# Patient Record
Sex: FEMALE | Race: WHITE | HISPANIC OR LATINO | Employment: UNEMPLOYED | URBAN - METROPOLITAN AREA
[De-identification: names, ages, dates, MRNs, and addresses within clinical notes are randomized per-mention and may not be internally consistent; named-entity substitution may affect disease eponyms.]

---

## 2017-01-12 ENCOUNTER — ALLSCRIPTS OFFICE VISIT (OUTPATIENT)
Dept: OTHER | Facility: OTHER | Age: 42
End: 2017-01-12

## 2017-01-12 ENCOUNTER — LAB REQUISITION (OUTPATIENT)
Dept: LAB | Facility: HOSPITAL | Age: 42
End: 2017-01-12
Payer: COMMERCIAL

## 2017-01-12 DIAGNOSIS — N93.9 ABNORMAL UTERINE AND VAGINAL BLEEDING, UNSPECIFIED: ICD-10-CM

## 2017-01-12 LAB — HCG, QUALITATIVE (HISTORICAL): NEGATIVE

## 2017-01-12 PROCEDURE — 88305 TISSUE EXAM BY PATHOLOGIST: CPT | Performed by: OBSTETRICS & GYNECOLOGY

## 2017-01-16 ENCOUNTER — ALLSCRIPTS OFFICE VISIT (OUTPATIENT)
Dept: OTHER | Facility: OTHER | Age: 42
End: 2017-01-16

## 2017-02-21 ENCOUNTER — APPOINTMENT (OUTPATIENT)
Dept: LAB | Facility: CLINIC | Age: 42
End: 2017-02-21
Payer: COMMERCIAL

## 2017-02-21 ENCOUNTER — ALLSCRIPTS OFFICE VISIT (OUTPATIENT)
Dept: OTHER | Facility: OTHER | Age: 42
End: 2017-02-21

## 2017-02-21 DIAGNOSIS — R94.6 ABNORMAL RESULTS OF THYROID FUNCTION STUDIES: ICD-10-CM

## 2017-02-21 LAB
ALBUMIN SERPL BCP-MCNC: 3.4 G/DL (ref 3.5–5)
ALP SERPL-CCNC: 82 U/L (ref 46–116)
ALT SERPL W P-5'-P-CCNC: 22 U/L (ref 12–78)
ANION GAP SERPL CALCULATED.3IONS-SCNC: 6 MMOL/L (ref 4–13)
AST SERPL W P-5'-P-CCNC: 15 U/L (ref 5–45)
BILIRUB SERPL-MCNC: 0.31 MG/DL (ref 0.2–1)
BUN SERPL-MCNC: 10 MG/DL (ref 5–25)
CALCIUM SERPL-MCNC: 8.3 MG/DL (ref 8.3–10.1)
CHLORIDE SERPL-SCNC: 106 MMOL/L (ref 100–108)
CO2 SERPL-SCNC: 28 MMOL/L (ref 21–32)
CREAT SERPL-MCNC: 0.7 MG/DL (ref 0.6–1.3)
GFR SERPL CREATININE-BSD FRML MDRD: >60 ML/MIN/1.73SQ M
GLUCOSE SERPL-MCNC: 93 MG/DL (ref 65–140)
POTASSIUM SERPL-SCNC: 3.5 MMOL/L (ref 3.5–5.3)
PROT SERPL-MCNC: 7.6 G/DL (ref 6.4–8.2)
SODIUM SERPL-SCNC: 140 MMOL/L (ref 136–145)
T3 SERPL-MCNC: 1.1 NG/ML (ref 0.6–1.8)
T4 FREE SERPL-MCNC: 0.88 NG/DL (ref 0.76–1.46)
TSH SERPL DL<=0.05 MIU/L-ACNC: 8.33 UIU/ML (ref 0.36–3.74)

## 2017-02-21 PROCEDURE — 84480 ASSAY TRIIODOTHYRONINE (T3): CPT

## 2017-02-21 PROCEDURE — 84443 ASSAY THYROID STIM HORMONE: CPT

## 2017-02-21 PROCEDURE — 80053 COMPREHEN METABOLIC PANEL: CPT

## 2017-02-21 PROCEDURE — 36415 COLL VENOUS BLD VENIPUNCTURE: CPT

## 2017-02-21 PROCEDURE — 84439 ASSAY OF FREE THYROXINE: CPT

## 2017-03-13 ENCOUNTER — ALLSCRIPTS OFFICE VISIT (OUTPATIENT)
Dept: OTHER | Facility: OTHER | Age: 42
End: 2017-03-13

## 2017-03-23 ENCOUNTER — GENERIC CONVERSION - ENCOUNTER (OUTPATIENT)
Dept: OTHER | Facility: OTHER | Age: 42
End: 2017-03-23

## 2017-03-28 ENCOUNTER — ALLSCRIPTS OFFICE VISIT (OUTPATIENT)
Dept: OTHER | Facility: OTHER | Age: 42
End: 2017-03-28

## 2017-03-28 DIAGNOSIS — Z01.818 ENCOUNTER FOR OTHER PREPROCEDURAL EXAMINATION: ICD-10-CM

## 2017-03-28 DIAGNOSIS — R39.9 UNSPECIFIED SYMPTOMS AND SIGNS INVOLVING THE GENITOURINARY SYSTEM: ICD-10-CM

## 2017-03-28 DIAGNOSIS — E03.8 OTHER SPECIFIED HYPOTHYROIDISM: ICD-10-CM

## 2017-03-28 DIAGNOSIS — N93.9 ABNORMAL UTERINE AND VAGINAL BLEEDING, UNSPECIFIED: ICD-10-CM

## 2017-03-30 ENCOUNTER — TRANSCRIBE ORDERS (OUTPATIENT)
Dept: LAB | Facility: HOSPITAL | Age: 42
End: 2017-03-30

## 2017-03-30 ENCOUNTER — APPOINTMENT (OUTPATIENT)
Dept: LAB | Facility: HOSPITAL | Age: 42
End: 2017-03-30
Payer: COMMERCIAL

## 2017-03-30 DIAGNOSIS — M79.7 MUSCLE PAIN, FIBROMYALGIA: Primary | ICD-10-CM

## 2017-03-30 DIAGNOSIS — M79.7 MUSCLE PAIN, FIBROMYALGIA: ICD-10-CM

## 2017-03-30 DIAGNOSIS — M79.7 RHEUMATISM, UNSPECIFIED AND FIBROSITIS: ICD-10-CM

## 2017-03-30 DIAGNOSIS — M79.0 RHEUMATISM, UNSPECIFIED AND FIBROSITIS: ICD-10-CM

## 2017-03-30 DIAGNOSIS — R94.6 ABNORMAL RESULTS OF THYROID FUNCTION STUDIES: ICD-10-CM

## 2017-03-30 DIAGNOSIS — N93.9 ABNORMAL UTERINE AND VAGINAL BLEEDING, UNSPECIFIED: ICD-10-CM

## 2017-03-30 DIAGNOSIS — Z01.818 ENCOUNTER FOR OTHER PREPROCEDURAL EXAMINATION: ICD-10-CM

## 2017-03-30 LAB
25(OH)D3 SERPL-MCNC: 17.3 NG/ML (ref 30–100)
BASOPHILS # BLD AUTO: 0.03 THOUSANDS/ΜL (ref 0–0.1)
BASOPHILS NFR BLD AUTO: 0 % (ref 0–1)
C3 SERPL-MCNC: 121 MG/DL (ref 90–180)
C4 SERPL-MCNC: 24 MG/DL (ref 10–40)
CK SERPL-CCNC: 69 U/L (ref 26–192)
CRP SERPL QL: 5.4 MG/L
EOSINOPHIL # BLD AUTO: 0.08 THOUSAND/ΜL (ref 0–0.61)
EOSINOPHIL NFR BLD AUTO: 1 % (ref 0–6)
ERYTHROCYTE [DISTWIDTH] IN BLOOD BY AUTOMATED COUNT: 13.8 % (ref 11.6–15.1)
ERYTHROCYTE [SEDIMENTATION RATE] IN BLOOD: 27 MM/HOUR (ref 0–20)
EST. AVERAGE GLUCOSE BLD GHB EST-MCNC: 120 MG/DL
HBA1C MFR BLD: 5.8 % (ref 4.2–6.3)
HCT VFR BLD AUTO: 40.1 % (ref 34.8–46.1)
HGB BLD-MCNC: 13.2 G/DL (ref 11.5–15.4)
LACTATE SERPL-SCNC: 1 MMOL/L (ref 0.5–2)
LYMPHOCYTES # BLD AUTO: 2.1 THOUSANDS/ΜL (ref 0.6–4.47)
LYMPHOCYTES NFR BLD AUTO: 27 % (ref 14–44)
MCH RBC QN AUTO: 27.8 PG (ref 26.8–34.3)
MCHC RBC AUTO-ENTMCNC: 32.9 G/DL (ref 31.4–37.4)
MCV RBC AUTO: 85 FL (ref 82–98)
MONOCYTES # BLD AUTO: 0.52 THOUSAND/ΜL (ref 0.17–1.22)
MONOCYTES NFR BLD AUTO: 7 % (ref 4–12)
NEUTROPHILS # BLD AUTO: 5.11 THOUSANDS/ΜL (ref 1.85–7.62)
NEUTS SEG NFR BLD AUTO: 65 % (ref 43–75)
NRBC BLD AUTO-RTO: 0 /100 WBCS
PLATELET # BLD AUTO: 233 THOUSANDS/UL (ref 149–390)
PMV BLD AUTO: 11.2 FL (ref 8.9–12.7)
RBC # BLD AUTO: 4.74 MILLION/UL (ref 3.81–5.12)
T3 SERPL-MCNC: 1 NG/ML (ref 0.6–1.8)
T4 FREE SERPL-MCNC: 0.84 NG/DL (ref 0.76–1.46)
TSH SERPL DL<=0.05 MIU/L-ACNC: 8.05 UIU/ML (ref 0.36–3.74)
WBC # BLD AUTO: 7.87 THOUSAND/UL (ref 4.31–10.16)

## 2017-03-30 PROCEDURE — 85652 RBC SED RATE AUTOMATED: CPT

## 2017-03-30 PROCEDURE — 86147 CARDIOLIPIN ANTIBODY EA IG: CPT

## 2017-03-30 PROCEDURE — 84443 ASSAY THYROID STIM HORMONE: CPT

## 2017-03-30 PROCEDURE — 86140 C-REACTIVE PROTEIN: CPT

## 2017-03-30 PROCEDURE — 36415 COLL VENOUS BLD VENIPUNCTURE: CPT

## 2017-03-30 PROCEDURE — 86225 DNA ANTIBODY NATIVE: CPT

## 2017-03-30 PROCEDURE — 86430 RHEUMATOID FACTOR TEST QUAL: CPT

## 2017-03-30 PROCEDURE — 86235 NUCLEAR ANTIGEN ANTIBODY: CPT

## 2017-03-30 PROCEDURE — 86376 MICROSOMAL ANTIBODY EACH: CPT

## 2017-03-30 PROCEDURE — 84439 ASSAY OF FREE THYROXINE: CPT

## 2017-03-30 PROCEDURE — 84480 ASSAY TRIIODOTHYRONINE (T3): CPT

## 2017-03-30 PROCEDURE — 86200 CCP ANTIBODY: CPT

## 2017-03-30 PROCEDURE — 85025 COMPLETE CBC W/AUTO DIFF WBC: CPT

## 2017-03-30 PROCEDURE — 83605 ASSAY OF LACTIC ACID: CPT | Performed by: INTERNAL MEDICINE

## 2017-03-30 PROCEDURE — 86160 COMPLEMENT ANTIGEN: CPT

## 2017-03-30 PROCEDURE — 86146 BETA-2 GLYCOPROTEIN ANTIBODY: CPT

## 2017-03-30 PROCEDURE — 82550 ASSAY OF CK (CPK): CPT

## 2017-03-30 PROCEDURE — 86800 THYROGLOBULIN ANTIBODY: CPT

## 2017-03-30 PROCEDURE — 86038 ANTINUCLEAR ANTIBODIES: CPT

## 2017-03-30 PROCEDURE — 82306 VITAMIN D 25 HYDROXY: CPT

## 2017-03-30 PROCEDURE — 83036 HEMOGLOBIN GLYCOSYLATED A1C: CPT

## 2017-03-31 LAB
CARDIOLIPIN IGA SER IA-ACNC: <9 APL U/ML (ref 0–11)
CARDIOLIPIN IGG SER IA-ACNC: <9 GPL U/ML (ref 0–14)
CARDIOLIPIN IGM SER IA-ACNC: 14 MPL U/ML (ref 0–12)
DSDNA AB SER-ACNC: 4 IU/ML (ref 0–9)
ENA RNP AB SER-ACNC: <0.2 AI (ref 0–0.9)
ENA SM AB SER-ACNC: <0.2 AI (ref 0–0.9)
ENA SS-A AB SER-ACNC: <0.2 AI (ref 0–0.9)
ENA SS-B AB SER-ACNC: <0.2 AI (ref 0–0.9)
RHEUMATOID FACT SER QL LA: NEGATIVE
RYE IGE QN: NEGATIVE
THYROGLOB AB SERPL-ACNC: 76.1 IU/ML (ref 0–0.9)
THYROPEROXIDASE AB SERPL-ACNC: >600 IU/ML (ref 0–34)

## 2017-04-01 LAB
B2 GLYCOPROT1 IGA SER-ACNC: <9 GPI IGA UNITS (ref 0–25)
B2 GLYCOPROT1 IGG SER-ACNC: <9 GPI IGG UNITS (ref 0–20)
B2 GLYCOPROT1 IGM SER-ACNC: <9 GPI IGM UNITS (ref 0–32)

## 2017-04-02 LAB — CCP IGA+IGG SERPL IA-ACNC: 8 UNITS (ref 0–19)

## 2017-04-13 ENCOUNTER — ALLSCRIPTS OFFICE VISIT (OUTPATIENT)
Dept: OTHER | Facility: OTHER | Age: 42
End: 2017-04-13

## 2017-04-17 ENCOUNTER — APPOINTMENT (OUTPATIENT)
Dept: LAB | Facility: CLINIC | Age: 42
End: 2017-04-17
Payer: COMMERCIAL

## 2017-04-17 DIAGNOSIS — R39.9 UNSPECIFIED SYMPTOMS AND SIGNS INVOLVING THE GENITOURINARY SYSTEM: ICD-10-CM

## 2017-04-17 LAB
BACTERIA UR QL AUTO: ABNORMAL /HPF
BILIRUB UR QL STRIP: NEGATIVE
CLARITY UR: ABNORMAL
COLOR UR: YELLOW
GLUCOSE UR STRIP-MCNC: NEGATIVE MG/DL
HGB UR QL STRIP.AUTO: NEGATIVE
HYALINE CASTS #/AREA URNS LPF: ABNORMAL /LPF
KETONES UR STRIP-MCNC: NEGATIVE MG/DL
LEUKOCYTE ESTERASE UR QL STRIP: ABNORMAL
NITRITE UR QL STRIP: NEGATIVE
NON-SQ EPI CELLS URNS QL MICRO: ABNORMAL /HPF
PH UR STRIP.AUTO: 6 [PH] (ref 4.5–8)
PROT UR STRIP-MCNC: NEGATIVE MG/DL
RBC #/AREA URNS AUTO: ABNORMAL /HPF
SP GR UR STRIP.AUTO: 1.01 (ref 1–1.03)
UROBILINOGEN UR QL STRIP.AUTO: 0.2 E.U./DL
WBC #/AREA URNS AUTO: ABNORMAL /HPF

## 2017-04-17 PROCEDURE — 87086 URINE CULTURE/COLONY COUNT: CPT

## 2017-04-17 PROCEDURE — 81001 URINALYSIS AUTO W/SCOPE: CPT

## 2017-04-18 LAB — BACTERIA UR CULT: NORMAL

## 2017-04-20 ENCOUNTER — HOSPITAL ENCOUNTER (OUTPATIENT)
Dept: RADIOLOGY | Facility: HOSPITAL | Age: 42
Discharge: HOME | End: 2017-04-20
Payer: COMMERCIAL

## 2017-04-20 DIAGNOSIS — E03.8 OTHER SPECIFIED HYPOTHYROIDISM: ICD-10-CM

## 2017-04-20 PROCEDURE — 76536 US EXAM OF HEAD AND NECK: CPT

## 2017-05-24 ENCOUNTER — GENERIC CONVERSION - ENCOUNTER (OUTPATIENT)
Dept: OTHER | Facility: OTHER | Age: 42
End: 2017-05-24

## 2017-05-25 DIAGNOSIS — E03.8 OTHER SPECIFIED HYPOTHYROIDISM: ICD-10-CM

## 2017-06-01 ENCOUNTER — TRANSCRIBE ORDERS (OUTPATIENT)
Dept: LAB | Facility: HOSPITAL | Age: 42
End: 2017-06-01

## 2017-06-01 ENCOUNTER — APPOINTMENT (OUTPATIENT)
Dept: LAB | Facility: HOSPITAL | Age: 42
End: 2017-06-01
Payer: COMMERCIAL

## 2017-06-01 DIAGNOSIS — E03.8 OTHER SPECIFIED HYPOTHYROIDISM: ICD-10-CM

## 2017-06-01 LAB
T3 SERPL-MCNC: 1 NG/ML (ref 0.6–1.8)
T4 FREE SERPL-MCNC: 0.97 NG/DL (ref 0.76–1.46)
TSH SERPL DL<=0.05 MIU/L-ACNC: 4.04 UIU/ML (ref 0.36–3.74)

## 2017-06-01 PROCEDURE — 84443 ASSAY THYROID STIM HORMONE: CPT

## 2017-06-01 PROCEDURE — 84480 ASSAY TRIIODOTHYRONINE (T3): CPT

## 2017-06-01 PROCEDURE — 84439 ASSAY OF FREE THYROXINE: CPT

## 2017-06-01 PROCEDURE — 36415 COLL VENOUS BLD VENIPUNCTURE: CPT

## 2017-06-08 ENCOUNTER — ALLSCRIPTS OFFICE VISIT (OUTPATIENT)
Dept: OTHER | Facility: OTHER | Age: 42
End: 2017-06-08

## 2017-07-26 ENCOUNTER — APPOINTMENT (OUTPATIENT)
Dept: LAB | Facility: HOSPITAL | Age: 42
End: 2017-07-26
Attending: INTERNAL MEDICINE
Payer: COMMERCIAL

## 2017-07-26 ENCOUNTER — TRANSCRIBE ORDERS (OUTPATIENT)
Dept: LAB | Facility: HOSPITAL | Age: 42
End: 2017-07-26

## 2017-07-26 DIAGNOSIS — M79.7 FIBROMYALGIA: Primary | ICD-10-CM

## 2017-07-26 DIAGNOSIS — M79.7 FIBROMYALGIA: ICD-10-CM

## 2017-07-26 LAB
CRP SERPL QL: 3.5 MG/L
ERYTHROCYTE [SEDIMENTATION RATE] IN BLOOD: 18 MM/HOUR (ref 0–20)

## 2017-07-26 PROCEDURE — 36415 COLL VENOUS BLD VENIPUNCTURE: CPT

## 2017-07-26 PROCEDURE — 85613 RUSSELL VIPER VENOM DILUTED: CPT

## 2017-07-26 PROCEDURE — 86147 CARDIOLIPIN ANTIBODY EA IG: CPT

## 2017-07-26 PROCEDURE — 85652 RBC SED RATE AUTOMATED: CPT

## 2017-07-26 PROCEDURE — 85705 THROMBOPLASTIN INHIBITION: CPT

## 2017-07-26 PROCEDURE — 85732 THROMBOPLASTIN TIME PARTIAL: CPT

## 2017-07-26 PROCEDURE — 86140 C-REACTIVE PROTEIN: CPT

## 2017-07-26 PROCEDURE — 85670 THROMBIN TIME PLASMA: CPT

## 2017-07-27 LAB
CARDIOLIPIN IGA SER IA-ACNC: <9 APL U/ML (ref 0–11)
CARDIOLIPIN IGG SER IA-ACNC: <9 GPL U/ML (ref 0–14)
CARDIOLIPIN IGM SER IA-ACNC: 16 MPL U/ML (ref 0–12)

## 2017-07-28 LAB
APTT SCREEN TO CONFIRM RATIO: 0.92 RATIO (ref 0–1.4)
CONFIRM APTT/NORMAL: 43 SEC (ref 0–55)
LA PPP-IMP: NORMAL
SCREEN APTT: 42.3 SEC (ref 0–51.9)
SCREEN DRVVT: 37.6 SEC (ref 0–47)
THROMBIN TIME: 20.5 SEC (ref 0–23)

## 2017-08-07 DIAGNOSIS — E03.8 OTHER SPECIFIED HYPOTHYROIDISM: ICD-10-CM

## 2017-08-14 ENCOUNTER — APPOINTMENT (OUTPATIENT)
Dept: LAB | Facility: CLINIC | Age: 42
End: 2017-08-14
Payer: COMMERCIAL

## 2017-08-14 DIAGNOSIS — E03.8 OTHER SPECIFIED HYPOTHYROIDISM: ICD-10-CM

## 2017-08-14 LAB
T3 SERPL-MCNC: 1.1 NG/ML (ref 0.6–1.8)
T4 FREE SERPL-MCNC: 1.1 NG/DL (ref 0.76–1.46)
TSH SERPL DL<=0.05 MIU/L-ACNC: 3.27 UIU/ML (ref 0.36–3.74)

## 2017-08-14 PROCEDURE — 84443 ASSAY THYROID STIM HORMONE: CPT

## 2017-08-14 PROCEDURE — 36415 COLL VENOUS BLD VENIPUNCTURE: CPT

## 2017-08-14 PROCEDURE — 84439 ASSAY OF FREE THYROXINE: CPT

## 2017-08-14 PROCEDURE — 84480 ASSAY TRIIODOTHYRONINE (T3): CPT

## 2017-10-21 ENCOUNTER — APPOINTMENT (EMERGENCY)
Dept: RADIOLOGY | Facility: HOSPITAL | Age: 42
DRG: 263 | End: 2017-10-21
Payer: COMMERCIAL

## 2017-10-21 ENCOUNTER — HOSPITAL ENCOUNTER (INPATIENT)
Facility: HOSPITAL | Age: 42
LOS: 2 days | Discharge: HOME | DRG: 263 | End: 2017-10-23
Attending: EMERGENCY MEDICINE | Admitting: SURGERY
Payer: COMMERCIAL

## 2017-10-21 DIAGNOSIS — K81.9 CHOLECYSTITIS: Primary | ICD-10-CM

## 2017-10-21 LAB
ABO GROUP BLD: NORMAL
ALBUMIN SERPL BCP-MCNC: 3.3 G/DL (ref 3.5–5)
ALP SERPL-CCNC: 75 U/L (ref 46–116)
ALT SERPL W P-5'-P-CCNC: 19 U/L (ref 12–78)
ANION GAP SERPL CALCULATED.3IONS-SCNC: 7 MMOL/L (ref 4–13)
AST SERPL W P-5'-P-CCNC: 18 U/L (ref 5–45)
BACTERIA UR QL AUTO: ABNORMAL /HPF
BASOPHILS # BLD AUTO: 0.03 THOUSANDS/ΜL (ref 0–0.1)
BASOPHILS NFR BLD AUTO: 0 % (ref 0–1)
BILIRUB SERPL-MCNC: 0.15 MG/DL (ref 0.2–1)
BILIRUB UR QL STRIP: NEGATIVE
BLD GP AB SCN SERPL QL: NEGATIVE
BUN SERPL-MCNC: 12 MG/DL (ref 5–25)
CALCIUM SERPL-MCNC: 8.5 MG/DL (ref 8.3–10.1)
CHLORIDE SERPL-SCNC: 105 MMOL/L (ref 100–108)
CLARITY UR: CLEAR
CO2 SERPL-SCNC: 27 MMOL/L (ref 21–32)
COLOR UR: YELLOW
COLOR, POC: NORMAL
CREAT SERPL-MCNC: 0.71 MG/DL (ref 0.6–1.3)
EOSINOPHIL # BLD AUTO: 0.16 THOUSAND/ΜL (ref 0–0.61)
EOSINOPHIL NFR BLD AUTO: 2 % (ref 0–6)
ERYTHROCYTE [DISTWIDTH] IN BLOOD BY AUTOMATED COUNT: 14.5 % (ref 11.6–15.1)
EXT PREG TEST URINE: NEGATIVE
GFR SERPL CREATININE-BSD FRML MDRD: 106 ML/MIN/1.73SQ M
GLUCOSE SERPL-MCNC: 86 MG/DL (ref 65–140)
GLUCOSE UR STRIP-MCNC: NEGATIVE MG/DL
HCT VFR BLD AUTO: 37.7 % (ref 34.8–46.1)
HGB BLD-MCNC: 12.5 G/DL (ref 11.5–15.4)
HGB UR QL STRIP.AUTO: ABNORMAL
HYALINE CASTS #/AREA URNS LPF: ABNORMAL /LPF
KETONES UR STRIP-MCNC: NEGATIVE MG/DL
LEUKOCYTE ESTERASE UR QL STRIP: ABNORMAL
LIPASE SERPL-CCNC: 297 U/L (ref 73–393)
LYMPHOCYTES # BLD AUTO: 2.69 THOUSANDS/ΜL (ref 0.6–4.47)
LYMPHOCYTES NFR BLD AUTO: 33 % (ref 14–44)
MCH RBC QN AUTO: 28.1 PG (ref 26.8–34.3)
MCHC RBC AUTO-ENTMCNC: 33.2 G/DL (ref 31.4–37.4)
MCV RBC AUTO: 85 FL (ref 82–98)
MONOCYTES # BLD AUTO: 0.56 THOUSAND/ΜL (ref 0.17–1.22)
MONOCYTES NFR BLD AUTO: 7 % (ref 4–12)
NEUTROPHILS # BLD AUTO: 4.62 THOUSANDS/ΜL (ref 1.85–7.62)
NEUTS SEG NFR BLD AUTO: 58 % (ref 43–75)
NITRITE UR QL STRIP: NEGATIVE
NON-SQ EPI CELLS URNS QL MICRO: ABNORMAL /HPF
NRBC BLD AUTO-RTO: 0 /100 WBCS
PH UR STRIP.AUTO: 6.5 [PH] (ref 4.5–8)
PLATELET # BLD AUTO: 215 THOUSANDS/UL (ref 149–390)
PLATELET # BLD AUTO: 238 THOUSANDS/UL (ref 149–390)
PMV BLD AUTO: 11.3 FL (ref 8.9–12.7)
PMV BLD AUTO: 11.4 FL (ref 8.9–12.7)
POTASSIUM SERPL-SCNC: 3.8 MMOL/L (ref 3.5–5.3)
PROT SERPL-MCNC: 7.9 G/DL (ref 6.4–8.2)
PROT UR STRIP-MCNC: ABNORMAL MG/DL
RBC # BLD AUTO: 4.45 MILLION/UL (ref 3.81–5.12)
RBC #/AREA URNS AUTO: ABNORMAL /HPF
RH BLD: POSITIVE
SODIUM SERPL-SCNC: 139 MMOL/L (ref 136–145)
SP GR UR STRIP.AUTO: 1.02 (ref 1–1.03)
SPECIMEN EXPIRATION DATE: NORMAL
UROBILINOGEN UR QL STRIP.AUTO: 0.2 E.U./DL
WBC # BLD AUTO: 8.09 THOUSAND/UL (ref 4.31–10.16)
WBC #/AREA URNS AUTO: ABNORMAL /HPF

## 2017-10-21 PROCEDURE — 36415 COLL VENOUS BLD VENIPUNCTURE: CPT | Performed by: EMERGENCY MEDICINE

## 2017-10-21 PROCEDURE — 86923 COMPATIBILITY TEST ELECTRIC: CPT

## 2017-10-21 PROCEDURE — 96374 THER/PROPH/DIAG INJ IV PUSH: CPT

## 2017-10-21 PROCEDURE — 87086 URINE CULTURE/COLONY COUNT: CPT

## 2017-10-21 PROCEDURE — 81001 URINALYSIS AUTO W/SCOPE: CPT

## 2017-10-21 PROCEDURE — 81002 URINALYSIS NONAUTO W/O SCOPE: CPT | Performed by: EMERGENCY MEDICINE

## 2017-10-21 PROCEDURE — 86850 RBC ANTIBODY SCREEN: CPT

## 2017-10-21 PROCEDURE — 80053 COMPREHEN METABOLIC PANEL: CPT | Performed by: EMERGENCY MEDICINE

## 2017-10-21 PROCEDURE — 85025 COMPLETE CBC W/AUTO DIFF WBC: CPT | Performed by: EMERGENCY MEDICINE

## 2017-10-21 PROCEDURE — 86900 BLOOD TYPING SEROLOGIC ABO: CPT

## 2017-10-21 PROCEDURE — 96361 HYDRATE IV INFUSION ADD-ON: CPT

## 2017-10-21 PROCEDURE — 81025 URINE PREGNANCY TEST: CPT | Performed by: EMERGENCY MEDICINE

## 2017-10-21 PROCEDURE — 85049 AUTOMATED PLATELET COUNT: CPT | Performed by: SURGERY

## 2017-10-21 PROCEDURE — 83690 ASSAY OF LIPASE: CPT | Performed by: EMERGENCY MEDICINE

## 2017-10-21 PROCEDURE — 99285 EMERGENCY DEPT VISIT HI MDM: CPT

## 2017-10-21 PROCEDURE — 96375 TX/PRO/DX INJ NEW DRUG ADDON: CPT

## 2017-10-21 PROCEDURE — 86901 BLOOD TYPING SEROLOGIC RH(D): CPT

## 2017-10-21 PROCEDURE — 76705 ECHO EXAM OF ABDOMEN: CPT

## 2017-10-21 RX ORDER — HEPARIN SODIUM 5000 [USP'U]/ML
5000 INJECTION, SOLUTION INTRAVENOUS; SUBCUTANEOUS EVERY 8 HOURS SCHEDULED
Status: DISCONTINUED | OUTPATIENT
Start: 2017-10-21 | End: 2017-10-23 | Stop reason: HOSPADM

## 2017-10-21 RX ORDER — OXYCODONE HYDROCHLORIDE 5 MG/1
5 TABLET ORAL EVERY 4 HOURS PRN
Status: DISCONTINUED | OUTPATIENT
Start: 2017-10-21 | End: 2017-10-23 | Stop reason: HOSPADM

## 2017-10-21 RX ORDER — FENTANYL CITRATE 50 UG/ML
50 INJECTION, SOLUTION INTRAMUSCULAR; INTRAVENOUS ONCE
Status: COMPLETED | OUTPATIENT
Start: 2017-10-21 | End: 2017-10-21

## 2017-10-21 RX ORDER — LEVOTHYROXINE SODIUM 0.03 MG/1
25 TABLET ORAL DAILY
COMMUNITY
End: 2018-02-20 | Stop reason: SDUPTHER

## 2017-10-21 RX ORDER — ONDANSETRON 2 MG/ML
4 INJECTION INTRAMUSCULAR; INTRAVENOUS ONCE
Status: COMPLETED | OUTPATIENT
Start: 2017-10-21 | End: 2017-10-21

## 2017-10-21 RX ORDER — ONDANSETRON 2 MG/ML
4 INJECTION INTRAMUSCULAR; INTRAVENOUS EVERY 4 HOURS PRN
Status: DISCONTINUED | OUTPATIENT
Start: 2017-10-21 | End: 2017-10-23 | Stop reason: HOSPADM

## 2017-10-21 RX ORDER — ACETAMINOPHEN 325 MG/1
650 TABLET ORAL EVERY 4 HOURS PRN
Status: DISCONTINUED | OUTPATIENT
Start: 2017-10-21 | End: 2017-10-23 | Stop reason: HOSPADM

## 2017-10-21 RX ORDER — OXYCODONE HYDROCHLORIDE 10 MG/1
10 TABLET ORAL EVERY 4 HOURS PRN
Status: DISCONTINUED | OUTPATIENT
Start: 2017-10-21 | End: 2017-10-23 | Stop reason: HOSPADM

## 2017-10-21 RX ORDER — SODIUM CHLORIDE 9 MG/ML
125 INJECTION, SOLUTION INTRAVENOUS CONTINUOUS
Status: DISCONTINUED | OUTPATIENT
Start: 2017-10-21 | End: 2017-10-22

## 2017-10-21 RX ADMIN — HYDROMORPHONE HYDROCHLORIDE 0.5 MG: 1 INJECTION, SOLUTION INTRAMUSCULAR; INTRAVENOUS; SUBCUTANEOUS at 19:24

## 2017-10-21 RX ADMIN — HEPARIN SODIUM 5000 UNITS: 5000 INJECTION, SOLUTION INTRAVENOUS; SUBCUTANEOUS at 21:36

## 2017-10-21 RX ADMIN — ONDANSETRON 4 MG: 2 INJECTION INTRAMUSCULAR; INTRAVENOUS at 18:29

## 2017-10-21 RX ADMIN — SODIUM CHLORIDE 1000 ML: 0.9 INJECTION, SOLUTION INTRAVENOUS at 18:30

## 2017-10-21 RX ADMIN — SODIUM CHLORIDE 125 ML/HR: 0.9 INJECTION, SOLUTION INTRAVENOUS at 21:35

## 2017-10-21 RX ADMIN — OXYCODONE HYDROCHLORIDE 10 MG: 10 TABLET ORAL at 22:26

## 2017-10-21 RX ADMIN — ONDANSETRON 4 MG: 2 INJECTION INTRAMUSCULAR; INTRAVENOUS at 23:29

## 2017-10-21 RX ADMIN — HYDROMORPHONE HYDROCHLORIDE 1 MG: 1 INJECTION, SOLUTION INTRAMUSCULAR; INTRAVENOUS; SUBCUTANEOUS at 23:29

## 2017-10-21 RX ADMIN — FENTANYL CITRATE 50 MCG: 50 INJECTION INTRAMUSCULAR; INTRAVENOUS at 18:29

## 2017-10-21 NOTE — ED PROVIDER NOTES
History  Chief Complaint   Patient presents with    Abdominal Pain     right upper quadrant abd pain which wraps around to her back and flank, also feel alot of pressure in my lung     HPI  20-year-old woman with row history of gallstones presents for evaluation of right upper quadrant pain  Patient was in usual state of health yesterday which was eating a hash browns from [employer] when she had acute onset of cramping pain in the right upper quadrant  It is radiating into her back  She said that the pain was there all day yesterday intermittently worsening  Patient had 1 of these episodes that the pain came on suddenly approximately half hour prior to arrival and states that the pain is not getting better  It is radiating to her right CVA as well as radiating into her left upper quadrant  The patient doses nausea without vomiting  Patient is still passing flatus, last BM was this morning unremarkable nonbloody  Patient does have history of abdominal surgery in the form of getting her uterine tubes removed  Patient denies fevers chills chest pain shortness of breath  Prior to Admission Medications   Prescriptions Last Dose Informant Patient Reported? Taking?   levothyroxine 25 mcg tablet   Yes Yes   Sig: Take 25 mcg by mouth daily      Facility-Administered Medications: None       Past Medical History:   Diagnosis Date    Disease of thyroid gland     Fibromyalgia     Herniated intervertebral disc of lumbar spine        History reviewed  No pertinent surgical history  History reviewed  No pertinent family history  I have reviewed and agree with the history as documented  Social History   Substance Use Topics    Smoking status: Never Smoker    Smokeless tobacco: Never Used    Alcohol use No        Review of Systems   Constitutional: Negative for activity change, chills, diaphoresis and fever  HENT: Negative for ear pain, trouble swallowing and voice change  Eyes: Negative for pain  Respiratory: Negative for chest tightness, shortness of breath, wheezing and stridor  Cardiovascular: Negative for chest pain, palpitations and leg swelling  Gastrointestinal: Positive for abdominal pain and nausea  Negative for abdominal distention, constipation, diarrhea and vomiting  R upper quadrant pain   Endocrine: Negative for polyuria  Genitourinary: Positive for flank pain  Negative for dysuria and frequency  Musculoskeletal: Negative for back pain and myalgias  Skin: Negative for rash  Neurological: Negative for dizziness, syncope, weakness and headaches  Psychiatric/Behavioral: Negative for agitation and dysphoric mood  Physical Exam  ED Triage Vitals   Temperature Pulse Respirations Blood Pressure SpO2   10/21/17 1752 10/21/17 1752 10/21/17 1752 10/21/17 1752 10/21/17 1752   (!) 97 2 °F (36 2 °C) 72 18 115/57 99 %      Temp Source Heart Rate Source Patient Position - Orthostatic VS BP Location FiO2 (%)   10/21/17 1752 10/21/17 1752 10/21/17 1752 10/21/17 1752 --   Tympanic Monitor Sitting Left arm       Pain Score       10/21/17 1751       9           Physical Exam   Constitutional: She is oriented to person, place, and time  She appears well-developed and well-nourished  No distress  HENT:   Head: Normocephalic and atraumatic  Right Ear: External ear normal    Left Ear: External ear normal    Mouth/Throat: Oropharynx is clear and moist    Eyes: Conjunctivae and EOM are normal  Pupils are equal, round, and reactive to light  Right eye exhibits no discharge  Left eye exhibits no discharge  No scleral icterus  Neck: Normal range of motion  Neck supple  No tracheal deviation present  No thyromegaly present  Cardiovascular: Normal rate, regular rhythm and intact distal pulses  Exam reveals no gallop and no friction rub  No murmur heard  Pulmonary/Chest: Effort normal and breath sounds normal  No stridor  No respiratory distress  She has no wheezes  She has no rales  Abdominal: Soft  Bowel sounds are normal  She exhibits no distension  There is tenderness  There is guarding  There is no rebound  Tenderness in LUQ and RUQ  No ttp in the RLQ or LLQ  Musculoskeletal: Normal range of motion  She exhibits no edema or deformity  Neurological: She is alert and oriented to person, place, and time  No cranial nerve deficit  Skin: Skin is warm and dry  No rash noted  She is not diaphoretic  No erythema  Psychiatric: She has a normal mood and affect  Her behavior is normal  Thought content normal    Nursing note and vitals reviewed                ED Medications  Medications   ondansetron (ZOFRAN) injection 4 mg ( Intravenous MAR Unhold 10/22/17 1245)   heparin (porcine) subcutaneous injection 5,000 Units (5,000 Units Subcutaneous Given 10/22/17 1337)   acetaminophen (TYLENOL) tablet 650 mg ( Oral MAR Unhold 10/22/17 1245)   oxyCODONE (ROXICODONE) IR tablet 5 mg ( Oral MAR Unhold 10/22/17 1245)   oxyCODONE (ROXICODONE) immediate release tablet 10 mg ( Oral MAR Unhold 10/22/17 1245)   HYDROmorphone (DILAUDID) 1 mg/mL injection 1 mg ( Intravenous MAR Unhold 10/22/17 1245)   lactated ringers infusion (20 mL/hr Intravenous New Bag 10/22/17 1246)   sodium chloride 0 9 % bolus 1,000 mL (0 mL Intravenous Stopped 10/21/17 1927)   ondansetron (ZOFRAN) injection 4 mg (4 mg Intravenous Given 10/21/17 1829)   fentanyl citrate (PF) 100 MCG/2ML 50 mcg (50 mcg Intravenous Given 10/21/17 1829)   HYDROmorphone (DILAUDID) 1 mg/mL injection 0 5 mg (0 5 mg Intravenous Given 10/21/17 1924)       Diagnostic Studies  Labs Reviewed   URINE MICROSCOPIC - Abnormal        Result Value Ref Range Status    RBC, UA 4-10 (*) None Seen, 0-5 /hpf Final    WBC, UA 30-50 (*) None Seen, 0-5, 5-55, 5-65 /hpf Final    Epithelial Cells Moderate (*) None Seen, Occasional /hpf Final    Bacteria, UA Moderate (*) None Seen, Occasional /hpf Final    Hyaline Casts, UA None Seen  None Seen /lpf Final   COMPREHENSIVE Clear   Final    pH, UA 6 5  4 5 - 8 0 Final    Nitrite, UA Negative  Negative Final    Glucose, UA Negative  Negative mg/dl Final    Ketones, UA Negative  Negative mg/dl Final    Urobilinogen, UA 0 2  0 2, 1 0 E U /dl E U /dl Final    Bilirubin, UA Negative  Negative Final    Specific Gravity, UA 1 020  1 003 - 1 030 Final    Narrative:     CLINITEK RESULT   CBC AND DIFFERENTIAL - Normal    WBC 8 09  4 31 - 10 16 Thousand/uL Final    RBC 4 45  3 81 - 5 12 Million/uL Final    Hemoglobin 12 5  11 5 - 15 4 g/dL Final    Hematocrit 37 7  34 8 - 46 1 % Final    MCV 85  82 - 98 fL Final    MCH 28 1  26 8 - 34 3 pg Final    MCHC 33 2  31 4 - 37 4 g/dL Final    RDW 14 5  11 6 - 15 1 % Final    MPV 11 3  8 9 - 12 7 fL Final    Platelets 572  394 - 390 Thousands/uL Final    nRBC 0  /100 WBCs Final    Neutrophils Relative 58  43 - 75 % Final    Lymphocytes Relative 33  14 - 44 % Final    Monocytes Relative 7  4 - 12 % Final    Eosinophils Relative 2  0 - 6 % Final    Basophils Relative 0  0 - 1 % Final    Neutrophils Absolute 4 62  1 85 - 7 62 Thousands/µL Final    Lymphocytes Absolute 2 69  0 60 - 4 47 Thousands/µL Final    Monocytes Absolute 0 56  0 17 - 1 22 Thousand/µL Final    Eosinophils Absolute 0 16  0 00 - 0 61 Thousand/µL Final    Basophils Absolute 0 03  0 00 - 0 10 Thousands/µL Final   LIPASE - Normal    Lipase 297  73 - 393 u/L Final   PLATELET COUNT - Normal    Platelets 092  305 - 390 Thousands/uL Final    MPV 11 4  8 9 - 12 7 fL Final   POCT URINALYSIS DIPSTICK - Normal    Color, UA see results   Final   POCT PREGNANCY, URINE - Normal    EXT PREG TEST UR (Ref: Negative) Negative   Final   URINE CULTURE       US gallbladder   Final Result      Numerous gallstones  Positive Pool's sign  No visible gallbladder wall thickening or pericholecystic fluid  Gallbladder is moderately distended  Consider follow-up HIDA scan if the clinical diagnosis remains indeterminate           Workstation performed: [id number]             Procedures  Procedures      Phone Consults  ED Phone Contact    ED Course  ED Course                                MDM  Number of Diagnoses or Management Options  Diagnosis management comments: 50-year-old woman presents with right upper quadrant pain  Likely biliary colic given the location as well as known stones as well as stones being seen on ultrasound  Will get CBC CMP and lipase  We will get a formal ultrasound the size of the common bile duct  Disposition will be pending results of an ultrasound  Will give patient pain control, fluid rehydration  We will keep her NPO  Disposition will be to either surgery versus Medicine with GI consult based on the results of the [address]  CritCare Time    Disposition  Final diagnoses:   Cholecystitis     ED Disposition     ED Disposition Condition Comment    Admit  Case was discussed with Surgery and pt was admitted to their service        Follow-up Information    None       Current Discharge Medication List      CONTINUE these medications which have NOT CHANGED    Details   levothyroxine 25 mcg tablet Take 25 mcg by mouth daily           No discharge procedures on file  ED Provider  Attending physically available and evaluated Paulo Godwin  RAMON managed the patient along with the ED Attending      Electronically Signed by       Alycia Houston MD  Resident  10/22/17 [id number]

## 2017-10-21 NOTE — ED ATTENDING ATTESTATION
Flor Green MD, saw and evaluated the patient  I have discussed the patient with the resident/non-physician practitioner and agree with the resident's/non-physician practitioner's findings, Plan of Care, and MDM as documented in the resident's/non-physician practitioner's note, except where noted  All available labs and Radiology studies were reviewed  At this point I agree with the current assessment done in the Emergency Department  I have conducted an independent evaluation of this patient a history and physical is as follows:  Patient here with right upper quadrant pain that radiates into her right flank  Patient states that the pain started and got worse, has been constant since yesterday  She states that it waxes and wanes in intensity, but is always present  It is associated with nausea  She has not had diarrhea  The patient does not have urinary symptoms  She is currently at the end of her period  The patient has known gallstones, and has never had pain like this before  She denies fevers or chills  She denies significant alcohol consumption or illicit substance abuse  She is otherwise healthy  Her review of systems otherwise negative in 12 systems were reviewed  On exam the patient appears uncomfortable  Her vital signs are normal  Her HEENT exam is nonfocal   Neck is supple and nontender with no adenopathy  Heart is regular with no murmurs, rubs, or gallops  Her lungs are clear and equal with no wheezes, rales, rhonchi  She has no CVA tenderness bilaterally  Her back exam is normal   On abdominal exam, she has significant tenderness in her right upper quadrant, and less tenderness in her epigastrium and left upper quadrant  There is no rebound, but she does have voluntary guarding  There are no masses appreciated  The patient's extremities are warm and well perfused with no rashes or skin changes  Her pulses are intact  She is neurologically intact   Impression: Abdominal pain, differential includes gallbladder disease, pancreatitis, will get formal ultrasound, labs, analgesia, anticipated surgical consult    Critical Care Time  CritCare Time

## 2017-10-21 NOTE — ED NOTES
Dr Patrice Meckel at bedside     Verna Buckley  10/21/17 [address] [facility]

## 2017-10-22 ENCOUNTER — ANESTHESIA EVENT (INPATIENT)
Dept: PERIOP | Facility: HOSPITAL | Age: 42
DRG: 263 | End: 2017-10-22
Payer: COMMERCIAL

## 2017-10-22 ENCOUNTER — ANESTHESIA (INPATIENT)
Dept: PERIOP | Facility: HOSPITAL | Age: 42
DRG: 263 | End: 2017-10-22
Payer: COMMERCIAL

## 2017-10-22 LAB
ALBUMIN SERPL BCP-MCNC: 2.9 G/DL (ref 3.5–5)
ALP SERPL-CCNC: 76 U/L (ref 46–116)
ALT SERPL W P-5'-P-CCNC: 34 U/L (ref 12–78)
ANION GAP SERPL CALCULATED.3IONS-SCNC: 6 MMOL/L (ref 4–13)
AST SERPL W P-5'-P-CCNC: 45 U/L (ref 5–45)
ATRIAL RATE: 75 BPM
BACTERIA UR CULT: NORMAL
BASOPHILS # BLD AUTO: 0.02 THOUSANDS/ΜL (ref 0–0.1)
BASOPHILS NFR BLD AUTO: 0 % (ref 0–1)
BILIRUB SERPL-MCNC: 0.55 MG/DL (ref 0.2–1)
BUN SERPL-MCNC: 10 MG/DL (ref 5–25)
CALCIUM SERPL-MCNC: 7.9 MG/DL (ref 8.3–10.1)
CHLORIDE SERPL-SCNC: 107 MMOL/L (ref 100–108)
CO2 SERPL-SCNC: 25 MMOL/L (ref 21–32)
CREAT SERPL-MCNC: 0.68 MG/DL (ref 0.6–1.3)
EOSINOPHIL # BLD AUTO: 0.03 THOUSAND/ΜL (ref 0–0.61)
EOSINOPHIL NFR BLD AUTO: 0 % (ref 0–6)
ERYTHROCYTE [DISTWIDTH] IN BLOOD BY AUTOMATED COUNT: 14.5 % (ref 11.6–15.1)
GFR SERPL CREATININE-BSD FRML MDRD: 109 ML/MIN/1.73SQ M
GLUCOSE SERPL-MCNC: 106 MG/DL (ref 65–140)
HCT VFR BLD AUTO: 35.5 % (ref 34.8–46.1)
HGB BLD-MCNC: 11.8 G/DL (ref 11.5–15.4)
INR PPP: 1.1 (ref 0.86–1.16)
LYMPHOCYTES # BLD AUTO: 2.04 THOUSANDS/ΜL (ref 0.6–4.47)
LYMPHOCYTES NFR BLD AUTO: 18 % (ref 14–44)
MCH RBC QN AUTO: 28.1 PG (ref 26.8–34.3)
MCHC RBC AUTO-ENTMCNC: 33.2 G/DL (ref 31.4–37.4)
MCV RBC AUTO: 85 FL (ref 82–98)
MONOCYTES # BLD AUTO: 0.71 THOUSAND/ΜL (ref 0.17–1.22)
MONOCYTES NFR BLD AUTO: 6 % (ref 4–12)
NEUTROPHILS # BLD AUTO: 8.25 THOUSANDS/ΜL (ref 1.85–7.62)
NEUTS SEG NFR BLD AUTO: 76 % (ref 43–75)
NRBC BLD AUTO-RTO: 0 /100 WBCS
P AXIS: 10 DEGREES
PLATELET # BLD AUTO: 211 THOUSANDS/UL (ref 149–390)
PMV BLD AUTO: 11.5 FL (ref 8.9–12.7)
POTASSIUM SERPL-SCNC: 3.9 MMOL/L (ref 3.5–5.3)
PR INTERVAL: 160 MS
PROT SERPL-MCNC: 7.1 G/DL (ref 6.4–8.2)
PROTHROMBIN TIME: 14.2 SECONDS (ref 12.1–14.4)
QRS AXIS: 57 DEGREES
QRSD INTERVAL: 82 MS
QT INTERVAL: 412 MS
QTC INTERVAL: 460 MS
RBC # BLD AUTO: 4.2 MILLION/UL (ref 3.81–5.12)
SODIUM SERPL-SCNC: 138 MMOL/L (ref 136–145)
T WAVE AXIS: 21 DEGREES
TROPONIN I SERPL-MCNC: <0.02 NG/ML
VENTRICULAR RATE: 75 BPM
WBC # BLD AUTO: 11.07 THOUSAND/UL (ref 4.31–10.16)

## 2017-10-22 PROCEDURE — 0FT44ZZ RESECTION OF GALLBLADDER, PERCUTANEOUS ENDOSCOPIC APPROACH: ICD-10-PCS | Performed by: SURGERY

## 2017-10-22 PROCEDURE — 80053 COMPREHEN METABOLIC PANEL: CPT | Performed by: SURGERY

## 2017-10-22 PROCEDURE — 88304 TISSUE EXAM BY PATHOLOGIST: CPT | Performed by: SURGERY

## 2017-10-22 PROCEDURE — 85610 PROTHROMBIN TIME: CPT | Performed by: STUDENT IN AN ORGANIZED HEALTH CARE EDUCATION/TRAINING PROGRAM

## 2017-10-22 PROCEDURE — 93005 ELECTROCARDIOGRAM TRACING: CPT | Performed by: STUDENT IN AN ORGANIZED HEALTH CARE EDUCATION/TRAINING PROGRAM

## 2017-10-22 PROCEDURE — 84484 ASSAY OF TROPONIN QUANT: CPT | Performed by: STUDENT IN AN ORGANIZED HEALTH CARE EDUCATION/TRAINING PROGRAM

## 2017-10-22 PROCEDURE — 85025 COMPLETE CBC W/AUTO DIFF WBC: CPT | Performed by: SURGERY

## 2017-10-22 RX ORDER — SODIUM CHLORIDE, SODIUM LACTATE, POTASSIUM CHLORIDE, CALCIUM CHLORIDE 600; 310; 30; 20 MG/100ML; MG/100ML; MG/100ML; MG/100ML
20 INJECTION, SOLUTION INTRAVENOUS CONTINUOUS
Status: DISCONTINUED | OUTPATIENT
Start: 2017-10-22 | End: 2017-10-23

## 2017-10-22 RX ORDER — PROPOFOL 10 MG/ML
INJECTION, EMULSION INTRAVENOUS AS NEEDED
Status: DISCONTINUED | OUTPATIENT
Start: 2017-10-22 | End: 2017-10-22 | Stop reason: SURG

## 2017-10-22 RX ORDER — GLYCOPYRROLATE 0.2 MG/ML
INJECTION INTRAMUSCULAR; INTRAVENOUS AS NEEDED
Status: DISCONTINUED | OUTPATIENT
Start: 2017-10-22 | End: 2017-10-22 | Stop reason: SURG

## 2017-10-22 RX ORDER — SODIUM CHLORIDE, SODIUM LACTATE, POTASSIUM CHLORIDE, CALCIUM CHLORIDE 600; 310; 30; 20 MG/100ML; MG/100ML; MG/100ML; MG/100ML
INJECTION, SOLUTION INTRAVENOUS CONTINUOUS PRN
Status: DISCONTINUED | OUTPATIENT
Start: 2017-10-22 | End: 2017-10-22 | Stop reason: SURG

## 2017-10-22 RX ORDER — MAGNESIUM HYDROXIDE 1200 MG/15ML
LIQUID ORAL AS NEEDED
Status: DISCONTINUED | OUTPATIENT
Start: 2017-10-22 | End: 2017-10-22 | Stop reason: HOSPADM

## 2017-10-22 RX ORDER — FENTANYL CITRATE/PF 50 MCG/ML
50 SYRINGE (ML) INJECTION
Status: DISCONTINUED | OUTPATIENT
Start: 2017-10-22 | End: 2017-10-22 | Stop reason: HOSPADM

## 2017-10-22 RX ORDER — FENTANYL CITRATE 50 UG/ML
INJECTION, SOLUTION INTRAMUSCULAR; INTRAVENOUS AS NEEDED
Status: DISCONTINUED | OUTPATIENT
Start: 2017-10-22 | End: 2017-10-22 | Stop reason: SURG

## 2017-10-22 RX ORDER — ROCURONIUM BROMIDE 10 MG/ML
INJECTION, SOLUTION INTRAVENOUS AS NEEDED
Status: DISCONTINUED | OUTPATIENT
Start: 2017-10-22 | End: 2017-10-22 | Stop reason: SURG

## 2017-10-22 RX ORDER — BUPIVACAINE HYDROCHLORIDE 5 MG/ML
INJECTION, SOLUTION PERINEURAL AS NEEDED
Status: DISCONTINUED | OUTPATIENT
Start: 2017-10-22 | End: 2017-10-22 | Stop reason: HOSPADM

## 2017-10-22 RX ORDER — KETOROLAC TROMETHAMINE 30 MG/ML
INJECTION, SOLUTION INTRAMUSCULAR; INTRAVENOUS AS NEEDED
Status: DISCONTINUED | OUTPATIENT
Start: 2017-10-22 | End: 2017-10-22 | Stop reason: SURG

## 2017-10-22 RX ORDER — LIDOCAINE HYDROCHLORIDE 10 MG/ML
INJECTION, SOLUTION INFILTRATION; PERINEURAL AS NEEDED
Status: DISCONTINUED | OUTPATIENT
Start: 2017-10-22 | End: 2017-10-22 | Stop reason: SURG

## 2017-10-22 RX ORDER — MIDAZOLAM HYDROCHLORIDE 1 MG/ML
INJECTION INTRAMUSCULAR; INTRAVENOUS AS NEEDED
Status: DISCONTINUED | OUTPATIENT
Start: 2017-10-22 | End: 2017-10-22 | Stop reason: SURG

## 2017-10-22 RX ORDER — KETOROLAC TROMETHAMINE 30 MG/ML
15 INJECTION, SOLUTION INTRAMUSCULAR; INTRAVENOUS ONCE
Status: COMPLETED | OUTPATIENT
Start: 2017-10-22 | End: 2017-10-22

## 2017-10-22 RX ADMIN — HEPARIN SODIUM 5000 UNITS: 5000 INJECTION, SOLUTION INTRAVENOUS; SUBCUTANEOUS at 21:41

## 2017-10-22 RX ADMIN — ACETAMINOPHEN 650 MG: 325 TABLET, FILM COATED ORAL at 21:43

## 2017-10-22 RX ADMIN — CEFAZOLIN SODIUM 2000 MG: 2 SOLUTION INTRAVENOUS at 09:32

## 2017-10-22 RX ADMIN — GLYCOPYRROLATE 0.4 MG: 0.2 INJECTION, SOLUTION INTRAMUSCULAR; INTRAVENOUS at 10:55

## 2017-10-22 RX ADMIN — HEPARIN SODIUM 5000 UNITS: 5000 INJECTION, SOLUTION INTRAVENOUS; SUBCUTANEOUS at 05:54

## 2017-10-22 RX ADMIN — KETOROLAC TROMETHAMINE 15 MG: 30 INJECTION, SOLUTION INTRAMUSCULAR at 20:22

## 2017-10-22 RX ADMIN — FENTANYL CITRATE 50 MCG: 50 INJECTION, SOLUTION INTRAMUSCULAR; INTRAVENOUS at 09:19

## 2017-10-22 RX ADMIN — HEPARIN SODIUM 5000 UNITS: 5000 INJECTION, SOLUTION INTRAVENOUS; SUBCUTANEOUS at 13:37

## 2017-10-22 RX ADMIN — METRONIDAZOLE 500 MG: 500 SOLUTION INTRAVENOUS at 09:32

## 2017-10-22 RX ADMIN — LIDOCAINE HYDROCHLORIDE 50 MG: 10 INJECTION, SOLUTION INFILTRATION; PERINEURAL at 09:19

## 2017-10-22 RX ADMIN — ONDANSETRON 4 MG: 2 INJECTION INTRAMUSCULAR; INTRAVENOUS at 04:38

## 2017-10-22 RX ADMIN — FENTANYL CITRATE 50 MCG: 50 INJECTION, SOLUTION INTRAMUSCULAR; INTRAVENOUS at 09:28

## 2017-10-22 RX ADMIN — PROPOFOL 240 MG: 10 INJECTION, EMULSION INTRAVENOUS at 09:20

## 2017-10-22 RX ADMIN — NEOSTIGMINE METHYLSULFATE 3 MG: 1 INJECTION, SOLUTION INTRAMUSCULAR; INTRAVENOUS; SUBCUTANEOUS at 10:55

## 2017-10-22 RX ADMIN — HYDROMORPHONE HYDROCHLORIDE 0.6 MG: 1 INJECTION, SOLUTION INTRAMUSCULAR; INTRAVENOUS; SUBCUTANEOUS at 10:20

## 2017-10-22 RX ADMIN — KETOROLAC TROMETHAMINE 30 MG: 30 INJECTION, SOLUTION INTRAMUSCULAR at 10:57

## 2017-10-22 RX ADMIN — SODIUM CHLORIDE 125 ML/HR: 0.9 INJECTION, SOLUTION INTRAVENOUS at 05:50

## 2017-10-22 RX ADMIN — MIDAZOLAM HYDROCHLORIDE 2 MG: 1 INJECTION, SOLUTION INTRAMUSCULAR; INTRAVENOUS at 09:16

## 2017-10-22 RX ADMIN — SODIUM CHLORIDE, SODIUM LACTATE, POTASSIUM CHLORIDE, AND CALCIUM CHLORIDE: .6; .31; .03; .02 INJECTION, SOLUTION INTRAVENOUS at 09:49

## 2017-10-22 RX ADMIN — HYDROMORPHONE HYDROCHLORIDE 1 MG: 1 INJECTION, SOLUTION INTRAMUSCULAR; INTRAVENOUS; SUBCUTANEOUS at 04:38

## 2017-10-22 RX ADMIN — ROCURONIUM BROMIDE 40 MG: 10 INJECTION, SOLUTION INTRAVENOUS at 09:21

## 2017-10-22 RX ADMIN — SODIUM CHLORIDE, SODIUM LACTATE, POTASSIUM CHLORIDE, AND CALCIUM CHLORIDE 20 ML/HR: .6; .31; .03; .02 INJECTION, SOLUTION INTRAVENOUS at 12:46

## 2017-10-22 RX ADMIN — HYDROMORPHONE HYDROCHLORIDE 0.4 MG: 1 INJECTION, SOLUTION INTRAMUSCULAR; INTRAVENOUS; SUBCUTANEOUS at 09:44

## 2017-10-22 RX ADMIN — DEXAMETHASONE SODIUM PHOSPHATE 10 MG: 10 INJECTION INTRAMUSCULAR; INTRAVENOUS at 09:34

## 2017-10-22 RX ADMIN — ONDANSETRON 4 MG: 2 INJECTION INTRAMUSCULAR; INTRAVENOUS at 18:02

## 2017-10-22 NOTE — ANESTHESIA POSTPROCEDURE EVALUATION
Post-Op Assessment Note      CV Status:  Stable    Mental Status:  Alert and awake    Hydration Status:  Euvolemic    PONV Controlled:  Controlled    Airway Patency:  Patent    Post Op Vitals Reviewed: Yes          Staff: Anesthesiologist, CRNA           BP   108/60   Temp (!) 97 °F (36 1 °C) (10/22/17 1120)    Pulse (P) 87 (10/22/17 1120)   Resp   14   SpO2 (P) 100 % (10/22/17 1120)

## 2017-10-22 NOTE — SOCIAL WORK
CM spoke with patient and explained CM role  Pt lives in [facility] home with  Mary Hnedrix [phone] and 3 children  3 KATHERINE  IP with ADLs and transportation PTA  No DME  No history of HHC or STR  Preferred Rx [facility]  No POA  No history of D/A or MH issues reported  CM reviewed d/c planning process including the following: identifying help at home, patient preference for d/c planning needs, Discharge Lounge, [facility] Meds to Bed program, availability of treatment team to discuss questions or concerns patient and/or family may have regarding understanding medications and recognizing signs and symptoms once discharged  CM also encouraged patient to follow up with all recommended appointments after discharge  Patient advised of importance for patient and family to participate in managing patients medical well being  DC checklist provided and explained

## 2017-10-22 NOTE — OP NOTE
OPERATIVE REPORT  PATIENT NAME: Lane Seo    :  1975  MRN: [id number]  Pt Location: [facility] OR ROOM 08    SURGERY DATE: 10/22/2017    Surgeon(s) and Role:     * Alma Cardenas MD - Primary     * Rebecca Navarro MD - Gerardo Ceron MD - Assisting    Preop Diagnosis:  Cholecystitis [K81 9]    Post-Op Diagnosis Codes:     * Cholecystitis [K81 9]    Procedure(s) (LRB):  CHOLECYSTECTOMY LAPAROSCOPIC (N/A)    Specimen(s):  ID Type Source Tests Collected by Time Destination   1 :  Tissue Gallbladder TISSUE EXAM Alma Cardenas MD 10/22/2017 1051        Estimated Blood Loss:   Minimal    Drains:       Anesthesia Type:   General    Operative Indications:  Cholecystitis [K81 9]  Acute cholecystitis  All risks, benefits, alternatives and possible complications discussed by me  Informed consent signed  Operative Findings:  Acute cholecystitis    Complications:   None    Procedure and Technique:  Pt placed supine on table and prepped and draped in usual sterile manner  Timeout performed and all elements of timeout reviewed and confirmed  Laparoscopic equipment was checked and deemed adequate  An infraumbilical incision was made and dissection was taken down through anterior and posterior abdominal wall layers until an 11 mm trocar could be introduced  Abdomen was then insufflated to 15 mm HG pressure and direct inspection only showed a frankly inflamed Gallbladder  The subxiphoid and right sided ports were then placed under direct visualization and the gallbladder was retracted cephalad and laterally  The cystic duct and cystic artery were carefully skeletonized until a critical view could be accomplished  Once this was done these structures were sequentially clipped and divided  The gallbladder was remove from the gallbladder fossa with the laparoscopic hook and bovie cautery  It was placed in an Endocatch bag and removed through the infraumbilical port site without difficulty   The RUQ was irrigated with warm normal saline until the return fluid was clear of blood and bile  The ports were then removed under direct visualization without difficulty  The subumbilical port site was closed with 2 figure of eight 0-vicryl sutures and the skin sites were closed with running 4-0 monocryl subcuticular suture and steri strips  Pt tolerated the procedure well, was transported to PACU in stable condition and sponge and instrument counts were correct     I was present for the entire procedure      Patient Disposition:  PACU     SIGNATURE: Shayy Humphrey MD  DATE: October 22, 2017  TIME: 10:56 AM

## 2017-10-22 NOTE — H&P
H&P Exam - General Surgery   Randa Romano 40 y o  female MRN: [id number]  Unit/Bed#: ED 23 Encounter: [id number]    Assessment/Plan     Assessment:  42yoF with symptomatic cholelithiasis  Plan:    -OR 10/22 for lap kavin     History of Present Illness     HPI:  Xochitl Fox is a 40 y o  multiparous female who presents with RUQ  She states pain started Friday morning after one hour after eating a hashbrown  Pain lasted through most of day but resided after motrin  Pain reoccurred last evening after drinking a beer and also reoccurred Saturday morning prompting an ED visit  Had one similar episode many years ago and was todl by doctors to be related to gallstones  She modified her diet and has not had pain since until now  Denies fevers  Says pain is sharp in nature in RUQ and radiates into R flank and back as well as chest  No nausea vomitng  No fevers or chills  Review of Systems   Constitutional: Negative for chills, fever and unexpected weight change  Respiratory: Negative for shortness of breath  Cardiovascular: Positive for chest pain  Gastrointestinal: Positive for abdominal pain  Negative for vomiting  Endocrine: Negative for polyuria  Genitourinary: Negative for dysuria  Skin: Negative  Hematological: Negative  Historical Information   Past Medical History:   Diagnosis Date    Disease of thyroid gland     Fibromyalgia     Herniated intervertebral disc of lumbar spine      History reviewed  No pertinent surgical history  Social History   History   Alcohol Use No     History   Drug Use No     History   Smoking Status    Never Smoker   Smokeless Tobacco    Never Used     Family History: non-contributory    Meds/Allergies   PTA meds:   Prior to Admission Medications   Prescriptions Last Dose Informant Patient Reported?  Taking?   levothyroxine 25 mcg tablet   Yes Yes   Sig: Take 25 mcg by mouth daily      Facility-Administered Medications: None     No Known Allergies    Objective   First Vitals:   Blood Pressure: 115/57 (10/21/17 1752)  Pulse: 72 (10/21/17 1752)  Temperature: (!) 97 2 °F (36 2 °C) (10/21/17 1752)  Temp Source: Tympanic (10/21/17 1752)  Respirations: 18 (10/21/17 1752)  Height: 5' 6" (167 6 cm) (10/21/17 1751)  Weight - Scale: 90 7 kg (200 lb) (10/21/17 1751)  SpO2: 99 % (10/21/17 1752)    Current Vitals:   Blood Pressure: 104/55 (10/21/17 2027)  Pulse: 68 (10/21/17 2027)  Temperature: (!) 97 2 °F (36 2 °C) (10/21/17 1752)  Temp Source: Tympanic (10/21/17 1752)  Respirations: 18 (10/21/17 2027)  Height: 5' 6" (167 6 cm) (10/21/17 1751)  Weight - Scale: 90 7 kg (200 lb) (10/21/17 1751)  SpO2: 100 % (10/21/17 2027)      Intake/Output Summary (Last 24 hours) at 10/21/17 2058  Last data filed at 10/21/17 1927   Gross per 24 hour   Intake             1000 ml   Output                0 ml   Net             1000 ml       Invasive Devices     Peripheral Intravenous Line            Peripheral IV 10/21/17 Left Antecubital less than 1 day                Physical Exam   Constitutional: She is oriented to person, place, and time  She appears well-developed and well-nourished  She appears distressed  HENT:   Head: Normocephalic  Eyes: Pupils are equal, round, and reactive to light  Neck: No tracheal deviation present  Cardiovascular: Normal rate, regular rhythm, normal heart sounds and intact distal pulses  Pulmonary/Chest: No stridor  She is in respiratory distress  She has wheezes  Abdominal: Soft  There is tenderness  There is no rebound and no guarding  Soft, non distended, tender RUQ, + murphys sign   Musculoskeletal: Normal range of motion  Neurological: She is alert and oriented to person, place, and time  GCS eye subscore is 4  GCS verbal subscore is 5  GCS motor subscore is 6  Skin: Skin is warm and dry  She is not diaphoretic         Lab Results:   CBC:   Lab Results   Component Value Date    WBC 8 09 10/21/2017    HGB 12 5 10/21/2017 HCT 37 7 10/21/2017    MCV 85 10/21/2017     10/21/2017    MCH 28 1 10/21/2017    MCHC 33 2 10/21/2017    RDW 14 5 10/21/2017    MPV 11 4 10/21/2017    NRBC 0 10/21/2017   , CMP:   Lab Results   Component Value Date     10/21/2017    K 3 8 10/21/2017     10/21/2017    CO2 27 10/21/2017    ANIONGAP 7 10/21/2017    BUN 12 10/21/2017    CREATININE 0 71 10/21/2017    GLUCOSE 86 10/21/2017    CALCIUM 8 5 10/21/2017    AST 18 10/21/2017    ALT 19 10/21/2017    ALKPHOS 75 10/21/2017    PROT 7 9 10/21/2017    ALBUMIN 3 3 (L) 10/21/2017    BILITOT 0 15 (L) 10/21/2017    EGFR 106 10/21/2017   , Coagulation: No results found for: PT, INR, APTT, Urinalysis:   Lab Results   Component Value Date    COLORU Yellow 10/21/2017    CLARITYU Clear 10/21/2017    SPECGRAV 1 020 10/21/2017    PHUR 6 5 10/21/2017    LEUKOCYTESUR Trace (A) 10/21/2017    NITRITE Negative 10/21/2017    PROTEINUA Trace (A) 10/21/2017    GLUCOSEU Negative 10/21/2017    KETONESU Negative 10/21/2017    BILIRUBINUR Negative 10/21/2017    BLOODU Large (A) 10/21/2017   , Amylase: No results found for: AMYLASE, Lipase:   Lab Results   Component Value Date    LIPASE 297 10/21/2017     Imaging: I have personally reviewed pertinent reports  and I have personally reviewed pertinent films in PACS  EKG, Pathology, and Other Studies: I have personally reviewed pertinent reports     and I have personally reviewed pertinent films in PACS    Code Status: No Order  Advance Directive and Living Will:      Power of :    POLST:

## 2017-10-22 NOTE — ANESTHESIA PREPROCEDURE EVALUATION
Review of Systems/Medical History  Patient summary reviewed  Chart reviewed      Cardiovascular  Negative cardio ROS Exercise tolerance: good,     Pulmonary  Smoker , ,   Comment: Remote hx - quit 6 years ago     GI/Hepatic       Negative  ROS        Endo/Other  History of thyroid disease ,      GYN  Negative gynecology ROS     Comment: UPT 10/21 negative     Hematology  Negative hematology ROS      Musculoskeletal  Back pain , lumbar pain,        Neurology  Negative neurology ROS      Psychology   Negative psychology ROS            Physical Exam    Airway  Comment: Pt sts "I have oversized tonsils"  Mallampati score: III  TM Distance: <3 FB  Neck ROM: full     Dental   No notable dental hx     Cardiovascular  Comment: Negative ROS, Rhythm: regular, Rate: normal,     Pulmonary  Pulmonary exam normal     Other Findings        Anesthesia Plan  ASA Score- 2 Emergent      Anesthesia Type- general with ASA Monitors  Additional Monitors:   Airway Plan: ETT  Induction- intravenous  Informed Consent- Anesthetic plan and risks discussed with patient

## 2017-10-22 NOTE — PLAN OF CARE
DISCHARGE PLANNING     Discharge to home or other facility with appropriate resources Progressing        GASTROINTESTINAL - ADULT     Minimal or absence of nausea and/or vomiting Progressing        Knowledge Deficit     Patient/family/caregiver demonstrates understanding of disease process, treatment plan, medications, and discharge instructions Progressing        METABOLIC, FLUID AND ELECTROLYTES - ADULT     Electrolytes maintained within normal limits Progressing     Fluid balance maintained Progressing        PAIN - ADULT     Verbalizes/displays adequate comfort level or baseline comfort level Progressing

## 2017-10-22 NOTE — PROGRESS NOTES
Progress Note - General Surgery   Randa Romano 40 y o  female MRN: [id number]  Unit/Bed#: [facility] 833-01 Encounter: [id number]    Assessment:  45yoF with symptomatic cholelithiasis  Plan:  NPO  OR today for lap kavin  Pain control    Subjective/Objective   Chief Complaint:     Subjective: Patient woke up with severe abdominal and chest pain  Says she has a stabbing chest pain which is reproducible on palpation  She is also shaking and may be having rigors vs anxiety  She feels nauseous  Vital signs all wnl  She will get an EKG/troponin, breakthrough dilaudid and Zofran  EKG wnl, normal axis, no ST depressions/elevations  Objective:     Blood pressure 116/64, pulse 61, temperature 97 9 °F (36 6 °C), temperature source Oral, resp  rate 18, height 5' 6" (1 676 m), weight 90 7 kg (199 lb 14 4 oz), last menstrual period 10/20/2017, SpO2 96 %  ,Body mass index is 32 26 kg/m²  Intake/Output Summary (Last 24 hours) at 10/22/17 0230  Last data filed at 10/21/17 2135   Gross per 24 hour   Intake             2000 ml   Output                0 ml   Net             2000 ml       Invasive Devices     Peripheral Intravenous Line            Peripheral IV 10/21/17 Left Antecubital less than 1 day                Physical Exam: AAOX3  NAD  RRR  Normal respiratory effort  soft, TTP xiphoid process/epigastrium LUQ and RUQ, ND  +humphreys's sign  no c/c/e    Lab, Imaging and other studies:  I have personally reviewed pertinent lab results    , CBC:   Lab Results   Component Value Date    WBC 8 09 10/21/2017    HGB 12 5 10/21/2017    HCT 37 7 10/21/2017    MCV 85 10/21/2017     10/21/2017    MCH 28 1 10/21/2017    MCHC 33 2 10/21/2017    RDW 14 5 10/21/2017    MPV 11 4 10/21/2017    NRBC 0 10/21/2017   , CMP:   Lab Results   Component Value Date     10/21/2017    K 3 8 10/21/2017     10/21/2017    CO2 27 10/21/2017    ANIONGAP 7 10/21/2017    BUN 12 10/21/2017    CREATININE 0 71 10/21/2017    GLUCOSE 86 10/21/2017    CALCIUM 8 5 10/21/2017    AST 18 10/21/2017    ALT 19 10/21/2017    ALKPHOS 75 10/21/2017    PROT 7 9 10/21/2017    ALBUMIN 3 3 (L) 10/21/2017    BILITOT 0 15 (L) 10/21/2017    EGFR 106 10/21/2017     VTE Pharmacologic Prophylaxis: Heparin  VTE Mechanical Prophylaxis: sequential compression device

## 2017-10-23 VITALS
HEART RATE: 63 BPM | BODY MASS INDEX: 31.92 KG/M2 | RESPIRATION RATE: 16 BRPM | TEMPERATURE: 98.8 F | SYSTOLIC BLOOD PRESSURE: 128 MMHG | HEIGHT: 66 IN | WEIGHT: 198.63 LBS | OXYGEN SATURATION: 95 % | DIASTOLIC BLOOD PRESSURE: 71 MMHG

## 2017-10-23 PROBLEM — K81.9 CHOLECYSTITIS: Status: ACTIVE | Noted: 2017-10-21

## 2017-10-23 RX ORDER — POLYETHYLENE GLYCOL 3350 17 G/17G
17 POWDER, FOR SOLUTION ORAL DAILY PRN
Status: DISCONTINUED | OUTPATIENT
Start: 2017-10-23 | End: 2017-10-23 | Stop reason: HOSPADM

## 2017-10-23 RX ORDER — POLYETHYLENE GLYCOL 3350 17 G/17G
17 POWDER, FOR SOLUTION ORAL DAILY PRN
Qty: 14 EACH | Refills: 0
Start: 2017-10-23 | End: 2018-03-01

## 2017-10-23 RX ORDER — IBUPROFEN 600 MG/1
600 TABLET ORAL EVERY 6 HOURS PRN
Qty: 30 TABLET | Refills: 0 | Status: SHIPPED | OUTPATIENT
Start: 2017-10-23 | End: 2018-03-01

## 2017-10-23 RX ORDER — KETOROLAC TROMETHAMINE 30 MG/ML
15 INJECTION, SOLUTION INTRAMUSCULAR; INTRAVENOUS ONCE
Status: COMPLETED | OUTPATIENT
Start: 2017-10-23 | End: 2017-10-23

## 2017-10-23 RX ORDER — ACETAMINOPHEN 325 MG/1
650 TABLET ORAL EVERY 4 HOURS PRN
Qty: 30 TABLET | Refills: 0
Start: 2017-10-23 | End: 2017-11-22

## 2017-10-23 RX ORDER — OXYCODONE HYDROCHLORIDE 5 MG/1
2.5-5 TABLET ORAL EVERY 4 HOURS PRN
Qty: 15 TABLET | Refills: 0 | Status: SHIPPED | OUTPATIENT
Start: 2017-10-23 | End: 2017-10-23

## 2017-10-23 RX ORDER — OXYCODONE HYDROCHLORIDE 5 MG/1
2.5-5 TABLET ORAL EVERY 4 HOURS PRN
Qty: 15 TABLET | Refills: 0 | Status: SHIPPED | OUTPATIENT
Start: 2017-10-23 | End: 2017-11-02

## 2017-10-23 RX ADMIN — HEPARIN SODIUM 5000 UNITS: 5000 INJECTION, SOLUTION INTRAVENOUS; SUBCUTANEOUS at 13:08

## 2017-10-23 RX ADMIN — ACETAMINOPHEN 650 MG: 325 TABLET, FILM COATED ORAL at 05:45

## 2017-10-23 RX ADMIN — KETOROLAC TROMETHAMINE 15 MG: 30 INJECTION, SOLUTION INTRAMUSCULAR at 07:38

## 2017-10-23 RX ADMIN — HEPARIN SODIUM 5000 UNITS: 5000 INJECTION, SOLUTION INTRAVENOUS; SUBCUTANEOUS at 05:45

## 2017-10-23 RX ADMIN — ACETAMINOPHEN 650 MG: 325 TABLET, FILM COATED ORAL at 10:17

## 2017-10-23 RX ADMIN — ONDANSETRON 4 MG: 2 INJECTION INTRAMUSCULAR; INTRAVENOUS at 11:58

## 2017-10-23 NOTE — DISCHARGE SUMMARY
Discharge Summary - General Surgery   Randa Romano 40 y o  female MRN: [id number]  Unit/Bed#: [facility] 457-68 Encounter: [id number]    Admission Date: 10/21/2017     Discharge Date: 10/23/2017     Admitting Diagnosis: Cholecystitis [K81 9]  Abdominal pain, acute [R10 9]    Discharge Diagnosis: Cholecystitis  Attending and Service: Dr [facility]  Consulting Physician(s): None  Imaging and Procedures Performed:     1  Abdominal ultrasound on 10/21/2017 showed Numerous gallstones  Positive Pool's sign  No visible gallbladder wall thickening or pericholecystic fluid  Gallbladder is moderately distended  2   Laparoscopic cholecystectomy on 10/22/2017 by Dr Carla Walker  Pathology: Final surgical pathology pending at the time of discharge  Hospital Course: Kassandra Thompson is a 45-year-old female presented with right upper abdominal pain that began the day presentation after eating  The pain was somewhat improved with Motrin, but reoccurred after additional oral intake  She did have a similar prior episode of this type pain years ago and was told was related to gallstones  With diet modification, she has had no recurrences until her current presentation  Her pain is in her right upper abdomen with radiation to her right flank and back as well as to her chest   She had no associated fevers, chills, nausea, and/or vomiting  On her initial evaluation, she was afebrile with normal vital signs; her abdomen was soft and nondistended, but tender in the right upper quadrant with a positive Pool's sign; she did have some mild respiratory distress with wheezing; the remainder of her exam was unremarkable  Her initial workup included the above-noted abdominal ultrasound  She was admitted to the acute care surgery service with acute cholecystitis  She was kept NPO, started on IV fluids and IV antibiotics, and placed on a pain control regimen   Operative intervention was recommended to her and she agreed to proceed  She was then taken to the operating room for a laparoscopic cholecystectomy on 10/22/2017  She tolerated the procedure well, and there were no intraoperative complications  Postoperatively, she was advanced to a regular diet and tolerated it well  Once tolerating an oral diet well, the IV fluids were discontinued  She was transitioned to an oral pain medication regimen  By 10/23/2017, she is deemed stable for discharge home  On discharge, she is instructed to follow-up with her primary care provider in the next one month to review the events of her recent hospitalization  She is instructed to follow-up with the  [facility] in 2 weeks for post-operative re-evaluation  She may resume a regular diet  She should avoid lifting greater than 20 pounds and any strenuous physical exercise or activity for the next 2 weeks at least  She may shower daily, but should avoid tub baths or swimming for 2 weeks  She is instructed to call our office or return to the ER if develops a fever greater than 101, shaking chills, persistent nausea or vomiting, worsening or uncontrollable pain, and/or any increasing redness or purulent drainage from her incision  She may return to work/school on Monday, 10/30/2017 or sooner if well enough  Condition at Discharge: good     Discharge instructions/Information to patient and family:   See after visit summary for information provided to patient and family  Provisions for Follow-Up Care:  See after visit summary for information related to follow-up care and any pertinent home health orders  Disposition: See After Visit Summary for discharge disposition information  Planned Readmission: No    Discharge Statement   I spent 25 minutes discharging the patient  This time was spent on the day of discharge  I had direct contact with the patient on the day of discharge   Additional documentation is required if more than 30 minutes were spent on discharge  Discharge Medications:  See after visit summary for reconciled discharge medications provided to patient and family  I performed a search on the [address] website on this patient for past prescriptions of controlled substances      Florian Felix PA-C  10/23/2017  9:48 AM

## 2017-10-23 NOTE — DISCHARGE INSTRUCTIONS
Acute Care Surgery Discharge Instructions    Please follow-up as scheduled  If you do not already have a follow-up appointment, please call the office when you leave to schedule an appointment to be seen in 2-3 weeks for post-operative re-evaluation  Activity:  - No lifting greater than 20 pounds or strenuous physical activity or exercise for 2 weeks  - Walking and normal light activities are encouraged  - Normal daily activities including climbing steps are okay  - No driving until no longer using pain medications  Return to work:    - You may return to work on Monday, 10/30/2017 or sooner if you are feeling well enough  Diet:    - You may resume your normal diet  Wound Care:  - May shower daily  No tub baths or swimming until cleared by your surgeon   - Wash incision gently with soap and water and pat dry  - Do not apply any creams or ointments unless instructed to do so by your surgeon   - Lizbeth Iyert may apply ice as needed (no longer than 20 minutes an hour) for the first 48 hours  - Bruising is not unusual and will go away with a little time  You may apply a warm, moist compress that may help the bruising resolve quicker  Medications:    - You may resume all of your regular medications, including blood thinners and aspirin, after going home unless otherwise instructed  Please refer to your discharge medication list for further details  - Please take the pain medications as directed  - You are encouraged to use non-narcotic pain medications first and whenever possible  Reserve the use of narcotic pain medication for moderate to severe pain not controlled by non-narcotic medications   - No driving while taking narcotic pain medications  - You may become constipated, especially if taking pain medications  You may take any over the counter stool softeners or laxatives as needed  Examples: Milk of Magnesia, Colace, Senna      Additional Instructions:  - If you have any questions or concerns after discharge please call the office   - Call office or return to ER if fever greater than 101, chills, persistent nausea/vomiting, worsening/uncontrollable pain, and/or increasing redness or purulent/foul smelling drainage from incision(s)

## 2017-10-23 NOTE — MEDICAL STUDENT
Progress Note - General Surgery   Corrine Romano 40 y o  female MRN: [id number]  Unit/Bed#: [facility] 812-01 Encounter: [id number]    Assessment:  41 yo F with symptomatic cholelithiasis POD#1 from lap kavin     Plan:  Low fat diet  Pain control  OOB/ambulation  DC home today    Subjective/Objective   Subjective: No overnight events  States she is in a significant amount of pain but only taking tylenol due to narcotics making her nauseous  Had one episode of nausea and vomiting yesterday after drinking broth; however she had crackers later in the evening and felt okay  Not passing gas and no bowel movement yet  Has been OOB and ambulating  Objective:   AAOx3  RRR  CTAbl, normal effort  Soft, slightly distended, appropriately tender in RUQ    Blood pressure 137/71, pulse 86, temperature 98 5 °F (36 9 °C), temperature source Oral, resp  rate 18, height 5' 6" (1 676 m), weight 90 1 kg (198 lb 10 2 oz), last menstrual period 10/20/2017, SpO2 98 %  ,Body mass index is 32 06 kg/m²        Intake/Output Summary (Last 24 hours) at 10/23/17 0622  Last data filed at 10/23/17 0217   Gross per 24 hour   Intake             1521 ml   Output              825 ml   Net              696 ml       Invasive Devices     Peripheral Intravenous Line            Peripheral IV 10/21/17 Left Antecubital 1 day                    Lab, Imaging and other studies:  VTE Pharmacologic Prophylaxis: Heparin  VTE Mechanical Prophylaxis: sequential compression device

## 2017-10-23 NOTE — PLAN OF CARE
DISCHARGE PLANNING     Discharge to home or other facility with appropriate resources Progressing        DISCHARGE PLANNING - CARE MANAGEMENT     Discharge to post-acute care or home with appropriate resources Progressing        GASTROINTESTINAL - ADULT     Minimal or absence of nausea and/or vomiting Progressing        Knowledge Deficit     Patient/family/caregiver demonstrates understanding of disease process, treatment plan, medications, and discharge instructions Progressing        METABOLIC, FLUID AND ELECTROLYTES - ADULT     Electrolytes maintained within normal limits Progressing     Fluid balance maintained Progressing        PAIN - ADULT     Verbalizes/displays adequate comfort level or baseline comfort level Progressing        Potential for Falls     Patient will remain free of falls Progressing

## 2017-10-23 NOTE — PROGRESS NOTES
Progress Note - General Surgery   [facility] Gigi Romano 40 y o  female MRN: [id number]  Unit/Bed#: [facility] 812-01 Encounter: [id number]    Assessment:  40 F with symptomatic cholelithiasis, s/p lap kavin    Plan:  Low fat diet  Pain control  OOB and ambulating  Discharge home today    Subjective/Objective     Subjective: No acute events  Did not take in much food secondary to pain  Denies any nausea or vomiting  Does not wish to take opioid pain medications,    Objective:    Blood pressure 137/71, pulse 86, temperature 98 5 °F (36 9 °C), temperature source Oral, resp  rate 18, height 5' 6" (1 676 m), weight 90 1 kg (198 lb 10 2 oz), last menstrual period 10/20/2017, SpO2 98 %  ,Body mass index is 32 06 kg/m²  Intake/Output Summary (Last 24 hours) at 10/23/17 5693  Last data filed at 10/23/17 0217   Gross per 24 hour   Intake             1521 ml   Output              825 ml   Net              696 ml       Invasive Devices     Peripheral Intravenous Line            Peripheral IV 10/21/17 Left Antecubital 1 day                Physical Exam:   General: NAD, AAOx3  CV: RRR +S1/S2  Chest: breath sounds bilaterally  Abdomen: soft, obese, mildly tender   Incisions c/d/i  Extremities: atraumatic, no edema        Results from last 7 days  Lab Units 10/22/17  0454 10/21/17  2135 10/21/17  1828   WBC Thousand/uL 11 07*  --  8 09   HEMOGLOBIN g/dL 11 8  --  12 5   HEMATOCRIT % 35 5  --  37 7   PLATELETS Thousands/uL 211 215 238       Results from last 7 days  Lab Units 10/22/17  0454 10/21/17  1828   SODIUM mmol/L 138 139   POTASSIUM mmol/L 3 9 3 8   CHLORIDE mmol/L 107 105   CO2 mmol/L 25 27   BUN mg/dL 10 12   CREATININE mg/dL 0 68 0 71   GLUCOSE RANDOM mg/dL 106 86   CALCIUM mg/dL 7 9* 8 5       Results from last 7 days  Lab Units 10/22/17  0458   INR  1 10

## 2017-10-23 NOTE — CASE MANAGEMENT
[address] [facility] in the [facility] by Claudio Diamond for 2017  Network Utilization Review Department  Phone: [phone]; Fax [phone]  ATTENTION: The Network Utilization Review Department is now centralized for our 7 Facilities  Make a note that we have a new phone and fax numbers for our Department  Please call with any questions or concerns to [phone] and carefully follow the prompts so that you are directed to the right person  All voicemails are confidential  Fax any determinations, approvals, denials, and requests for initial or continue stay review clinical to [phone]  Due to HIGH CALL volume, it would be easier if you could please send faxed requests to expedite your requests and in part, help us provide discharge notifications faster  Initial Clinical Review    Admission: Date/Time/Statement: 10/21/17 @ 2101     Orders Placed This Encounter   Procedures    Inpatient Admission     Standing Status:   Standing     Number of Occurrences:   1     Order Specific Question:   Admitting Physician     Answer:   Sotero Solano [[id number]]     Order Specific Question:   Level of Care     Answer:   Med Surg [16]     Order Specific Question:   Estimated length of stay     Answer:   More than 2 Midnights     Order Specific Question:   Certification     Answer:   I certify that inpatient services are medically necessary for this patient for a duration of greater than two midnights  See H&P and MD Progress Notes for additional information about the patient's course of treatment       ED: Date/Time/Mode of Arrival:   ED Arrival Information     Expected Arrival Acuity Means of Arrival Escorted By Service Admission Type    - 10/21/2017 17:48 Urgent Walk-In Self Surgery-General Urgent    Arrival Complaint    abdominal pain        Chief Complaint:   Chief Complaint   Patient presents with    Abdominal Pain     right upper quadrant abd pain which wraps around to her back and flank, also feel alot of pressure in my lung     History of Illness: Dayron Ramirez is a 40 y o  multiparous female who presents with RUQ  She states pain started Friday morning after one hour after eating a hashbrown  Pain lasted through most of day but resided after motrin  Pain reoccurred last evening after drinking a beer and also reoccurred Saturday morning prompting an ED visit  Had one similar episode many years ago and was todl by doctors to be related to gallstones  She modified her diet and has not had pain since until now  Denies fevers  Says pain is sharp in nature in RUQ and radiates into R flank and back as well as chest  No nausea vomitng  No fevers or chills       ED Vital Signs:   ED Triage Vitals   Temperature Pulse Respirations Blood Pressure SpO2   10/21/17 1752 10/21/17 1752 10/21/17 1752 10/21/17 1752 10/21/17 1752   (!) 97 2 °F (36 2 °C) 72 18 115/57 99 %      Temp Source Heart Rate Source Patient Position - Orthostatic VS BP Location FiO2 (%)   10/21/17 1752 10/21/17 1752 10/21/17 1752 10/21/17 1752 --   Tympanic Monitor Sitting Left arm       Pain Score       10/21/17 1751       9        Wt Readings from Last 1 Encounters:   10/23/17 90 1 kg (198 lb 10 2 oz)     Vital Signs (abnormal):                   10/22/17 1215   97 4 °F (36 3 °C)  72   10  103/58  96 %  Nasal cannula    10/22/17 1200  --  74   8  106/60  95 %  Nasal cannula    10/22/17 1145  --  76   9  101/59  95 %  Nasal cannula    10/22/17 1130  --  74   8  91/63  97 %  Simple mask    10/22/17 1120   97 °F (36 1 °C)  87   8  108/60  100 %  Simple mask    10/21/07  97 2                 Abnormal Labs;   10/21 10/22   Calcium 8 5 7 9   Albumin 3 3 2 9   Total Bilirubin 0 15 0 55     Wbc 10/22 - 11 07  Blood, UA Large   Nitrite, UA Negative   Leukocytes, UA Trace   pH, UA 6 5   Protein, UA Trace   Bilirubin, UA Negative   Urobilinogen, UA 0 2   RBC, UA 4-10   WBC, UA 30-50   Bacteria, UA Moderate     Diagnostic Test Results:    EKG - Normal sinus rhythm  ST & T wave abnormality, consider anterior ischemia  Prolonged Qt  Abnormal ECG  When compared with ECG of 05-AUG-2016 20:10, Questionable change in QRS duration  US Gallbladder - Numerous gallstones  Positive Pool's sign  No visible gallbladder wall thickening or pericholecystic fluid  Gallbladder is moderately distended      ED Treatment:   Medication Administration from 10/21/2017 1748 to 10/21/2017 2201    Date/Time Order Dose Route Action   10/21/2017 1830 sodium chloride 0 9 % bolus 1,000 mL 1,000 mL Intravenous New Bag   10/21/2017 1829 ondansetron (ZOFRAN) injection 4 mg 4 mg Intravenous Given   10/21/2017 1829 fentanyl citrate (PF) 100 MCG/2ML 50 mcg 50 mcg Intravenous Given   10/21/2017 1924 HYDROmorphone (DILAUDID) 1 mg/mL injection 0 5 mg 0 5 mg Intravenous Given   10/21/2017 2135 sodium chloride 0 9 % infusion 125 mL/hr Intravenous New Bag   10/21/2017 2136 heparin (porcine) subcutaneous injection 5,000 Units 5,000 Units Subcutaneous Given        Past Medical/Surgical History: Active Ambulatory Problems     Diagnosis Date Noted    No Active Ambulatory Problems     Resolved Ambulatory Problems     Diagnosis Date Noted    No Resolved Ambulatory Problems     Past Medical History:   Diagnosis Date    Disease of thyroid gland     Fibromyalgia     Herniated intervertebral disc of lumbar spine      Admitting Diagnosis: Cholecystitis [K81 9]  Abdominal pain, acute [R10 9]    Age/Sex: 40 y o  female    Assessment/Plan:   45yoF with symptomatic cholelithiasis       Plan:  -OR 10/22 for lap kavin      Admission Orders:  Scheduled Meds:   heparin (porcine) 5,000 Units Subcutaneous Q8H [address]       PRN Meds:     Acetaminophen x2    HYDROmorphone x4 IV    Ondansetron IV x 4    oxyCODONE x1    polyethylene glycol    Cons General Surgery  Diet house  SCDs  VS q 3 hr    10/22/17 OP NOTE  CHOLECYSTECTOMY LAPAROSCOPIC (N/A)  General anesthesia  ___________________________________________  10/23 Surgery Progress Note  Assessment:  40 F with symptomatic cholelithiasis, s/p lap kavin     Plan:  Low fat diet  Pain control  OOB and ambulating  Discharge home today

## 2017-10-24 ENCOUNTER — GENERIC CONVERSION - ENCOUNTER (OUTPATIENT)
Dept: OTHER | Facility: OTHER | Age: 42
End: 2017-10-24

## 2017-10-24 LAB
ABO GROUP BLD BPU: NORMAL
ABO GROUP BLD BPU: NORMAL
BPU ID: NORMAL
BPU ID: NORMAL
UNIT DISPENSE STATUS: NORMAL
UNIT DISPENSE STATUS: NORMAL
UNIT PRODUCT CODE: NORMAL
UNIT PRODUCT CODE: NORMAL
UNIT RH: NORMAL
UNIT RH: NORMAL

## 2017-11-06 ENCOUNTER — GENERIC CONVERSION - ENCOUNTER (OUTPATIENT)
Dept: OTHER | Facility: OTHER | Age: 42
End: 2017-11-06

## 2018-01-11 NOTE — RESULT NOTES
Message   Recorded as Task   Date: 11/16/2016 11:55 AM, Created By: Joshua Ogden   Task Name: Med Renewal Request   Assigned To: [facility],Team   Regarding Patient: Tatiana Martin, Status: Active   Comment:    Connie Arndt - 16 Nov 2016 11:55 AM     TASK CREATED  Caller: Self; Renew Medication; [phone] (Home); [phone] (Work)  Pt lmom stating that she saw Dr Miguel 2 weeks ago and was given samples of lyrica  Pt was instructed to call the office if it worked and would like it sent to her pharmacy  Returned pt's call, lmom for pt to call the office  Connie Arndt - 16 Nov 2016 11:55 AM     TASK EDITED   Joshua Ogden - 16 Nov 2016 2:21 PM     TASK EDITED           Spoke to pt, she was instructed to take lyrica 50mg daily for 1 week and then bid, pt reports that her pain level has gone from a 6/10 to a 3/10  The burning she was experiencing down her left leg has improved and pt would like to continue on the lyrica bid  Requesting the medicaiton to be sent to [facility] [address] [place], [place] [address]  Michelle Julian - 16 Nov 2016 5:20 PM     TASK REPLIED TO: Previously Assigned To Michelle Julian  unfortunately i couldn't send the sript to her pharmay as it is a controlled substance so she will have to pick it up at the clinic tomorrow   Meaghan Lee - 17 Nov 2016 10:50 AM     TASK EDITED  S/w th ept  and she is aware          Signatures   Electronically signed by : Kike Sarah, ; Nov 17 2016 10:50AM EST                       (Author)

## 2018-01-12 VITALS
HEIGHT: 67 IN | HEART RATE: 66 BPM | TEMPERATURE: 98.4 F | SYSTOLIC BLOOD PRESSURE: 104 MMHG | WEIGHT: 203.26 LBS | BODY MASS INDEX: 31.9 KG/M2 | DIASTOLIC BLOOD PRESSURE: 60 MMHG

## 2018-01-12 NOTE — MISCELLANEOUS
Message   Recorded as Task   Date: 08/19/2016 09:35 AM, Created By: Alex Yeboah   Task Name: Follow Up   Assigned To: Irlanda Cherry   Regarding Patient: Gwendolyn Tirado, Status: Active   Comment:    Meaghan Lee - 19 Aug 2016 9:35 AM     TASK CREATED  Caller: Self; Results Inquiry; [phone] (Home); [phone] (Work)  Received a TC from Ariel Webber  for [place] today at [phone]  He is stating the pt  continues with pain in her LBP radiating down her LE'S  He stated per her she had a HA after the opro and ended up going to the ED  She had an OPRO on 8/3 and povs is scheduled for 9/30 for F/u  Per your note pt  to F/U c/ Dr Joel Webber is wondering if she needs another IRAM or what the plan is  Please contact the pt  and Ariel Webbre  in regards to your advise  thanks   [place] - 22 Aug 2016 7:51 AM     TASK REPLIED TO: Previously Assigned To [facility]                      She did not respond well to the injection last time with headache and warm feeling down her legs   Atif Lynn - 22 Aug 2016 7:52 AM     TASK REASSIGNED: Previously Assigned To [facility]      Please see response below  She has appt scheduled with Dr Joel Laura, I would recommend she continue with the appointment  Irlanda Cherry - 22 Aug 1764 5:61 PM     TASK EDITED  Pt left MSG on VM stating that she has left 2 MSGs and not call back, and she states someone please call her back before she has to get a  and also change doctors  I called patient back and Patient states she is doing okay now  She was upset because she states she did not get a phone call back  I apologized for that and advised her of recommendation to see Dr Galileo Ly first and will see what he recommends   I explained about her not responding well to procedure so will see what he recommends - Confirmed f/u appt with you and advised if Dr Galileo Ly recommends another procedure then we could change that appt  Patient agreeable  Atif Lynn - 22 Aug 2016 4:46 PM     TASK REPLIED TO: Previously Assigned To Atif Lynn                      Thank you   Connie Arndt - 23 Aug 2016 11:18 AM     TASK EDITED        Active Problems    1  Abnormal MRI (793 99) (R93 8)   2  Cervical pain (neck) (723 1) (M54 2)   3  Chest pain (786 50) (R07 9)   4  Chronic pelvic pain in female (799 6,444 14) (R10 2,G89 29)   5  DDD (degenerative disc disease), lumbar (722 52) (M51 36)   6  Dyspareunia (625 0)   7  Encounter to discuss test results (V65 49) (Z71 89)   8  Fatigue (780 79) (R53 83)   9  History of General counseling for initiation of other contraceptive measures (V25 02)   (Z30 09)   10  Low back pain radiating to left leg (724 2) (M54 5)   11  Memory loss (780 93) (R41 3)   12  Mixed incontinence (788 33) (N39 46)   13  Myalgia (729 1) (M79 1)   14  Myofascial pain (729 1) (M79 1)   15  Neck Strain (847 0)   16  Paresthesia (782 0) (R20 2)   17  Pelvic pain in female (625 9) (R10 2)   18  Polydipsia (783 5) (R63 1)   19  Sacroiliac joint pain (724 6) (M53 3)   20  Sacroiliitis (720 2) (M46 1)   21  Sciatica (724 3) (M54 30)   22  Stress incontinence, female (625 6) (N39 3)    Current Meds   1  Methocarbamol 750 MG Oral Tablet (Robaxin-750); TAKE 1 TABLET 3 TIMES DAILY; Therapy: 42Oxi7797 to (Evaluate:65Slk8329)  Requested for: 33Swu2270; Last   Rx:91Wto7234 Ordered    Allergies    1  No Known Drug Allergies    2  No Known Environmental Allergies   3   No Known Food Allergies    Signatures   Electronically signed by : Maryjane Camara, ; Aug 24 2016  9:21AM EST                       (Author)

## 2018-01-13 VITALS
HEART RATE: 78 BPM | DIASTOLIC BLOOD PRESSURE: 60 MMHG | BODY MASS INDEX: 31.14 KG/M2 | HEIGHT: 67 IN | SYSTOLIC BLOOD PRESSURE: 120 MMHG | WEIGHT: 198.41 LBS | TEMPERATURE: 98.3 F

## 2018-01-13 VITALS
HEART RATE: 84 BPM | SYSTOLIC BLOOD PRESSURE: 110 MMHG | HEIGHT: 67 IN | BODY MASS INDEX: 31.83 KG/M2 | TEMPERATURE: 98.4 F | WEIGHT: 202.82 LBS | DIASTOLIC BLOOD PRESSURE: 66 MMHG

## 2018-01-13 VITALS
HEIGHT: 66 IN | DIASTOLIC BLOOD PRESSURE: 70 MMHG | SYSTOLIC BLOOD PRESSURE: 103 MMHG | WEIGHT: 204 LBS | BODY MASS INDEX: 32.78 KG/M2

## 2018-01-14 VITALS
HEART RATE: 100 BPM | HEIGHT: 67 IN | SYSTOLIC BLOOD PRESSURE: 102 MMHG | DIASTOLIC BLOOD PRESSURE: 68 MMHG | TEMPERATURE: 98.2 F | WEIGHT: 200.62 LBS | BODY MASS INDEX: 31.49 KG/M2

## 2018-01-14 VITALS
WEIGHT: 201.2 LBS | SYSTOLIC BLOOD PRESSURE: 104 MMHG | DIASTOLIC BLOOD PRESSURE: 71 MMHG | BODY MASS INDEX: 31.58 KG/M2 | HEIGHT: 67 IN

## 2018-01-14 VITALS — SYSTOLIC BLOOD PRESSURE: 114 MMHG | DIASTOLIC BLOOD PRESSURE: 69 MMHG | WEIGHT: 204 LBS | BODY MASS INDEX: 32.19 KG/M2

## 2018-01-15 NOTE — RESULT NOTES
Message   Recorded as Task   Date: 04/26/2016 08:45 AM, Created By: Geneva Boyle   Task Name: Care Coordination   Assigned To: [facility]   Regarding Patient: Janeth Barclay, Status: Active   Comment:    Sonia Riley - 26 Apr 2016 8:45 AM     TASK CREATED  Caller: Pankaj CHAVEZ, Care Coordinator; Care Coordination; [phone]    [facility] from   [facility] [address] on [place] triage line 4/25/16 at [phone] (transferred from  line)  States he has questions about script  Requesting CB    I spoke with [facility], he is asking if we received script for home cryotherapy system? Asking if Dr Ame Gomez was able to sign and send back? States the company is [employer]  I advised will discuss with Dr Ame Gomez  [facility] asking for CB either way, if we received or did not receive  *************   Atif Jeronimo - 29 Apr 2016 5:07 PM     TASK REPLIED TO: Previously Assigned To [facility]  We did receive it and should have been faxed already  Nona Carlos - 02 May 2016 10:12 AM     TASK REPLIED TO: Previously Assigned To [facility]  Spoke with [facility] and advised of the same,appreciative  Signatures   Electronically signed by :  Nona Carlos, ; May  2 2016 10:13AM EST                       (Author)

## 2018-01-15 NOTE — MISCELLANEOUS
Re: Ralf Sutton  1975        Terrance Fisher is currently a medical patient of our practice  She is currently under treatment for Hypothyroidism on medication and is scheduled for GYN surgery in the near future  I believe having the proposed deep dental cleaning  is medically necessary as this will help reduce the risk of post-operative infection and thereby promote healing and recovery  Please feel free to contact our office should you require any additional  information  Sincerely;             Electronically signed by:Sandee Woodson [facility]  May 24 2017  7:43PM EST

## 2018-01-16 NOTE — MISCELLANEOUS
Message   Recorded as Task   Date: 10/23/2017 03:12 PM, Created By: System   Task Name: Hospital [facility]   Assigned To: Bessy Taylor   Regarding Patient: Ernst Gale, Status: Active   Comment:    System - 23 Oct 2017 3:12 PM     Patient discharged from hospital   Patient Name: Francisca Louis  Patient YOB: 1975  Discharge Date: 10/23/2017  Facility: [facility] - 24 Oct 2017 8:21 AM     TASK REASSIGNED: Previously Assigned To Charlee Green - 24 Oct 2017 11:25 AM     TASK EDITED  Called and spoke with pt  Pt  reports is having alot of pain at her incisions since her surgery  Pt  denies and redness or drainage from incisions and states they are closed  Pt  denies any fevers but admits to chills and reports she thinks it is from the pain she is having  Pt  has oxycodone to use and needed and tylenol and/or ibuprofen to use in between  Pt  does c/o of difficulty of having a bowel movement  Pt  reports did take milk of mag earlier and is hoping that works  Pt  reports is keeping hydrated  Pt  reports did vomit yesterday after she was home and ate  Pt  reports she ate breakfast earlier and has not vomit and does not feel nauseous at this time  Pt  is aware to avoid lifting anything over 20 lbs and is able to shower, but avoid tub baths for next 2 weeks  Pt  aware she would need to return to ER if she gets a fever over 101, has uncontrollable pain  persistant N/V, and/or redness or drainage from incisions  Pt  reports she understands this info  P    Pt  to f/u with surgery clinic in 2 weeks  Appt scheduled for pt  on Monday 11/6 @ 11:10 am  Pt  wanted a later appt so she had time to get kids to the bus/school  Pt  was offered to schedule a f/u with Lorena  Pt  declined to schedule appt right now and states will call before the end of the year to schedule routine f/u appt with her  Pt  to call in meantime with any further questions or concerns       *Please note final surgical pathology pending at d/c  Active Problems    1  Abnormal MRI (793 99) (R93 8)   2  Abnormal thyroid blood test (794 5) (R94 6)   3  Abnormal uterine bleeding (AUB) (626 9) (N93 9)   4  Contact dermatitis due to poison ivy (692 6) (L23 7)   5  DDD (degenerative disc disease), lumbar (722 52) (M51 36)   6  Goiter (240 9) (E04 9)   7  Hypothyroidism due to Hashimoto's thyroiditis (244 8,245 2) (E03 8,E06 3)   8  Lumbar radiculopathy (724 4) (M54 16)   9  Obese (278 00) (E66 9)   10  Stress incontinence, female (625 6) (N39 3)   11  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)    Current Meds   1  HydrOXYzine HCl - 25 MG Oral Tablet; TAKE 1 TABLET AT BEDTIME AS NEEDED; Therapy: 31Wwn1933 to (Evaluate:23Mar2017)  Requested for: 35Tip2080; Last   Rx:66Vqz3209 Ordered   2  Ibuprofen 800 MG Oral Tablet; TAKE 1 TABLET EVERY 8 HOURS DAILY  Requested for:   42VMB1425; Last Rx:64Ruc3724 Ordered   3  Levothyroxine Sodium 25 MCG Oral Tablet; TAKE ONE TABLET BY MOUTH ONCE   DAILY AS DIRECTED; Therapy: 97Ygr4416 to (Chao Timothy)  Requested for: 55HXQ2126; Last   Rx:08Jun2017 Ordered   4  PredniSONE 20 MG Oral Tablet; TAKE 3 TABLETS DAILY FOR 3 DAYS, THEN 2   TABLETS DAILY FOR 3 DAYS, THEN 1 TABLET DAILY FOR 3 DAYS; Therapy: 63MXC3517 to (Evaluate:17Jun2017)  Requested for: 49OCS9285; Last   Rx:08Jun2017 Ordered   5  Robaxin 500 MG Oral Tablet (Methocarbamol); Therapy: 98HNW6343 to Recorded    Allergies    1  No Known Drug Allergies    2  No Known Environmental Allergies   3   No Known Food Allergies    Signatures   Electronically signed by : Raine Goldberg, ; Oct 24 2017 11:25AM EST                       (Author)

## 2018-01-16 NOTE — MISCELLANEOUS
Message   Recorded as Task   Date: 07/19/2016 09:31 AM, Created By: Butch Macias   Task Name: Miscellaneous   Assigned To: Irlanda Cherry   Regarding Patient: Farzana Hoffmann, Status: Active   CommentDewanda Malu - 19 Jul 6418 1:43 AM     TASK CREATED  Pt called to scheduled LESI @L4-5 that was recommended by Dr Fawn Olivo     All preprocedure instructions were reviwed with pt -     NPO 1 hour prior   No NSAIDS 4 days prior, No Ibuprofen 1 day prior, no antibx and she will need a     Pt agreed and aware - confirmed date time and location        Active Problems    1  Cervical pain (neck) (723 1) (M54 2)   2  Chest pain (786 50) (R07 9)   3  Chronic pelvic pain in female (792 0,979 50) (R10 2,G89 29)   4  DDD (degenerative disc disease), lumbar (722 52) (M51 36)   5  Dyspareunia (625 0)   6  Encounter to discuss test results (V65 49) (Z71 89)   7  Fatigue (780 79) (R53 83)   8  History of General counseling for initiation of other contraceptive measures (V25 02)   (Z30 09)   9  Low back pain radiating to left leg (724 2) (M54 5)   10  Memory loss (780 93) (R41 3)   11  Mixed incontinence (788 33) (N39 46)   12  Myalgia (729 1) (M79 1)   13  Myofascial pain (729 1) (M79 1)   14  Neck Strain (847 0)   15  Paresthesia (782 0) (R20 2)   16  Polydipsia (783 5) (R63 1)   17  Sacroiliac joint pain (724 6) (M53 3)   18  Sacroiliitis (720 2) (M46 1)   19  Sciatica (724 3) (M54 30)   20  Stress incontinence, female (625 6) (N39 3)    Current Meds   1  Methocarbamol 750 MG Oral Tablet (Robaxin-750); TAKE 1 TABLET 3 TIMES DAILY    Requested for: 81NQI4106; Last Rx:64Oyf4641 Ordered    Allergies    1  No Known Drug Allergies    2  No Known Environmental Allergies   3   No Known Food Allergies    Signatures   Electronically signed by : Samir Mckeon, ; Jul 19 2016  9:32AM EST                       (Author)

## 2018-01-17 NOTE — RESULT NOTES
Message   Recorded as Task   Date: 08/05/2016 02:12 PM, Created By: Nomi Coffey   Task Name: Follow Up   Assigned To: [facility]   Regarding Patient: Viridiana May, Status: Active   Comment:    Nomi Coffey - 05 Aug 2016 2:12 PM     TASK CREATED  [facility] stating that she just had injection done last week  Patient reports she had a headache since Wed, both legs hurt and hot to touch  Patient stated that no one told her about these symptoms  Patient can be called back at [phone]   Conception Jazzmine - 05 Aug 2016 3:30 PM     TASK REPLIED TO: Previously Assigned To [facility]                      Please call patient to see if she has a postural headache and check site of injection  I did not have any reason to suspect spinal tap during injection  Nona Carlos - 05 Aug 2016 3:45 PM     TASK REPLIED TO: Previously Assigned To [facility]  S/P MARVIN 8-3-16    Spoke with pt who states she had a h/a all day yesterday  States it is better today,it comes and goes-does not notice any difference to her h/a when lying down or standing up  Taking ibuprofen prn which helps  Pt states her legs feel like they are on fire  Pt states injection site looks good-denies any fevers  Denies any loss of bowel/bladder control  Advised pt that injection could take up to 2 weeks for full effect and that we would f/u with her next week  Pt continues to use ice prn to back and legs  ADvised I would update you and cb with any other recommendations  Atif Lynn - 05 Aug 2016 3:50 PM     TASK REPLIED TO: Previously Assigned To Atif Lynn                      No additional recommendation, continue with conservative management as you suggested  Thank you        Signatures   Electronically signed by :  Nona Carlos, ; Aug  5 2016  3:56PM EST                       (Author)

## 2018-01-22 VITALS
DIASTOLIC BLOOD PRESSURE: 74 MMHG | BODY MASS INDEX: 31.61 KG/M2 | WEIGHT: 201.38 LBS | SYSTOLIC BLOOD PRESSURE: 106 MMHG | HEIGHT: 67 IN

## 2018-01-22 VITALS
HEART RATE: 80 BPM | SYSTOLIC BLOOD PRESSURE: 116 MMHG | DIASTOLIC BLOOD PRESSURE: 80 MMHG | WEIGHT: 194.42 LBS | TEMPERATURE: 98 F | HEIGHT: 66 IN | BODY MASS INDEX: 31.25 KG/M2

## 2018-02-20 DIAGNOSIS — E03.8 HYPOTHYROIDISM DUE TO HASHIMOTO'S THYROIDITIS: Primary | ICD-10-CM

## 2018-02-20 DIAGNOSIS — E06.3 HYPOTHYROIDISM DUE TO HASHIMOTO'S THYROIDITIS: Primary | ICD-10-CM

## 2018-02-20 RX ORDER — LEVOTHYROXINE SODIUM 0.03 MG/1
25 TABLET ORAL DAILY
Qty: 90 TABLET | Refills: 0 | Status: SHIPPED | OUTPATIENT
Start: 2018-02-20 | End: 2018-05-14 | Stop reason: SDUPTHER

## 2018-03-01 ENCOUNTER — OFFICE VISIT (OUTPATIENT)
Dept: OBGYN CLINIC | Facility: HOSPITAL | Age: 43
End: 2018-03-01
Payer: COMMERCIAL

## 2018-03-01 VITALS
SYSTOLIC BLOOD PRESSURE: 104 MMHG | HEIGHT: 66 IN | DIASTOLIC BLOOD PRESSURE: 68 MMHG | HEART RATE: 81 BPM | WEIGHT: 189 LBS | BODY MASS INDEX: 30.37 KG/M2

## 2018-03-01 DIAGNOSIS — N93.9 ABNORMAL UTERINE BLEEDING (AUB): ICD-10-CM

## 2018-03-01 DIAGNOSIS — N80.0 ADENOMYOSIS: ICD-10-CM

## 2018-03-01 DIAGNOSIS — R10.2 PELVIC PAIN: Primary | ICD-10-CM

## 2018-03-01 PROCEDURE — 99213 OFFICE O/P EST LOW 20 MIN: CPT | Performed by: OBSTETRICS & GYNECOLOGY

## 2018-03-01 RX ORDER — SODIUM CHLORIDE, SODIUM LACTATE, POTASSIUM CHLORIDE, CALCIUM CHLORIDE 600; 310; 30; 20 MG/100ML; MG/100ML; MG/100ML; MG/100ML
125 INJECTION, SOLUTION INTRAVENOUS CONTINUOUS
Status: CANCELLED | OUTPATIENT
Start: 2018-03-01

## 2018-03-01 NOTE — PROGRESS NOTES
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 and had to leave work yesterday because of it  They told her she needed a doctor's note to return  Pt states she was supposed to have a hysterectomy performed last year but had to cancel it the week before because her house was foreclosed and she had a month to move  She was not going to have the down time to recover from the surgery at that time  However, pt states she is still interested in proceeding with the surgery       Plan:  1)AUB with pelvic pain 2/2 Adenomyosis:  Spoke with Billie: surgery for TLH and bilateral salpingectomy to be scheduled for April/May and to return to clinic 2 weeks prior to scheduled surgery for H&P (consent to be signed at that visit)  2) Pre-op orders placed today  3) Work note provided to patient    Duc Zambrano MD  OBGYN, PGY-1  3/1/2018 11:58 AM

## 2018-03-01 NOTE — LETTER
March 1, 2018     Patient: Allyson Pettit   YOB: 1975   Date of Visit: 3/1/2018       To Whom it May Concern:    Allyson Pettit is under my professional care  She was seen in my office on 3/1/2018  She has a diagnosis of Adenomyosis which can result in pain  Pt is in the process of scheduling the appropriate management  If you have any questions or concerns, please don't hesitate to call           Sincerely,          Jonathon Barrera MD

## 2018-03-27 ENCOUNTER — OFFICE VISIT (OUTPATIENT)
Dept: OBGYN CLINIC | Facility: HOSPITAL | Age: 43
End: 2018-03-27
Payer: COMMERCIAL

## 2018-03-27 VITALS
BODY MASS INDEX: 30.37 KG/M2 | SYSTOLIC BLOOD PRESSURE: 107 MMHG | HEIGHT: 66 IN | HEART RATE: 72 BPM | WEIGHT: 189 LBS | DIASTOLIC BLOOD PRESSURE: 72 MMHG

## 2018-03-27 DIAGNOSIS — R10.2 PELVIC PAIN: ICD-10-CM

## 2018-03-27 DIAGNOSIS — N93.9 ABNORMAL UTERINE BLEEDING (AUB): Primary | ICD-10-CM

## 2018-03-27 DIAGNOSIS — Z00.00 HEALTHCARE MAINTENANCE: ICD-10-CM

## 2018-03-27 DIAGNOSIS — Z01.818 PREOP EXAMINATION: ICD-10-CM

## 2018-03-27 LAB — SL AMB POCT URINE HCG: NORMAL

## 2018-03-27 PROCEDURE — 88305 TISSUE EXAM BY PATHOLOGIST: CPT | Performed by: PATHOLOGY

## 2018-03-27 PROCEDURE — 58100 BIOPSY OF UTERUS LINING: CPT | Performed by: OBSTETRICS & GYNECOLOGY

## 2018-03-27 PROCEDURE — 81025 URINE PREGNANCY TEST: CPT | Performed by: OBSTETRICS & GYNECOLOGY

## 2018-03-27 NOTE — PROGRESS NOTES
Assessment/Plan     Assessment:  43 yo with a hx of abnormal uterine bleeding and pelvic pain here for H/P for surgery    Plan:  1  AUB/pelvic pain:  - Likely secondary to adenomyosis  - Patient have failed hormonal therapy  - Plan for definitive treatment with total laparoscopic hysterectomy and bilateral salpingectomy    2  Follow visit: 2 weeks post op    Priscila Camejo is a 44 y o   15 para 3, female  Patient had been having abnormal uterine bleeding associated with pelvic pain for over a year  Patient was treated with hormonal methods , but the treatment was unsuccessful  Patient had an U/S that showed about a 12 weeks size uterus and findings concerning for adenomyosis  At this time, patient is requesting definitive management of her complaint by hysterectomy  I had a lengthy discussion with the patient regarding her bleeding and consideration for endometrial ablation versus hysterectomy  Procedure, risks, reasons, benefits and complications (including injury to bowel, bladder, major blood vessel, ureter, bleeding, possibility of transfusion, infection, or fistula formation) were reviewed in detail  Consent was signed and preop testing was ordered  Instructions were reviewed, including NPO after midnight  Pertinent Gyn History:  Menses irregular  Bleeding: intermenstrual bleeding and dysfunctional uterine bleeding  Contraception: tubal ligation    Preventive screening:   Last mammogram: 2016 BIRAD 2   Last pap: 2016 , cytology and HPV negative   Endometrial biopsy : 18  -> negative  EMB was repeated today    The following portions of the patient's history were reviewed and updated as appropriate: allergies, current medications, past family history, past medical history, past social history, past surgical history and problem list     Review of Systems  Pertinent items are noted in HPI       Objective     /72 (BP Location: Right arm, Patient Position: Sitting, Cuff Size: Standard)   Pulse 72   Ht 5' 6" (1 676 m)   Wt 85 7 kg (189 lb)   LMP 02/28/2018 (Exact Date)   BMI 30 51 kg/m²     General:   alert and oriented, in no acute distress   Heart: regular rate and rhythm, S1, S2 normal, no murmur, click, rub or gallop   Lungs: clear to auscultation bilaterally   Abdomen: soft, non-tender, without masses or organomegaly   Vulva: normal   Vagina: normal mucosa, normal discharge  Narrow introitus   Cervix: no cervical motion tenderness and no lesions  Limited descensus   Uterus: normal size, non-tender   Adnexa: normal adnexa     EMB  Procedure:  1  SSE inserted into vagina  and cervix visualized  2  Betadine used to clean the cervix  3   Uterus sounded to 9 cm  4  4 passes made into the uterus using the P[place], and the endometrial curetting/biopsy was sent to pathology for evalaution    Lab Review  Urine pregnancy test      Jenna Richmond   3/27/2018

## 2018-04-17 ENCOUNTER — APPOINTMENT (OUTPATIENT)
Dept: LAB | Facility: HOSPITAL | Age: 43
End: 2018-04-17
Payer: COMMERCIAL

## 2018-04-17 ENCOUNTER — TRANSCRIBE ORDERS (OUTPATIENT)
Dept: LAB | Facility: HOSPITAL | Age: 43
End: 2018-04-17

## 2018-04-17 DIAGNOSIS — R10.2 PELVIC PAIN: ICD-10-CM

## 2018-04-17 DIAGNOSIS — N93.9 ABNORMAL UTERINE BLEEDING (AUB): ICD-10-CM

## 2018-04-17 LAB
ABO GROUP BLD: NORMAL
BASOPHILS # BLD AUTO: 0.02 THOUSANDS/ΜL (ref 0–0.1)
BASOPHILS NFR BLD AUTO: 0 % (ref 0–1)
BLD GP AB SCN SERPL QL: NEGATIVE
EOSINOPHIL # BLD AUTO: 0.05 THOUSAND/ΜL (ref 0–0.61)
EOSINOPHIL NFR BLD AUTO: 1 % (ref 0–6)
ERYTHROCYTE [DISTWIDTH] IN BLOOD BY AUTOMATED COUNT: 14.6 % (ref 11.6–15.1)
EST. AVERAGE GLUCOSE BLD GHB EST-MCNC: 97 MG/DL
HBA1C MFR BLD: 5 % (ref 4.2–6.3)
HCT VFR BLD AUTO: 37.1 % (ref 34.8–46.1)
HGB BLD-MCNC: 12.1 G/DL (ref 11.5–15.4)
LYMPHOCYTES # BLD AUTO: 2.26 THOUSANDS/ΜL (ref 0.6–4.47)
LYMPHOCYTES NFR BLD AUTO: 27 % (ref 14–44)
MCH RBC QN AUTO: 27.7 PG (ref 26.8–34.3)
MCHC RBC AUTO-ENTMCNC: 32.6 G/DL (ref 31.4–37.4)
MCV RBC AUTO: 85 FL (ref 82–98)
MONOCYTES # BLD AUTO: 0.63 THOUSAND/ΜL (ref 0.17–1.22)
MONOCYTES NFR BLD AUTO: 7 % (ref 4–12)
NEUTROPHILS # BLD AUTO: 5.57 THOUSANDS/ΜL (ref 1.85–7.62)
NEUTS SEG NFR BLD AUTO: 65 % (ref 43–75)
NRBC BLD AUTO-RTO: 0 /100 WBCS
PLATELET # BLD AUTO: 225 THOUSANDS/UL (ref 149–390)
PMV BLD AUTO: 12 FL (ref 8.9–12.7)
RBC # BLD AUTO: 4.37 MILLION/UL (ref 3.81–5.12)
RH BLD: POSITIVE
SPECIMEN EXPIRATION DATE: NORMAL
WBC # BLD AUTO: 8.54 THOUSAND/UL (ref 4.31–10.16)

## 2018-04-17 PROCEDURE — 86901 BLOOD TYPING SEROLOGIC RH(D): CPT

## 2018-04-17 PROCEDURE — 36415 COLL VENOUS BLD VENIPUNCTURE: CPT

## 2018-04-17 PROCEDURE — 86900 BLOOD TYPING SEROLOGIC ABO: CPT

## 2018-04-17 PROCEDURE — 86850 RBC ANTIBODY SCREEN: CPT

## 2018-04-17 PROCEDURE — 85025 COMPLETE CBC W/AUTO DIFF WBC: CPT

## 2018-04-17 PROCEDURE — 83036 HEMOGLOBIN GLYCOSYLATED A1C: CPT

## 2018-04-19 ENCOUNTER — ANESTHESIA EVENT (OUTPATIENT)
Dept: PERIOP | Facility: HOSPITAL | Age: 43
End: 2018-04-19
Payer: COMMERCIAL

## 2018-04-19 RX ORDER — ONDANSETRON 2 MG/ML
4 INJECTION INTRAMUSCULAR; INTRAVENOUS ONCE AS NEEDED
Status: CANCELLED | OUTPATIENT
Start: 2018-04-20

## 2018-04-19 RX ORDER — SODIUM CHLORIDE, SODIUM LACTATE, POTASSIUM CHLORIDE, CALCIUM CHLORIDE 600; 310; 30; 20 MG/100ML; MG/100ML; MG/100ML; MG/100ML
100 INJECTION, SOLUTION INTRAVENOUS CONTINUOUS
Status: CANCELLED | OUTPATIENT
Start: 2018-04-20

## 2018-04-19 RX ORDER — FENTANYL CITRATE/PF 50 MCG/ML
25 SYRINGE (ML) INJECTION
Status: CANCELLED | OUTPATIENT
Start: 2018-04-20

## 2018-04-20 ENCOUNTER — ANESTHESIA (OUTPATIENT)
Dept: PERIOP | Facility: HOSPITAL | Age: 43
End: 2018-04-20
Payer: COMMERCIAL

## 2018-04-20 ENCOUNTER — HOSPITAL ENCOUNTER (OUTPATIENT)
Facility: HOSPITAL | Age: 43
Setting detail: OUTPATIENT SURGERY
Discharge: HOME | End: 2018-04-21
Attending: OBSTETRICS & GYNECOLOGY | Admitting: OBSTETRICS & GYNECOLOGY
Payer: COMMERCIAL

## 2018-04-20 DIAGNOSIS — N93.9 ABNORMAL UTERINE BLEEDING (AUB): ICD-10-CM

## 2018-04-20 DIAGNOSIS — Z90.710 STATUS POST VAGINAL HYSTERECTOMY: Primary | ICD-10-CM

## 2018-04-20 LAB
EXT PREGNANCY TEST URINE: NEGATIVE
GLUCOSE SERPL-MCNC: 128 MG/DL (ref 65–140)

## 2018-04-20 PROCEDURE — 82948 REAGENT STRIP/BLOOD GLUCOSE: CPT

## 2018-04-20 PROCEDURE — 88307 TISSUE EXAM BY PATHOLOGIST: CPT | Performed by: PATHOLOGY

## 2018-04-20 PROCEDURE — 81025 URINE PREGNANCY TEST: CPT | Performed by: OBSTETRICS & GYNECOLOGY

## 2018-04-20 PROCEDURE — 58262 VAG HYST INCLUDING T/O: CPT | Performed by: OBSTETRICS & GYNECOLOGY

## 2018-04-20 RX ORDER — FENTANYL CITRATE/PF 50 MCG/ML
50 SYRINGE (ML) INJECTION
Status: DISCONTINUED | OUTPATIENT
Start: 2018-04-20 | End: 2018-04-20 | Stop reason: HOSPADM

## 2018-04-20 RX ORDER — OXYCODONE HYDROCHLORIDE AND ACETAMINOPHEN 5; 325 MG/1; MG/1
1 TABLET ORAL EVERY 4 HOURS PRN
Status: DISCONTINUED | OUTPATIENT
Start: 2018-04-20 | End: 2018-04-21 | Stop reason: HOSPADM

## 2018-04-20 RX ORDER — SODIUM CHLORIDE, SODIUM LACTATE, POTASSIUM CHLORIDE, CALCIUM CHLORIDE 600; 310; 30; 20 MG/100ML; MG/100ML; MG/100ML; MG/100ML
125 INJECTION, SOLUTION INTRAVENOUS CONTINUOUS
Status: DISCONTINUED | OUTPATIENT
Start: 2018-04-20 | End: 2018-04-20

## 2018-04-20 RX ORDER — FAMOTIDINE 20 MG/1
20 TABLET, FILM COATED ORAL 2 TIMES DAILY
Status: DISCONTINUED | OUTPATIENT
Start: 2018-04-20 | End: 2018-04-21 | Stop reason: HOSPADM

## 2018-04-20 RX ORDER — MIDAZOLAM HYDROCHLORIDE 1 MG/ML
INJECTION INTRAMUSCULAR; INTRAVENOUS AS NEEDED
Status: DISCONTINUED | OUTPATIENT
Start: 2018-04-20 | End: 2018-04-20 | Stop reason: SURG

## 2018-04-20 RX ORDER — ONDANSETRON 2 MG/ML
INJECTION INTRAMUSCULAR; INTRAVENOUS AS NEEDED
Status: DISCONTINUED | OUTPATIENT
Start: 2018-04-20 | End: 2018-04-20 | Stop reason: SURG

## 2018-04-20 RX ORDER — DIPHENHYDRAMINE HYDROCHLORIDE 50 MG/ML
12.5 INJECTION INTRAMUSCULAR; INTRAVENOUS ONCE
Status: COMPLETED | OUTPATIENT
Start: 2018-04-20 | End: 2018-04-20

## 2018-04-20 RX ORDER — LIDOCAINE HYDROCHLORIDE 10 MG/ML
INJECTION, SOLUTION INFILTRATION; PERINEURAL AS NEEDED
Status: DISCONTINUED | OUTPATIENT
Start: 2018-04-20 | End: 2018-04-20 | Stop reason: SURG

## 2018-04-20 RX ORDER — SODIUM CHLORIDE, SODIUM LACTATE, POTASSIUM CHLORIDE, CALCIUM CHLORIDE 600; 310; 30; 20 MG/100ML; MG/100ML; MG/100ML; MG/100ML
125 INJECTION, SOLUTION INTRAVENOUS CONTINUOUS
Status: DISCONTINUED | OUTPATIENT
Start: 2018-04-20 | End: 2018-04-21

## 2018-04-20 RX ORDER — OXYCODONE HYDROCHLORIDE AND ACETAMINOPHEN 5; 325 MG/1; MG/1
1 TABLET ORAL EVERY 4 HOURS PRN
Qty: 20 TABLET | Refills: 0 | Status: SHIPPED | OUTPATIENT
Start: 2018-04-20 | End: 2018-04-30

## 2018-04-20 RX ORDER — SODIUM CHLORIDE, SODIUM LACTATE, POTASSIUM CHLORIDE, CALCIUM CHLORIDE 600; 310; 30; 20 MG/100ML; MG/100ML; MG/100ML; MG/100ML
100 INJECTION, SOLUTION INTRAVENOUS CONTINUOUS
Status: DISCONTINUED | OUTPATIENT
Start: 2018-04-20 | End: 2018-04-20

## 2018-04-20 RX ORDER — GLYCOPYRROLATE 0.2 MG/ML
INJECTION INTRAMUSCULAR; INTRAVENOUS AS NEEDED
Status: DISCONTINUED | OUTPATIENT
Start: 2018-04-20 | End: 2018-04-20 | Stop reason: SURG

## 2018-04-20 RX ORDER — METOCLOPRAMIDE HYDROCHLORIDE 5 MG/ML
10 INJECTION INTRAMUSCULAR; INTRAVENOUS ONCE AS NEEDED
Status: COMPLETED | OUTPATIENT
Start: 2018-04-20 | End: 2018-04-20

## 2018-04-20 RX ORDER — OXYCODONE HYDROCHLORIDE AND ACETAMINOPHEN 5; 325 MG/1; MG/1
2 TABLET ORAL EVERY 4 HOURS PRN
Status: DISCONTINUED | OUTPATIENT
Start: 2018-04-20 | End: 2018-04-21 | Stop reason: HOSPADM

## 2018-04-20 RX ORDER — ROCURONIUM BROMIDE 10 MG/ML
INJECTION, SOLUTION INTRAVENOUS AS NEEDED
Status: DISCONTINUED | OUTPATIENT
Start: 2018-04-20 | End: 2018-04-20 | Stop reason: SURG

## 2018-04-20 RX ORDER — DOCUSATE SODIUM 100 MG/1
100 CAPSULE, LIQUID FILLED ORAL 2 TIMES DAILY
Status: DISCONTINUED | OUTPATIENT
Start: 2018-04-20 | End: 2018-04-21 | Stop reason: HOSPADM

## 2018-04-20 RX ORDER — METRONIDAZOLE 7.5 MG/G
GEL VAGINAL AS NEEDED
Status: DISCONTINUED | OUTPATIENT
Start: 2018-04-20 | End: 2018-04-20 | Stop reason: HOSPADM

## 2018-04-20 RX ORDER — LEVOTHYROXINE SODIUM 0.03 MG/1
25 TABLET ORAL
Status: DISCONTINUED | OUTPATIENT
Start: 2018-04-21 | End: 2018-04-21 | Stop reason: HOSPADM

## 2018-04-20 RX ORDER — PROPOFOL 10 MG/ML
INJECTION, EMULSION INTRAVENOUS AS NEEDED
Status: DISCONTINUED | OUTPATIENT
Start: 2018-04-20 | End: 2018-04-20 | Stop reason: SURG

## 2018-04-20 RX ORDER — LIDOCAINE HYDROCHLORIDE AND EPINEPHRINE 10; 10 MG/ML; UG/ML
INJECTION, SOLUTION INFILTRATION; PERINEURAL AS NEEDED
Status: DISCONTINUED | OUTPATIENT
Start: 2018-04-20 | End: 2018-04-20 | Stop reason: HOSPADM

## 2018-04-20 RX ORDER — FENTANYL CITRATE 50 UG/ML
INJECTION, SOLUTION INTRAMUSCULAR; INTRAVENOUS AS NEEDED
Status: DISCONTINUED | OUTPATIENT
Start: 2018-04-20 | End: 2018-04-20 | Stop reason: SURG

## 2018-04-20 RX ORDER — ONDANSETRON 2 MG/ML
4 INJECTION INTRAMUSCULAR; INTRAVENOUS EVERY 6 HOURS PRN
Status: DISCONTINUED | OUTPATIENT
Start: 2018-04-20 | End: 2018-04-21 | Stop reason: HOSPADM

## 2018-04-20 RX ORDER — IBUPROFEN 600 MG/1
600 TABLET ORAL EVERY 6 HOURS PRN
Status: DISCONTINUED | OUTPATIENT
Start: 2018-04-20 | End: 2018-04-21 | Stop reason: HOSPADM

## 2018-04-20 RX ORDER — ONDANSETRON 2 MG/ML
4 INJECTION INTRAMUSCULAR; INTRAVENOUS ONCE AS NEEDED
Status: COMPLETED | OUTPATIENT
Start: 2018-04-20 | End: 2018-04-20

## 2018-04-20 RX ADMIN — DEXAMETHASONE SODIUM PHOSPHATE 5 MG: 10 INJECTION INTRAMUSCULAR; INTRAVENOUS at 17:54

## 2018-04-20 RX ADMIN — SODIUM CHLORIDE, SODIUM LACTATE, POTASSIUM CHLORIDE, AND CALCIUM CHLORIDE: .6; .31; .03; .02 INJECTION, SOLUTION INTRAVENOUS at 16:02

## 2018-04-20 RX ADMIN — DEXAMETHASONE SODIUM PHOSPHATE 5 MG: 10 INJECTION INTRAMUSCULAR; INTRAVENOUS at 16:16

## 2018-04-20 RX ADMIN — HYDROMORPHONE HYDROCHLORIDE 0.5 MG: 1 INJECTION, SOLUTION INTRAMUSCULAR; INTRAVENOUS; SUBCUTANEOUS at 22:17

## 2018-04-20 RX ADMIN — PROPOFOL 200 MG: 10 INJECTION, EMULSION INTRAVENOUS at 16:11

## 2018-04-20 RX ADMIN — LIDOCAINE HYDROCHLORIDE 100 MG: 10 INJECTION, SOLUTION INFILTRATION; PERINEURAL at 16:11

## 2018-04-20 RX ADMIN — ROCURONIUM BROMIDE 50 MG: 10 INJECTION INTRAVENOUS at 16:11

## 2018-04-20 RX ADMIN — SODIUM CHLORIDE, SODIUM LACTATE, POTASSIUM CHLORIDE, AND CALCIUM CHLORIDE 125 ML/HR: .6; .31; .03; .02 INJECTION, SOLUTION INTRAVENOUS at 11:31

## 2018-04-20 RX ADMIN — ONDANSETRON 4 MG: 2 INJECTION INTRAMUSCULAR; INTRAVENOUS at 18:53

## 2018-04-20 RX ADMIN — ONDANSETRON 4 MG: 2 INJECTION INTRAMUSCULAR; INTRAVENOUS at 18:18

## 2018-04-20 RX ADMIN — FENTANYL CITRATE 50 MCG: 50 INJECTION, SOLUTION INTRAMUSCULAR; INTRAVENOUS at 19:30

## 2018-04-20 RX ADMIN — HYDROMORPHONE HYDROCHLORIDE 0.5 MG: 1 INJECTION, SOLUTION INTRAMUSCULAR; INTRAVENOUS; SUBCUTANEOUS at 19:50

## 2018-04-20 RX ADMIN — METOCLOPRAMIDE 10 MG: 5 INJECTION, SOLUTION INTRAMUSCULAR; INTRAVENOUS at 18:47

## 2018-04-20 RX ADMIN — GLYCOPYRROLATE 0.4 MG: 0.2 INJECTION, SOLUTION INTRAMUSCULAR; INTRAVENOUS at 18:24

## 2018-04-20 RX ADMIN — FENTANYL CITRATE 100 MCG: 50 INJECTION, SOLUTION INTRAMUSCULAR; INTRAVENOUS at 16:11

## 2018-04-20 RX ADMIN — Medication 2000 MG: at 16:15

## 2018-04-20 RX ADMIN — IBUPROFEN 600 MG: 600 TABLET ORAL at 22:16

## 2018-04-20 RX ADMIN — DOCUSATE SODIUM 100 MG: 100 CAPSULE, LIQUID FILLED ORAL at 22:17

## 2018-04-20 RX ADMIN — MIDAZOLAM 2 MG: 1 INJECTION INTRAMUSCULAR; INTRAVENOUS at 16:02

## 2018-04-20 RX ADMIN — SODIUM CHLORIDE, SODIUM LACTATE, POTASSIUM CHLORIDE, AND CALCIUM CHLORIDE: .6; .31; .03; .02 INJECTION, SOLUTION INTRAVENOUS at 16:57

## 2018-04-20 RX ADMIN — DIPHENHYDRAMINE HYDROCHLORIDE 12.5 MG: 50 INJECTION, SOLUTION INTRAMUSCULAR; INTRAVENOUS at 20:10

## 2018-04-20 RX ADMIN — FENTANYL CITRATE 50 MCG: 50 INJECTION, SOLUTION INTRAMUSCULAR; INTRAVENOUS at 19:42

## 2018-04-20 RX ADMIN — NEOSTIGMINE METHYLSULFATE 3 MG: 1 INJECTION, SOLUTION INTRAMUSCULAR; INTRAVENOUS; SUBCUTANEOUS at 18:24

## 2018-04-20 RX ADMIN — HYDROMORPHONE HYDROCHLORIDE 1 MG: 1 INJECTION, SOLUTION INTRAMUSCULAR; INTRAVENOUS; SUBCUTANEOUS at 17:54

## 2018-04-20 RX ADMIN — OXYCODONE HYDROCHLORIDE AND ACETAMINOPHEN 1 TABLET: 5; 325 TABLET ORAL at 21:01

## 2018-04-20 NOTE — H&P
H&P Exam - Gynecology   Josseline Romano 44 y o  female MRN: [id number]  Unit/Bed#: OR POOL Encounter: [id number]    Assessment/Plan     Assessment:  43 yo with AUB and pelvic pain here for definitive  management by total vaginal  hysterectomy and bilateral salpingectomy    Plan:  1  AUB/pelvic pain: plan is  for total  Vaginal hysterectomy  See progress note from 3/27/18 for details    History of Present Illness   HX and PE limited by: None  HPI:  Lidya Maxwell is a 44 y o  female who presents with AUB and pelvic pain  Patient desires definitive management by surgery  Please see note from 3/27/18 for details  Review of Systems   Constitutional: Negative for chills and fever  Respiratory: Negative for cough, chest tightness, shortness of breath and wheezing  Cardiovascular: Negative for chest pain, palpitations and leg swelling  Gastrointestinal: Negative for abdominal pain, constipation, diarrhea, nausea and vomiting  Genitourinary: Negative for dysuria, flank pain, pelvic pain and vaginal bleeding  Neurological: Negative for light-headedness and headaches  Psychiatric/Behavioral: Negative for confusion  The patient is not nervous/anxious         See progress note from 3/27/18 for details    Historical Information   Past Medical History:   Diagnosis Date    Anxiety     Bilateral fibrocystic breast changes     resolved: 02/21/2017    Breast disorder     Chronic pelvic pain in female     last assessed: 09/07/2016    Disease of thyroid gland     Dyspareunia in female     last assessed: 08/18/2016    Fibromyalgia     Gallbladder disease     Herniated intervertebral disc of lumbar spine     Memory loss     last assessed: 06/15/2015    Mixed incontinence     last assessed: 01/11/2016    Myofascial pain     last assessed: 06/01/2016    Sciatica     last assessed: 06/15/2015    Situational anxiety     last assessed: 02/21/2017    Thyroid disease     Uterus, adenomyosis     last assessed: 09/07/2016     Past Surgical History:   Procedure Laterality Date    CHOLECYSTECTOMY LAPAROSCOPIC N/A 10/22/2017    Procedure: CHOLECYSTECTOMY LAPAROSCOPIC;  Surgeon: Pippa Beaulieu MD;  Location: [facility];  Service: General    ENDOMETRIAL BIOPSY      resolved: 02/21/2017    TUBAL LIGATION      last assessed: 10/20/2014     OB/GYN History:   Family History   Problem Relation Age of Onset    No Known Problems Mother     Diabetes Maternal Grandmother     Leukemia Maternal Grandmother     Breast cancer Family     Cancer Family     Diabetes Family     Heart disease Family     Hyperlipidemia Family      reported cholesterol level was high    Hypertension Other      Social History   History   Alcohol Use No     Comment: (alcohol use and remote social alcohol use-as per [employer])     History   Drug Use No     History   Smoking Status    Never Smoker   Smokeless Tobacco    Never Used     Comment: (former smoker-as per [employer])       Meds/Allergies   all current active meds have been reviewed  No Known Allergies    Objective   Vitals: Blood pressure 101/61, pulse 78, resp  rate 20, height 5' 6" (1 676 m), weight 85 7 kg (189 lb), SpO2 100 %  No intake or output data in the 24 hours ending 04/20/18 1341    Invasive Devices: Invasive Devices     Peripheral Intravenous Line            Peripheral IV 04/20/18 Left Forearm less than 1 day                Physical Exam   Constitutional: She is oriented to person, place, and time  She appears well-developed and well-nourished  HENT:   Head: Normocephalic and atraumatic  Cardiovascular: Normal rate and normal heart sounds  Pulmonary/Chest: Effort normal and breath sounds normal  No respiratory distress  She has no wheezes  She has no rales  Abdominal: Soft  She exhibits no distension  There is no tenderness  There is no rebound and no guarding  Neurological: She is alert and oriented to person, place, and time     Psychiatric: She has a normal mood and affect  Her behavior is normal    Vitals reviewed  Lab Results: I have personally reviewed pertinent reports  Imaging: I have personally reviewed pertinent reports  EKG, Pathology, and Other Studies: I have personally reviewed pertinent reports        Code Status: Full code    Burton Santos   4/20/2018

## 2018-04-20 NOTE — DISCHARGE INSTRUCTIONS
Vaginal Hysterectomy   WHAT YOU SHOULD KNOW:   A vaginal hysterectomy is surgery to remove your uterus through your vagina  Other organs, such as your ovaries and fallopian tubes, may also be removed  AFTER YOU LEAVE:   Medicines:   · Anticoagulants    are a type of blood thinner medicine that helps prevent clots  Clots can cause strokes, heart attacks, and death  These medicines may cause you to bleed or bruise more easily  ¨ Watch for bleeding from your gums or nose  Watch for blood in your urine and bowel movements  Use a soft washcloth and a soft toothbrush  If you shave, use an electric razor  Avoid activities that can cause bruising or bleeding  ¨ Tell your healthcare provider about all medicines you take because many medicines cannot be used with anticoagulants  Do not start or stop any medicines unless your healthcare provider tells you to  Tell your dentist and other healthcare providers that you take anticoagulants  Wear a bracelet or necklace that says you take this medicine  ¨ You will need regular blood tests so your healthcare provider can decide how much medicine you need  Take anticoagulants exactly as directed  Tell your healthcare provider right away if you forget to take the medicine, or if you take too much  ¨ If you take warfarin, some foods can change how your blood clots  Do not make major changes to your diet while you take warfarin  Warfarin works best when you eat about the same amount of vitamin K every day  Vitamin K is found in green leafy vegetables, broccoli, grapes, and other foods  Ask for more information about what to eat when you take warfarin  · Prescription pain medicine  may be given  Ask your PHP how to take this medicine safely  · Antibiotics  help treat or prevent a bacterial infection  · Take your medicine as directed  Contact your PHP if you think your medicine is not helping or if you have side effects   Tell him if you are allergic to any medicine  Keep a list of the medicines, vitamins, and herbs you take  Include the amounts, and when and why you take them  Bring the list or the pill bottles to follow-up visits  Carry your medicine list with you in case of an emergency  Follow up with your PHP or gynecologist as directed: You may need to return for blood tests, ultrasound, CT scan, or other tests  Write down your questions so you remember to ask them during your visits  Rest as needed: You may feel like resting more after surgery  Slowly start to do more each day  Ask when you can return to your usual activities  Contact your PHP or gynecologist if:   · You have heavy vaginal bleeding that fills 1 or more sanitary pads in 1 hour  · You have a fever  · You feel pain when you urinate, or you have trouble urinating  · You feel pain during sex  · You feel pain or fullness in your vagina  · You feel like something is sticking out of your vagina  · You have questions or concerns about your condition or care  Seek care immediately or call 911 if:   · Your arm or leg feels warm, tender, and painful  It may look swollen and red  · You feel lightheaded, short of breath, and have chest pain  · You cough up blood  © 2014 [address] is for End User's use only and may not be sold, redistributed or otherwise used for commercial purposes  All illustrations and images included in CareNotes® are the copyrighted property of [employer]  or [employer]  The above information is an  only  It is not intended as medical advice for individual conditions or treatments  Talk to your doctor, nurse or pharmacist before following any medical regimen to see if it is safe and effective for you

## 2018-04-20 NOTE — ANESTHESIA POSTPROCEDURE EVALUATION
Post-Op Assessment Note      CV Status:  Stable    Mental Status:  Alert and awake    Hydration Status:  Euvolemic    PONV Controlled:  Controlled    Airway Patency:  Patent    Post Op Vitals Reviewed: Yes          Staff: AARON           /64 (04/20/18 1837)    Temp 97 6 °F (36 4 °C) (04/20/18 1837)    Pulse 81 (04/20/18 1837)   Resp 12 (04/20/18 1837)    SpO2 100 % (04/20/18 1837)

## 2018-04-20 NOTE — LETTER
[address]  [address] [facility] [address]  Dept: [phone]    April 11, 2018     Patient: Debbie Del Rio   YOB: 1975   Date of Visit: 3/1/2018       To Whom it May Concern:    Debbie Del Rio is under my professional care  She was seen in the hospital from 3/1/2018  She will be undergoing surgery on 4/20/18  She may return to work on 5/14/18  If you have any questions or concerns, please don't hesitate to call           Sincerely,          Francheska Brooks MD

## 2018-04-20 NOTE — OP NOTE
OPERATIVE REPORT  PATIENT NAME: Debbie Del Rio    :  1975  MRN: [id number]  Pt Location: [facility] OR ROOM 04    SURGERY DATE: 2018    Surgeon(s) and Role:     * Daren Leone MD - Primary     * Chaparro Gunderson - Assisting     * Roly De La Rosa MD - Assisting    Preop Diagnosis:  Abnormal uterine bleeding (AUB) [N93 9]    Post-Op Diagnosis Codes:     * Abnormal uterine bleeding (AUB) [N93 9]    Procedure(s) (LRB):  HYSTERECTOMY VAGINAL TOTAL (TVH), BILATERAL SALPINGECTOMY (N/A)    Specimen(s):  ID Type Source Tests Collected by Time Destination   1 : Uterus, Cervix, Bilateral Fallopian Tubes (stitch marks Right Fallopian Tube) Tissue Uterus TISSUE EXAM Daren Leone MD 2018 1643        Estimated Blood Loss:   300 mL    Drains: None       Anesthesia Type:   General    Operative Indications:  Abnormal uterine bleeding (AUB) [N93 9]      Operative Findings:  1  About 12 weeks size uterus,  bulky, with no visible uterine fibroids  2  Normal appearing ovaries bilaterally  3  Normal appearing fallopian tubes bilaterally  4  Multiparous cervix , with good descensus noted on exam  5  Grade 1 rectocele  6  No signs of injury to abdominal or pelvic organs    Complications:   None    Procedure and Technique:  The patient was taken to the operating room and placed in supine position on OR table  She was given prophylactic intravenous antibiotics (Ancef 2g)  General anesthesia was established without difficulty  The patient was then positioned on the operating table in the dorsal lithotomy position with the legs supported using stirrups  All pressure points were padded and a Umberto hugger was placed to maintain control of core body temperature  The patient was then prepped and draped in the usual sterile fashion  A time out was performed to confirm correct patient and correct procedure  An exam under anesthesia was performed and the Venegas catheter was aseptically inserted   A weighted speculum was inserted into the vagina and using a Davenport retractor, the anterior and posterior portions of the cervix was visualized and grasped with two double-tooth tenaculum  1% Lidocaine with epinephrine 17 ml total was injected circumferentially, with good blanching noted  The scalpel  was used to make a circumferential incision at the cervicovaginal junction  The vagina was bluntly dissected off of the cervix using a 4x4 gauze  The posterior cul-de-sac was entered sharply  The posterior peritoneum was secured to the posterior mucosa using a figure-of-eight stitch  The gooseneck weighted speculum was inserted into the posterior cul-de-sac  Using Ulises clamps, the uterosacral ligaments were clamped, cut, and Ulises stitched bilaterally with 0 Vicryl sutures  This was repeated along the cardinal ligaments bilaterally and up to the broad ligaments until the utero-ovarian ligament was clamped and cut bilaterally  The uterus was delivered vaginally  The utero-ovarian ligament was suture ligated with 0 Vicryl  Attention was first turned to the right fallopian tube  The fimbria was grasped using a Ocle clamp and brought into view  The tube was clamped, transected, and doubly suture ligated with 0 Vicryl  The same steps were taking to remove the contralateral fallopian tube  All of the pedicles were visualized, and excellent hemostasis was noted  The suture end of both  uterosacral ligaments stitches were secure together , thereby providing extra support to the  vaginal cuff   0 Vicryl was used to suture the cuff , with figure 8 stiches   The cuff was noted to be  hemostatic  All instruments were then removed from the vagina  She was placed in the supine position and awakened from general anesthesia without difficulty  She tolerated the procedure well and was transferred to the recovery room in stable condition  All needle, sponge, and instrument counts were noted to be correct at the end of the procedure    Dr Susan Tate was present and participated in the entire procedure       Patient Disposition:  PACU    Stable    SIGNATURE: Gloria Encarnacion  DATE: April 20, 2018  TIME: 6:32 PM

## 2018-04-21 VITALS
HEART RATE: 80 BPM | HEIGHT: 66 IN | TEMPERATURE: 98.7 F | BODY MASS INDEX: 30.37 KG/M2 | WEIGHT: 189 LBS | OXYGEN SATURATION: 96 % | RESPIRATION RATE: 20 BRPM | SYSTOLIC BLOOD PRESSURE: 99 MMHG | DIASTOLIC BLOOD PRESSURE: 64 MMHG

## 2018-04-21 LAB
ANION GAP SERPL CALCULATED.3IONS-SCNC: 7 MMOL/L (ref 4–13)
BUN SERPL-MCNC: 11 MG/DL (ref 5–25)
CALCIUM SERPL-MCNC: 8 MG/DL (ref 8.3–10.1)
CHLORIDE SERPL-SCNC: 102 MMOL/L (ref 100–108)
CO2 SERPL-SCNC: 25 MMOL/L (ref 21–32)
CREAT SERPL-MCNC: 0.64 MG/DL (ref 0.6–1.3)
ERYTHROCYTE [DISTWIDTH] IN BLOOD BY AUTOMATED COUNT: 14.9 % (ref 11.6–15.1)
GFR SERPL CREATININE-BSD FRML MDRD: 110 ML/MIN/1.73SQ M
GLUCOSE SERPL-MCNC: 123 MG/DL (ref 65–140)
HCT VFR BLD AUTO: 34.5 % (ref 34.8–46.1)
HGB BLD-MCNC: 11.2 G/DL (ref 11.5–15.4)
MCH RBC QN AUTO: 27.5 PG (ref 26.8–34.3)
MCHC RBC AUTO-ENTMCNC: 32.5 G/DL (ref 31.4–37.4)
MCV RBC AUTO: 85 FL (ref 82–98)
PLATELET # BLD AUTO: 224 THOUSANDS/UL (ref 149–390)
PMV BLD AUTO: 11.6 FL (ref 8.9–12.7)
POTASSIUM SERPL-SCNC: 4 MMOL/L (ref 3.5–5.3)
RBC # BLD AUTO: 4.07 MILLION/UL (ref 3.81–5.12)
SODIUM SERPL-SCNC: 134 MMOL/L (ref 136–145)
WBC # BLD AUTO: 14.86 THOUSAND/UL (ref 4.31–10.16)

## 2018-04-21 PROCEDURE — 99024 POSTOP FOLLOW-UP VISIT: CPT | Performed by: OBSTETRICS & GYNECOLOGY

## 2018-04-21 PROCEDURE — 85027 COMPLETE CBC AUTOMATED: CPT | Performed by: STUDENT IN AN ORGANIZED HEALTH CARE EDUCATION/TRAINING PROGRAM

## 2018-04-21 PROCEDURE — 80048 BASIC METABOLIC PNL TOTAL CA: CPT | Performed by: STUDENT IN AN ORGANIZED HEALTH CARE EDUCATION/TRAINING PROGRAM

## 2018-04-21 RX ORDER — MAGNESIUM HYDROXIDE/ALUMINUM HYDROXICE/SIMETHICONE 120; 1200; 1200 MG/30ML; MG/30ML; MG/30ML
30 SUSPENSION ORAL EVERY 4 HOURS PRN
Status: DISCONTINUED | OUTPATIENT
Start: 2018-04-21 | End: 2018-04-21 | Stop reason: HOSPADM

## 2018-04-21 RX ADMIN — OXYCODONE HYDROCHLORIDE AND ACETAMINOPHEN 2 TABLET: 5; 325 TABLET ORAL at 03:51

## 2018-04-21 RX ADMIN — SODIUM CHLORIDE, SODIUM LACTATE, POTASSIUM CHLORIDE, AND CALCIUM CHLORIDE 125 ML/HR: .6; .31; .03; .02 INJECTION, SOLUTION INTRAVENOUS at 03:54

## 2018-04-21 RX ADMIN — IBUPROFEN 600 MG: 600 TABLET ORAL at 12:08

## 2018-04-21 RX ADMIN — ALUMINUM HYDROXIDE, MAGNESIUM HYDROXIDE, AND SIMETHICONE 30 ML: 200; 200; 20 SUSPENSION ORAL at 14:10

## 2018-04-21 RX ADMIN — DOCUSATE SODIUM 100 MG: 100 CAPSULE, LIQUID FILLED ORAL at 08:27

## 2018-04-21 RX ADMIN — OXYCODONE HYDROCHLORIDE AND ACETAMINOPHEN 1 TABLET: 5; 325 TABLET ORAL at 14:22

## 2018-04-21 RX ADMIN — IBUPROFEN 600 MG: 600 TABLET ORAL at 06:05

## 2018-04-21 RX ADMIN — LEVOTHYROXINE SODIUM 25 MCG: 25 TABLET ORAL at 06:05

## 2018-04-21 RX ADMIN — OXYCODONE HYDROCHLORIDE AND ACETAMINOPHEN 1 TABLET: 5; 325 TABLET ORAL at 08:27

## 2018-04-21 NOTE — PERIOPERATIVE NURSING NOTE
Patient hemodynamically stable and in no sign of respiratory distress   Patient being transferred to floor

## 2018-04-21 NOTE — PERIOPERATIVE NURSING NOTE
Rash noticed to patient's abdomen, pt admits that she feels itchy  Rash not noted anywhere else, VSS lungs clear   Dr Shannan Durand and OBGYN resident aware

## 2018-04-21 NOTE — PROGRESS NOTES
Left message at [facility] rehab @ [phone] per CM awaiting return call for report, discharge paperwork faxed

## 2018-05-02 NOTE — PROGRESS NOTES
Subjective     Edgildardo Boone is a 44 y o  female who presents to the clinic about 1-2  weeks status post Total  vaginal hysterectomy and Bilateral salpingectomy  for abnormal uterine bleeding  Eating a regular diet without difficulty  Bowel movements are normal  The patient is not having any pain  Patient is also voiding spontaneously and ambulating without difficulties  Patient reports no vaginal bleeding or abnormal vaginal discharges  Patient is not needing any pain medications at this time  The following portions of the patient's history were reviewed and updated as appropriate: allergies, current medications, past family history, past medical history, past social history, past surgical history and problem list     Review of Systems  Pertinent items are noted in HPI  All negative     Objective     There were no vitals taken for this visit  General:  alert and oriented, in no acute distress   Abdomen: soft, bowel sounds active, non-tender   Vagina cuff exam:   Declined  But reports no vaginal bleeding , abnormal vaginal discharge, or    Discomfort  Pathology report:  Final Diagnosis   A  Uterus, cervix, bilateral fallopian tubes, hysterectomy:             - Uterus, cervix, bilateral fallopian tubes: 151 g              - Early secretory endometrium              - Benign squamous mucosa and endocervical glands  - Leiomyoma              - Superficial adenomyosis  - Benign right and left fallopian tubes  - No malignancy is identified  Assessment      Doing well postoperatively  Operative findings again reviewed  Pathology report discussed  Plan     1  Continue any current medications prn    2   Doing well post op  3  Activity restrictions: No heavy lifting  4  Anticipated return to work: in 3 weeks  5  Follow up: In 2 weeks for cuff exam, or with signs or symptoms of infection/bleeding/abnormal vaginal discharge/pain   Please schedule your annual GYN visit      Bryan Carney   5/3/2018

## 2018-05-03 ENCOUNTER — OFFICE VISIT (OUTPATIENT)
Dept: OBGYN CLINIC | Facility: HOSPITAL | Age: 43
End: 2018-05-03
Payer: COMMERCIAL

## 2018-05-03 VITALS
BODY MASS INDEX: 30.37 KG/M2 | HEIGHT: 66 IN | WEIGHT: 189 LBS | DIASTOLIC BLOOD PRESSURE: 61 MMHG | SYSTOLIC BLOOD PRESSURE: 101 MMHG | HEART RATE: 89 BPM

## 2018-05-03 DIAGNOSIS — Z09 S/P GYNECOLOGICAL SURGERY, FOLLOW-UP EXAM: Primary | ICD-10-CM

## 2018-05-03 PROCEDURE — 99024 POSTOP FOLLOW-UP VISIT: CPT | Performed by: OBSTETRICS & GYNECOLOGY

## 2018-05-03 NOTE — LETTER
May 3, 2018     Patient: Paulo Godwin   YOB: 1975   Date of Visit: 5/3/2018       To Whom it May Concern:    Paulo Godwin is under my professional care  She was seen in my office on 5/3/2018  She may return to work on 3 weeks  Patient is post op from a Hysterectomy  If you have any questions or concerns, please don't hesitate to call           Sincerely,          Zahira Bradley        CC: No Recipients

## 2018-05-07 ENCOUNTER — APPOINTMENT (EMERGENCY)
Dept: RADIOLOGY | Facility: HOSPITAL | Age: 43
End: 2018-05-07
Payer: COMMERCIAL

## 2018-05-07 ENCOUNTER — HOSPITAL ENCOUNTER (EMERGENCY)
Facility: HOSPITAL | Age: 43
Discharge: HOME | End: 2018-05-08
Attending: EMERGENCY MEDICINE | Admitting: EMERGENCY MEDICINE
Payer: COMMERCIAL

## 2018-05-07 VITALS
TEMPERATURE: 98.3 F | OXYGEN SATURATION: 100 % | DIASTOLIC BLOOD PRESSURE: 55 MMHG | RESPIRATION RATE: 20 BRPM | WEIGHT: 189 LBS | HEIGHT: 69 IN | SYSTOLIC BLOOD PRESSURE: 99 MMHG | BODY MASS INDEX: 27.99 KG/M2 | HEART RATE: 70 BPM

## 2018-05-07 DIAGNOSIS — R07.9 CHEST PAIN: Primary | ICD-10-CM

## 2018-05-07 LAB
ALBUMIN SERPL BCP-MCNC: 3.6 G/DL (ref 3.5–5)
ALP SERPL-CCNC: 83 U/L (ref 46–116)
ALT SERPL W P-5'-P-CCNC: 18 U/L (ref 12–78)
ANION GAP SERPL CALCULATED.3IONS-SCNC: 7 MMOL/L (ref 4–13)
AST SERPL W P-5'-P-CCNC: 14 U/L (ref 5–45)
ATRIAL RATE: 70 BPM
BASOPHILS # BLD AUTO: 0.03 THOUSANDS/ΜL (ref 0–0.1)
BASOPHILS NFR BLD AUTO: 0 % (ref 0–1)
BILIRUB SERPL-MCNC: 0.27 MG/DL (ref 0.2–1)
BUN SERPL-MCNC: 10 MG/DL (ref 5–25)
CALCIUM SERPL-MCNC: 8.6 MG/DL (ref 8.3–10.1)
CHLORIDE SERPL-SCNC: 103 MMOL/L (ref 100–108)
CO2 SERPL-SCNC: 29 MMOL/L (ref 21–32)
CREAT SERPL-MCNC: 0.74 MG/DL (ref 0.6–1.3)
DEPRECATED D DIMER PPP: 614 NG/ML (FEU) (ref 0–424)
EOSINOPHIL # BLD AUTO: 0.1 THOUSAND/ΜL (ref 0–0.61)
EOSINOPHIL NFR BLD AUTO: 1 % (ref 0–6)
ERYTHROCYTE [DISTWIDTH] IN BLOOD BY AUTOMATED COUNT: 13.8 % (ref 11.6–15.1)
GFR SERPL CREATININE-BSD FRML MDRD: 100 ML/MIN/1.73SQ M
GLUCOSE SERPL-MCNC: 90 MG/DL (ref 65–140)
HCT VFR BLD AUTO: 36.1 % (ref 34.8–46.1)
HGB BLD-MCNC: 11.7 G/DL (ref 11.5–15.4)
LIPASE SERPL-CCNC: 239 U/L (ref 73–393)
LYMPHOCYTES # BLD AUTO: 2.43 THOUSANDS/ΜL (ref 0.6–4.47)
LYMPHOCYTES NFR BLD AUTO: 27 % (ref 14–44)
MCH RBC QN AUTO: 27.1 PG (ref 26.8–34.3)
MCHC RBC AUTO-ENTMCNC: 32.4 G/DL (ref 31.4–37.4)
MCV RBC AUTO: 84 FL (ref 82–98)
MONOCYTES # BLD AUTO: 0.65 THOUSAND/ΜL (ref 0.17–1.22)
MONOCYTES NFR BLD AUTO: 7 % (ref 4–12)
NEUTROPHILS # BLD AUTO: 5.74 THOUSANDS/ΜL (ref 1.85–7.62)
NEUTS SEG NFR BLD AUTO: 65 % (ref 43–75)
NRBC BLD AUTO-RTO: 0 /100 WBCS
P AXIS: 31 DEGREES
PLATELET # BLD AUTO: 318 THOUSANDS/UL (ref 149–390)
PMV BLD AUTO: 10.9 FL (ref 8.9–12.7)
POTASSIUM SERPL-SCNC: 3.4 MMOL/L (ref 3.5–5.3)
PR INTERVAL: 152 MS
PROT SERPL-MCNC: 8.7 G/DL (ref 6.4–8.2)
QRS AXIS: 46 DEGREES
QRSD INTERVAL: 82 MS
QT INTERVAL: 422 MS
QTC INTERVAL: 455 MS
RBC # BLD AUTO: 4.31 MILLION/UL (ref 3.81–5.12)
SODIUM SERPL-SCNC: 139 MMOL/L (ref 136–145)
T WAVE AXIS: 25 DEGREES
TROPONIN I SERPL-MCNC: <0.02 NG/ML
VENTRICULAR RATE: 70 BPM
WBC # BLD AUTO: 8.97 THOUSAND/UL (ref 4.31–10.16)

## 2018-05-07 PROCEDURE — 71046 X-RAY EXAM CHEST 2 VIEWS: CPT

## 2018-05-07 PROCEDURE — 36415 COLL VENOUS BLD VENIPUNCTURE: CPT

## 2018-05-07 PROCEDURE — 93010 ELECTROCARDIOGRAM REPORT: CPT | Performed by: INTERNAL MEDICINE

## 2018-05-07 PROCEDURE — 85025 COMPLETE CBC W/AUTO DIFF WBC: CPT | Performed by: EMERGENCY MEDICINE

## 2018-05-07 PROCEDURE — 71275 CT ANGIOGRAPHY CHEST: CPT

## 2018-05-07 PROCEDURE — 96374 THER/PROPH/DIAG INJ IV PUSH: CPT

## 2018-05-07 PROCEDURE — 83690 ASSAY OF LIPASE: CPT | Performed by: EMERGENCY MEDICINE

## 2018-05-07 PROCEDURE — 93005 ELECTROCARDIOGRAM TRACING: CPT | Performed by: EMERGENCY MEDICINE

## 2018-05-07 PROCEDURE — 80053 COMPREHEN METABOLIC PANEL: CPT | Performed by: EMERGENCY MEDICINE

## 2018-05-07 PROCEDURE — 84484 ASSAY OF TROPONIN QUANT: CPT | Performed by: EMERGENCY MEDICINE

## 2018-05-07 PROCEDURE — 85379 FIBRIN DEGRADATION QUANT: CPT | Performed by: EMERGENCY MEDICINE

## 2018-05-07 RX ORDER — DIPHENHYDRAMINE HYDROCHLORIDE 50 MG/ML
25 INJECTION INTRAMUSCULAR; INTRAVENOUS ONCE
Status: COMPLETED | OUTPATIENT
Start: 2018-05-07 | End: 2018-05-07

## 2018-05-07 RX ADMIN — DIPHENHYDRAMINE HYDROCHLORIDE 25 MG: 50 INJECTION, SOLUTION INTRAMUSCULAR; INTRAVENOUS at 23:27

## 2018-05-07 RX ADMIN — IOHEXOL 85 ML: 350 INJECTION, SOLUTION INTRAVENOUS at 23:09

## 2018-05-08 PROCEDURE — 99285 EMERGENCY DEPT VISIT HI MDM: CPT

## 2018-05-08 NOTE — ED ATTENDING ATTESTATION
Ashley Larsen MD, saw and evaluated the patient  I have discussed the patient with the resident/non-physician practitioner and agree with the resident's/non-physician practitioner's findings, Plan of Care, and MDM as documented in the resident's/non-physician practitioner's note, except where noted  All available labs and Radiology studies were reviewed  At this point I agree with the current assessment done in the Emergency Department    I have conducted an independent evaluation of this patient a history and physical is as follows:    Hysterectomy  4/20      Has CP  Worse to take a deep breath    No fever no cough  Upper abd pain as well  No radiation   Tender in the right leg    No swelling    The patient has had the chest pain for 3 days the pain has been consistently there and never  goes away   exam the patient is in no acute distress vital signs are stable HEENT unremarkable neck no JVD lungs clear heart regular with no murmurs gallops rubs abdomen soft positive bowel sounds very minimal epigastric tenderness no rebound or guarding noted   no swelling in the leg she does complain of tenderness in the right thigh no skin changes noted pulses are normal   chest pain   will get CTA to rule pulmonary embolism   EKG and cardiac labs will also do lipase    Critical Care Time  CritCare Time    Procedures

## 2018-05-08 NOTE — DISCHARGE INSTRUCTIONS
Chest Pain   WHAT YOU NEED TO KNOW:   Chest pain can be caused by a range of conditions, from not serious to life-threatening  Chest pain can be a symptom of a digestive problem, such as acid reflux or a stomach ulcer  An anxiety attack or a strong emotion, such as anger, can also cause chest pain  Infection, inflammation, or a fracture in the bones or cartilage in your chest can cause pain or discomfort  Sometimes chest pain or pressure is caused by poor blood flow to your heart (angina)  Chest pain may also be caused by life-threatening conditions such as a heart attack or blood clot in your lungs  DISCHARGE INSTRUCTIONS:   Call 911 if:   · You have any of the following signs of a heart attack:      ¨ Squeezing, pressure, or pain in your chest that lasts longer than 5 minutes or returns    ¨ Discomfort or pain in your back, neck, jaw, stomach, or arm     ¨ Trouble breathing    ¨ Nausea or vomiting    ¨ Lightheadedness or a sudden cold sweat, especially with chest pain or trouble breathing    Return to the emergency department if:   · You have chest discomfort that gets worse, even with medicine  · You cough or vomit blood  · Your bowel movements are black or bloody  · You cannot stop vomiting, or it hurts to swallow  Contact your healthcare provider if:   · You have questions or concerns about your condition or care  Medicines:   · Medicines  may be given to treat the cause of your chest pain  Examples include pain medicine, anxiety medicine, or medicines to increase blood flow to your heart  · Do not take certain medicines without asking your healthcare provider first   These include NSAIDs, herbal or vitamin supplements, or hormones (estrogen or progestin)  · Take your medicine as directed  Contact your healthcare provider if you think your medicine is not helping or if you have side effects  Tell him or her if you are allergic to any medicine   Keep a list of the medicines, vitamins, and herbs you take  Include the amounts, and when and why you take them  Bring the list or the pill bottles to follow-up visits  Carry your medicine list with you in case of an emergency  Follow up with your healthcare provider within 72 hours, or as directed: You may need to return for more tests to find the cause of your chest pain  You may be referred to a specialist, such as a cardiologist or gastroenterologist  Write down your questions so you remember to ask them during your visits  Healthy living tips: The following are general healthy guidelines  If your chest pain is caused by a heart problem, your healthcare provider will give you specific guidelines to follow  · Do not smoke  Nicotine and other chemicals in cigarettes and cigars can cause lung and heart damage  Ask your healthcare provider for information if you currently smoke and need help to quit  E-cigarettes or smokeless tobacco still contain nicotine  Talk to your healthcare provider before you use these products  · Eat a variety of healthy, low-fat foods  Healthy foods include fruits, vegetables, whole-grain breads, low-fat dairy products, beans, lean meats, and fish  Ask for more information about a heart healthy diet  · Ask about activity  Your healthcare provider will tell you which activities to limit or avoid  Ask when you can drive, return to work, and have sex  Ask about the best exercise plan for you  · Maintain a healthy weight  Ask your healthcare provider how much you should weigh  Ask him or her to help you create a weight loss plan if you are overweight  · Get the flu and pneumonia vaccines  All adults should get the influenza (flu) vaccine  Get it every year as soon as it becomes available  The pneumococcal vaccine is given to adults aged 72 years or older  The vaccine is given every 5 years to prevent pneumococcal disease, such as pneumonia    © 2017 [employer] Information is for End User's use only and may not be sold, redistributed or otherwise used for commercial purposes  All illustrations and images included in CareNotes® are the copyrighted property of [employer] Inc  or [employer]  The above information is an  only  It is not intended as medical advice for individual conditions or treatments  Talk to your doctor, nurse or pharmacist before following any medical regimen to see if it is safe and effective for you

## 2018-05-08 NOTE — ED PROVIDER NOTES
History  Chief Complaint   Patient presents with    Chest Pain     per patient, "brenda been having chest pain when i breathe for the past couple days, i had a hysterectomy on the 20th and my obgyn said to come in because of the chest pain "     Patient is a 42-year-old female with a history of hysterectomy performed on 4/20, hypothyroidism who presents with chest pain  Pain is located in the bilateral anterior chest and radiates to the back  Is a "discomfort" that is worsened with deep breathing and is 5/10 at maximum  Associated with feeling like I can't take a full breath" or no severe shortness of breath  She also complains of epigastric pain but denies nausea/vomiting, fever/chills, diarrhea, or cough  She denies similar pain in the past   She also complains of a small area of tenderness the medial right thigh that she noticed yesterday  She denies appreciable swelling or skin changes  She has history of DVT or PE  No estrogen use  No malignancy  No significant cardiac history  Nonsmoker  Prior to Admission Medications   Prescriptions Last Dose Informant Patient Reported?  Taking?   levothyroxine 25 mcg tablet   No No   Sig: Take 1 tablet (25 mcg total) by mouth daily for 90 days      Facility-Administered Medications: None       Past Medical History:   Diagnosis Date    Anxiety     Bilateral fibrocystic breast changes     resolved: 02/21/2017    Breast disorder     Chronic pelvic pain in female     last assessed: 09/07/2016    Disease of thyroid gland     Dyspareunia in female     last assessed: 08/18/2016    Fibromyalgia     Gallbladder disease     Herniated intervertebral disc of lumbar spine     Memory loss     last assessed: 06/15/2015    Mixed incontinence     last assessed: 01/11/2016    Myofascial pain     last assessed: 06/01/2016    Sciatica     last assessed: 06/15/2015    Situational anxiety     last assessed: 02/21/2017    Thyroid disease     Uterus, adenomyosis last assessed: 09/07/2016       Past Surgical History:   Procedure Laterality Date    CHOLECYSTECTOMY LAPAROSCOPIC N/A 10/22/2017    Procedure: CHOLECYSTECTOMY LAPAROSCOPIC;  Surgeon: Jenni Lim MD;  Location: [facility];  Service: General    ENDOMETRIAL BIOPSY      resolved: 02/21/2017    WA VAGINAL HYSTERECTOMY,UTERUS 250 GMS/< N/A 4/20/2018    Procedure: HYSTERECTOMY VAGINAL TOTAL (TVH), BILATERAL SALPINGECTOMY;  Surgeon: Radha Dockery MD;  Location[facility];  Service: Gynecology    TUBAL LIGATION      last assessed: 10/20/2014       Family History   Problem Relation Age of Onset    No Known Problems Mother     Diabetes Maternal Grandmother     Leukemia Maternal Grandmother     Breast cancer Family     Cancer Family     Diabetes Family     Heart disease Family     Hyperlipidemia Family      reported cholesterol level was high    Hypertension Other      I have reviewed and agree with the history as documented  Social History   Substance Use Topics    Smoking status: Never Smoker    Smokeless tobacco: Never Used      Comment: CTL3    Alcohol use No      Comment: ²5        Review of Systems   Constitutional: Negative for chills, fatigue and fever  HENT: Negative for congestion and sore throat  Eyes: Negative for visual disturbance  Respiratory: Positive for chest tightness  Negative for cough and shortness of breath  Cardiovascular: Positive for chest pain  Gastrointestinal: Negative for abdominal pain, diarrhea, nausea and vomiting  Endocrine: Negative for polyuria  Genitourinary: Negative for difficulty urinating and dysuria  Musculoskeletal: Negative for arthralgias  Skin: Negative for rash  Neurological: Negative for dizziness, light-headedness and headaches  All other systems reviewed and are negative        Physical Exam  ED Triage Vitals [05/07/18 2004]   Temperature Pulse Respirations Blood Pressure SpO2   98 3 °F (36 8 °C) 76 20 125/68 100 %      Temp Source Heart Rate Source Patient Position - Orthostatic VS BP Location FiO2 (%)   Oral Monitor Sitting Right arm --      Pain Score       5           Orthostatic Vital Signs  Vitals:    05/07/18 2118 05/07/18 2225 05/07/18 2245 05/07/18 2356   BP: 108/53 93/54 101/58 99/55   Pulse: 78 67 72 70   Patient Position - Orthostatic VS: Lying  Lying Lying       Physical Exam   Constitutional: She is oriented to person, place, and time  She appears well-developed and well-nourished  No distress  Overweight  Appears comfortable  HENT:   Head: Normocephalic and atraumatic  Eyes: EOM are normal  Pupils are equal, round, and reactive to light  Neck: Normal range of motion  Neck supple  Cardiovascular: Normal rate, regular rhythm and normal heart sounds  Pulmonary/Chest: Effort normal and breath sounds normal  No respiratory distress  Abdominal: Soft  Bowel sounds are normal  There is no rigidity, no rebound and no guarding  Musculoskeletal: Normal range of motion  Legs:  Neurological: She is alert and oriented to person, place, and time  Skin: Skin is warm and dry  No rash noted  Psychiatric: She has a normal mood and affect  Nursing note and vitals reviewed        ED Medications  Medications   iohexol (OMNIPAQUE) 350 MG/ML injection (MULTI-DOSE) 85 mL (85 mL Intravenous Given 5/7/18 2309)   diphenhydrAMINE (BENADRYL) injection 25 mg (25 mg Intravenous Given 5/7/18 2327)       Diagnostic Studies  Results Reviewed     Procedure Component Value Units Date/Time    Lipase [[id number]]  (Normal) Collected:  05/07/18 2029    Lab Status:  Final result Specimen:  Blood from Arm, Right Updated:  05/07/18 2203     Lipase 239 u/L     D-dimer, quantitative [[id number]]  (Abnormal) Collected:  05/07/18 2105    Lab Status:  Final result Specimen:  Blood from Arm, Left Updated:  05/07/18 2129     D-Dimer, Quant 614 (H) ng/ml (FEU)     Troponin I [[id number]]  (Normal) Collected:  05/07/18 2029    Lab Status:  Final result Specimen:  Blood from Arm, Right Updated:  05/07/18 2104     Troponin I <0 02 ng/mL     Narrative:         [employer] Chemistry analyzer 99% cutoff is > 0 04 ng/mL in network labs    o cTnI 99% cutoff is useful only when applied to patients in the clinical setting of myocardial ischemia  o cTnI 99% cutoff should be interpreted in the context of clinical history, ECG findings and possibly cardiac imaging to establish correct diagnosis  o cTnI 99% cutoff may be suggestive but clearly not indicative of a coronary event without the clinical setting of myocardial ischemia  Comprehensive metabolic panel [[id number]]  (Abnormal) Collected:  05/07/18 2029    Lab Status:  Final result Specimen:  Blood from Arm, Right Updated:  05/07/18 2102     Sodium 139 mmol/L      Potassium 3 4 (L) mmol/L      Chloride 103 mmol/L      CO2 29 mmol/L      Anion Gap 7 mmol/L      BUN 10 mg/dL      Creatinine 0 74 mg/dL      Glucose 90 mg/dL      Calcium 8 6 mg/dL      AST 14 U/L      ALT 18 U/L      Alkaline Phosphatase 83 U/L      Total Protein 8 7 (H) g/dL      Albumin 3 6 g/dL      Total Bilirubin 0 27 mg/dL      eGFR 100 ml/min/1 73sq m     Narrative:         National Kidney Disease Education Program recommendations are as follows:  GFR calculation is accurate only with a steady state creatinine  Chronic Kidney disease less than 60 ml/min/1 73 sq  meters  Kidney failure less than 15 ml/min/1 73 sq  meters      CBC and differential [[id number]] Collected:  05/07/18 2029    Lab Status:  Final result Specimen:  Blood from Arm, Right Updated:  05/07/18 2049     WBC 8 97 Thousand/uL      RBC 4 31 Million/uL      Hemoglobin 11 7 g/dL      Hematocrit 36 1 %      MCV 84 fL      MCH 27 1 pg      MCHC 32 4 g/dL      RDW 13 8 %      MPV 10 9 fL      Platelets 602 Thousands/uL      nRBC 0 /100 WBCs      Neutrophils Relative 65 %      Lymphocytes Relative 27 %      Monocytes Relative 7 %      Eosinophils Relative 1 %      Basophils Relative 0 % Neutrophils Absolute 5 74 Thousands/µL      Lymphocytes Absolute 2 43 Thousands/µL      Monocytes Absolute 0 65 Thousand/µL      Eosinophils Absolute 0 10 Thousand/µL      Basophils Absolute 0 03 Thousands/µL                  CTA ED chest PE study   Final Result by Simeon Chau MD (05/07 1987)         No evidence of acute pulmonary embolus, thoracic aortic aneurysm or dissection  No acute cardiopulmonary process                    Workstation performed: [id number]         X-ray chest 2 views   ED Interpretation by Ankush Vela MD (05/07 2208)   No acute abnormality            Procedures  ECG 12 Lead Documentation  Date/Time: 5/7/2018 9:08 PM  Performed by: Pilar Burrell  Authorized by: Danny Espinoza     Indications / Diagnosis:  Chest pain  ECG reviewed by me, the ED Provider: yes    Patient location:  ED  Previous ECG:     Previous ECG:  Compared to current    Comparison ECG info:  10/22/17    Similarity:  No change    Comparison to cardiac monitor: Yes    Interpretation:     Interpretation: normal    Quality:     Tracing quality:  Limited by artifact  Rate:     ECG rate:  70    ECG rate assessment: normal    Rhythm:     Rhythm: sinus rhythm    Ectopy:     Ectopy: none    QRS:     QRS axis:  Normal  Conduction:     Conduction: normal    ST segments:     ST segments:  Normal            Phone Consults  ED Phone Contact    ED Course               PERC Rule for PE      Most Recent Value   PERC Rule for PE   Age >=50  0 Filed at: 05/07/2018 2117   HR >=100  0 Filed at: 05/07/2018 2117   O2 Sat on room air < 95%  0 Filed at: 05/07/2018 2117   History of PE or DVT  0 Filed at: 05/07/2018 2117   Recent trauma or surgery  1 Filed at: 05/07/2018 2117   Hemoptysis  0 Filed at: 05/07/2018 2117   Exogenous estrogen  0 Filed at: 05/07/2018 2117   Unilateral leg swelling  0 Filed at: 05/07/2018 2117   PERC Rule for PE Results  1 Filed at: 05/07/2018 2117                Wells' Criteria for PE      Most Recent Value   Wells' Criteria for PE   Clinical signs and symptoms of DVT  0 Filed at: 05/07/2018 2117   PE is primary diagnosis or equally likely  3 Filed at: 05/07/2018 2117   HR >100  0 Filed at: 05/07/2018 2117   Immobilization at least 3 days or Surgery in the previous 4 weeks  1 5 Filed at: 05/07/2018 2117   Previous, objectively diagnosed PE or DVT  0 Filed at: 05/07/2018 2117   Hemoptysis  0 Filed at: 05/07/2018 2117   Malignancy with treatment within 6 months or palliative  0 Filed at: 05/07/2018 2117   Wells' Criteria Total  4 5 Filed at: 05/07/2018 2117            MDM  Number of Diagnoses or Management Options  Chest pain:   Diagnosis management comments: Patient is a 43-year-old female with a history of hysterectomy performed on 4/20, hypothyroidism who presents with chest pain  Normal vitals  Exam shows mild epigastric abdominal tenderness and a focal area of tenderness of the medial right thigh without swelling or skin changes  Given recent surgery and pleuritic nature pain, will obtain CTA to rule out PE  CTA negative  Will reassure and discharge with return precautions  CritCare Time    Disposition  Final diagnoses:   Chest pain     Time reflects when diagnosis was documented in both MDM as applicable and the Disposition within this note     Time User Action Codes Description Comment    5/7/2018 11:58 PM Bessy Mckeon Lot Add [R07 9] Chest pain       ED Disposition     ED Disposition Condition Comment    Discharge  [address] discharge to home/self care      Condition at discharge: Stable        Follow-up Information     Follow up With Specialties Details Why Contact Info Additional Information    Claudia Ibarra MD Internal Medicine Call in 1 day  [facility] [phone]       [address] [place] [address] Emergency Department Emergency Medicine  As needed, If symptoms worsen [address]  [phone] [facility], [address] [facility], [place], [place], [address]        Patient's Medications   Discharge Prescriptions    No medications on file     No discharge procedures on file  ED Provider  Attending physically available and evaluated Hugo Hand  I managed the patient along with the ED Attending      Electronically Signed by         Ankush Vela MD  05/08/18 7614

## 2018-05-08 NOTE — ED NOTES
Pt  Returns from CT  CT tech, Scotty Martell reports patient developed hives after receiving IV contrast   Pt  Denies SOB or difficulty breathing  Dr Nirali Martinez made aware  Benadryl to be ordered        Niles Lau RN  05/07/18 7063

## 2018-05-13 DIAGNOSIS — E03.8 HYPOTHYROIDISM DUE TO HASHIMOTO'S THYROIDITIS: ICD-10-CM

## 2018-05-13 DIAGNOSIS — E06.3 HYPOTHYROIDISM DUE TO HASHIMOTO'S THYROIDITIS: ICD-10-CM

## 2018-05-13 RX ORDER — LEVOTHYROXINE SODIUM 0.03 MG/1
TABLET ORAL
Qty: 90 TABLET | Refills: 0 | Status: CANCELLED | OUTPATIENT
Start: 2018-05-13

## 2018-05-14 DIAGNOSIS — E03.8 HYPOTHYROIDISM DUE TO HASHIMOTO'S THYROIDITIS: Primary | ICD-10-CM

## 2018-05-14 DIAGNOSIS — E06.3 HYPOTHYROIDISM DUE TO HASHIMOTO'S THYROIDITIS: Primary | ICD-10-CM

## 2018-05-14 PROBLEM — N36.41 HYPERMOBILITY OF URETHRA: Status: ACTIVE | Noted: 2017-01-16

## 2018-05-14 PROBLEM — M79.7 FIBROMYALGIA: Status: ACTIVE | Noted: 2017-11-15

## 2018-05-14 PROBLEM — E04.9 GOITER: Status: ACTIVE | Noted: 2017-04-13

## 2018-05-14 RX ORDER — LEVOTHYROXINE SODIUM 0.03 MG/1
25 TABLET ORAL DAILY
Qty: 90 TABLET | Refills: 0 | Status: SHIPPED | OUTPATIENT
Start: 2018-05-14 | End: 2018-08-17 | Stop reason: SDUPTHER

## 2018-05-14 NOTE — TELEPHONE ENCOUNTER
Please call patient and let her know that I sent a 90 day refill of her levothyroxine    I have also placed an order for her to get her follow-up blood test for thyroid as stated in August   She will need to schedule an appointment after she completes her labs in August

## 2018-05-21 ENCOUNTER — TELEPHONE (OUTPATIENT)
Dept: INTERNAL MEDICINE CLINIC | Facility: CLINIC | Age: 43
End: 2018-05-21

## 2018-05-21 DIAGNOSIS — M79.7 FIBROMYALGIA: Primary | ICD-10-CM

## 2018-05-21 NOTE — TELEPHONE ENCOUNTER
Please put in new order for Rheumatology  Patient has an appointment 5/22/18 with Dr Olegario Sanchez  Thank you

## 2018-05-21 NOTE — TELEPHONE ENCOUNTER
Please put in a new Rheumatology order  Patient has an appointment with Dr Maddy Batista 5/22/18  Thank you

## 2018-05-22 ENCOUNTER — OFFICE VISIT (OUTPATIENT)
Dept: MULTI SPECIALTY CLINIC | Facility: CLINIC | Age: 43
End: 2018-05-22
Payer: COMMERCIAL

## 2018-05-22 VITALS
SYSTOLIC BLOOD PRESSURE: 100 MMHG | BODY MASS INDEX: 28.47 KG/M2 | DIASTOLIC BLOOD PRESSURE: 70 MMHG | HEART RATE: 72 BPM | HEIGHT: 69 IN | TEMPERATURE: 98.3 F | WEIGHT: 192.24 LBS

## 2018-05-22 DIAGNOSIS — M79.7 FIBROMYALGIA: ICD-10-CM

## 2018-05-22 PROCEDURE — 99214 OFFICE O/P EST MOD 30 MIN: CPT | Performed by: INTERNAL MEDICINE

## 2018-05-22 NOTE — PROGRESS NOTES
Rheumatology Follow Up   [address] [place]  [address] [facility] [place] [facility]\""    NAME: Lane Seo  AGE: 44 y o  SEX: female    DATE OF ENCOUNTER: 5/22/2018    Assessment and Plan   Fibromyalgia   -Pt is refusing meds   -Encourage working out and being active   -Non weight bearing exercises like swimming    No orders of the defined types were placed in this encounter  Chief Complaint     Chief Complaint   Patient presents with    Follow-up     Fibromyalgia       History of Present Illness     This is a 44year old female with past medical history of fibromyalgia who is here for follow up appointment  Pt has refused meds in the past  She was asked to do water therapy but 2/2 financial reasons she was unable to do that  She reports that she recently had a hysterectomy  Pt reports that the pain is staying the same or getting worse  Pt reports that she has interrupted sleep, she reports that she never wakes up refreshed  Pt reports that most of the time she feels like she got hit by a merrill truck when she wakes up  She states that she feels like her muscles are locking  She is having cramps  Hx of 6 miscarriages, negative rheumatology work up  Pt has tried Cymbalta and Trazodone both of them did not help         The following portions of the patient's history were reviewed and updated as appropriate: allergies, current medications, past family history, past medical history, past social history, past surgical history and problem list     Review of Systems     Review of Systems   Constitutional: Positive for fatigue  Negative for activity change, chills and fever  Has difficulty opening stuff jars    HENT: Positive for sinus pressure  Negative for sinus pain  Eyes: Negative for visual disturbance  Respiratory: Negative for cough and shortness of breath  Cardiovascular: Negative for chest pain and leg swelling  Gastrointestinal: Positive for constipation  Negative for diarrhea, nausea and vomiting  Musculoskeletal: Positive for arthralgias, back pain, joint swelling and myalgias  Denies rashes    Neurological: Negative for weakness  Active Problem List     Patient Active Problem List   Diagnosis    Cholecystitis    Abnormal uterine bleeding (AUB)    Preop examination    Pelvic pain    Status post vaginal hysterectomy    S/P gynecological surgery, follow-up exam    Fibromyalgia    Goiter    Hypothyroidism due to Hashimoto's thyroiditis    Stress incontinence, female    Hypermobility of urethra       Objective     /70 (BP Location: Right arm, Patient Position: Sitting, Cuff Size: Standard)   Pulse 72   Temp 98 3 °F (36 8 °C) (Oral)   Ht 5' 9" (1 753 m)   Wt 87 2 kg (192 lb 3 9 oz)   LMP 02/28/2018 (Exact Date)   BMI 28 39 kg/m²     Physical Exam   Constitutional: She is oriented to person, place, and time  She appears well-developed and well-nourished  No distress  Positive tender points no joint swelling   Ok affect      HENT:   Head: Normocephalic and atraumatic  Eyes: Conjunctivae are normal  Right eye exhibits no discharge  Left eye exhibits no discharge  Neck: Neck supple  No JVD present  Cardiovascular: Normal rate and regular rhythm  No murmur heard  Pulmonary/Chest: Breath sounds normal  No respiratory distress  She has no wheezes  Abdominal: Soft  She exhibits no distension  There is no tenderness  There is no rebound and no guarding  Musculoskeletal: Normal range of motion  She exhibits no edema  Neurological: She is alert and oriented to person, place, and time  No cranial nerve deficit  Skin: Skin is warm and dry  She is not diaphoretic  No erythema  Nursing note and vitals reviewed        Current Medications     Current Outpatient Prescriptions:     levothyroxine 25 mcg tablet, Take 1 tablet (25 mcg total) by mouth daily for 90 days, Disp: 90 tablet, Rfl: 0    [facility] Maintenance   Topic Date Due    HIV SCREENING  1975    Depression Screening PHQ-9  02/10/1988    DTaP,Tdap,and Td Vaccines (1 - Tdap) 02/10/1997    INFLUENZA VACCINE  09/01/2018     There is no immunization history for the selected administration types on file for this patient  Lizabeth Rojelio D O    Internal Medicine PGY-2  5/22/2018 2:53 PM

## 2018-06-21 ENCOUNTER — EVALUATION (OUTPATIENT)
Dept: PHYSICAL THERAPY | Age: 43
End: 2018-06-21
Payer: COMMERCIAL

## 2018-06-21 DIAGNOSIS — M79.7 FIBROMYALGIA: ICD-10-CM

## 2018-06-21 PROCEDURE — G8979 MOBILITY GOAL STATUS: HCPCS | Performed by: PHYSICAL THERAPIST

## 2018-06-21 PROCEDURE — G8978 MOBILITY CURRENT STATUS: HCPCS | Performed by: PHYSICAL THERAPIST

## 2018-06-21 PROCEDURE — 97162 PT EVAL MOD COMPLEX 30 MIN: CPT | Performed by: PHYSICAL THERAPIST

## 2018-06-21 NOTE — PROGRESS NOTES
PT Evaluation     Today's date: 2018  Patient name: Cody Nuno  : 1975  MRN: [id number]  Referring provider: Joann Frazier DO  Dx:   Encounter Diagnosis     ICD-10-CM    1  Fibromyalgia M79 7 Ambulatory referral to Physical Therapy                  Assessment  Impairments: abnormal muscle tone, impaired physical strength and pain with function    Assessment details: Patient with fibromyalgia dx presents with generalized mild deficits with flexibility, strength, and posture  Knee sx's appear to have so PF input  Understanding of Dx/Px/POC: good   Prognosis: fair    Goals  Short Term goals - 4 weeks  1  Patient will be independent HEP  2   Patient will report a 50% decrease in pain complaints  3   Increase strength 1/2 grade  4   Increase ROM 5-10 degrees  Long Term goals - 8 weeks  1  Patient will report elimination of pain complaints  2   Patient will return to all work related activities without restriction  3   Patient will return to all recreational activities without restriction  4   ROM WFL  5   Strength 5/5  Plan  Planned therapy interventions: strengthening, stretching and therapeutic exercise  Frequency: 2x week  Duration in weeks: 6        Subjective Evaluation    History of Present Illness  Mechanism of injury: Patient reports a long history of multiple areas of pain, swelling, and limited function  Main areas of sx's are knees, hands, and cervical spine/UT region  Patient reports that sx's are aggravated by writing, lifting, stairs, walking, opening jars  Patient is employed and works 3 days a week in housekeeping  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    Quality of life: good    Pain  Current pain ratin  At best pain ratin  At worst pain ratin  Quality: dull ache, throbbing and pressure  Aggravating factors: standing, walking, overhead activity and lifting  Progression: worsening    Patient Goals  Patient goals for therapy: decreased edema, decreased pain, increased motion and increased strength          Objective     Active Range of Motion   Cervical/Thoracic Spine   Cervical    Flexion: 45 degrees   Extension: 40 degrees   Left lateral flexion: 27 degrees   Right lateral flexion: 25 degrees   Left rotation: 55 degrees   Right rotation: 58 degrees     Mobility   Patellar Mobility:   Left Knee   Hypomobile: left medial, left lateral, left superior and left inferior    Right Knee   Hypomobile: medial, lateral, superior and inferior     Strength/Myotome Testing     Left Hip   Planes of Motion   Flexion: 3+  Abduction: 3+  External rotation: 3+  Internal rotation: 3+    Right Hip   Planes of Motion   Flexion: 3+  Abduction: 3+  External rotation: 3+  Internal rotation: 3+    Left Knee   Flexion: 3+  Prone flexion: 3+    Right Knee   Flexion: 3+  Prone flexion: 3+    Tests   Cervical     Left   Negative cervical distraction, Spurling's sign and cervical compression test       Right   Negative cervical distraction, Spurling's sign and cervical compression test       General Comments     Knee Comments  Decreased flexibility - hip flexors and hamstrings          Precautions: None    Daily Treatment Diary     Manual              Walking             Running with pool ponies             Cross-country skiing                                           Exercise Diary                           Seated hamstring stretch             Hip flexor stretch             Seated hamstring stretch             UT stretch             Post  Capsule stretch             Standing quad stretch                          AROM - hip flexion, abd, ext             AROM - knee ext , flexion - standing                                                                                                                                                     Modalities

## 2018-06-28 ENCOUNTER — OFFICE VISIT (OUTPATIENT)
Dept: PHYSICAL THERAPY | Age: 43
End: 2018-06-28
Payer: COMMERCIAL

## 2018-06-28 DIAGNOSIS — M79.7 FIBROMYALGIA: Primary | ICD-10-CM

## 2018-06-28 PROCEDURE — 97113 AQUATIC THERAPY/EXERCISES: CPT | Performed by: PHYSICAL THERAPIST

## 2018-06-28 NOTE — PROGRESS NOTES
Daily Note     Today's date: 2018  Patient name: Kristin Batista  : 1975  MRN: [id number]  Referring provider: Marcy Mann DO  Dx:   Encounter Diagnosis     ICD-10-CM    1  Fibromyalgia M79 7                   Subjective: No complaints      Objective: See treatment diary below    Manual              Walking 5 laps pre-post             Running with pool ponies 5 minutes            Cross-country skiing 5 minutes                                          Exercise Diary                           Seated hamstring stretch 4 reps hold 20 sec  Hip flexor stretch nt            Seated piriformis stretch 4 rep s hold 20 sec  UT stretch 4 reps hold 20 sec  Post  Capsule stretch nt            Standing quad stretch nt                         AROM - hip flexion, abd, ext Hip abd and ext - 2 sets of 10 reps            AROM - Shld  horz abd, ER, flexion  2 sets of 10 reps                                                                                                                                                  Modalities                                                           Assessment: Tolerated treatment well  Patient would benefit from continued PT  Good tolerance to HEP  Plan: Continue per plan of care

## 2018-07-02 ENCOUNTER — APPOINTMENT (OUTPATIENT)
Dept: PHYSICAL THERAPY | Age: 43
End: 2018-07-02
Payer: COMMERCIAL

## 2018-07-05 ENCOUNTER — OFFICE VISIT (OUTPATIENT)
Dept: PHYSICAL THERAPY | Age: 43
End: 2018-07-05
Payer: COMMERCIAL

## 2018-07-05 DIAGNOSIS — M79.7 FIBROMYALGIA: Primary | ICD-10-CM

## 2018-07-05 PROCEDURE — 97113 AQUATIC THERAPY/EXERCISES: CPT

## 2018-07-05 PROCEDURE — G8979 MOBILITY GOAL STATUS: HCPCS | Performed by: PHYSICAL THERAPIST

## 2018-07-05 PROCEDURE — G8978 MOBILITY CURRENT STATUS: HCPCS | Performed by: PHYSICAL THERAPIST

## 2018-07-05 NOTE — PROGRESS NOTES
Daily Note     Today's date: 2018  Patient name: Kylah Sosa  : 1975  MRN: [id number]  Referring provider: Estrella Rankin DO  Dx:   Encounter Diagnosis     ICD-10-CM    1  Fibromyalgia M79 7             Subjective: Pt reports full body joint aches as "4-5/10" upon arrival and notes no changes since LV  Objective: See treatment diary below    Precautions: None    Daily Treatment Diary  Manual             Walking 5 laps pre-post  5 laps pre & post           Running with pool ponies 5 minutes 5 mins           Cross-country skiing 5 minutes 5 mins                          Exercise Diary             Seated hamstring stretch 4 reps hold 20 sec  20"x4 ea           Hip flexor stretch nt -           Seated piriformis stretch 4 rep s hold 20 sec  20"x4           UT stretch 4 reps hold 20 sec  20"x4           Post  Capsule stretch nt 20"x4 ea           Standing quad stretch nt -                        AROM - hip flexion, abd, ext Hip abd and ext - 2 sets of 10 reps 2x10 ea           AROM - Shld  horz abd, ER, flexion  2 sets of 10 reps 2x10 ea                                       Assessment: Tolerated treatment well  Patient exhibited good technique with therapeutic exercises and would benefit from continued PT to address pain and endurance deficits  Plan: Continue per plan of care  Progress treatment as tolerated        Karishma Certain, PTA

## 2018-07-11 ENCOUNTER — APPOINTMENT (OUTPATIENT)
Dept: PHYSICAL THERAPY | Age: 43
End: 2018-07-11
Payer: COMMERCIAL

## 2018-07-12 ENCOUNTER — APPOINTMENT (OUTPATIENT)
Dept: PHYSICAL THERAPY | Age: 43
End: 2018-07-12
Payer: COMMERCIAL

## 2018-07-26 ENCOUNTER — TELEPHONE (OUTPATIENT)
Dept: INTERNAL MEDICINE CLINIC | Facility: CLINIC | Age: 43
End: 2018-07-26

## 2018-07-26 NOTE — TELEPHONE ENCOUNTER
PT  ASKING FOR A GENERIC LETTER FOR HER [employer] STATING THAT SHE IS BEING TREATED FOR HYPOTHYROID AND FIBROMYALGIA IN THIS OFFICE      PT  WILL P/U

## 2018-08-15 ENCOUNTER — TELEPHONE (OUTPATIENT)
Dept: INTERNAL MEDICINE CLINIC | Facility: CLINIC | Age: 43
End: 2018-08-15

## 2018-08-16 ENCOUNTER — APPOINTMENT (OUTPATIENT)
Dept: LAB | Facility: HOSPITAL | Age: 43
End: 2018-08-16
Payer: COMMERCIAL

## 2018-08-16 DIAGNOSIS — E06.3 HYPOTHYROIDISM DUE TO HASHIMOTO'S THYROIDITIS: ICD-10-CM

## 2018-08-16 DIAGNOSIS — E03.8 HYPOTHYROIDISM DUE TO HASHIMOTO'S THYROIDITIS: ICD-10-CM

## 2018-08-16 LAB
T4 FREE SERPL-MCNC: 0.97 NG/DL (ref 0.76–1.46)
TSH SERPL DL<=0.05 MIU/L-ACNC: 4.85 UIU/ML (ref 0.36–3.74)

## 2018-08-16 PROCEDURE — 84439 ASSAY OF FREE THYROXINE: CPT

## 2018-08-16 PROCEDURE — 84443 ASSAY THYROID STIM HORMONE: CPT

## 2018-08-16 PROCEDURE — 36415 COLL VENOUS BLD VENIPUNCTURE: CPT

## 2018-08-17 ENCOUNTER — OFFICE VISIT (OUTPATIENT)
Dept: INTERNAL MEDICINE CLINIC | Facility: CLINIC | Age: 43
End: 2018-08-17
Payer: COMMERCIAL

## 2018-08-17 VITALS
DIASTOLIC BLOOD PRESSURE: 60 MMHG | HEIGHT: 69 IN | BODY MASS INDEX: 29.19 KG/M2 | HEART RATE: 80 BPM | TEMPERATURE: 98.3 F | SYSTOLIC BLOOD PRESSURE: 106 MMHG | WEIGHT: 197.09 LBS

## 2018-08-17 DIAGNOSIS — E03.8 HYPOTHYROIDISM DUE TO HASHIMOTO'S THYROIDITIS: Primary | ICD-10-CM

## 2018-08-17 DIAGNOSIS — M25.572 ACUTE LEFT ANKLE PAIN: ICD-10-CM

## 2018-08-17 DIAGNOSIS — M25.562 CHRONIC PAIN OF BOTH KNEES: ICD-10-CM

## 2018-08-17 DIAGNOSIS — G89.29 CHRONIC PAIN OF BOTH KNEES: ICD-10-CM

## 2018-08-17 DIAGNOSIS — M25.561 CHRONIC PAIN OF BOTH KNEES: ICD-10-CM

## 2018-08-17 DIAGNOSIS — E06.3 HYPOTHYROIDISM DUE TO HASHIMOTO'S THYROIDITIS: Primary | ICD-10-CM

## 2018-08-17 PROBLEM — R10.2 PELVIC PAIN: Status: RESOLVED | Noted: 2018-03-27 | Resolved: 2018-08-17

## 2018-08-17 PROBLEM — Z09 S/P GYNECOLOGICAL SURGERY, FOLLOW-UP EXAM: Status: RESOLVED | Noted: 2018-05-03 | Resolved: 2018-08-17

## 2018-08-17 PROBLEM — Z90.710 STATUS POST VAGINAL HYSTERECTOMY: Status: RESOLVED | Noted: 2018-04-20 | Resolved: 2018-08-17

## 2018-08-17 PROBLEM — Z01.818 PREOP EXAMINATION: Status: RESOLVED | Noted: 2018-03-27 | Resolved: 2018-08-17

## 2018-08-17 PROCEDURE — 99213 OFFICE O/P EST LOW 20 MIN: CPT | Performed by: PHYSICIAN ASSISTANT

## 2018-08-17 RX ORDER — LEVOTHYROXINE SODIUM 0.03 MG/1
TABLET ORAL
Qty: 100 TABLET | Refills: 1 | Status: SHIPPED | OUTPATIENT
Start: 2018-08-17 | End: 2019-01-05 | Stop reason: SDUPTHER

## 2018-08-17 NOTE — PROGRESS NOTES
Assessment/Plan:    Hypothyroidism due to Hashimoto's thyroiditis    As we discussed today we are adjusting the dose of your thyroid medication  Please continue levothyroxine 25 mcg daily Monday through Friday and please increase and take 2 tablets together on Saturdays and Sundays  Please continue to take this medication 1st thing in the morning with water only and no other food or liquids for 1 hour  Diagnoses and all orders for this visit:    Hypothyroidism due to Hashimoto's thyroiditis  -     levothyroxine 25 mcg tablet; Take one tablet daily Monday through Friday and take two pills together on Saturday and Sunday  -     T3; Future  -     T4, free; Future  -     TSH, 3rd generation; Future    Chronic pain of both knees  -     XR knee 3 vw left non injury; Future  -     XR knee 3 vw right non injury; Future    Acute left ankle pain  -     XR ankle 3+ vw left; Future          Subjective:      Patient ID: Penny Kwok is a 44 y o  female  Pt here for lab review  Discussed results of thyroid testing and lower level and slight increase in TSH  Will adjust dose    Here with c/o knee pain states was starting the PT as per Rheumatologist and water therapy and states PT told her that her knees are "bad"  Pt reports getting swelling to her knees and sometimes feel warm but not red  and pain to L ankle  Pt states the heat is even more at night in her knees    Patient reports the pain in her ankle is to the left outside ankle  Patient does not feel the pain every day but notices it more when she is walking and pushing off of the floor  Patient denies any acute or chronic injury to her knees or her left foot or ankle  Patient reports that she does not get the same heat sensation in her ankle            The following portions of the patient's history were reviewed and updated as appropriate: allergies, current medications, past family history, past medical history, past social history, past surgical history and problem list     Review of Systems   Constitutional: Positive for fatigue  Respiratory: Negative  Negative for cough and shortness of breath  Cardiovascular: Negative for chest pain and leg swelling  Musculoskeletal: Positive for arthralgias, joint swelling and myalgias  Skin: Negative for rash and wound  Neurological: Positive for weakness and numbness  Psychiatric/Behavioral: Negative  Objective:      /60 (BP Location: Right arm, Patient Position: Sitting, Cuff Size: Large)   Pulse 80   Temp 98 3 °F (36 8 °C) (Oral)   Ht 5' 9" (1 753 m)   Wt 89 4 kg (197 lb 1 5 oz)   LMP 02/28/2018 (Exact Date)   BMI 29 11 kg/m²          Physical Exam   Constitutional: She appears well-developed and well-nourished  HENT:   Head: Normocephalic and atraumatic  Cardiovascular: Normal rate and regular rhythm  Pulses:       Dorsalis pedis pulses are 2+ on the right side, and 2+ on the left side  Posterior tibial pulses are 2+ on the right side, and 2+ on the left side  Pulmonary/Chest: Effort normal and breath sounds normal    Musculoskeletal: She exhibits tenderness  She exhibits no edema  Right knee: She exhibits normal range of motion, no swelling, no effusion and no erythema  Tenderness found  Medial joint line tenderness noted  Left knee: She exhibits swelling  Tenderness found  Medial joint line tenderness noted  Left ankle: She exhibits swelling  No tenderness  No lateral malleolus and no medial malleolus tenderness found  Achilles tendon exhibits no pain and no defect  Legs:       Feet:    Neurological: No sensory deficit  She exhibits normal muscle tone  Skin: No rash noted  No erythema  Psychiatric: She has a normal mood and affect   Her behavior is normal

## 2018-08-17 NOTE — ASSESSMENT & PLAN NOTE
As we discussed today we are adjusting the dose of your thyroid medication  Please continue levothyroxine 25 mcg daily Monday through Friday and please increase and take 2 tablets together on Saturdays and Sundays  Please continue to take this medication 1st thing in the morning with water only and no other food or liquids for 1 hour

## 2018-08-17 NOTE — PATIENT INSTRUCTIONS
Make ther dose adjustment for thyroid with labs in 6 weeks   Get the x-rays and we will advise on follow up    Based on the x-rays of your knees and her ankle we will advise if you need to follow up with the rheumatologist or if you need a referral to different physical therapy or Ortho

## 2018-08-20 ENCOUNTER — HOSPITAL ENCOUNTER (OUTPATIENT)
Dept: RADIOLOGY | Facility: HOSPITAL | Age: 43
Discharge: HOME | End: 2018-08-20
Payer: COMMERCIAL

## 2018-08-20 ENCOUNTER — TRANSCRIBE ORDERS (OUTPATIENT)
Dept: RADIOLOGY | Facility: HOSPITAL | Age: 43
End: 2018-08-20

## 2018-08-20 DIAGNOSIS — E06.3 HYPOTHYROIDISM DUE TO HASHIMOTO'S THYROIDITIS: ICD-10-CM

## 2018-08-20 DIAGNOSIS — M25.562 CHRONIC PAIN OF BOTH KNEES: ICD-10-CM

## 2018-08-20 DIAGNOSIS — M25.572 ACUTE LEFT ANKLE PAIN: ICD-10-CM

## 2018-08-20 DIAGNOSIS — G89.29 CHRONIC PAIN OF BOTH KNEES: ICD-10-CM

## 2018-08-20 DIAGNOSIS — M25.561 CHRONIC PAIN OF BOTH KNEES: ICD-10-CM

## 2018-08-20 DIAGNOSIS — E03.8 HYPOTHYROIDISM DUE TO HASHIMOTO'S THYROIDITIS: ICD-10-CM

## 2018-08-20 PROCEDURE — 73562 X-RAY EXAM OF KNEE 3: CPT

## 2018-08-20 PROCEDURE — 73610 X-RAY EXAM OF ANKLE: CPT

## 2018-08-20 RX ORDER — LEVOTHYROXINE SODIUM 0.03 MG/1
TABLET ORAL
Qty: 90 TABLET | Refills: 0 | OUTPATIENT
Start: 2018-08-20

## 2018-08-22 ENCOUNTER — TELEPHONE (OUTPATIENT)
Dept: INTERNAL MEDICINE CLINIC | Facility: CLINIC | Age: 43
End: 2018-08-22

## 2018-08-22 PROBLEM — M77.32 CALCANEAL SPUR OF LEFT FOOT: Status: ACTIVE | Noted: 2018-08-22

## 2018-08-23 NOTE — TELEPHONE ENCOUNTER
Called patient and made her aware as per note  Patient understood  Patient is also asking about her knee X-ray

## 2018-09-10 ENCOUNTER — TELEPHONE (OUTPATIENT)
Dept: OBGYN CLINIC | Facility: HOSPITAL | Age: 43
End: 2018-09-10

## 2018-09-10 NOTE — TELEPHONE ENCOUNTER
Pt called, says she's having "popping sensation" in vaginal area and also pain on right side of vaginal area  Says she's had the pain for about the last 6wks  Pt had vaginal hysterectomy on 4/20/18  Pt scheduled for Gyn visit on 9/12/18 to assess

## 2018-09-12 ENCOUNTER — OFFICE VISIT (OUTPATIENT)
Dept: OBGYN CLINIC | Facility: HOSPITAL | Age: 43
End: 2018-09-12
Payer: COMMERCIAL

## 2018-09-12 VITALS
HEIGHT: 66 IN | SYSTOLIC BLOOD PRESSURE: 99 MMHG | HEART RATE: 73 BPM | BODY MASS INDEX: 31.6 KG/M2 | WEIGHT: 196.6 LBS | DIASTOLIC BLOOD PRESSURE: 69 MMHG

## 2018-09-12 DIAGNOSIS — K46.9 NON-RECURRENT ABDOMINAL HERNIA WITHOUT OBSTRUCTION OR GANGRENE, UNSPECIFIED HERNIA TYPE: ICD-10-CM

## 2018-09-12 DIAGNOSIS — B96.89 BACTERIAL VAGINITIS: Primary | ICD-10-CM

## 2018-09-12 DIAGNOSIS — R10.31 RLQ ABDOMINAL PAIN: ICD-10-CM

## 2018-09-12 DIAGNOSIS — N76.0 BACTERIAL VAGINITIS: Primary | ICD-10-CM

## 2018-09-12 PROCEDURE — 99214 OFFICE O/P EST MOD 30 MIN: CPT | Performed by: OBSTETRICS & GYNECOLOGY

## 2018-09-12 RX ORDER — METRONIDAZOLE 500 MG/1
500 TABLET ORAL EVERY 8 HOURS SCHEDULED
Qty: 14 TABLET | Refills: 0 | Status: SHIPPED | OUTPATIENT
Start: 2018-09-12 | End: 2018-09-19

## 2018-09-26 ENCOUNTER — HOSPITAL ENCOUNTER (OUTPATIENT)
Dept: RADIOLOGY | Facility: HOSPITAL | Age: 43
Discharge: HOME | End: 2018-09-26
Payer: COMMERCIAL

## 2018-09-26 DIAGNOSIS — K46.9 NON-RECURRENT ABDOMINAL HERNIA WITHOUT OBSTRUCTION OR GANGRENE, UNSPECIFIED HERNIA TYPE: ICD-10-CM

## 2018-09-26 DIAGNOSIS — R10.31 RLQ ABDOMINAL PAIN: ICD-10-CM

## 2018-09-26 PROCEDURE — 76705 ECHO EXAM OF ABDOMEN: CPT

## 2018-09-26 PROCEDURE — 76856 US EXAM PELVIC COMPLETE: CPT

## 2018-09-26 PROCEDURE — 76830 TRANSVAGINAL US NON-OB: CPT

## 2018-09-28 ENCOUNTER — TELEPHONE (OUTPATIENT)
Dept: OTHER | Facility: HOSPITAL | Age: 43
End: 2018-09-28

## 2018-09-28 DIAGNOSIS — K43.9 VENTRAL HERNIA WITHOUT OBSTRUCTION OR GANGRENE: Primary | ICD-10-CM

## 2018-09-28 NOTE — TELEPHONE ENCOUNTER
ASSESSMENT  44 y o   female w/ventral hernia    PLAN  d/w pt: ambulatory referral to General Surgery for hernia repair  SUBJECTIVE  Please see pt  Visit on 18    She reports of continued sx: popping sensation, mild pain  OBJECTIVE    RLQ abdominal wall u/s on 18: small bowel hernia with Valsalva in the right groin as detailed above  I d/w pt her small ventral hernia, the risk of the fascial defect's expansion, as well as eventual possible risk for strangulation  I strongly encouraged her and reiterated twice to f/u with general surgery for hernia repair  She was agreeable to do so

## 2018-10-01 ENCOUNTER — APPOINTMENT (OUTPATIENT)
Dept: LAB | Facility: HOSPITAL | Age: 43
End: 2018-10-01
Payer: COMMERCIAL

## 2018-10-01 DIAGNOSIS — E06.3 HYPOTHYROIDISM DUE TO HASHIMOTO'S THYROIDITIS: ICD-10-CM

## 2018-10-01 DIAGNOSIS — E03.8 HYPOTHYROIDISM DUE TO HASHIMOTO'S THYROIDITIS: ICD-10-CM

## 2018-10-01 LAB
T3 SERPL-MCNC: 0.9 NG/ML (ref 0.6–1.8)
T4 FREE SERPL-MCNC: 1.04 NG/DL (ref 0.76–1.46)
TSH SERPL DL<=0.05 MIU/L-ACNC: 4.66 UIU/ML (ref 0.36–3.74)

## 2018-10-01 PROCEDURE — 84443 ASSAY THYROID STIM HORMONE: CPT

## 2018-10-01 PROCEDURE — 84439 ASSAY OF FREE THYROXINE: CPT

## 2018-10-01 PROCEDURE — 84480 ASSAY TRIIODOTHYRONINE (T3): CPT

## 2018-10-01 PROCEDURE — 36415 COLL VENOUS BLD VENIPUNCTURE: CPT

## 2018-10-05 ENCOUNTER — OFFICE VISIT (OUTPATIENT)
Dept: INTERNAL MEDICINE CLINIC | Facility: CLINIC | Age: 43
End: 2018-10-05
Payer: COMMERCIAL

## 2018-10-05 VITALS
HEIGHT: 66 IN | DIASTOLIC BLOOD PRESSURE: 61 MMHG | HEART RATE: 72 BPM | WEIGHT: 197.53 LBS | TEMPERATURE: 98 F | SYSTOLIC BLOOD PRESSURE: 98 MMHG | BODY MASS INDEX: 31.75 KG/M2

## 2018-10-05 DIAGNOSIS — M77.32 CALCANEAL SPUR OF LEFT FOOT: ICD-10-CM

## 2018-10-05 DIAGNOSIS — R00.2 INTERMITTENT PALPITATIONS: Primary | ICD-10-CM

## 2018-10-05 DIAGNOSIS — E06.3 HYPOTHYROIDISM DUE TO HASHIMOTO'S THYROIDITIS: ICD-10-CM

## 2018-10-05 DIAGNOSIS — E03.8 HYPOTHYROIDISM DUE TO HASHIMOTO'S THYROIDITIS: ICD-10-CM

## 2018-10-05 DIAGNOSIS — I95.0 IDIOPATHIC HYPOTENSION: ICD-10-CM

## 2018-10-05 PROCEDURE — 99213 OFFICE O/P EST LOW 20 MIN: CPT | Performed by: PHYSICIAN ASSISTANT

## 2018-10-05 NOTE — PROGRESS NOTES
Assessment/Plan:    No problem-specific Assessment & Plan notes found for this encounter  Diagnoses and all orders for this visit:    Intermittent palpitations  -     Holter monitor - 48 hour; Future  -     Ambulatory referral to Cardiology; Future    Hypothyroidism due to Hashimoto's thyroiditis  -     T4, free; Future  -     TSH, 3rd generation; Future    Idiopathic hypotension  -     Holter monitor - 48 hour; Future  -     Ambulatory referral to Cardiology; Future    Calcaneal spur of left foot          Subjective:      Patient ID: Edith Garcia is a 44 y o  female  Pt here for result review  Reviewed her x-rays positive heel spur but not having pain daily  Patient states that she would not like to have a referral for Podiatry at this time  We discussed that should the pain become worse or daily that we would provide this referral for her  Continues to feel chest pressure but not all the time sometime heart is skipping  Not pain  Has had multi EKG and stress ECHO that were normal in past     We did discuss however that the length of time for EKG is very brief and if not having symptoms at the time it will not catch these rhythm changes  Patient reports that more recently she has been told on multiple occasions that her blood pressure is lower and patient feels that she is feeling more tired and lightheaded due to this  Patient confirms the only medication she is taking is her thyroid medication and labs as noted today put her thyroid level in the normal limits  Patient states that when she saw the doctor for her social security disability exam that he told her her heart "skipped"  When asked patient states sometimes she will feel this but does not feel it every day maybe 3-4 times per week            The following portions of the patient's history were reviewed and updated as appropriate: allergies, current medications, past family history, past medical history, past social history, past surgical history and problem list     Review of Systems   Constitutional: Positive for fatigue  Negative for chills and fever  HENT: Negative  Respiratory: Negative  Negative for chest tightness and shortness of breath  Cardiovascular: Positive for palpitations  Negative for chest pain and leg swelling  Gastrointestinal: Negative  Endocrine: Negative for cold intolerance, heat intolerance, polydipsia, polyphagia and polyuria  Musculoskeletal: Positive for myalgias  Patient continues under care of the rheumatologist   As noted she declines all medications  Patient reports overall her pain is unchanged and some days are better than others  Skin: Negative for rash  Neurological: Positive for light-headedness  Negative for dizziness, syncope, weakness and numbness  Psychiatric/Behavioral: Negative  Objective:      BP 98/61   Pulse 72   Temp 98 °F (36 7 °C) (Oral)   Ht 5' 6" (1 676 m)   Wt 89 6 kg (197 lb 8 5 oz)   LMP 02/28/2018 (Exact Date)   BMI 31 88 kg/m²          Physical Exam   Constitutional: She is oriented to person, place, and time  She appears well-developed and well-nourished  HENT:   Head: Normocephalic and atraumatic  Right Ear: External ear normal    Left Ear: External ear normal    Eyes: Pupils are equal, round, and reactive to light  Cardiovascular: Normal rate, regular rhythm and normal heart sounds  On today's visit I listen to her heart while palpating radial pulse at the same time for 2 minutes  No PVCs were noted during the exam today  Pulmonary/Chest: Effort normal and breath sounds normal  She has no wheezes  Neurological: She is alert and oriented to person, place, and time  Skin: Skin is warm and dry  No rash noted  She is not diaphoretic  Psychiatric: She has a normal mood and affect  Her behavior is normal    Nursing note and vitals reviewed

## 2018-10-05 NOTE — PATIENT INSTRUCTIONS
Continue same dose levothyroxine  Take 1 tablet daily Monday through Friday and 2 tablets together on Saturday and Sunday  Please get the follow-up blood test for thyroid as dated in 3 months  Get the holter monitor 48   sched follow up with cardiologist    If you feel your heel pain is getting worse or prevent any activities of daily living then please call here for referral to the podiatrist     You have declined flu and tetanus immunizations as recommended today

## 2018-10-09 ENCOUNTER — HOSPITAL ENCOUNTER (OUTPATIENT)
Dept: NON INVASIVE DIAGNOSTICS | Facility: CLINIC | Age: 43
Discharge: HOME | End: 2018-10-09
Payer: COMMERCIAL

## 2018-10-09 DIAGNOSIS — I95.0 IDIOPATHIC HYPOTENSION: ICD-10-CM

## 2018-10-09 DIAGNOSIS — R00.2 INTERMITTENT PALPITATIONS: ICD-10-CM

## 2018-10-09 PROCEDURE — 93225 XTRNL ECG REC<48 HRS REC: CPT

## 2018-10-09 PROCEDURE — 93226 XTRNL ECG REC<48 HR SCAN A/R: CPT

## 2018-10-16 PROCEDURE — 93227 XTRNL ECG REC<48 HR R&I: CPT | Performed by: INTERNAL MEDICINE

## 2018-10-26 ENCOUNTER — OFFICE VISIT (OUTPATIENT)
Dept: MULTI SPECIALTY CLINIC | Facility: CLINIC | Age: 43
End: 2018-10-26
Payer: COMMERCIAL

## 2018-10-26 VITALS
HEIGHT: 66 IN | HEART RATE: 72 BPM | BODY MASS INDEX: 31.96 KG/M2 | TEMPERATURE: 97.9 F | WEIGHT: 198.85 LBS | SYSTOLIC BLOOD PRESSURE: 110 MMHG | DIASTOLIC BLOOD PRESSURE: 76 MMHG

## 2018-10-26 DIAGNOSIS — R00.2 INTERMITTENT PALPITATIONS: ICD-10-CM

## 2018-10-26 DIAGNOSIS — I95.0 IDIOPATHIC HYPOTENSION: ICD-10-CM

## 2018-10-26 DIAGNOSIS — R07.9 CHEST PAIN: Primary | ICD-10-CM

## 2018-10-26 PROCEDURE — 99202 OFFICE O/P NEW SF 15 MIN: CPT | Performed by: STUDENT IN AN ORGANIZED HEALTH CARE EDUCATION/TRAINING PROGRAM

## 2018-10-26 NOTE — PROGRESS NOTES
Cardiology Consult    Garrick Grissom  1975  [phone]  [address] [address]    1  Intermittent palpitations  Ambulatory referral to Cardiology    Echo stress test w contrast if indicated   2  Idiopathic hypotension  Ambulatory referral to Cardiology     Discussion/Summary:    1  Atypical chest pain - Patient has Hx fibromyalgia and gets intermittent chest pain, but she also states she gets intermittent episodes of chest pain and SOB with exertion  Will get an exercise stress echo to evaluate for any ischemia, or arrhythmias  Will also evaluate cardiac valves in the resting echo  2  Palpitations - Patient states she has skipped beats  48 hour Holter showed no significant arrhythmias  Very Rare episodes of Wenckebach seen with HR in 50s to 60s  Asymptomatic at that time  Will just monitor for now  Palpitations could be just sec to anxiety but will f/u stress echo  Patient taking levothyroxine with normal T3 and free T4, very slightly elevated TSH(followed by PCP)  Asked patient to decrease caffeine intake  3  Dizziness- Patient states she had episodes of dizziness and hypotension in the past  Likely Orthostatic given her symptoms and history (see HPI)  Recommended adequate hydration, compression stockings  HPI: 43 y/o F with PMHx Fibromyalgia, Hashimoto's thyroiditis causing hypothyroidism referred by PCP for evaluation for palpitations  Patient states she feels "skipped beats" a couple of time in a month not associated with exertion  They just last for a few seconds  She has episodes of chest tightness, sob but she states she has hx fibromyalgia and also gets back pain, shoulder pains at that time  Sometimes she gets chest pain, sob and diaphoresis with exertion  Denies any episode of syncope  She states she had low BP in the past  Sometimes she gets dizzy when she stands up suddenly   In her prior surgeries she had low BP secondary to likely anesthesia  Denies smoking, drug abuse  Drinks at least 2 cups of caffeine daily  Drinks alcohol socially  No family Hx CAD, HF or SCD      Patient Active Problem List   Diagnosis    Cholecystitis    Abnormal uterine bleeding (AUB)    Fibromyalgia    Goiter    Hypothyroidism due to Hashimoto's thyroiditis    Stress incontinence, female    Hypermobility of urethra    Chronic pain of both knees    Acute left ankle pain    Calcaneal spur of left foot    Intermittent palpitations    Idiopathic hypotension     Past Medical History:   Diagnosis Date    Anxiety     Bilateral fibrocystic breast changes     resolved: 02/21/2017    Breast disorder     Chronic pelvic pain in female     last assessed: 09/07/2016    Disease of thyroid gland     Dyspareunia in female     last assessed: 08/18/2016    Fibromyalgia     Gallbladder disease     Herniated intervertebral disc of lumbar spine     Memory loss     last assessed: 06/15/2015    Mixed incontinence     last assessed: 01/11/2016    Myofascial pain     last assessed: 06/01/2016    Sciatica     last assessed: 06/15/2015    Situational anxiety     last assessed: 02/21/2017    Thyroid disease     Uterus, adenomyosis     last assessed: 09/07/2016     Social History     Social History    Marital status: /Civil Union     Spouse name: N/A    Number of children: 4    Years of education: N/A     Occupational History    Full-time      Social History Main Topics    Smoking status: Never Smoker    Smokeless tobacco: Never Used      Comment: CTL3    Alcohol use Yes      Comment: social    Drug use: No    Sexual activity: Yes     Partners: Male     Birth control/ protection: Female Sterilization, Injection      Comment: Depo Provera     Other Topics Concern    Not on file     Social History Narrative    Daily caffeine consumption    Domestic partner    Parent          Family History   Problem Relation Age of Onset    No Known Problems Mother     Diabetes Maternal Grandmother     Leukemia Maternal Grandmother     Breast cancer Family     Cancer Family     Diabetes Family     Heart disease Family     Hyperlipidemia Family         reported cholesterol level was high    Hypertension Other      Past Surgical History:   Procedure Laterality Date    CHOLECYSTECTOMY LAPAROSCOPIC N/A 10/22/2017    Procedure: CHOLECYSTECTOMY LAPAROSCOPIC;  Surgeon: Heather Jurado MD;  Location: [facility];  Service: General    ENDOMETRIAL BIOPSY      resolved: 02/21/2017    OR VAGINAL HYSTERECTOMY,UTERUS 250 GMS/< N/A 4/20/2018    Procedure: HYSTERECTOMY VAGINAL TOTAL (TVH), BILATERAL SALPINGECTOMY;  Surgeon: Katia Maharaj MD;  Location: [facility];  Service: Gynecology    TUBAL LIGATION      last assessed: 10/20/2014       Current Outpatient Prescriptions:     levothyroxine 25 mcg tablet, Take one tablet daily Monday through Friday and take two pills together on Saturday and Sunday, Disp: 100 tablet, Rfl: 1  Allergies   Allergen Reactions    Iv Contrast [Iodinated Diagnostic Agents] Hives       Imaging: No results found  Review of Systems:  Review of Systems   Constitutional: Negative for chills and fever  Respiratory: Positive for chest tightness and shortness of breath  Cardiovascular: Positive for chest pain and palpitations  Gastrointestinal: Negative for abdominal pain  Neurological: Positive for dizziness  Negative for syncope  Physical Exam:  Physical Exam   Constitutional: She is oriented to person, place, and time  She appears well-developed  No distress  HENT:   Head: Normocephalic and atraumatic  Eyes: Pupils are equal, round, and reactive to light  EOM are normal    Neck: No JVD present  No thyromegaly present  Cardiovascular: Normal rate and regular rhythm  No murmur heard  Pulmonary/Chest: She has no wheezes  She has no rales  Abdominal: Soft  There is no tenderness     Musculoskeletal: She exhibits no edema or tenderness  Neurological: She is alert and oriented to person, place, and time  Skin: She is not diaphoretic         /76 (BP Location: Right arm, Patient Position: Sitting, Cuff Size: Adult)   Pulse 72   Temp 97 9 °F (36 6 °C) (Oral)   Ht 5' 6" (1 676 m)   Wt 90 2 kg (198 lb 13 7 oz)   LMP 02/28/2018 (Exact Date)   BMI 32 10 kg/m²

## 2018-11-09 ENCOUNTER — TELEPHONE (OUTPATIENT)
Dept: NON INVASIVE DIAGNOSTICS | Facility: HOSPITAL | Age: 43
End: 2018-11-09

## 2018-11-12 ENCOUNTER — HOSPITAL ENCOUNTER (OUTPATIENT)
Dept: NON INVASIVE DIAGNOSTICS | Facility: HOSPITAL | Age: 43
Discharge: HOME | End: 2018-11-12
Payer: COMMERCIAL

## 2018-11-12 DIAGNOSIS — R00.2 INTERMITTENT PALPITATIONS: ICD-10-CM

## 2018-11-12 LAB
CHEST PAIN STATEMENT: NORMAL
MAX DIASTOLIC BP: 80 MMHG
MAX HEART RATE: 162 BPM
MAX PREDICTED HEART RATE: 178 BPM
MAX. SYSTOLIC BP: 132 MMHG
PROTOCOL NAME: NORMAL
REASON FOR TERMINATION: NORMAL
TARGET HR FORMULA: NORMAL
TEST INDICATION: NORMAL
TIME IN EXERCISE PHASE: NORMAL

## 2018-11-12 PROCEDURE — 93351 STRESS TTE COMPLETE: CPT | Performed by: INTERNAL MEDICINE

## 2018-11-12 PROCEDURE — 93350 STRESS TTE ONLY: CPT

## 2018-11-28 ENCOUNTER — OFFICE VISIT (OUTPATIENT)
Dept: INTERNAL MEDICINE CLINIC | Facility: CLINIC | Age: 43
End: 2018-11-28
Payer: COMMERCIAL

## 2018-11-28 VITALS
DIASTOLIC BLOOD PRESSURE: 80 MMHG | HEART RATE: 100 BPM | SYSTOLIC BLOOD PRESSURE: 104 MMHG | TEMPERATURE: 99.2 F | WEIGHT: 202.82 LBS | HEIGHT: 67 IN | BODY MASS INDEX: 31.83 KG/M2

## 2018-11-28 DIAGNOSIS — J06.9 VIRAL URI WITH COUGH: Primary | ICD-10-CM

## 2018-11-28 PROCEDURE — 99213 OFFICE O/P EST LOW 20 MIN: CPT | Performed by: INTERNAL MEDICINE

## 2018-11-28 PROCEDURE — 3008F BODY MASS INDEX DOCD: CPT | Performed by: INTERNAL MEDICINE

## 2018-11-28 PROCEDURE — 1036F TOBACCO NON-USER: CPT | Performed by: INTERNAL MEDICINE

## 2018-11-28 NOTE — PROGRESS NOTES
Assessment/Plan:     Diagnoses and all orders for this visit:    Viral URI with cough  · Do not suspect bacterial infection  Patient's centor score 0 at this time  · Recommend supportive care  · Over-the-counter decongestants, NSAIDs  · Return if not better or getting worse over the next several days  Subjective:      Patient ID: Madi Cuellar is a 44 y o  female  HPI  Pt is here with sore throat, coughing, sneezing, runny nose, headache, ear pain for 3 days  Her tonsils are normally enlarged so she is not sure if they look infected  Her daughter tested positive for strep this morning, so she is worried she has it too  She has tried tea and honey  Has hx of fibromyalgia so says her body "always hurts "  No flu shot this year  She says her symptoms are similar to a viral URI, but her tonsils hurt more than usual   Also has ear fullness  Subjective chills  No n/v, fevers, diaphoresis, diarrhea  The following portions of the patient's history were reviewed and updated as appropriate: allergies, current medications, past family history, past medical history, past social history, past surgical history and problem list     Review of Systems   Constitutional: Positive for chills  Negative for diaphoresis and fever  HENT: Positive for congestion, ear pain, rhinorrhea, sneezing and sore throat  Gastrointestinal: Negative for diarrhea, nausea and vomiting  Skin: Negative for rash  Neurological: Positive for headaches  Hematological: Negative for adenopathy  Objective:    /80 (BP Location: Right arm, Patient Position: Sitting, Cuff Size: Adult)   Pulse 100   Temp 99 2 °F (37 3 °C) (Oral)   Ht 5' 7" (1 702 m)   Wt 92 kg (202 lb 13 2 oz)   LMP 02/28/2018 (Exact Date)   BMI 31 77 kg/m²        Physical Exam   Constitutional: She appears well-developed and well-nourished  No distress     HENT:   Right Ear: Tympanic membrane, external ear and ear canal normal    Left Ear: Tympanic membrane, external ear and ear canal normal    Nose: Rhinorrhea present  Mouth/Throat: Uvula is midline and mucous membranes are normal  Posterior oropharyngeal erythema present  No oropharyngeal exudate, posterior oropharyngeal edema or tonsillar abscesses  Cardiovascular: Normal rate, regular rhythm, normal heart sounds and intact distal pulses  Skin: She is not diaphoretic

## 2018-12-07 ENCOUNTER — OFFICE VISIT (OUTPATIENT)
Dept: MULTI SPECIALTY CLINIC | Facility: CLINIC | Age: 43
End: 2018-12-07
Payer: COMMERCIAL

## 2018-12-07 VITALS
WEIGHT: 200.18 LBS | TEMPERATURE: 98.7 F | HEART RATE: 80 BPM | SYSTOLIC BLOOD PRESSURE: 90 MMHG | BODY MASS INDEX: 32.17 KG/M2 | DIASTOLIC BLOOD PRESSURE: 60 MMHG | HEIGHT: 66 IN

## 2018-12-07 DIAGNOSIS — R00.2 INTERMITTENT PALPITATIONS: Primary | ICD-10-CM

## 2018-12-07 PROCEDURE — 99213 OFFICE O/P EST LOW 20 MIN: CPT | Performed by: STUDENT IN AN ORGANIZED HEALTH CARE EDUCATION/TRAINING PROGRAM

## 2018-12-07 NOTE — PROGRESS NOTES
Cardiology Follow Up    Yue Fernando  1975  [id number]  [address]    1  Intermittent palpitations         Discussion/Summary:  1  Atypical chest pain - Likely sec to fibromyalgia and anxiety  Exercise stress echo showed no evidence of ischemia with stress  No significant valvular abnormalities seen  No further cardiac testing needed at this time  Can follow cardiology prn for any new symptoms  2  Palpitations - Skipped beats  48 hour Holter showed no significant arrhythmias  Very Rare episodes of Wenckebach seen with HR in 50s to 60s  Asymptomatic at that time  Will just monitor for now  Palpitations likely sec to anxiety and thyroid issues  Patient taking levothyroxine with normal T3 and free T4, very slightly elevated TSH(followed by PCP)  Asked patient to decrease caffeine intake  3  Dizziness - No recent episodes  Likely past episodes orthostatic given her symptoms and history (see HPI)  Improved with adequate hydration, recommended compression stockings  If patient has any plans for hernia surgery, no further cardiac testing needed  She had issues with anesthesia in the past, so may need medical clearance  Recommend adequate hydration in surgery  Interval History: 43 y/o F with PMHx Fibromyalgia, Hashimoto's thyroiditis causing hypothyroidism referred by PCP in 10/2018 for evaluation for palpitations  Patient stated she felt "skipped beats" a couple of time in a month not associated with exertion  They just last for a few seconds  She has episodes of chest tightness, sob but she states she has hx fibromyalgia and also gets back pain, shoulder pains at that time  Denies any episode of syncope  She states she had low BP in the past  Sometimes she gets dizzy when she stands up suddenly  In her prior surgeries she had low BP secondary to likely anesthesia  Denies smoking, drug abuse   Drinks at least 2 cups of caffeine daily  Drinks alcohol socially  No family Hx CAD, HF or SCD  In Todays visit patient denies any recent episodes of exertional chest pain, sob  Rarely has skipped beats  She came for f/u exercise stress echo results  She states she will be seeing surgery in future for hernia repair      Patient Active Problem List   Diagnosis    Cholecystitis    Abnormal uterine bleeding (AUB)    Fibromyalgia    Goiter    Hypothyroidism due to Hashimoto's thyroiditis    Stress incontinence, female    Hypermobility of urethra    Chronic pain of both knees    Acute left ankle pain    Calcaneal spur of left foot    Intermittent palpitations    Idiopathic hypotension     Past Medical History:   Diagnosis Date    Anxiety     Bilateral fibrocystic breast changes     resolved: 02/21/2017    Breast disorder     Chronic pelvic pain in female     last assessed: 09/07/2016    Disease of thyroid gland     Dyspareunia in female     last assessed: 08/18/2016    Fibromyalgia     Gallbladder disease     Herniated intervertebral disc of lumbar spine     Memory loss     last assessed: 06/15/2015    Mixed incontinence     last assessed: 01/11/2016    Myofascial pain     last assessed: 06/01/2016    Sciatica     last assessed: 06/15/2015    Situational anxiety     last assessed: 02/21/2017    Thyroid disease     Uterus, adenomyosis     last assessed: 09/07/2016     Social History     Social History    Marital status: /Civil Union     Spouse name: N/A    Number of children: 3    Years of education: N/A     Occupational History    Full-time      Social History Main Topics    Smoking status: Never Smoker    Smokeless tobacco: Never Used    Alcohol use Yes      Comment: social    Drug use: No    Sexual activity: Yes     Partners: Male     Birth control/ protection: Female Sterilization, Injection      Comment: Depo Provera     Other Topics Concern    Not on file     Social History Narrative    Daily caffeine consumption    Domestic partner    Parent          Family History   Problem Relation Age of Onset    No Known Problems Mother     Diabetes Maternal Grandmother     Leukemia Maternal Grandmother     Breast cancer Family     Cancer Family     Diabetes Family     Heart disease Family     Hyperlipidemia Family         reported cholesterol level was high    Hypertension Other      Past Surgical History:   Procedure Laterality Date    CHOLECYSTECTOMY LAPAROSCOPIC N/A 10/22/2017    Procedure: CHOLECYSTECTOMY LAPAROSCOPIC;  Surgeon: Moraima Tejada MD;  [facility];  Service: General    ENDOMETRIAL BIOPSY      resolved: 02/21/2017    [place] VAGINAL HYSTERECTOMY,UTERUS 250 GMS/< N/A 4/20/2018    Procedure: HYSTERECTOMY VAGINAL TOTAL (TVH), BILATERAL SALPINGECTOMY;  Surgeon: Andreia Larose MD;  [facility];  Service: Gynecology    TUBAL LIGATION      last assessed: 10/20/2014       Current Outpatient Prescriptions:     levothyroxine 25 mcg tablet, Take one tablet daily Monday through Friday and take two pills together on Saturday and Sunday, Disp: 100 tablet, Rfl: 1  Allergies   Allergen Reactions    Iv Contrast [Iodinated Diagnostic Agents] Hives       Imaging: No results found  Review of Systems:  Review of Systems   Constitutional: Negative for chills and fatigue  Respiratory: Negative for shortness of breath  Cardiovascular: Positive for palpitations  Negative for chest pain and leg swelling  Gastrointestinal: Negative for abdominal pain  Neurological: Negative for dizziness and syncope  Physical Exam:  Physical Exam   Constitutional: She is oriented to person, place, and time  She appears well-developed  HENT:   Head: Normocephalic and atraumatic  Eyes: Pupils are equal, round, and reactive to light  EOM are normal    Neck: No JVD present  No thyromegaly present  Cardiovascular: Normal rate and regular rhythm  No murmur heard    Pulmonary/Chest: She has no wheezes  She has no rales  Abdominal: Soft  Musculoskeletal: She exhibits no tenderness  Neurological: She is alert and oriented to person, place, and time  Skin: She is not diaphoretic         BP 90/60 (BP Location: Right arm, Patient Position: Sitting, Cuff Size: Adult)   Pulse 80   Temp 98 7 °F (37 1 °C) (Oral)   Ht 5' 6" (1 676 m)   Wt 90 8 kg (200 lb 2 8 oz)   LMP 02/28/2018 (Exact Date)   BMI 32 31 kg/m²

## 2018-12-18 ENCOUNTER — OFFICE VISIT (OUTPATIENT)
Dept: MULTI SPECIALTY CLINIC | Facility: CLINIC | Age: 43
End: 2018-12-18
Payer: COMMERCIAL

## 2018-12-18 VITALS
WEIGHT: 202.82 LBS | TEMPERATURE: 98.5 F | HEIGHT: 66 IN | HEART RATE: 60 BPM | SYSTOLIC BLOOD PRESSURE: 92 MMHG | DIASTOLIC BLOOD PRESSURE: 68 MMHG | BODY MASS INDEX: 32.6 KG/M2

## 2018-12-18 DIAGNOSIS — M79.7 FIBROMYALGIA: Primary | ICD-10-CM

## 2018-12-18 PROCEDURE — 99214 OFFICE O/P EST MOD 30 MIN: CPT | Performed by: INTERNAL MEDICINE

## 2018-12-18 NOTE — PROGRESS NOTES
Rheumatology - progress note    ASSESSMENT/PLAN:    Fibromyalgia  -encouraged exercise and being active  -encouraged adequate sleep  -recommended soft tissue massage  -advised patient to discuss possibly starting low-dose amitriptyline with her primary care provider    No orders were placed at this encounter    Health Maintenance:  Advised diet and exercise  Advised to refrain from tobacco, alcohol, illicit drug use  Advised medical compliance      Schedule a follow-up appointment in as needed/discharge    CHIEF COMPLAINT:   Fibromyalgia follow-up    HISTORY OF PRESENT ILLNESS:    Patient is a 21-year-old female with longstanding fibromyalgia who presents after 6 months for follow-up of fibromyalgia  She has exhausted FDA approved drugs for fibromyalgia  She is currently not interested in antidepressants or amitriptyline because she states that they brought on depression and suicidal ideations  She also states that she does not want to be sedated heavily with a toddler at home  She states that she is caring for 2 kids, on her 2rd marriage, and distressed with cooking for the holidays (expressed that she does not have much help)  She is chronically fatigued and states that she has generalized pain/tenderness all over  She has tried water exercises physical therapy for which there was increased knee pain  She also stated that she was experiencing breast pain and chest tightness/chest pain for which she was evaluated by OBGYN and Cardiology; OBGYN cardiology workup negative  Denies fever, chills, headaches, lightheadedness, dizziness, visual disturbances, sore throat, chest pain, palpitations, SOB, abdominal pain, nausea, vomiting, or trouble urinating/ defecating             The following portions of the patient's history were reviewed and updated as appropriate: allergies, current medications, past family history, past medical history, past social history, past surgical history and problem list     Review of Systems:  As noted in HPI    OBJECTIVE:  Vitals:    12/18/18 1634   BP: 92/68   BP Location: Left arm   Patient Position: Sitting   Cuff Size: Adult   Pulse: 60   Temp: 98 5 °F (36 9 °C)   TempSrc: Oral   Weight: 92 kg (202 lb 13 2 oz)   Height: 5' 6" (1 676 m)     Physical Exam   Constitutional: She is oriented to person, place, and time  She appears well-developed and well-nourished  No distress  HENT:   Head: Normocephalic and atraumatic  Right Ear: External ear normal    Left Ear: External ear normal    Nose: Nose normal    Eyes: Pupils are equal, round, and reactive to light  Conjunctivae and EOM are normal  Right eye exhibits no discharge  Left eye exhibits no discharge  No scleral icterus  Neck: No JVD present  No tracheal deviation present  Cardiovascular: Normal rate, regular rhythm, normal heart sounds and intact distal pulses  Exam reveals no gallop and no friction rub  No murmur heard  Pulmonary/Chest: Effort normal and breath sounds normal  No stridor  No respiratory distress  She has no wheezes  She has no rales  She exhibits no tenderness  Abdominal: Soft  Bowel sounds are normal  She exhibits no distension  There is no tenderness  There is no rebound and no guarding  Musculoskeletal: She exhibits tenderness  She exhibits no edema or deformity  Tenderness to palpation diffusely/at multiple joints   Neurological: She is alert and oriented to person, place, and time  5/5 muscle strength in all 4 extremities   Skin: Skin is warm and dry  No rash noted  She is not diaphoretic  No erythema  No pallor  Vitals reviewed           Current Outpatient Prescriptions:     levothyroxine 25 mcg tablet, Take one tablet daily Monday through Friday and take two pills together on Saturday and Sunday, Disp: 100 tablet, Rfl: 1    Past Medical History:   Diagnosis Date    Anxiety     Bilateral fibrocystic breast changes     resolved: 02/21/2017    Breast disorder     Chronic pelvic pain in female last assessed: 09/07/2016    Disease of thyroid gland     Dyspareunia in female     last assessed: 08/18/2016    Fibromyalgia     Gallbladder disease     Herniated intervertebral disc of lumbar spine     Memory loss     last assessed: 06/15/2015    Mixed incontinence     last assessed: 01/11/2016    Myofascial pain     last assessed: 06/01/2016    Sciatica     last assessed: 06/15/2015    Situational anxiety     last assessed: 02/21/2017    Thyroid disease     Uterus, adenomyosis     last assessed: 09/07/2016     Past Surgical History:   Procedure Laterality Date    CHOLECYSTECTOMY LAPAROSCOPIC N/A 10/22/2017    Procedure: Josemanuel Proper;  Surgeon: Joshua Valdes MD;  [facility];  Service: General    ENDOMETRIAL BIOPSY      resolved: 02/21/2017    [place] VAGINAL HYSTERECTOMY,UTERUS 250 GMS/< N/A 4/20/2018    Procedure: HYSTERECTOMY VAGINAL TOTAL (TVH), BILATERAL SALPINGECTOMY;  Surgeon: Sarah Ellington MD;  [facility];  Service: Gynecology    TUBAL LIGATION      last assessed: 10/20/2014     Social History     Social History    Marital status: /Civil Union     Spouse name: N/A    Number of children: 4    Years of education: N/A     Occupational History    Full-time      Social History Main Topics    Smoking status: Never Smoker    Smokeless tobacco: Never Used    Alcohol use Yes      Comment: social    Drug use: No    Sexual activity: Yes     Partners: Male     Birth control/ protection: Female Sterilization, Injection      Comment: Depo Provera     Other Topics Concern    Not on file     Social History Narrative    Daily caffeine consumption    Domestic partner    Parent         Family History   Problem Relation Age of Onset    No Known Problems Mother     Diabetes Maternal Grandmother     Leukemia Maternal Grandmother     Breast cancer Family     Cancer Family     Diabetes Family     Heart disease Family     Hyperlipidemia Family reported cholesterol level was high    Hypertension Other        ==  DO Carroll Thomas [facility] Internal Medicine PGY-2    [address] [facility] , [address] [address] [facility] [facility]  Office: [phone]  Fax: [phone]

## 2019-01-05 DIAGNOSIS — E03.8 HYPOTHYROIDISM DUE TO HASHIMOTO'S THYROIDITIS: ICD-10-CM

## 2019-01-05 DIAGNOSIS — E06.3 HYPOTHYROIDISM DUE TO HASHIMOTO'S THYROIDITIS: ICD-10-CM

## 2019-01-07 RX ORDER — LEVOTHYROXINE SODIUM 0.03 MG/1
TABLET ORAL
Qty: 100 TABLET | Refills: 0 | Status: SHIPPED | OUTPATIENT
Start: 2019-01-07 | End: 2019-03-05 | Stop reason: ALTCHOICE

## 2019-01-10 NOTE — PROGRESS NOTES
[facility] OVERDUE RESULTS REMINDER FOR THYROID LABS ORDERED BY [facility]  MADE PT  AWARE SHE IS DUE IN JAN   TO HAVE THOSE DONE

## 2019-02-07 ENCOUNTER — APPOINTMENT (OUTPATIENT)
Dept: LAB | Facility: CLINIC | Age: 44
End: 2019-02-07
Payer: COMMERCIAL

## 2019-02-07 ENCOUNTER — OFFICE VISIT (OUTPATIENT)
Dept: INTERNAL MEDICINE CLINIC | Facility: CLINIC | Age: 44
End: 2019-02-07

## 2019-02-07 VITALS
HEIGHT: 66 IN | WEIGHT: 205.03 LBS | DIASTOLIC BLOOD PRESSURE: 62 MMHG | HEART RATE: 120 BPM | TEMPERATURE: 98.2 F | BODY MASS INDEX: 32.95 KG/M2 | SYSTOLIC BLOOD PRESSURE: 80 MMHG

## 2019-02-07 DIAGNOSIS — E06.3 HYPOTHYROIDISM DUE TO HASHIMOTO'S THYROIDITIS: ICD-10-CM

## 2019-02-07 DIAGNOSIS — G89.29 ACUTE EXACERBATION OF CHRONIC LOW BACK PAIN: ICD-10-CM

## 2019-02-07 DIAGNOSIS — M54.50 ACUTE EXACERBATION OF CHRONIC LOW BACK PAIN: ICD-10-CM

## 2019-02-07 DIAGNOSIS — E03.8 HYPOTHYROIDISM DUE TO HASHIMOTO'S THYROIDITIS: ICD-10-CM

## 2019-02-07 DIAGNOSIS — I95.0 IDIOPATHIC HYPOTENSION: Primary | ICD-10-CM

## 2019-02-07 LAB
ALBUMIN SERPL BCP-MCNC: 3.7 G/DL (ref 3.5–5)
ALP SERPL-CCNC: 80 U/L (ref 46–116)
ALT SERPL W P-5'-P-CCNC: 19 U/L (ref 12–78)
ANION GAP SERPL CALCULATED.3IONS-SCNC: 4 MMOL/L (ref 4–13)
AST SERPL W P-5'-P-CCNC: 12 U/L (ref 5–45)
BILIRUB SERPL-MCNC: 0.27 MG/DL (ref 0.2–1)
BUN SERPL-MCNC: 12 MG/DL (ref 5–25)
CALCIUM SERPL-MCNC: 8.7 MG/DL (ref 8.3–10.1)
CHLORIDE SERPL-SCNC: 105 MMOL/L (ref 100–108)
CO2 SERPL-SCNC: 28 MMOL/L (ref 21–32)
CORTIS SERPL-MCNC: 9.4 UG/DL
CREAT SERPL-MCNC: 0.7 MG/DL (ref 0.6–1.3)
GFR SERPL CREATININE-BSD FRML MDRD: 107 ML/MIN/1.73SQ M
GLUCOSE P FAST SERPL-MCNC: 80 MG/DL (ref 65–99)
POTASSIUM SERPL-SCNC: 3.9 MMOL/L (ref 3.5–5.3)
PROT SERPL-MCNC: 8.4 G/DL (ref 6.4–8.2)
SODIUM SERPL-SCNC: 137 MMOL/L (ref 136–145)
T4 FREE SERPL-MCNC: 0.9 NG/DL (ref 0.76–1.46)
TSH SERPL DL<=0.05 MIU/L-ACNC: 5.19 UIU/ML (ref 0.36–3.74)

## 2019-02-07 PROCEDURE — 99213 OFFICE O/P EST LOW 20 MIN: CPT | Performed by: INTERNAL MEDICINE

## 2019-02-07 PROCEDURE — 84439 ASSAY OF FREE THYROXINE: CPT

## 2019-02-07 PROCEDURE — 36415 COLL VENOUS BLD VENIPUNCTURE: CPT | Performed by: PHYSICIAN ASSISTANT

## 2019-02-07 PROCEDURE — 82533 TOTAL CORTISOL: CPT | Performed by: PHYSICIAN ASSISTANT

## 2019-02-07 PROCEDURE — 84443 ASSAY THYROID STIM HORMONE: CPT

## 2019-02-07 PROCEDURE — 80053 COMPREHEN METABOLIC PANEL: CPT | Performed by: PHYSICIAN ASSISTANT

## 2019-02-07 RX ORDER — NAPROXEN 500 MG/1
500 TABLET ORAL 2 TIMES DAILY WITH MEALS
Qty: 60 TABLET | Refills: 0 | Status: SHIPPED | OUTPATIENT
Start: 2019-02-07 | End: 2019-03-22 | Stop reason: SDUPTHER

## 2019-02-07 RX ORDER — FLUDROCORTISONE ACETATE 0.1 MG/1
0.1 TABLET ORAL DAILY
Qty: 30 TABLET | Refills: 0 | Status: SHIPPED | OUTPATIENT
Start: 2019-02-07 | End: 2019-03-05 | Stop reason: SDUPTHER

## 2019-02-07 NOTE — PATIENT INSTRUCTIONS
Get the labs completed  Start the med for lower blood pressure  sched with pain management  F/u after testing but if any labs abnormal will call and advise if need additional testing

## 2019-02-07 NOTE — PROGRESS NOTES
Assessment/Plan:    No problem-specific Assessment & Plan notes found for this encounter  Diagnoses and all orders for this visit:    Idiopathic hypotension  -     fludrocortisone (FLORINEF) 0 1 mg tablet; Take 1 tablet (0 1 mg total) by mouth daily for 30 days  -     Comprehensive metabolic panel  -     Cortisol    Acute exacerbation of chronic low back pain  -     Ambulatory referral to Pain Management; Future  -     naproxen (EC NAPROSYN) 500 MG EC tablet; Take 1 tablet (500 mg total) by mouth 2 (two) times a day with meals for 30 days    Hypothyroidism due to Hashimoto's thyroiditis          Subjective:      Patient ID: Ebony Delcid is a 44 y o  female  Patient here for back pain and fatigue  Patient states she has had fatigue in the past with her history of hypothyroidism but this has increased in the past week  The past week she has been getting the same amount of sleep and doing the same activities but still feels more tired than usual  Feels like she "zones out" a couple of times a day (3-4)  Was due for thyroid labs 1 month ago    Her blood pressure is low today at 80/62  This has happened in the past but has never had a formal evaluation of it  She has never fainted  She has felt lightheaded and had to sit  This has not increased in frequency  She has tired increase salt and fluid but this does not seem to help  Patient wants to see someone for the pain in her back, possibly pain management  She has chronic low back pain for the past three years but it flared up again in June of 2018  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  Since this time with any cleaning her back pain flares up  The pain is sharp and radiates up into between her shoulders  She states she also has a pinched sciatic nerve and when she goes to sleep at night the pain radiates all the way into her feet   It starts in her left buttock and is hot then goes all the way down her leg into the bottom of her foot  She has been taking tylenol for the past month  She believes the Naproxen was working better  She also states she has numbness and tingling in her left leg when she has a bad back spasm  Was going to pain management as part of a WC in the past and was getting some relief and would like a referral    Patient expresses concern with trying other medications because a psychiatrist told her the medications were making her feel suicidal many years ago and she never wants to try these types of medications again  Patient also notes that with her not working, the family is experiencing great financial stress  Discussed with attending based on patient's chronic symptomatic hypotension  This is the more likely cause of many of her symptoms as patient was minimally hypothyroid and only on 25 mcg  Patient did see the cardiologist in December of 2018 however that was related to her palpitations and sensation of heart skipping  Holter monitor showed very rare Wenckebach and echo stress test was negative for ischemia  Patient has positive orthostatic hypotension and as noted above she has attempted to maintain hydration and additional salt with no improvement in blood pressure or symptoms  Discussed with attending who agreed to start patient on fludrocortisone as well as labs  The following portions of the patient's history were reviewed and updated as appropriate: allergies, current medications, past family history, past medical history, past social history, past surgical history and problem list     Review of Systems   Constitutional: Positive for fatigue  Negative for chills and fever  Respiratory: Negative for cough and shortness of breath  Cardiovascular: Positive for palpitations  Negative for chest pain and leg swelling  Gastrointestinal: Negative for abdominal pain, constipation and diarrhea  Musculoskeletal: Positive for back pain and myalgias  Skin: Negative for rash  Neurological: Positive for dizziness, weakness and light-headedness  Negative for seizures, syncope and speech difficulty  Psychiatric/Behavioral: Negative for confusion and decreased concentration  The patient is nervous/anxious  Objective:      BP (!) 80/62 (BP Location: Left arm, Patient Position: Sitting, Cuff Size: Large)   Pulse (!) 120   Temp 98 2 °F (36 8 °C) (Oral)   Ht 5' 6" (1 676 m)   Wt 93 kg (205 lb 0 4 oz)   LMP 02/28/2018 (Exact Date)   BMI 33 09 kg/m²          Physical Exam   Constitutional: She is oriented to person, place, and time  She appears well-developed and well-nourished  Cardiovascular: Regular rhythm, S1 normal and S2 normal   Tachycardia present  Pulmonary/Chest: Effort normal and breath sounds normal  She has no wheezes  Abdominal: Soft  Bowel sounds are normal  There is no tenderness  Musculoskeletal: She exhibits no edema  Full lumbar ROM with reproduction of bilateral LBP with flexion    DTR 2+ equal bilateral  Positive SLR L neg in Right  Sensation to light touch intact distally LE bilat   Neurological: She is alert and oriented to person, place, and time  No cranial nerve deficit  Coordination normal    Skin: Skin is warm and dry  No rash noted  Psychiatric: She has a normal mood and affect   Her behavior is normal

## 2019-02-12 ENCOUNTER — TELEPHONE (OUTPATIENT)
Dept: INTERNAL MEDICINE CLINIC | Facility: CLINIC | Age: 44
End: 2019-02-12

## 2019-03-05 ENCOUNTER — OFFICE VISIT (OUTPATIENT)
Dept: INTERNAL MEDICINE CLINIC | Facility: CLINIC | Age: 44
End: 2019-03-05

## 2019-03-05 VITALS
WEIGHT: 201.06 LBS | TEMPERATURE: 97.8 F | DIASTOLIC BLOOD PRESSURE: 74 MMHG | BODY MASS INDEX: 32.31 KG/M2 | SYSTOLIC BLOOD PRESSURE: 100 MMHG | HEIGHT: 66 IN | HEART RATE: 100 BPM

## 2019-03-05 DIAGNOSIS — I95.0 IDIOPATHIC HYPOTENSION: Primary | ICD-10-CM

## 2019-03-05 DIAGNOSIS — Z23 NEED FOR TDAP VACCINATION: ICD-10-CM

## 2019-03-05 DIAGNOSIS — E04.1 THYROID NODULE: ICD-10-CM

## 2019-03-05 DIAGNOSIS — E06.3 HYPOTHYROIDISM DUE TO HASHIMOTO'S THYROIDITIS: ICD-10-CM

## 2019-03-05 DIAGNOSIS — E03.8 HYPOTHYROIDISM DUE TO HASHIMOTO'S THYROIDITIS: ICD-10-CM

## 2019-03-05 PROCEDURE — 90471 IMMUNIZATION ADMIN: CPT | Performed by: PHYSICIAN ASSISTANT

## 2019-03-05 PROCEDURE — 99213 OFFICE O/P EST LOW 20 MIN: CPT | Performed by: PHYSICIAN ASSISTANT

## 2019-03-05 PROCEDURE — 90715 TDAP VACCINE 7 YRS/> IM: CPT | Performed by: PHYSICIAN ASSISTANT

## 2019-03-05 RX ORDER — LEVOTHYROXINE SODIUM 0.05 MG/1
50 TABLET ORAL DAILY
Qty: 90 TABLET | Refills: 0 | Status: SHIPPED | OUTPATIENT
Start: 2019-03-05 | End: 2019-04-23 | Stop reason: SDUPTHER

## 2019-03-05 RX ORDER — FLUDROCORTISONE ACETATE 0.1 MG/1
0.1 TABLET ORAL DAILY
Qty: 30 TABLET | Refills: 1 | Status: SHIPPED | OUTPATIENT
Start: 2019-03-05 | End: 2019-05-05 | Stop reason: SDUPTHER

## 2019-03-05 NOTE — PATIENT INSTRUCTIONS
As her thyroid is not yet well controlled please increase your dose to 50 mcg daily continue to take with water only and no additional foods medications for liquids for 30-60 minutes  Call and schedule the ultrasound of your thyroid to follow up on the nodule that was seen previously  Appointment here after new labs in April

## 2019-03-05 NOTE — ASSESSMENT & PLAN NOTE
As reviewed today your thyroid level is at the low end of normal but your stimulating hormone is increasing once again  Please increase your levothyroxine to 50 mcg daily and get the follow-up blood test as dated in 6 weeks

## 2019-03-05 NOTE — ASSESSMENT & PLAN NOTE
As review today your blood pressure has improved with the Florinef daily  You also reports significant improvement in symptoms  Please continue this medication once daily    We will re-evaluate this at your follow-up in a few weeks and determine if you need any additional evaluations or referral back to the cardiologist

## 2019-03-05 NOTE — PROGRESS NOTES
Assessment/Plan:    Idiopathic hypotension  As review today your blood pressure has improved with the Florinef daily  You also reports significant improvement in symptoms  Please continue this medication once daily  We will re-evaluate this at your follow-up in a few weeks and determine if you need any additional evaluations or referral back to the cardiologist     Hypothyroidism due to Hashimoto's thyroiditis  As reviewed today your thyroid level is at the low end of normal but your stimulating hormone is increasing once again  Please increase your levothyroxine to 50 mcg daily and get the follow-up blood test as dated in 6 weeks  Thyroid nodule  Please call and schedule the new ultrasound for a new evaluation of the known left thyroid nodule  Diagnoses and all orders for this visit:    Idiopathic hypotension  -     fludrocortisone (FLORINEF) 0 1 mg tablet; Take 1 tablet (0 1 mg total) by mouth daily for 60 days    Hypothyroidism due to Hashimoto's thyroiditis  -     US thyroid; Future  -     levothyroxine 50 mcg tablet; Take 1 tablet (50 mcg total) by mouth daily for 90 days  -     T4, free; Future  -     TSH, 3rd generation; Future    Thyroid nodule  -     US thyroid; Future    Need for Tdap vaccination  -     TDAP VACCINE GREATER THAN OR EQUAL TO 6YO IM          Subjective:      Patient ID: Brett Wong is a 38 y o  female  Patient is here to review labs  Patient's liver and kidney function are normal    TSH is elevated at 5 190  T4 is trending down from 1 04 to 0 90 now  Patient drank coffee this morning  Her blood pressure today is 100/74  Visit previously it was 80/66  Patient states she is not experiencing as much "out of sorts" episodes or lightheadedness  She also has not been waking up with her heart racing  She is taking fludrocortisone everyday  Monday through Friday she takes 25 mg of synthroid and Saturday and Sunday she takes 50 mg   She waits 30 minutes before eating and then waits an hour to take the blood pressure medication  She is still struggling with constipation, puts lots of lotion on for her dry skin  Her hair is much better than in the past and is not dry  Patient has gained about 20 lbs over two months  She is feeling reece in her neck as well  Patient does have a small thyroid nodule left upper pole  This will be re-evaluated with a new ultrasound as last was 2017  The following portions of the patient's history were reviewed and updated as appropriate: allergies, current medications, past family history, past medical history, past social history, past surgical history and problem list     Review of Systems   Constitutional: Positive for activity change (decreased) and unexpected weight change (gain)  Negative for fatigue  HENT: Negative  Respiratory: Negative  Negative for shortness of breath  Cardiovascular: Negative  Negative for chest pain and palpitations  Gastrointestinal: Positive for constipation (chronic)  Negative for abdominal pain and blood in stool  Endocrine: Negative for cold intolerance and heat intolerance  Genitourinary: Negative  Negative for urgency  Musculoskeletal: Negative  Skin: Negative for color change and rash  Neurological: Positive for headaches  Negative for dizziness and light-headedness  Psychiatric/Behavioral: Negative  Objective:      /74 (BP Location: Left arm, Patient Position: Sitting, Cuff Size: Large)   Pulse 100   Temp 97 8 °F (36 6 °C) (Oral)   Ht 5' 6" (1 676 m)   Wt 91 2 kg (201 lb 1 oz)   LMP 02/28/2018 (Exact Date)   BMI 32 45 kg/m²          Physical Exam   Constitutional: She is oriented to person, place, and time  She appears well-developed and well-nourished  No distress  HENT:   Head: Normocephalic  Eyes: EOM are normal    Neck: Neck supple  Carotid bruit is not present  No tracheal deviation present  No thyromegaly present     Cardiovascular: Regular rhythm, normal heart sounds, intact distal pulses and normal pulses  Tachycardia present  Pulmonary/Chest: Effort normal and breath sounds normal  No stridor  No respiratory distress  She has no wheezes  She has no rales  Abdominal: Soft  Bowel sounds are normal  She exhibits no distension  There is no tenderness  There is no guarding  Musculoskeletal: She exhibits no edema  Neurological: She is alert and oriented to person, place, and time  Skin: Skin is warm and dry  No rash noted  She is not diaphoretic  No erythema  Psychiatric: She has a normal mood and affect  Her behavior is normal    Nursing note and vitals reviewed

## 2019-03-06 ENCOUNTER — HOSPITAL ENCOUNTER (OUTPATIENT)
Dept: RADIOLOGY | Age: 44
Discharge: HOME | End: 2019-03-06
Payer: COMMERCIAL

## 2019-03-06 DIAGNOSIS — E04.1 THYROID NODULE: ICD-10-CM

## 2019-03-06 DIAGNOSIS — E06.3 HYPOTHYROIDISM DUE TO HASHIMOTO'S THYROIDITIS: ICD-10-CM

## 2019-03-06 DIAGNOSIS — E03.8 HYPOTHYROIDISM DUE TO HASHIMOTO'S THYROIDITIS: ICD-10-CM

## 2019-03-06 PROCEDURE — 76536 US EXAM OF HEAD AND NECK: CPT

## 2019-03-08 ENCOUNTER — TELEPHONE (OUTPATIENT)
Dept: INTERNAL MEDICINE CLINIC | Facility: CLINIC | Age: 44
End: 2019-03-08

## 2019-03-14 ENCOUNTER — TELEPHONE (OUTPATIENT)
Dept: PAIN MEDICINE | Facility: CLINIC | Age: 44
End: 2019-03-14

## 2019-03-22 DIAGNOSIS — M54.50 ACUTE EXACERBATION OF CHRONIC LOW BACK PAIN: ICD-10-CM

## 2019-03-22 DIAGNOSIS — G89.29 ACUTE EXACERBATION OF CHRONIC LOW BACK PAIN: ICD-10-CM

## 2019-03-29 ENCOUNTER — CLINICAL SUPPORT (OUTPATIENT)
Dept: PAIN MEDICINE | Facility: CLINIC | Age: 44
End: 2019-03-29
Payer: COMMERCIAL

## 2019-03-29 VITALS
HEIGHT: 66 IN | HEART RATE: 74 BPM | BODY MASS INDEX: 32.3 KG/M2 | DIASTOLIC BLOOD PRESSURE: 74 MMHG | TEMPERATURE: 98.6 F | WEIGHT: 201 LBS | SYSTOLIC BLOOD PRESSURE: 104 MMHG

## 2019-03-29 DIAGNOSIS — M54.16 LUMBAR RADICULOPATHY: Primary | ICD-10-CM

## 2019-03-29 DIAGNOSIS — M79.7 FIBROMYALGIA: ICD-10-CM

## 2019-03-29 DIAGNOSIS — M54.50 ACUTE EXACERBATION OF CHRONIC LOW BACK PAIN: ICD-10-CM

## 2019-03-29 DIAGNOSIS — G89.29 ACUTE EXACERBATION OF CHRONIC LOW BACK PAIN: ICD-10-CM

## 2019-03-29 PROCEDURE — 99214 OFFICE O/P EST MOD 30 MIN: CPT | Performed by: ANESTHESIOLOGY

## 2019-03-29 NOTE — PROGRESS NOTES
Assessment  1  Lumbar radiculopathy    2  Acute exacerbation of chronic low back pain    3  Fibromyalgia        Plan  43-year-old female with a history of fibromyalgia last seen by our practice in October 2016 returning for follow-up of lumbosacral back pain with radicular symptoms in the S1 distribution of the left lower extremity  The last MRI of the patient's lumbar spine was in 2016 which revealed a small disc bulge at L4-5 an annular fissure at L5-S1 without any neural compression  The patient's symptoms did improve up until the past few months without any recent trauma or inciting event  The patient did have a lumbar epidural steroid injection in 2016 which did not provide any pain relief and the patient states that she got significant side effects from the steroids  She would like to avoid steroid injections  She is currently taking naproxen p r n  With minimal to mild relief  She has not done any recent formal physical therapy  The patient's low back pain seems to be mostly myofascial   Her symptoms are consistent with S1 radiculopathy and she does have a positive straight leg raise on the left  1  I will prescribe physical therapy as I feel she would benefit from Darlene Killings based exercises  2  The patient may continue with naproxen as prescribed  3  I did offer the patient a trial of gabapentin and she wished to avoid sedating medications at this time  4  I will follow up the patient in 2 months and will consider repeating MRI of lumbar spine if she does not improve with conservative treatment       My impressions and treatment recommendations were discussed in detail with the patient who verbalized understanding and had no further questions  Discharge instructions were provided  I personally saw and examined the patient and I agree with the above discussed plan of care  No orders of the defined types were placed in this encounter      No orders of the defined types were placed in this encounter  History of Present Illness    Cynthia Garcia is a 38 y o  female with a history of fibromyalgia last seen by our practice in October 2016 returning for follow-up of lumbosacral back pain with radiation into the posterior aspect of the left lower extremity with associated numbness  She denies any weakness in her legs or any right lower extremity symptoms  She denies any bladder or bowel incontinence or saddle anesthesia  She denies any recent trauma or inciting event  The last MRI of the patient's lumbar spine was in 2016 which revealed a small disc bulge at L4-5 an annular fissure at L5-S1 without any neural compression  The patient did have a lumbar epidural steroid injection in 2016 which did not provide any pain relief and the patient states that she got significant side effects from the steroids  She would like to avoid steroid injections  She is currently taking naproxen p r n  With minimal to mild relief  She has not done any recent formal physical therapy  The patient rates her pain as 6/10 on the pain is not follow any particular pattern throughout the day  The pain is constant and described as burning, throbbing, pressure-like, shooting, and numbness  The pain is worse with standing, walking, and exercise  The pain is alleviated with sitting, relaxation, and lying down  I have personally reviewed and/or updated the patient's past medical history, past surgical history, family history, social history, current medications, allergies, and vital signs today  Other than as stated above, the patient denies any interval changes in medications, medical condition, mental condition, symptoms, or allergies since the last office visit  Review of Systems   Constitutional: Negative for fever and unexpected weight change  HENT: Negative for trouble swallowing  Eyes: Negative for visual disturbance  Respiratory: Positive for shortness of breath  Negative for wheezing  Cardiovascular: Positive for chest pain  Negative for palpitations  Gastrointestinal: Positive for constipation and diarrhea  Negative for nausea and vomiting  Endocrine: Negative for cold intolerance, heat intolerance and polydipsia  Genitourinary: Negative for difficulty urinating and frequency  Musculoskeletal: Positive for gait problem and joint swelling  Negative for arthralgias and myalgias  Swelling, decreased ROM   Skin: Negative for rash  Neurological: Positive for dizziness and weakness  Negative for seizures, syncope and headaches  Memory loss   Hematological: Does not bruise/bleed easily  Psychiatric/Behavioral: Negative for dysphoric mood  All other systems reviewed and are negative        Patient Active Problem List   Diagnosis    Cholecystitis    Abnormal uterine bleeding (AUB)    Fibromyalgia    Goiter    Hypothyroidism due to Hashimoto's thyroiditis    Stress incontinence, female    Hypermobility of urethra    Chronic pain of both knees    Acute left ankle pain    Calcaneal spur of left foot    Intermittent palpitations    Idiopathic hypotension    Acute exacerbation of chronic low back pain    Thyroid nodule       Past Medical History:   Diagnosis Date    Anxiety     Bilateral fibrocystic breast changes     resolved: 02/21/2017    Breast disorder     Chronic pelvic pain in female     last assessed: 09/07/2016    Disease of thyroid gland     Dyspareunia in female     last assessed: 08/18/2016    Fibromyalgia     Gallbladder disease     Herniated intervertebral disc of lumbar spine     Memory loss     last assessed: 06/15/2015    Mixed incontinence     last assessed: 01/11/2016    Myofascial pain     last assessed: 06/01/2016    Sciatica     last assessed: 06/15/2015    Situational anxiety     last assessed: 02/21/2017    Thyroid disease     Uterus, adenomyosis     last assessed: 09/07/2016       Past Surgical History:   Procedure Laterality Date    CHOLECYSTECTOMY LAPAROSCOPIC N/A 10/22/2017    Procedure: CHOLECYSTECTOMY LAPAROSCOPIC;  Surgeon: Luis Alberto Sutton MD;  [facility];  Service: General    ENDOMETRIAL BIOPSY      resolved: 02/21/2017    ID VAGINAL HYSTERECTOMY,UTERUS 250 GMS/< N/A 4/20/2018    Procedure: HYSTERECTOMY VAGINAL TOTAL (TVH), BILATERAL SALPINGECTOMY;  Surgeon: Eufemia Sun MD;  [facility];  Service: Gynecology    TUBAL LIGATION      last assessed: 10/20/2014       Family History   Problem Relation Age of Onset    No Known Problems Mother     Diabetes Maternal Grandmother     Leukemia Maternal Grandmother     Breast cancer Family     Cancer Family     Diabetes Family     Heart disease Family     Hyperlipidemia Family         reported cholesterol level was high    Hypertension Other        Social History     Occupational History    Occupation: Full-time   Tobacco Use    Smoking status: Never Smoker    Smokeless tobacco: Never Used   Substance and Sexual Activity    Alcohol use: Yes     Comment: social    Drug use: No    Sexual activity: Yes     Partners: Male     Birth control/protection: Female Sterilization, Injection     Comment: Depo Provera       Current Outpatient Medications on File Prior to Visit   Medication Sig    fludrocortisone (FLORINEF) 0 1 mg tablet Take 1 tablet (0 1 mg total) by mouth daily for 60 days    levothyroxine 50 mcg tablet Take 1 tablet (50 mcg total) by mouth daily for 90 days    NAPROXEN  MG EC tablet TAKE 1 TABLET (500 MG TOTAL) BY MOUTH 2 (TWO) TIMES A DAY WITH MEALS FOR 30 DAYS     No current facility-administered medications on file prior to visit          Allergies   Allergen Reactions    Iv Contrast [Iodinated Diagnostic Agents] Hives       Physical Exam    Temp 98 6 °F (37 °C) (Oral)   Ht 5' 6" (1 676 m)   Wt 91 2 kg (201 lb)   LMP 02/28/2018 (Exact Date)   BMI 32 44 kg/m²     Constitutional: normal, well developed, well nourished, alert, in no distress and non-toxic and no overt pain behavior  Eyes: anicteric  HEENT: grossly intact  Neck: supple, symmetric, trachea midline and no masses   Pulmonary:even and unlabored  Cardiovascular:No edema or pitting edema present  Skin:Normal without rashes or lesions and well hydrated  Psychiatric:Mood and affect appropriate  Neurologic:Cranial Nerves II-XII grossly intact  Musculoskeletal:normal gait  Bilateral lumbar paraspinals tender to palpation more so on the left than the right from L3-L5  Bilateral SI joints and greater trochanters nontender to palpation  Bilateral patellar and Achilles reflexes were 2/4 and symmetrical   No clonus was noted bilaterally  Bilateral lower extremity strength 5/5 in all muscle groups  Positive straight leg raise on the left and negative on the right  Negative Mandeep's test bilaterally  Imaging      PACS Images      Show images for XR spine lumbar 2 or 3 views   Study Result     LUMBAR SPINE     INDICATION:  Back pain      COMPARISON:  None     VIEWS:  AP, lateral and coned-down projections; 3 images     FINDINGS:     A mild curvature is present convexity to the left      There is no radiographic evidence of acute fracture or destructive osseous lesion      Minimal spurring is present in L4 and L5      Overlying the expected region of the left kidney is a 4 x 2 mm calcification which could represent a renal calculus  Ultrasound suggested      IMPRESSION:        1  Mild curvature convexity to the left, this could be related to muscle spasm  2   4 x 2 mm calcification overlying the left kidney concerning for a left renal calculus    Renal ultrasound suggested      ##sigslh##sigslh

## 2019-04-08 ENCOUNTER — EVALUATION (OUTPATIENT)
Dept: PHYSICAL THERAPY | Facility: REHABILITATION | Age: 44
End: 2019-04-08
Payer: COMMERCIAL

## 2019-04-08 DIAGNOSIS — M54.16 LUMBAR RADICULOPATHY: ICD-10-CM

## 2019-04-08 PROCEDURE — 97162 PT EVAL MOD COMPLEX 30 MIN: CPT | Performed by: PHYSICAL THERAPIST

## 2019-04-08 PROCEDURE — G8990 OTHER PT/OT CURRENT STATUS: HCPCS | Performed by: PHYSICAL THERAPIST

## 2019-04-08 PROCEDURE — G8991 OTHER PT/OT GOAL STATUS: HCPCS | Performed by: PHYSICAL THERAPIST

## 2019-04-09 ENCOUNTER — OFFICE VISIT (OUTPATIENT)
Dept: PHYSICAL THERAPY | Facility: REHABILITATION | Age: 44
End: 2019-04-09
Payer: COMMERCIAL

## 2019-04-09 DIAGNOSIS — M54.16 LUMBAR RADICULOPATHY: Primary | ICD-10-CM

## 2019-04-09 PROCEDURE — 97140 MANUAL THERAPY 1/> REGIONS: CPT | Performed by: PHYSICAL THERAPIST

## 2019-04-09 PROCEDURE — 97530 THERAPEUTIC ACTIVITIES: CPT | Performed by: PHYSICAL THERAPIST

## 2019-04-09 PROCEDURE — 97112 NEUROMUSCULAR REEDUCATION: CPT | Performed by: PHYSICAL THERAPIST

## 2019-04-09 PROCEDURE — 97110 THERAPEUTIC EXERCISES: CPT | Performed by: PHYSICAL THERAPIST

## 2019-04-12 ENCOUNTER — TELEPHONE (OUTPATIENT)
Dept: INTERNAL MEDICINE CLINIC | Facility: CLINIC | Age: 44
End: 2019-04-12

## 2019-04-15 ENCOUNTER — TELEPHONE (OUTPATIENT)
Dept: INTERNAL MEDICINE CLINIC | Facility: CLINIC | Age: 44
End: 2019-04-15

## 2019-04-16 ENCOUNTER — OFFICE VISIT (OUTPATIENT)
Dept: PHYSICAL THERAPY | Facility: REHABILITATION | Age: 44
End: 2019-04-16
Payer: COMMERCIAL

## 2019-04-16 DIAGNOSIS — M54.16 LUMBAR RADICULOPATHY: Primary | ICD-10-CM

## 2019-04-16 PROCEDURE — 97112 NEUROMUSCULAR REEDUCATION: CPT | Performed by: PHYSICAL THERAPIST

## 2019-04-16 PROCEDURE — 97530 THERAPEUTIC ACTIVITIES: CPT | Performed by: PHYSICAL THERAPIST

## 2019-04-16 PROCEDURE — 97140 MANUAL THERAPY 1/> REGIONS: CPT | Performed by: PHYSICAL THERAPIST

## 2019-04-16 PROCEDURE — 97110 THERAPEUTIC EXERCISES: CPT | Performed by: PHYSICAL THERAPIST

## 2019-04-18 ENCOUNTER — OFFICE VISIT (OUTPATIENT)
Dept: PHYSICAL THERAPY | Facility: REHABILITATION | Age: 44
End: 2019-04-18
Payer: COMMERCIAL

## 2019-04-18 ENCOUNTER — APPOINTMENT (OUTPATIENT)
Dept: LAB | Facility: HOSPITAL | Age: 44
End: 2019-04-18
Payer: COMMERCIAL

## 2019-04-18 DIAGNOSIS — M54.16 LUMBAR RADICULOPATHY: Primary | ICD-10-CM

## 2019-04-18 DIAGNOSIS — E03.8 HYPOTHYROIDISM DUE TO HASHIMOTO'S THYROIDITIS: ICD-10-CM

## 2019-04-18 DIAGNOSIS — E06.3 HYPOTHYROIDISM DUE TO HASHIMOTO'S THYROIDITIS: ICD-10-CM

## 2019-04-18 LAB
T4 FREE SERPL-MCNC: 1 NG/DL (ref 0.76–1.46)
TSH SERPL DL<=0.05 MIU/L-ACNC: 2.53 UIU/ML (ref 0.36–3.74)

## 2019-04-18 PROCEDURE — 97112 NEUROMUSCULAR REEDUCATION: CPT

## 2019-04-18 PROCEDURE — 84443 ASSAY THYROID STIM HORMONE: CPT

## 2019-04-18 PROCEDURE — 97110 THERAPEUTIC EXERCISES: CPT

## 2019-04-18 PROCEDURE — 97530 THERAPEUTIC ACTIVITIES: CPT

## 2019-04-18 PROCEDURE — 36415 COLL VENOUS BLD VENIPUNCTURE: CPT

## 2019-04-18 PROCEDURE — 84439 ASSAY OF FREE THYROXINE: CPT

## 2019-04-18 PROCEDURE — 97140 MANUAL THERAPY 1/> REGIONS: CPT

## 2019-04-22 ENCOUNTER — APPOINTMENT (OUTPATIENT)
Dept: PHYSICAL THERAPY | Facility: REHABILITATION | Age: 44
End: 2019-04-22
Payer: COMMERCIAL

## 2019-04-23 ENCOUNTER — OFFICE VISIT (OUTPATIENT)
Dept: INTERNAL MEDICINE CLINIC | Facility: CLINIC | Age: 44
End: 2019-04-23

## 2019-04-23 VITALS
SYSTOLIC BLOOD PRESSURE: 100 MMHG | HEART RATE: 80 BPM | BODY MASS INDEX: 32.03 KG/M2 | HEIGHT: 66 IN | DIASTOLIC BLOOD PRESSURE: 70 MMHG | TEMPERATURE: 98 F | WEIGHT: 199.3 LBS

## 2019-04-23 DIAGNOSIS — E66.09 CLASS 1 OBESITY DUE TO EXCESS CALORIES WITHOUT SERIOUS COMORBIDITY WITH BODY MASS INDEX (BMI) OF 32.0 TO 32.9 IN ADULT: Chronic | ICD-10-CM

## 2019-04-23 DIAGNOSIS — I95.0 IDIOPATHIC HYPOTENSION: ICD-10-CM

## 2019-04-23 DIAGNOSIS — E06.3 HYPOTHYROIDISM DUE TO HASHIMOTO'S THYROIDITIS: Primary | ICD-10-CM

## 2019-04-23 DIAGNOSIS — M79.7 FIBROMYALGIA: ICD-10-CM

## 2019-04-23 DIAGNOSIS — F41.1 GENERALIZED ANXIETY DISORDER: Chronic | ICD-10-CM

## 2019-04-23 DIAGNOSIS — E03.8 HYPOTHYROIDISM DUE TO HASHIMOTO'S THYROIDITIS: Primary | ICD-10-CM

## 2019-04-23 PROBLEM — M25.572 ACUTE LEFT ANKLE PAIN: Status: RESOLVED | Noted: 2018-08-17 | Resolved: 2019-04-23

## 2019-04-23 PROBLEM — E66.811 CLASS 1 OBESITY DUE TO EXCESS CALORIES WITHOUT SERIOUS COMORBIDITY WITH BODY MASS INDEX (BMI) OF 32.0 TO 32.9 IN ADULT: Chronic | Status: ACTIVE | Noted: 2019-04-23

## 2019-04-23 PROBLEM — K81.9 CHOLECYSTITIS: Status: RESOLVED | Noted: 2017-10-21 | Resolved: 2019-04-23

## 2019-04-23 PROBLEM — R00.2 INTERMITTENT PALPITATIONS: Status: RESOLVED | Noted: 2018-10-05 | Resolved: 2019-04-23

## 2019-04-23 PROCEDURE — 99213 OFFICE O/P EST LOW 20 MIN: CPT | Performed by: INTERNAL MEDICINE

## 2019-04-23 RX ORDER — LEVOTHYROXINE SODIUM 0.05 MG/1
50 TABLET ORAL DAILY
Qty: 90 TABLET | Refills: 1 | Status: SHIPPED | OUTPATIENT
Start: 2019-04-23 | End: 2019-08-07

## 2019-04-25 ENCOUNTER — OFFICE VISIT (OUTPATIENT)
Dept: PHYSICAL THERAPY | Facility: REHABILITATION | Age: 44
End: 2019-04-25
Payer: COMMERCIAL

## 2019-04-25 DIAGNOSIS — M54.16 LUMBAR RADICULOPATHY: Primary | ICD-10-CM

## 2019-04-25 PROCEDURE — 97112 NEUROMUSCULAR REEDUCATION: CPT

## 2019-04-25 PROCEDURE — 97140 MANUAL THERAPY 1/> REGIONS: CPT

## 2019-04-25 PROCEDURE — 97110 THERAPEUTIC EXERCISES: CPT

## 2019-04-29 ENCOUNTER — OFFICE VISIT (OUTPATIENT)
Dept: PHYSICAL THERAPY | Facility: REHABILITATION | Age: 44
End: 2019-04-29
Payer: COMMERCIAL

## 2019-04-29 DIAGNOSIS — M54.16 LUMBAR RADICULOPATHY: Primary | ICD-10-CM

## 2019-04-29 PROCEDURE — 97140 MANUAL THERAPY 1/> REGIONS: CPT

## 2019-04-29 PROCEDURE — 97110 THERAPEUTIC EXERCISES: CPT

## 2019-04-29 PROCEDURE — G8990 OTHER PT/OT CURRENT STATUS: HCPCS

## 2019-04-29 PROCEDURE — G8991 OTHER PT/OT GOAL STATUS: HCPCS

## 2019-04-29 PROCEDURE — 97112 NEUROMUSCULAR REEDUCATION: CPT

## 2019-05-02 ENCOUNTER — OFFICE VISIT (OUTPATIENT)
Dept: PHYSICAL THERAPY | Facility: REHABILITATION | Age: 44
End: 2019-05-02
Payer: COMMERCIAL

## 2019-05-02 DIAGNOSIS — M54.16 LUMBAR RADICULOPATHY: Primary | ICD-10-CM

## 2019-05-02 PROCEDURE — 97140 MANUAL THERAPY 1/> REGIONS: CPT

## 2019-05-02 PROCEDURE — 97110 THERAPEUTIC EXERCISES: CPT

## 2019-05-02 PROCEDURE — 97112 NEUROMUSCULAR REEDUCATION: CPT

## 2019-05-05 DIAGNOSIS — I95.0 IDIOPATHIC HYPOTENSION: ICD-10-CM

## 2019-05-06 ENCOUNTER — APPOINTMENT (OUTPATIENT)
Dept: PHYSICAL THERAPY | Facility: REHABILITATION | Age: 44
End: 2019-05-06
Payer: COMMERCIAL

## 2019-05-06 RX ORDER — FLUDROCORTISONE ACETATE 0.1 MG/1
TABLET ORAL
Qty: 30 TABLET | Refills: 1 | Status: SHIPPED | OUTPATIENT
Start: 2019-05-06 | End: 2019-07-09 | Stop reason: SDUPTHER

## 2019-05-13 ENCOUNTER — APPOINTMENT (OUTPATIENT)
Dept: PHYSICAL THERAPY | Facility: REHABILITATION | Age: 44
End: 2019-05-13
Payer: COMMERCIAL

## 2019-05-14 ENCOUNTER — EVALUATION (OUTPATIENT)
Dept: PHYSICAL THERAPY | Facility: REHABILITATION | Age: 44
End: 2019-05-14
Payer: COMMERCIAL

## 2019-05-14 DIAGNOSIS — M54.16 LUMBAR RADICULOPATHY: Primary | ICD-10-CM

## 2019-05-14 PROCEDURE — 97112 NEUROMUSCULAR REEDUCATION: CPT | Performed by: PHYSICAL THERAPIST

## 2019-05-14 PROCEDURE — 97110 THERAPEUTIC EXERCISES: CPT | Performed by: PHYSICAL THERAPIST

## 2019-05-20 ENCOUNTER — APPOINTMENT (OUTPATIENT)
Dept: PHYSICAL THERAPY | Facility: REHABILITATION | Age: 44
End: 2019-05-20
Payer: COMMERCIAL

## 2019-05-22 ENCOUNTER — OFFICE VISIT (OUTPATIENT)
Dept: PAIN MEDICINE | Facility: CLINIC | Age: 44
End: 2019-05-22
Payer: COMMERCIAL

## 2019-05-22 VITALS
TEMPERATURE: 98.2 F | DIASTOLIC BLOOD PRESSURE: 71 MMHG | WEIGHT: 202 LBS | HEIGHT: 66 IN | HEART RATE: 79 BPM | SYSTOLIC BLOOD PRESSURE: 111 MMHG | BODY MASS INDEX: 32.47 KG/M2

## 2019-05-22 DIAGNOSIS — M54.16 LUMBAR RADICULOPATHY: Primary | ICD-10-CM

## 2019-05-22 DIAGNOSIS — M54.41 CHRONIC BILATERAL LOW BACK PAIN WITH BILATERAL SCIATICA: ICD-10-CM

## 2019-05-22 DIAGNOSIS — M54.42 CHRONIC BILATERAL LOW BACK PAIN WITH BILATERAL SCIATICA: ICD-10-CM

## 2019-05-22 DIAGNOSIS — G89.29 CHRONIC BILATERAL LOW BACK PAIN WITH BILATERAL SCIATICA: ICD-10-CM

## 2019-05-22 PROCEDURE — 99214 OFFICE O/P EST MOD 30 MIN: CPT | Performed by: NURSE PRACTITIONER

## 2019-05-22 RX ORDER — MELOXICAM 7.5 MG/1
7.5 TABLET ORAL 2 TIMES DAILY PRN
Qty: 60 TABLET | Refills: 1 | Status: SHIPPED | OUTPATIENT
Start: 2019-05-22 | End: 2019-07-19

## 2019-05-31 ENCOUNTER — HOSPITAL ENCOUNTER (EMERGENCY)
Facility: HOSPITAL | Age: 44
Discharge: HOME | End: 2019-05-31
Attending: EMERGENCY MEDICINE
Payer: COMMERCIAL

## 2019-05-31 ENCOUNTER — APPOINTMENT (EMERGENCY)
Dept: RADIOLOGY | Facility: HOSPITAL | Age: 44
End: 2019-05-31
Payer: COMMERCIAL

## 2019-05-31 VITALS
OXYGEN SATURATION: 97 % | TEMPERATURE: 97.2 F | WEIGHT: 200 LBS | BODY MASS INDEX: 32.28 KG/M2 | DIASTOLIC BLOOD PRESSURE: 73 MMHG | RESPIRATION RATE: 13 BRPM | SYSTOLIC BLOOD PRESSURE: 101 MMHG | HEART RATE: 78 BPM

## 2019-05-31 DIAGNOSIS — R07.9 CHEST PAIN: Primary | ICD-10-CM

## 2019-05-31 LAB
ALBUMIN SERPL BCP-MCNC: 3.8 G/DL (ref 3.5–5)
ALP SERPL-CCNC: 82 U/L (ref 46–116)
ALT SERPL W P-5'-P-CCNC: 21 U/L (ref 12–78)
ANION GAP SERPL CALCULATED.3IONS-SCNC: 7 MMOL/L (ref 4–13)
AST SERPL W P-5'-P-CCNC: 14 U/L (ref 5–45)
ATRIAL RATE: 78 BPM
BASOPHILS # BLD AUTO: 0.03 THOUSANDS/ΜL (ref 0–0.1)
BASOPHILS NFR BLD AUTO: 0 % (ref 0–1)
BILIRUB SERPL-MCNC: 0.28 MG/DL (ref 0.2–1)
BUN SERPL-MCNC: 13 MG/DL (ref 5–25)
CALCIUM SERPL-MCNC: 8.5 MG/DL (ref 8.3–10.1)
CHLORIDE SERPL-SCNC: 105 MMOL/L (ref 100–108)
CO2 SERPL-SCNC: 28 MMOL/L (ref 21–32)
CREAT SERPL-MCNC: 0.74 MG/DL (ref 0.6–1.3)
EOSINOPHIL # BLD AUTO: 0.1 THOUSAND/ΜL (ref 0–0.61)
EOSINOPHIL NFR BLD AUTO: 1 % (ref 0–6)
ERYTHROCYTE [DISTWIDTH] IN BLOOD BY AUTOMATED COUNT: 14.3 % (ref 11.6–15.1)
EXT PREG TEST URINE: NEGATIVE
GFR SERPL CREATININE-BSD FRML MDRD: 100 ML/MIN/1.73SQ M
GLUCOSE SERPL-MCNC: 113 MG/DL (ref 65–140)
HCT VFR BLD AUTO: 41.1 % (ref 34.8–46.1)
HGB BLD-MCNC: 12.9 G/DL (ref 11.5–15.4)
IMM GRANULOCYTES # BLD AUTO: 0.04 THOUSAND/UL (ref 0–0.2)
IMM GRANULOCYTES NFR BLD AUTO: 0 % (ref 0–2)
LYMPHOCYTES # BLD AUTO: 2.1 THOUSANDS/ΜL (ref 0.6–4.47)
LYMPHOCYTES NFR BLD AUTO: 23 % (ref 14–44)
MCH RBC QN AUTO: 26.8 PG (ref 26.8–34.3)
MCHC RBC AUTO-ENTMCNC: 31.4 G/DL (ref 31.4–37.4)
MCV RBC AUTO: 85 FL (ref 82–98)
MONOCYTES # BLD AUTO: 0.73 THOUSAND/ΜL (ref 0.17–1.22)
MONOCYTES NFR BLD AUTO: 8 % (ref 4–12)
NEUTROPHILS # BLD AUTO: 6.26 THOUSANDS/ΜL (ref 1.85–7.62)
NEUTS SEG NFR BLD AUTO: 68 % (ref 43–75)
NRBC BLD AUTO-RTO: 0 /100 WBCS
P AXIS: 43 DEGREES
PLATELET # BLD AUTO: 239 THOUSANDS/UL (ref 149–390)
PMV BLD AUTO: 11.3 FL (ref 8.9–12.7)
POTASSIUM SERPL-SCNC: 3.5 MMOL/L (ref 3.5–5.3)
PR INTERVAL: 150 MS
PROT SERPL-MCNC: 8.3 G/DL (ref 6.4–8.2)
QRS AXIS: 49 DEGREES
QRSD INTERVAL: 80 MS
QT INTERVAL: 402 MS
QTC INTERVAL: 458 MS
RBC # BLD AUTO: 4.81 MILLION/UL (ref 3.81–5.12)
SODIUM SERPL-SCNC: 140 MMOL/L (ref 136–145)
T WAVE AXIS: 26 DEGREES
TROPONIN I SERPL-MCNC: <0.02 NG/ML
VENTRICULAR RATE: 78 BPM
WBC # BLD AUTO: 9.26 THOUSAND/UL (ref 4.31–10.16)

## 2019-05-31 PROCEDURE — 99285 EMERGENCY DEPT VISIT HI MDM: CPT

## 2019-05-31 PROCEDURE — 80053 COMPREHEN METABOLIC PANEL: CPT | Performed by: EMERGENCY MEDICINE

## 2019-05-31 PROCEDURE — 81025 URINE PREGNANCY TEST: CPT | Performed by: EMERGENCY MEDICINE

## 2019-05-31 PROCEDURE — 85025 COMPLETE CBC W/AUTO DIFF WBC: CPT | Performed by: EMERGENCY MEDICINE

## 2019-05-31 PROCEDURE — 93010 ELECTROCARDIOGRAM REPORT: CPT | Performed by: INTERNAL MEDICINE

## 2019-05-31 PROCEDURE — 93005 ELECTROCARDIOGRAM TRACING: CPT

## 2019-05-31 PROCEDURE — 96374 THER/PROPH/DIAG INJ IV PUSH: CPT

## 2019-05-31 PROCEDURE — 99284 EMERGENCY DEPT VISIT MOD MDM: CPT | Performed by: EMERGENCY MEDICINE

## 2019-05-31 PROCEDURE — 84484 ASSAY OF TROPONIN QUANT: CPT | Performed by: EMERGENCY MEDICINE

## 2019-05-31 PROCEDURE — 71046 X-RAY EXAM CHEST 2 VIEWS: CPT

## 2019-05-31 PROCEDURE — 36415 COLL VENOUS BLD VENIPUNCTURE: CPT

## 2019-05-31 RX ORDER — KETOROLAC TROMETHAMINE 30 MG/ML
15 INJECTION, SOLUTION INTRAMUSCULAR; INTRAVENOUS ONCE
Status: COMPLETED | OUTPATIENT
Start: 2019-05-31 | End: 2019-05-31

## 2019-05-31 RX ORDER — ONDANSETRON 2 MG/ML
4 INJECTION INTRAMUSCULAR; INTRAVENOUS ONCE
Status: DISCONTINUED | OUTPATIENT
Start: 2019-05-31 | End: 2019-05-31 | Stop reason: HOSPADM

## 2019-05-31 RX ADMIN — KETOROLAC TROMETHAMINE 15 MG: 30 INJECTION, SOLUTION INTRAMUSCULAR at 03:39

## 2019-06-12 ENCOUNTER — TELEPHONE (OUTPATIENT)
Dept: PAIN MEDICINE | Facility: CLINIC | Age: 44
End: 2019-06-12

## 2019-06-12 ENCOUNTER — HOSPITAL ENCOUNTER (OUTPATIENT)
Dept: RADIOLOGY | Facility: HOSPITAL | Age: 44
Discharge: HOME | End: 2019-06-12

## 2019-06-12 DIAGNOSIS — M54.16 LUMBAR RADICULOPATHY: ICD-10-CM

## 2019-06-12 DIAGNOSIS — F41.9 ANXIETY: Primary | ICD-10-CM

## 2019-06-12 RX ORDER — LORAZEPAM 0.5 MG/1
TABLET ORAL
Qty: 2 TABLET | Refills: 0 | Status: SHIPPED | OUTPATIENT
Start: 2019-06-12 | End: 2019-07-19

## 2019-06-19 ENCOUNTER — OFFICE VISIT (OUTPATIENT)
Dept: CARDIOLOGY CLINIC | Facility: CLINIC | Age: 44
End: 2019-06-19
Payer: COMMERCIAL

## 2019-06-19 VITALS
BODY MASS INDEX: 32.47 KG/M2 | HEIGHT: 66 IN | SYSTOLIC BLOOD PRESSURE: 98 MMHG | OXYGEN SATURATION: 97 % | WEIGHT: 202 LBS | DIASTOLIC BLOOD PRESSURE: 68 MMHG | HEART RATE: 84 BPM

## 2019-06-19 DIAGNOSIS — I95.0 IDIOPATHIC HYPOTENSION: ICD-10-CM

## 2019-06-19 PROCEDURE — 99214 OFFICE O/P EST MOD 30 MIN: CPT | Performed by: INTERNAL MEDICINE

## 2019-06-19 RX ORDER — NAPROXEN 500 MG/1
500 TABLET ORAL AS NEEDED
COMMUNITY
End: 2019-07-19

## 2019-07-09 DIAGNOSIS — I95.0 IDIOPATHIC HYPOTENSION: ICD-10-CM

## 2019-07-09 RX ORDER — FLUDROCORTISONE ACETATE 0.1 MG/1
0.1 TABLET ORAL DAILY
Qty: 90 TABLET | Refills: 0 | Status: SHIPPED | OUTPATIENT
Start: 2019-07-09 | End: 2019-10-07

## 2019-07-19 ENCOUNTER — HOSPITAL ENCOUNTER (EMERGENCY)
Facility: HOSPITAL | Age: 44
Discharge: HOME | End: 2019-07-19
Attending: EMERGENCY MEDICINE | Admitting: EMERGENCY MEDICINE
Payer: COMMERCIAL

## 2019-07-19 ENCOUNTER — APPOINTMENT (EMERGENCY)
Dept: RADIOLOGY | Facility: HOSPITAL | Age: 44
End: 2019-07-19
Payer: COMMERCIAL

## 2019-07-19 VITALS
HEIGHT: 66 IN | HEART RATE: 72 BPM | DIASTOLIC BLOOD PRESSURE: 70 MMHG | SYSTOLIC BLOOD PRESSURE: 110 MMHG | TEMPERATURE: 98 F | BODY MASS INDEX: 32.14 KG/M2 | RESPIRATION RATE: 18 BRPM | WEIGHT: 200 LBS | OXYGEN SATURATION: 96 %

## 2019-07-19 DIAGNOSIS — R07.9 CHEST PAIN: Primary | ICD-10-CM

## 2019-07-19 LAB
ALBUMIN SERPL BCP-MCNC: 3.5 G/DL (ref 3.5–5)
ALP SERPL-CCNC: 78 U/L (ref 46–116)
ALT SERPL W P-5'-P-CCNC: 17 U/L (ref 12–78)
ANION GAP SERPL CALCULATED.3IONS-SCNC: 7 MMOL/L (ref 4–13)
AST SERPL W P-5'-P-CCNC: 16 U/L (ref 5–45)
ATRIAL RATE: 72 BPM
BASOPHILS # BLD AUTO: 0.04 THOUSANDS/ΜL (ref 0–0.1)
BASOPHILS NFR BLD AUTO: 0 % (ref 0–1)
BILIRUB SERPL-MCNC: 0.36 MG/DL (ref 0.2–1)
BUN SERPL-MCNC: 12 MG/DL (ref 5–25)
CALCIUM SERPL-MCNC: 8.5 MG/DL (ref 8.3–10.1)
CHLORIDE SERPL-SCNC: 105 MMOL/L (ref 100–108)
CO2 SERPL-SCNC: 26 MMOL/L (ref 21–32)
CREAT SERPL-MCNC: 0.74 MG/DL (ref 0.6–1.3)
EOSINOPHIL # BLD AUTO: 0.07 THOUSAND/ΜL (ref 0–0.61)
EOSINOPHIL NFR BLD AUTO: 1 % (ref 0–6)
ERYTHROCYTE [DISTWIDTH] IN BLOOD BY AUTOMATED COUNT: 14.4 % (ref 11.6–15.1)
GFR SERPL CREATININE-BSD FRML MDRD: 100 ML/MIN/1.73SQ M
GLUCOSE SERPL-MCNC: 93 MG/DL (ref 65–140)
HCT VFR BLD AUTO: 37.6 % (ref 34.8–46.1)
HGB BLD-MCNC: 11.9 G/DL (ref 11.5–15.4)
IMM GRANULOCYTES # BLD AUTO: 0.04 THOUSAND/UL (ref 0–0.2)
IMM GRANULOCYTES NFR BLD AUTO: 0 % (ref 0–2)
LYMPHOCYTES # BLD AUTO: 2.52 THOUSANDS/ΜL (ref 0.6–4.47)
LYMPHOCYTES NFR BLD AUTO: 28 % (ref 14–44)
MCH RBC QN AUTO: 26.9 PG (ref 26.8–34.3)
MCHC RBC AUTO-ENTMCNC: 31.6 G/DL (ref 31.4–37.4)
MCV RBC AUTO: 85 FL (ref 82–98)
MONOCYTES # BLD AUTO: 0.69 THOUSAND/ΜL (ref 0.17–1.22)
MONOCYTES NFR BLD AUTO: 8 % (ref 4–12)
NEUTROPHILS # BLD AUTO: 5.75 THOUSANDS/ΜL (ref 1.85–7.62)
NEUTS SEG NFR BLD AUTO: 63 % (ref 43–75)
NRBC BLD AUTO-RTO: 0 /100 WBCS
P AXIS: 34 DEGREES
PLATELET # BLD AUTO: 203 THOUSANDS/UL (ref 149–390)
PMV BLD AUTO: 11.7 FL (ref 8.9–12.7)
POTASSIUM SERPL-SCNC: 3.8 MMOL/L (ref 3.5–5.3)
PR INTERVAL: 150 MS
PROT SERPL-MCNC: 7.6 G/DL (ref 6.4–8.2)
QRS AXIS: 71 DEGREES
QRSD INTERVAL: 78 MS
QT INTERVAL: 396 MS
QTC INTERVAL: 433 MS
RBC # BLD AUTO: 4.42 MILLION/UL (ref 3.81–5.12)
SODIUM SERPL-SCNC: 138 MMOL/L (ref 136–145)
T WAVE AXIS: 26 DEGREES
T4 FREE SERPL-MCNC: 1.11 NG/DL (ref 0.76–1.46)
TROPONIN I SERPL-MCNC: <0.02 NG/ML
TSH SERPL DL<=0.05 MIU/L-ACNC: 3.91 UIU/ML (ref 0.36–3.74)
VENTRICULAR RATE: 72 BPM
WBC # BLD AUTO: 9.11 THOUSAND/UL (ref 4.31–10.16)

## 2019-07-19 PROCEDURE — 84443 ASSAY THYROID STIM HORMONE: CPT | Performed by: EMERGENCY MEDICINE

## 2019-07-19 PROCEDURE — 71046 X-RAY EXAM CHEST 2 VIEWS: CPT

## 2019-07-19 PROCEDURE — 93010 ELECTROCARDIOGRAM REPORT: CPT | Performed by: INTERNAL MEDICINE

## 2019-07-19 PROCEDURE — 99284 EMERGENCY DEPT VISIT MOD MDM: CPT | Performed by: EMERGENCY MEDICINE

## 2019-07-19 PROCEDURE — 36415 COLL VENOUS BLD VENIPUNCTURE: CPT

## 2019-07-19 PROCEDURE — 96374 THER/PROPH/DIAG INJ IV PUSH: CPT

## 2019-07-19 PROCEDURE — 93005 ELECTROCARDIOGRAM TRACING: CPT

## 2019-07-19 PROCEDURE — 85025 COMPLETE CBC W/AUTO DIFF WBC: CPT | Performed by: EMERGENCY MEDICINE

## 2019-07-19 PROCEDURE — 84484 ASSAY OF TROPONIN QUANT: CPT | Performed by: EMERGENCY MEDICINE

## 2019-07-19 PROCEDURE — 80053 COMPREHEN METABOLIC PANEL: CPT | Performed by: EMERGENCY MEDICINE

## 2019-07-19 PROCEDURE — 84439 ASSAY OF FREE THYROXINE: CPT | Performed by: EMERGENCY MEDICINE

## 2019-07-19 PROCEDURE — 99285 EMERGENCY DEPT VISIT HI MDM: CPT

## 2019-07-19 RX ORDER — KETOROLAC TROMETHAMINE 30 MG/ML
15 INJECTION, SOLUTION INTRAMUSCULAR; INTRAVENOUS ONCE
Status: COMPLETED | OUTPATIENT
Start: 2019-07-19 | End: 2019-07-19

## 2019-07-19 RX ADMIN — KETOROLAC TROMETHAMINE 15 MG: 30 INJECTION, SOLUTION INTRAMUSCULAR at 16:00

## 2019-07-19 NOTE — ED PROVIDER NOTES
History  Chief Complaint   Patient presents with    Chest Pain     Patient reports chest pain starting about 30 minutes ago  Patient states she feels shaky, lightheaded  37 yo F presents to ED for chest pain and lightheadedness  Chest pain started a few hours ago, similar to prior episodes of chest pain  Mild nausea with the chest pain with sensation of not being able to take deep breaths but otherwise no diaphoresis, vomiting, or radiation of the pain  No back pain or abdominal pain  Pt says she still has the chest pain now  Pt has had multiple episodes of same chest pain  She is followed by cardiologist and has had negative stress test recently  Pt has also had multiple episodes of lightheadedness, sometimes with hypotension and was started on florinef with some improvement  She says the only difference with today's episode of chest pain was that she felt like it was difficult to swallow despite that she was drinking water without difficulty  Pt says she has anxiety and fibromyalgia, and she is frustrated because she says everyone tells her that her chest pain is just because of that  Prior to Admission Medications   Prescriptions Last Dose Informant Patient Reported?  Taking?   fludrocortisone (FLORINEF) 0 1 mg tablet Past Month at Unknown time  No Yes   Sig: Take 1 tablet (0 1 mg total) by mouth daily for 90 days   Patient taking differently: Take 0 1 mg by mouth as needed    levothyroxine 50 mcg tablet 7/19/2019 at Unknown time Self No Yes   Sig: Take 1 tablet (50 mcg total) by mouth daily for 180 days      Facility-Administered Medications: None       Past Medical History:   Diagnosis Date    Abdominal pain     Anemia     Anxiety     Back pain     Bilateral fibrocystic breast changes     resolved: 02/21/2017    Breast disorder     Chest pain     Chronic pelvic pain in female     last assessed: 09/07/2016    Cluster headaches     Constipation     Cough     Depression     Difficulty concentrating     Disease of thyroid gland     Dizziness     Dyspareunia in female     last assessed: 08/18/2016    Easy bruising     Fainting episodes     Fatigue     Fever due to infection     Fibromyalgia     Frequent urination at night     Gallbladder disease     Herniated intervertebral disc of lumbar spine     Hypotension     Loss of bladder control     Memory loss     last assessed: 06/15/2015    Mixed incontinence     last assessed: 01/11/2016    Myofascial pain     last assessed: 06/01/2016    Numbness and tingling     Painful swelling of joint     Palpitations     Panic attack     Sciatica     last assessed: 06/15/2015    Situational anxiety     last assessed: 02/21/2017    Sleep apnea     SOB (shortness of breath)     Thyroid disease     Uterus, adenomyosis     last assessed: 09/07/2016    Weakness     Weight disorder        Past Surgical History:   Procedure Laterality Date    CHOLECYSTECTOMY LAPAROSCOPIC N/A 10/22/2017    Procedure: CHOLECYSTECTOMY LAPAROSCOPIC;  Surgeon: Vijay Holden MD;  [facility];  Service: General    ENDOMETRIAL BIOPSY      resolved: 02/21/2017    LA VAGINAL HYSTERECTOMY,UTERUS 250 GMS/< N/A 4/20/2018    Procedure: HYSTERECTOMY VAGINAL TOTAL (TVH), BILATERAL SALPINGECTOMY;  Surgeon: Herlinda Wayne MD;  [facility];  Service: Gynecology    TUBAL LIGATION      last assessed: 10/20/2014       Family History   Problem Relation Age of Onset    Hypertension Mother    [facility] Arthritis Mother     Diabetes Maternal Grandmother     Leukemia Maternal Grandmother     Breast cancer Family     Cancer Family     Diabetes Family     Heart disease Family     Hyperlipidemia Family         reported cholesterol level was high    Hypertension Other     Hyperlipidemia Father     Cancer Maternal Grandfather      I have reviewed and agree with the history as documented      Social History     Tobacco Use    Smoking status: Never Smoker    Smokeless tobacco: Never Used   Substance Use Topics    Alcohol use: Yes     Comment: social    Drug use: No        Review of Systems   Constitutional: Negative for activity change, chills and fever  HENT: Negative for congestion, rhinorrhea, sore throat and trouble swallowing  Eyes: Negative for pain, discharge and visual disturbance  Respiratory: Negative for cough, chest tightness and shortness of breath  Cardiovascular: Positive for chest pain  Negative for palpitations and leg swelling  Gastrointestinal: Negative for abdominal pain, nausea and vomiting  Genitourinary: Negative for dysuria, frequency and urgency  Musculoskeletal: Negative for gait problem, neck pain and neck stiffness  Skin: Negative for color change, rash and wound  Neurological: Positive for light-headedness  Negative for syncope, weakness and headaches  Physical Exam  ED Triage Vitals   Temperature Pulse Respirations Blood Pressure SpO2   07/19/19 1518 07/19/19 1515 07/19/19 1515 07/19/19 1515 07/19/19 1515   98 °F (36 7 °C) 77 16 119/62 98 %      Temp Source Heart Rate Source Patient Position - Orthostatic VS BP Location FiO2 (%)   07/19/19 1518 07/19/19 1515 07/19/19 1515 07/19/19 1515 --   Oral Monitor Lying Right arm       Pain Score       07/19/19 1515       No Pain             Orthostatic Vital Signs  Vitals:    07/19/19 1515 07/19/19 1600 07/19/19 1700   BP: 119/62 108/75 110/70   Pulse: 77 74 72   Patient Position - Orthostatic VS: Lying Sitting Sitting       Physical Exam   Constitutional: She is oriented to person, place, and time  She appears well-developed and well-nourished  No distress  HENT:   Head: Normocephalic and atraumatic  Mouth/Throat: Oropharynx is clear and moist    Eyes: Pupils are equal, round, and reactive to light  Conjunctivae and EOM are normal  Right eye exhibits no discharge  Left eye exhibits no discharge  Neck: Normal range of motion  Neck supple     Cardiovascular: Normal rate, regular rhythm and intact distal pulses  No murmur heard  Pulmonary/Chest: Effort normal and breath sounds normal  No respiratory distress  Abdominal: Soft  There is no tenderness  There is no rebound and no guarding  Musculoskeletal: Normal range of motion  She exhibits no edema or tenderness  Right lower leg: She exhibits no edema  Left lower leg: She exhibits no edema  Neurological: She is alert and oriented to person, place, and time  Skin: Skin is warm and dry  Capillary refill takes less than 2 seconds  Nursing note and vitals reviewed  ED Medications  Medications   ketorolac (TORADOL) injection 15 mg (15 mg Intravenous Given 7/19/19 1600)       Diagnostic Studies  Results Reviewed     Procedure Component Value Units Date/Time    Bascom draw [[id number]] Collected:  07/19/19 1526    Lab Status:  Final result Specimen:  Blood from Arm, Left Updated:  07/19/19 1701    Narrative: The following orders were created for panel order Bascom draw  Procedure                               Abnormality         Status                     ---------                               -----------         ------                     Yumiko Bumps Top on YDUP[[id number]]                           Final result               Lavender Top 7ml on SYHN[[id number]]                         Final result                 Please view results for these tests on the individual orders      T4, free [id number]  (Normal) Collected:  07/19/19 1526    Lab Status:  Final result Specimen:  Blood from Arm, Left Updated:  07/19/19 1627     Free T4 1 11 ng/dL     TSH, 3rd generation with Free T4 reflex [[id number]]  (Abnormal) Collected:  07/19/19 1526    Lab Status:  Final result Specimen:  Blood from Arm, Left Updated:  07/19/19 1609     TSH 3RD GENERATON 3 910 uIU/mL     Narrative:       Patients undergoing fluorescein dye angiography may retain small amounts of fluorescein in the body for 48-72 hours post procedure  Samples containing fluorescein can produce falsely depressed TSH values  If the patient had this procedure,a specimen should be resubmitted post fluorescein clearance        Troponin I [[id number]]  (Normal) Collected:  07/19/19 1526    Lab Status:  Final result Specimen:  Blood from Arm, Left Updated:  07/19/19 1602     Troponin I <0 02 ng/mL     Comprehensive metabolic panel [[id number]] Collected:  07/19/19 1526    Lab Status:  Final result Specimen:  Blood from Arm, Left Updated:  07/19/19 1600     Sodium 138 mmol/L      Potassium 3 8 mmol/L      Chloride 105 mmol/L      CO2 26 mmol/L      ANION GAP 7 mmol/L      BUN 12 mg/dL      Creatinine 0 74 mg/dL      Glucose 93 mg/dL      Calcium 8 5 mg/dL      AST 16 U/L      ALT 17 U/L      Alkaline Phosphatase 78 U/L      Total Protein 7 6 g/dL      Albumin 3 5 g/dL      Total Bilirubin 0 36 mg/dL      eGFR 100 ml/min/1 73sq m     Narrative:       National Kidney Disease Foundation guidelines for Chronic Kidney Disease (CKD):     Stage 1 with normal or high GFR (GFR > 90 mL/min/1 73 square meters)    Stage 2 Mild CKD (GFR = 60-89 mL/min/1 73 square meters)    Stage 3A Moderate CKD (GFR = 45-59 mL/min/1 73 square meters)    Stage 3B Moderate CKD (GFR = 30-44 mL/min/1 73 square meters)    Stage 4 Severe CKD (GFR = 15-29 mL/min/1 73 square meters)    Stage 5 End Stage CKD (GFR <15 mL/min/1 73 square meters)  Note: GFR calculation is accurate only with a steady state creatinine    CBC and differential [[id number]] Collected:  07/19/19 1526    Lab Status:  Final result Specimen:  Blood from Arm, Left Updated:  07/19/19 1547     WBC 9 11 Thousand/uL      RBC 4 42 Million/uL      Hemoglobin 11 9 g/dL      Hematocrit 37 6 %      MCV 85 fL      MCH 26 9 pg      MCHC 31 6 g/dL      RDW 14 4 %      MPV 11 7 fL      Platelets 814 Thousands/uL      nRBC 0 /100 WBCs      Neutrophils Relative 63 %      Immat GRANS % 0 %      Lymphocytes Relative 28 %      Monocytes Relative 8 %      Eosinophils Relative 1 %      Basophils Relative 0 %      Neutrophils Absolute 5 75 Thousands/µL      Immature Grans Absolute 0 04 Thousand/uL      Lymphocytes Absolute 2 52 Thousands/µL      Monocytes Absolute 0 69 Thousand/µL      Eosinophils Absolute 0 07 Thousand/µL      Basophils Absolute 0 04 Thousands/µL                  XR chest 2 views   Final Result by Tyron Fernandez MD (07/19 1701)      No acute cardiopulmonary disease              Workstation performed: [id number]               Procedures  Procedures        ED Course         HEART Risk Score      Most Recent Value   History  0 Filed at: 07/19/2019 1616   ECG  0 Filed at: 07/19/2019 1616   Age  0 Filed at: 07/19/2019 1616   Risk Factors  1 Filed at: 07/19/2019 1616   Troponin  0 Filed at: 07/19/2019 1616   Heart Score Risk Calculator   History  0 Filed at: 07/19/2019 1616   ECG  0 Filed at: 07/19/2019 1616   Age  0 Filed at: 07/19/2019 1616   Risk Factors  1 Filed at: 07/19/2019 1616   Troponin  0 Filed at: 07/19/2019 1616   HEART Score  1 Filed at: 07/19/2019 1616   HEART Score  1 Filed at: 07/19/2019 1616            PERC Rule for PE      Most Recent Value   PERC Rule for PE   Age >=50  0 Filed at: 07/19/2019 1616   HR >=100  0 Filed at: 07/19/2019 1616   O2 Sat on room air < 95%  0 Filed at: 07/19/2019 1616   History of PE or DVT  0 Filed at: 07/19/2019 1616   Recent trauma or surgery  0 Filed at: 07/19/2019 1616   Hemoptysis  0 Filed at: 07/19/2019 1616   Exogenous estrogen  0 Filed at: 07/19/2019 1616   Unilateral leg swelling  0 Filed at: 07/19/2019 1616   PERC Rule for PE Results  0 Filed at: 07/19/2019 1616                Wells' Criteria for PE      Most Recent Value   Wells' Criteria for PE   Clinical signs and symptoms of DVT  0 Filed at: 07/19/2019 1616   PE is primary diagnosis or equally likely  0 Filed at: 07/19/2019 1616   HR >100  0 Filed at: 07/19/2019 1616   Immobilization at least 3 days or Surgery in the previous 4 weeks 0 Filed at: 07/19/2019 1616   Previous, objectively diagnosed PE or DVT  0 Filed at: 07/19/2019 1616   Hemoptysis  0 Filed at: 07/19/2019 1616   Malignancy with treatment within 6 months or palliative  0 Filed at: 07/19/2019 1616   Wells' Criteria Total  0 Filed at: 07/19/2019 1616            [facility]  Number of Diagnoses or Management Options  Chest pain:   Diagnosis management comments: Normal EKG, normal troponin  Heart score 1, recent normal stress test   Ruled out for PE with perc  Normal free T4  Pt well appearing, normal vital signs  Feeling better  Continue outpatient workup of repeated episodes of chest pain and lightheadedness  Pt agreeable to this and will discharge  Disposition  Final diagnoses:   Chest pain     Time reflects when diagnosis was documented in both MDM as applicable and the Disposition within this note     Time User Action Codes Description Comment    7/19/2019  4:49 PM Justyna Mitchell Add [R07 9] Chest pain       ED Disposition     ED Disposition Condition Date/Time Comment    Discharge Stable Fri Jul 19, 2019  4:48 PM Myrna Skinner discharge to home/self care  Follow-up Information     Follow up With Specialties Details Why Contact Info Additional [address] Emergency Department Emergency Medicine  As needed, If symptoms worsen [address]  [phone]  ED, [address], [address] Internal Medicine, Physician Assistant  As needed  E   Broderick Pavon [facility] Call   [address] [phone]  Papo Nelson [facility] [place], [address] [place] [address],   [address] [place] [address], [phone]          Discharge Medication List as of 7/19/2019  4:49 PM      CONTINUE these medications which have NOT CHANGED Details   fludrocortisone (FLORINEF) 0 1 mg tablet Take 1 tablet (0 1 mg total) by mouth daily for 90 days, Starting Tue 7/9/2019, Until Mon 10/7/2019, Normal      levothyroxine 50 mcg tablet Take 1 tablet (50 mcg total) by mouth daily for 180 days, Starting Tue 4/23/2019, Until Sun 10/20/2019, Normal           No discharge procedures on file  ED Provider  Attending physically available and evaluated Stuart Sims I managed the patient along with the ED Attending      Electronically Signed by         Claudia Ferrer MD  07/20/19 6021

## 2019-07-19 NOTE — ED ATTENDING ATTESTATION
I, Aury Velez DO, saw and evaluated the patient  I have discussed the patient with the resident/non-physician practitioner and agree with the resident's/non-physician practitioner's findings, Plan of Care, and MDM as documented in the resident's/non-physician practitioner's note, except where noted  All available labs and Radiology studies were reviewed  I was present for key portions of any procedure(s) performed by the resident/non-physician practitioner and I was immediately available to provide assistance  At this point I agree with the current assessment done in the Emergency Department  I have conducted an independent evaluation of this patient a history and physical is as follows:      46-year-old female with past medical history below    Patient Active Problem List   Diagnosis    Abnormal uterine bleeding (AUB)    Fibromyalgia    Goiter    Hypothyroidism due to Hashimoto's thyroiditis    Stress incontinence, female    Hypermobility of urethra    Chronic pain of both knees    Calcaneal spur of left foot    Idiopathic hypotension    Acute exacerbation of chronic low back pain    Thyroid nodule    Lumbar radiculopathy    Generalized anxiety disorder    Class 1 obesity due to excess calories without serious comorbidity with body mass index (BMI) of 32 0 to 32 9 in adult      presents with chest pain  Patient states that it began earlier this morning a couple hours ago  Patient admits to central sternal chest pain, heaviness feels like cold water as burning of her chest   No radiation  Patient has had similar episodes in the past   Follows up Cardiology  No diaphoresis no nausea no vomiting  No history of PE or DVT  Patient is not on hormones  Patient denies shortness of breath cough pleurisy hemoptysis    Patient denies fever chills rigors neck pain neck stiffness headache lightheadedness dizziness palpitations abdominal pain nausea vomiting diarrhea constipation urinary symptoms motor weakness numbness and tingling  Twelve systems reviewed otherwise negative as per stated above     vital signs reviewed      Physical Exam   Constitutional: She is oriented to person, place, and time  She appears well-developed and well-nourished  No distress  HENT:   Head: Normocephalic and atraumatic  Right Ear: External ear normal    Left Ear: External ear normal    Nose: Nose normal    Mouth/Throat: Oropharynx is clear and moist  No oropharyngeal exudate  Eyes: Pupils are equal, round, and reactive to light  Conjunctivae and EOM are normal  Right eye exhibits no discharge  Left eye exhibits no discharge  No scleral icterus  Neck: Normal range of motion  Neck supple  No JVD present  No tracheal deviation present  No thyromegaly present  Cardiovascular: Normal rate, regular rhythm, normal heart sounds and intact distal pulses  No murmur heard  Pulmonary/Chest: Effort normal and breath sounds normal  No stridor  No respiratory distress  She has no wheezes  Abdominal: Soft  Bowel sounds are normal  She exhibits no distension and no mass  There is no tenderness  There is no rebound and no guarding  No hernia  Musculoskeletal: Normal range of motion  She exhibits no edema, tenderness or deformity  Homans sign negative bilaterally calves are nontender bilaterally  Lymphadenopathy:     She has no cervical adenopathy  Neurological: She is alert and oriented to person, place, and time  She displays normal reflexes  No cranial nerve deficit  She exhibits normal muscle tone  Skin: Skin is warm and dry  No rash noted  She is not diaphoretic  No erythema  Psychiatric: She has a normal mood and affect  Her behavior is normal  Judgment and thought content normal    Nursing note and vitals reviewed  EKG: Vent   rate 72 BPM  CT interval 150 ms  QRS duration 78 ms  QT/QTc 396/433 ms  P-R-T axes 34 71 26   Normal sinus rhythm with no ischemic changes    MDM:  Acute on chronic chest pain   Has followed up with Cardiology  Cardiac workup EKG chest x-ray cardiac labs reassessed for disposition          Critical Care Time  Procedures

## 2019-08-07 ENCOUNTER — APPOINTMENT (OUTPATIENT)
Dept: LAB | Facility: CLINIC | Age: 44
End: 2019-08-07
Payer: COMMERCIAL

## 2019-08-07 DIAGNOSIS — E06.3 HYPOTHYROIDISM DUE TO HASHIMOTO'S THYROIDITIS: Primary | ICD-10-CM

## 2019-08-07 DIAGNOSIS — E03.8 HYPOTHYROIDISM DUE TO HASHIMOTO'S THYROIDITIS: ICD-10-CM

## 2019-08-07 DIAGNOSIS — E03.8 HYPOTHYROIDISM DUE TO HASHIMOTO'S THYROIDITIS: Primary | ICD-10-CM

## 2019-08-07 DIAGNOSIS — E06.3 HYPOTHYROIDISM DUE TO HASHIMOTO'S THYROIDITIS: ICD-10-CM

## 2019-08-07 LAB
T4 FREE SERPL-MCNC: 0.98 NG/DL (ref 0.76–1.46)
TSH SERPL DL<=0.05 MIU/L-ACNC: 4.02 UIU/ML (ref 0.36–3.74)

## 2019-08-07 PROCEDURE — 36415 COLL VENOUS BLD VENIPUNCTURE: CPT

## 2019-08-07 PROCEDURE — 84439 ASSAY OF FREE THYROXINE: CPT

## 2019-08-07 PROCEDURE — 84443 ASSAY THYROID STIM HORMONE: CPT

## 2019-08-07 RX ORDER — LEVOTHYROXINE SODIUM 0.05 MG/1
TABLET ORAL
Qty: 120 TABLET | Refills: 0 | Status: SHIPPED | OUTPATIENT
Start: 2019-08-07 | End: 2019-10-31 | Stop reason: ALTCHOICE

## 2019-09-17 ENCOUNTER — TELEPHONE (OUTPATIENT)
Dept: INTERNAL MEDICINE CLINIC | Facility: CLINIC | Age: 44
End: 2019-09-17

## 2019-09-17 NOTE — TELEPHONE ENCOUNTER
Patient called in wanting to know if it is possible for you to order NAPROXEN 500 MG that she is taking for her back pain

## 2019-09-18 DIAGNOSIS — M79.7 FIBROMYALGIA: Primary | ICD-10-CM

## 2019-09-18 RX ORDER — NAPROXEN 500 MG/1
500 TABLET ORAL 2 TIMES DAILY WITH MEALS
Qty: 60 TABLET | Refills: 1 | Status: SHIPPED | OUTPATIENT
Start: 2019-09-18 | End: 2020-07-09 | Stop reason: ALTCHOICE

## 2019-09-18 NOTE — TELEPHONE ENCOUNTER
Called and spoke to patient  Advised per note  Patient stated that you had prescribed her an antiinflammatory medication, but cannot recall the name of it  Patient stated the antiinflamatory helped, but has not had it in a few months  Please advise

## 2019-09-24 ENCOUNTER — TELEPHONE (OUTPATIENT)
Dept: PAIN MEDICINE | Facility: CLINIC | Age: 44
End: 2019-09-24

## 2019-09-24 DIAGNOSIS — M54.16 LUMBAR RADICULOPATHY: Primary | ICD-10-CM

## 2019-09-24 NOTE — TELEPHONE ENCOUNTER
S/w patient states " I was at [facility] and freaked, I couldn't do it, can I get an IV or something? I will never be able to have the MRI, I couldn't do it"      Pt is crying, please review and cb pt # [phone]

## 2019-09-24 NOTE — TELEPHONE ENCOUNTER
S/w pt  Was scheduled for lumbar MRI today at [facility]  Pt states she could not go through with it at all  States once the machine started moving she "freaked out "  States she tried ativan in the past and that did not work either  Pt would be willing to retry with anesthesia

## 2019-10-14 NOTE — PROGRESS NOTES
OVERDUE RESULTS REMINDER    Called and spoke to patient, reminded her to complete labs ordered by Anthony Conley  Patient verbalized understanding      T3  T4, FREE  TSH, 3RD GENERATION

## 2019-10-21 ENCOUNTER — TELEPHONE (OUTPATIENT)
Dept: INTERNAL MEDICINE CLINIC | Facility: CLINIC | Age: 44
End: 2019-10-21

## 2019-10-21 NOTE — TELEPHONE ENCOUNTER
Attempted made to contact patient to remind her that she have an appt on 10/23/2019 at 1:40 pm with David Jones and there's blood test to be completed prior her appt  Patient didn't answer, detailed message left on voice mail and to call us back

## 2019-10-22 ENCOUNTER — TELEPHONE (OUTPATIENT)
Dept: PREADMISSION TESTING | Facility: HOSPITAL | Age: 44
End: 2019-10-22

## 2019-10-22 ENCOUNTER — APPOINTMENT (OUTPATIENT)
Dept: LAB | Facility: CLINIC | Age: 44
End: 2019-10-22
Payer: COMMERCIAL

## 2019-10-22 DIAGNOSIS — E06.3 HYPOTHYROIDISM DUE TO HASHIMOTO'S THYROIDITIS: ICD-10-CM

## 2019-10-22 DIAGNOSIS — E03.8 HYPOTHYROIDISM DUE TO HASHIMOTO'S THYROIDITIS: ICD-10-CM

## 2019-10-22 LAB
T3 SERPL-MCNC: 1 NG/ML (ref 0.6–1.8)
T4 FREE SERPL-MCNC: 1.1 NG/DL (ref 0.76–1.46)
TSH SERPL DL<=0.05 MIU/L-ACNC: 2.71 UIU/ML (ref 0.36–3.74)

## 2019-10-22 PROCEDURE — 84439 ASSAY OF FREE THYROXINE: CPT

## 2019-10-22 PROCEDURE — 84480 ASSAY TRIIODOTHYRONINE (T3): CPT

## 2019-10-22 PROCEDURE — 84443 ASSAY THYROID STIM HORMONE: CPT

## 2019-10-22 PROCEDURE — 36415 COLL VENOUS BLD VENIPUNCTURE: CPT

## 2019-10-31 ENCOUNTER — OFFICE VISIT (OUTPATIENT)
Dept: INTERNAL MEDICINE CLINIC | Facility: CLINIC | Age: 44
End: 2019-10-31

## 2019-10-31 VITALS
DIASTOLIC BLOOD PRESSURE: 68 MMHG | SYSTOLIC BLOOD PRESSURE: 88 MMHG | TEMPERATURE: 98.3 F | BODY MASS INDEX: 31.53 KG/M2 | WEIGHT: 196.21 LBS | HEART RATE: 80 BPM | HEIGHT: 66 IN

## 2019-10-31 DIAGNOSIS — E03.8 HYPOTHYROIDISM DUE TO HASHIMOTO'S THYROIDITIS: Primary | ICD-10-CM

## 2019-10-31 DIAGNOSIS — M54.16 LUMBAR RADICULOPATHY: ICD-10-CM

## 2019-10-31 DIAGNOSIS — E06.3 HYPOTHYROIDISM DUE TO HASHIMOTO'S THYROIDITIS: Primary | ICD-10-CM

## 2019-10-31 DIAGNOSIS — E04.1 THYROID NODULE: ICD-10-CM

## 2019-10-31 DIAGNOSIS — I95.0 IDIOPATHIC HYPOTENSION: ICD-10-CM

## 2019-10-31 DIAGNOSIS — M79.7 FIBROMYALGIA: ICD-10-CM

## 2019-10-31 PROCEDURE — 99213 OFFICE O/P EST LOW 20 MIN: CPT | Performed by: PHYSICIAN ASSISTANT

## 2019-10-31 PROCEDURE — 3008F BODY MASS INDEX DOCD: CPT | Performed by: PHYSICIAN ASSISTANT

## 2019-10-31 RX ORDER — LEVOTHYROXINE SODIUM 88 UG/1
88 TABLET ORAL DAILY
Qty: 90 TABLET | Refills: 0 | Status: SHIPPED | OUTPATIENT
Start: 2019-10-31 | End: 2020-01-20 | Stop reason: SDUPTHER

## 2019-10-31 RX ORDER — FLUDROCORTISONE ACETATE 0.1 MG/1
0.1 TABLET ORAL DAILY
Qty: 90 TABLET | Refills: 0 | Status: SHIPPED | OUTPATIENT
Start: 2019-10-31 | End: 2020-01-02

## 2019-10-31 NOTE — PATIENT INSTRUCTIONS
As per our discussion I am restarting you on your fludrocortisone 0 1 mg daily for your blood pressure  We will schedule you here for a nurse visit for blood pressure check in 2 weeks  If at that time there has been no improvement we will look to increase your dose  As reviewed your thyroid levels improved with the change in dosing and therefore I will switch you to 88 mcg daily once a day so you do not have to remember to take different doses on different days  Script provided for new thyroid testing as dated in 2 months  Continue follow-up as scheduled with pain management  Once we have a better idea on her blood pressure the anesthesiologist will be able to provide the correct dose of the sedation so you can complete the MRI for pain management and chronic back pain

## 2019-10-31 NOTE — PROGRESS NOTES
Assessment/Plan:  As per our discussion I am restarting you on your fludrocortisone 0 1 mg daily for your blood pressure  We will schedule you here for a nurse visit for blood pressure check in 2 weeks  If at that time there has been no improvement we will look to increase your dose  As reviewed your thyroid levels improved with the change in dosing and therefore I will switch you to 88 mcg daily once a day so you do not have to remember to take different doses on different days  Script provided for new thyroid testing as dated in 2 months  Continue follow-up as scheduled with pain management  Once we have a better idea on her blood pressure the anesthesiologist will be able to provide the correct dose of the sedation so you can complete the MRI for pain management and chronic back pain  No problem-specific Assessment & Plan notes found for this encounter  Diagnoses and all orders for this visit:    Hypothyroidism due to Hashimoto's thyroiditis  -     levothyroxine 88 mcg tablet; Take 1 tablet (88 mcg total) by mouth daily  -     T4, free; Future  -     TSH, 3rd generation; Future    Idiopathic hypotension  -     fludrocortisone (FLORINEF) 0 1 mg tablet; Take 1 tablet (0 1 mg total) by mouth daily    Fibromyalgia    Thyroid nodule    Lumbar radiculopathy          Subjective:      Patient ID: Norm Kim is a 38 y o  female  Pt here for med review    Saw cardio and was advised to continue same med I started her on, Fludrocortisone 0 1 mg  The Holter, stress ECHO normal and to increase water and salt intake  Has not had the med for BP but unsure how long  Based on patient's chart she would run out of medication approximately 3 weeks ago  As expected patient does endorse lightheaded and dizziness especially with change in position  States has to have anesthesia for the MRI for pain management, is in Dec but concern over her low BP    She was advised to have the appointment here to re-evaluate her blood pressure because they may have to adjust the sedation used for the MRI  Patient states he attempted the MRI at least 3 times and was unable to be in a claustrophobic environment  Patient also continues to have chronic low back pain  As noted patient is now under pain management and scheduled for MRI in December  Reviewed her thyroid labs and have improved with the adjustment in her dose  She was taking 50 mcg Monday through Friday and 100 mcg on Saturday and Sunday  As patient's labs show that her thyroid function is stable and improved with this dosing will switch her to 88 mcg daily that way she does not have to take different dose on different days  Patient has known left thyroid nodule  Last ultrasound March 2019  Stable from previous and does not meet criteria for biopsy  As noted on previous visits patient is diagnosed with fibromyalgia and used to follow with Rheumatology however she was trialed on multiple medications on either intolerant or no improvement and therefore Rheumatology released her from their care  Patient does not wish to be on any other medications at this time  The following portions of the patient's history were reviewed and updated as appropriate: allergies, current medications, past family history, past medical history, past social history, past surgical history and problem list     Review of Systems   Constitutional: Negative for activity change, chills and fever  HENT: Negative for trouble swallowing  Respiratory: Negative  Negative for cough and shortness of breath  Cardiovascular: Negative  Negative for chest pain, palpitations and leg swelling  Gastrointestinal: Negative  Negative for constipation  Endocrine: Negative  Negative for cold intolerance and heat intolerance  Musculoskeletal: Positive for arthralgias, back pain and myalgias  Neurological: Positive for dizziness, light-headedness and numbness   Negative for tremors, weakness and headaches  Psychiatric/Behavioral: Negative  Objective:      BP (!) 88/68 (BP Location: Right arm, Patient Position: Sitting, Cuff Size: Large)   Pulse 80   Temp 98 3 °F (36 8 °C) (Oral)   Ht 5' 6" (1 676 m)   Wt 89 kg (196 lb 3 4 oz)   LMP 02/28/2018 (Exact Date)   BMI 31 67 kg/m²          Physical Exam   Constitutional: She is oriented to person, place, and time  She appears well-developed and well-nourished  HENT:   Head: Normocephalic and atraumatic  Mouth/Throat: Oropharynx is clear and moist    Eyes: Pupils are equal, round, and reactive to light  Neck: Neck supple  Cardiovascular: Normal rate, regular rhythm and normal heart sounds  No murmur heard  During patient's physical exam and getting up and walking from the room patient did not have any episodes of lightheadedness or presyncopal    Pulmonary/Chest: Effort normal and breath sounds normal    Abdominal: Soft  Bowel sounds are normal  There is no tenderness  Musculoskeletal: She exhibits no edema  Neurological: She is alert and oriented to person, place, and time  No cranial nerve deficit  Skin: Skin is warm and dry  No rash noted  Psychiatric: She has a normal mood and affect   Her behavior is normal

## 2019-11-12 NOTE — PRE-PROCEDURE INSTRUCTIONS
Pre-Surgery Instructions:   Medication Instructions    fludrocortisone (FLORINEF) 0 1 mg tablet Instructed patient per Anesthesia Guidelines   levothyroxine 88 mcg tablet Instructed patient per Anesthesia Guidelines   naproxen (NAPROSYN) 500 mg tablet Instructed patient per Anesthesia Guidelines  Spoke to pt  Medication list reviewed & instructed     Advised no food or drink after midnight   Ok to take am meds with 1-2 sips of water prior to arrival

## 2019-11-14 ENCOUNTER — CLINICAL SUPPORT (OUTPATIENT)
Dept: INTERNAL MEDICINE CLINIC | Facility: CLINIC | Age: 44
End: 2019-11-14

## 2019-11-14 VITALS — OXYGEN SATURATION: 96 % | DIASTOLIC BLOOD PRESSURE: 72 MMHG | HEART RATE: 77 BPM | SYSTOLIC BLOOD PRESSURE: 138 MMHG

## 2019-11-14 DIAGNOSIS — I10 HYPERTENSION, UNSPECIFIED TYPE: Primary | ICD-10-CM

## 2019-11-14 NOTE — PATIENT INSTRUCTIONS
Patient came in today for a BP check as a nurse visit, ordered by her PCP Fransico Radford PA-C  BP was 138/72  Confirmed with Michelle patient okay to go

## 2019-11-18 ENCOUNTER — TELEPHONE (OUTPATIENT)
Dept: INTERNAL MEDICINE CLINIC | Facility: CLINIC | Age: 44
End: 2019-11-18

## 2019-11-18 NOTE — TELEPHONE ENCOUNTER
Patient is being put to sleep for her MRI ON 12/4/19  They required her to be seen by her PCP within days prior  She was seen to soon  They told her if she gets a note from her doctor stating she was seen by her doctor for a physical and that she was seen two weeks after for her blood pressure check they would accept that  It also need to say her doctor is comfortable enough for her to go under anesthesia for the MRI  Please let patient know

## 2019-11-22 ENCOUNTER — TELEPHONE (OUTPATIENT)
Dept: INTERNAL MEDICINE CLINIC | Facility: CLINIC | Age: 44
End: 2019-11-22

## 2019-11-22 NOTE — TELEPHONE ENCOUNTER
Patient called in informing us the letter you previously provided was not what was needed  Letter needs to state her vitals from the visit and state that she is okay to be put to sleep for her MRI 12/4/19    If this is not possible then patient does need to come in to be seen prior to appointment

## 2019-11-25 ENCOUNTER — TELEPHONE (OUTPATIENT)
Dept: PAIN MEDICINE | Facility: CLINIC | Age: 44
End: 2019-11-25

## 2019-11-25 NOTE — TELEPHONE ENCOUNTER
Jonh Frank RN   Cc: [facility]; Bela Shaffer             Good Morning,     The patients [address] [place] insurance is still showing inactive  Thank you,   Jeronimo Nance    Previous Messages      ----- Message -----   From: [employer] Senters   Sent: 11/20/2019   1:00 PM EST   To: Bela Shaffer, *   Subject: [facility] [place] Afternoon,     The patients health insurance - [place] has been terminated since 10/31/2019  Please obtain updated insurance, so I can obtain auth for MRI Lumbar SpineWO contrast which she is scheduled on 12/04/2019       Thank you,   Jeronimo Nance

## 2019-11-25 NOTE — TELEPHONE ENCOUNTER
S/w pt, informed her of below  Pt said she called her  last week and they said they forgot to submit her info but they assured her they would handle it by the end of last week  Pt will follow up with them, she is going to [facility] in the mean time

## 2019-11-26 ENCOUNTER — TELEPHONE (OUTPATIENT)
Dept: PAIN MEDICINE | Facility: CLINIC | Age: 44
End: 2019-11-26

## 2019-11-26 NOTE — TELEPHONE ENCOUNTER
As per patient she informed me that they were requesting the letter to state that her lungs were clear as well, but as of now the MRI is cancelled due to patients insurance being inactive  She is in process of having her insurance active again, once that is done she will schedule it again, FYI

## 2019-11-26 NOTE — TELEPHONE ENCOUNTER
Fabian Castleman, RN   Registered Nurse   Pain Medicine   Telephone Encounter   Signed   Encounter Date:  11/25/2019            Telephone Encounter          Show:Clear all  [x]Manual[]Template[]Copied    Added by:  [x]Lauren Boyd RN    []Jd for details  S/w pt, informed her of below  Pt said she called her  last week and they said they forgot to submit her info but they assured her they would handle it by the end of last week  Pt will follow up with them, she is going to [facility] MRI in the mean time  Electronically signed by Nate Sanchez RN at 11/25/2019  9:31 AM          Reford Amy Levy,   This is the conversation earlier with the patient  I s/w patient this morning and she is still saying the same thing  But, she hasn't cancelled her MRI yet? Patient is asking if she should cancel it now? Thanks

## 2019-12-04 ENCOUNTER — HOSPITAL ENCOUNTER (OUTPATIENT)
Dept: RADIOLOGY | Facility: HOSPITAL | Age: 44
Discharge: HOME | End: 2019-12-04
Attending: ANESTHESIOLOGY

## 2019-12-30 ENCOUNTER — OFFICE VISIT (OUTPATIENT)
Dept: INTERNAL MEDICINE CLINIC | Facility: CLINIC | Age: 44
End: 2019-12-30

## 2019-12-30 VITALS
HEIGHT: 66 IN | DIASTOLIC BLOOD PRESSURE: 64 MMHG | OXYGEN SATURATION: 99 % | HEART RATE: 83 BPM | SYSTOLIC BLOOD PRESSURE: 110 MMHG | BODY MASS INDEX: 30.9 KG/M2 | WEIGHT: 192.24 LBS | TEMPERATURE: 98.8 F

## 2019-12-30 DIAGNOSIS — M79.7 FIBROMYALGIA: Primary | ICD-10-CM

## 2019-12-30 DIAGNOSIS — E03.8 HYPOTHYROIDISM DUE TO HASHIMOTO'S THYROIDITIS: ICD-10-CM

## 2019-12-30 DIAGNOSIS — E06.3 HYPOTHYROIDISM DUE TO HASHIMOTO'S THYROIDITIS: ICD-10-CM

## 2019-12-30 DIAGNOSIS — Z12.31 VISIT FOR SCREENING MAMMOGRAM: Chronic | ICD-10-CM

## 2019-12-30 DIAGNOSIS — F41.1 GENERALIZED ANXIETY DISORDER: Chronic | ICD-10-CM

## 2019-12-30 PROCEDURE — 1036F TOBACCO NON-USER: CPT | Performed by: PHYSICIAN ASSISTANT

## 2019-12-30 PROCEDURE — 99213 OFFICE O/P EST LOW 20 MIN: CPT | Performed by: PHYSICIAN ASSISTANT

## 2019-12-30 PROCEDURE — 3008F BODY MASS INDEX DOCD: CPT | Performed by: PHYSICIAN ASSISTANT

## 2019-12-30 RX ORDER — HYDROXYZINE HYDROCHLORIDE 25 MG/1
25 TABLET, FILM COATED ORAL 2 TIMES DAILY PRN
Qty: 30 TABLET | Refills: 0 | Status: SHIPPED | OUTPATIENT
Start: 2019-12-30 | End: 2020-08-03 | Stop reason: ALTCHOICE

## 2019-12-30 RX ORDER — TIZANIDINE HYDROCHLORIDE 2 MG/1
2 CAPSULE, GELATIN COATED ORAL 2 TIMES DAILY PRN
Qty: 20 CAPSULE | Refills: 0 | Status: SHIPPED | OUTPATIENT
Start: 2019-12-30 | End: 2020-01-02 | Stop reason: CLARIF
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
## 2019-12-30 NOTE — PATIENT INSTRUCTIONS
As per our discussion I have sent a prescription for a new muscle relaxant call tizanidine  You may take this twice daily as needed for pain  Continue follow-up with pain management as scheduled for your back and sciatic pain  New order has been placed so you may schedule with a new rheumatologist for fibromyalgia  Order has also been placed you may meet with our  worker for assistance in finding a new mental health location  As noted you do Not wish to return to [facility]  Please keep appointment as scheduled for follow-up with the cardiologist on January 2nd  Also reviewed that you are due for your follow-up thyroid testing for your hypothyroidism with labs as dated January 6th  We will contact you with those results  Script provided so you may call and schedule your mammogram           Fibromyalgia   WHAT YOU NEED TO KNOW:   What is fibromyalgia? Fibromyalgia is a long-term condition that causes pain and tender points throughout your body  Fibromyalgia can start at any age and is more common in women than in men  What causes fibromyalgia? Healthcare providers do not know exactly what causes fibromyalgia  Problems with chemicals that send pain messages to and from the brain are thought to cause fibromyalgia  It may also be caused or triggered by any of the following:  · Hormone changes    · Physical injury    · Intense emotional trauma from sexual, physical, or emotional abuse  What are the signs and symptoms of fibromyalgia? The most common symptom of fibromyalgia is widespread pain for at least 3 months  You may also have tender spots  Tender spots are specific areas or points on both sides of your body that are painful when pressed  You may have tender spots in your neck, upper chest, shoulders, or shoulder blades  Other common areas are the elbows, lower back, sides of the thighs, and knees   You may also have any of the following:  · Fatigue and difficulty sleeping    · Diarrhea, constipation, pain, or bloating    · Headaches, memory problems, difficulty concentrating, or anxiety    · Numbness, muscle stiffness, or swelling of the hands and feet    · Pounding, racing heartbeats or chest pain  How is fibromyalgia diagnosed? Your healthcare provider will examine you and ask about your symptoms and other health conditions  He will do a manual tender point exam and press on specific sites or points in your body  Increased pain in most of these spots means a positive tender point exam  There are no specific lab tests to diagnose fibromyalgia  Blood and urine tests, a spinal tap, or sleep studies may be done to rule out other causes of pain  How is fibromyalgia treated? Fibromyalgia can be treated but not cured  The following can help you manage your pain and other symptoms:  · Acetaminophen and ibuprofen: These medicines decrease pain  They are available without a doctor's order  Ask your healthcare provider which medicine is right for you  Ask how much to take and how often to take it  Follow directions  These medicines can cause stomach bleeding if not taken correctly  Ibuprofen can cause kidney damage  Acetaminophen can cause liver damage  · Pain medicine: You may be given a prescription medicine to decrease pain  Do not wait until the pain is severe before you take this medicine  · Muscle relaxers  help decrease pain and muscle spasms  · Antidepressants: These help decrease depression, pain, and fatigue  · Antiseizure medicine: This is used to reduce fibromyalgia pain  What are the risks of fibromyalgia? If untreated, your symptoms may get worse  Pain may make it difficult to do daily activities  Your risk for fatigue, headaches, and depression may increase  How can I manage my symptoms? · Keep a pain diary:  Record your symptoms and what activity caused them   This may also help you track pain cycles and show a pattern to your symptoms  · Exercise:  Ask your healthcare provider about the best exercise plan for you  Exercise and other strength-training activities may decrease pain and sleep problems  · Set good sleep habits:  Do not nap during the day  Go to bed at the same time each night  Make sure your bedroom is dark, quiet, and comfortable  Do not stay in bed if you cannot sleep  Get up and do something relaxing until you are sleepy  Do not drink caffeine or alcohol right before you go to bed  These can make it difficult for you to sleep  Limit other liquids to help decrease your need to urinate in the night  Where can I find support and more information? · National Chronic Fatigue Syndrome and Fibromyalgia Association  [address] , [address] [place] [address]  Phone: [phone]  Web Address: [employer] com ee  org  When should I contact my healthcare provider? · You pain increases, even after you take your pain medicine  · You have difficulty sleeping  · You have questions or concerns about your condition or care  When should I seek immediate care or call 911? · You are depressed and feel you cannot cope with your condition  CARE AGREEMENT:   You have the right to help plan your care  Learn about your health condition and how it may be treated  Discuss treatment options with your caregivers to decide what care you want to receive  You always have the right to refuse treatment  The above information is an  only  It is not intended as medical advice for individual conditions or treatments  Talk to your doctor, nurse or pharmacist before following any medical regimen to see if it is safe and effective for you  © 2017 Formerly named [facility] Information is for End User's use only and may not be sold, redistributed or otherwise used for commercial purposes   All illustrations and images included in CareNotes® are the copyrighted property of [employer] M [employer] Inc  or [employer]      Generalized Anxiety Disorder   AMBULATORY CARE:   Generalized anxiety disorder (ELIAS)  is a condition that causes you to worry more than normal  It also causes you to feel fear in most situations  You are worried or afraid even without a cause  You may worry about your health, job, money, and relationships  It is hard for you to control your worry and feel calm  ELIAS prevents you from doing daily activities  It may also prevent you from spending time with family and friends  Without treatment, your anxiety may get worse  Common signs and symptoms that may occur with anxiety:   · Fatigue or muscle tightness     · Shaking, restlessness, or irritability     · Problems focusing     · Trouble sleeping     · Feeling jumpy, easily startled, or dizzy     · Nausea, vomiting, diarrhea, or sweating    · Rapid heartbeat or shortness of breath  Call 911 for any of the following:   · You have chest pain, tightness, or heaviness that may spread to your shoulders, arms, jaw, neck, or back  · You feel like hurting yourself or someone else  Contact your healthcare provider if:   · Your symptoms get worse or do not get better with treatment  · Your anxiety keeps you from doing your regular daily activities  · You have new symptoms since your last visit  · You have questions or concerns about your condition or care  Treatment for ELIAS  may include medicines to help you feel calm and relaxed, and decrease your symptoms  Medicines are usually given together with therapy or other treatments  Manage anxiety:   · Talk to someone about your anxiety  Your healthcare provider may suggest counseling  Cognitive behavioral therapy can help you understand and change how you react to events  It can also help you understand what triggers your symptoms  You might feel more comfortable talking with a friend or family member about your anxiety  Choose someone you know will be supportive and encouraging      · Keep a journal of your symptoms  Write down what you were doing before your symptoms started  Also write down what made the anxiety better or worse  Bring this journal with you to your follow-up appointments  · Find ways to relax  Activities such as yoga, meditation, or listening to music can help you relax  Spend time with friends, or do things you enjoy  · Practice deep breathing  Deep breathing can help you relax when you feel anxious  Focus on taking slow, deep breaths several times a day, or during an anxiety attack  Slowly breathe in through your nose  Pause, then slowly breathe out through your mouth  Try to breathe out longer than you breathed in  · Create a regular sleep routine  Regular sleep can help you feel calmer during the day  Go to sleep and wake up at the same times every day  Do not watch television or use the computer right before bed  Your room should be comfortable, dark, and quiet  · Eat a variety of healthy foods  Healthy foods include fruits, vegetables, low-fat dairy products, lean meats, fish, whole-grain breads, and cooked beans  Healthy foods can help you feel less anxious and have more energy  · Exercise regularly  Exercise can increase your energy level  Exercise may also lift your mood and help you sleep better  Your healthcare provider can help you create an exercise plan  · Do not smoke  Nicotine and other chemicals in cigarettes and cigars can increase anxiety  Ask your healthcare provider for information if you currently smoke and need help to quit  E-cigarettes or smokeless tobacco still contain nicotine  Talk to your healthcare provider before you use these products  · Do not have caffeine  Caffeine can make your symptoms worse  Do not have foods or drinks that are meant to increase your energy level  · Limit or do not drink alcohol  Ask your healthcare provider if alcohol is safe for you  Also ask how much is safe   You may not be able to drink alcohol if you take certain anxiety or depression medicines  · Do not use drugs  Drugs can make your anxiety worse  It can also make anxiety hard to manage  Talk to your healthcare provider if you use drugs and want help to quit  Follow up with your healthcare provider as directed:  Write down your questions so you remember to ask them during your visits  © 2017 [address] Information is for End User's use only and may not be sold, redistributed or otherwise used for commercial purposes  All illustrations and images included in CareNotes® are the copyrighted property of [employer] , [employer]  or [employer]  The above information is an  only  It is not intended as medical advice for individual conditions or treatments  Talk to your doctor, nurse or pharmacist before following any medical regimen to see if it is safe and effective for you

## 2019-12-31 ENCOUNTER — TELEPHONE (OUTPATIENT)
Dept: INTERNAL MEDICINE CLINIC | Facility: CLINIC | Age: 44
End: 2019-12-31

## 2019-12-31 NOTE — TELEPHONE ENCOUNTER
Pharmacy called in to inform us that the TIZANIDINE is not covered by insurance, if appropriate please send in BACLOFEN

## 2020-01-02 ENCOUNTER — OFFICE VISIT (OUTPATIENT)
Dept: CARDIOLOGY CLINIC | Facility: CLINIC | Age: 45
End: 2020-01-02
Payer: COMMERCIAL

## 2020-01-02 VITALS
HEART RATE: 84 BPM | SYSTOLIC BLOOD PRESSURE: 100 MMHG | HEIGHT: 66 IN | DIASTOLIC BLOOD PRESSURE: 68 MMHG | OXYGEN SATURATION: 97 % | WEIGHT: 191.4 LBS | BODY MASS INDEX: 30.76 KG/M2

## 2020-01-02 DIAGNOSIS — R07.9 CHEST PAIN, UNSPECIFIED TYPE: Primary | ICD-10-CM

## 2020-01-02 DIAGNOSIS — M79.7 FIBROMYALGIA: Primary | ICD-10-CM

## 2020-01-02 PROCEDURE — 99214 OFFICE O/P EST MOD 30 MIN: CPT | Performed by: INTERNAL MEDICINE

## 2020-01-02 RX ORDER — BACLOFEN 5 MG/1
1 TABLET ORAL
Qty: 30 TABLET | Refills: 0 | Status: SHIPPED | OUTPATIENT
Start: 2020-01-02 | End: 2020-03-02 | Stop reason: ALTCHOICE

## 2020-01-02 NOTE — PROGRESS NOTES
Cardiology Outpatient Follow Up  Heart & Vascular [facility]  [address], [facility], [address]    Name: Elvia Ledesma  : 1975  MRN: [id number]  PCP: Bernie Connors PA-C     Assessment/Plan     No diagnosis found  ASSESSMENT:    Risk Factors:  -overweight    1  Chest pain/pressure  -atypical in description  -negative stress echo x2 in  and   -no family history of sudden cardiac death, arrhythmia or device implantation that she knows of  -48 hour Holter monitor in 10/2018 with normal sinus rhythm and sporadic rounds of Mobitz type I/Wenckebach  -see her ECGs dating back to  with normal sinus rhythm and nonspecific ST/T-wave changes  -on no cardiac medication  TSH within normal limits as of 10/2019  -currently not taking fludrocortisone as patient did not fill out her last prescription  -missed tilt-table test as she lost her script  -negative A1c dating back to 2018  -negative workup at [facility] on 2019 with negative troponin, normal ECG and grossly normal lab work        PLAN:  1  Ordered for cardiac CTA to assess for atherosclerotic disease as well as anomalous coronaries    2  Ordered lipid panel for full CAD risk stratification    3  Deferred tilt table test until the studies above of been obtained    4  Patient to follow-up with cardiology once testing is completed for further workup if needed    5  Patient educated to present to the ED if any recurrent, worsening or concerning symptoms        Case discussed and reviewed with Dr Sada Adair who agrees with my assessment and plan  Thank you for involving us in the care of your patient        History of Present Illness     HPI/INTERVAL HISTORY: Elvia Ledesma is a 38 y o  female with a history of chest pain/palpitation of unclear etiology (negative workup to date), hypertension of unclear etiology, fibromyalgia, hypothyroidism due to Hashimoto's thyroiditis and generalized anxiety disorder who presented to [facility] outpatient Cardiology for chest pain follow-up  Since her last encounter in the cardiology office, patient was evaluated at [facility] for chest pain  Patient described the chest pain as midsternal, pressure-like, radiating up her neck bilaterally, as well as to her jaw with lower lip paresthesia, associated with difficulty breathing sporadically with no clear precipitating factor  As per patient, symptoms may occur at rest or with exertion with no clear pattern or consistency  Patient had chest pain last couple days prior to presentation while walking her dog  She could not go past the front yard during that episode but was able to walk the dog a couple miles with the next day  Patient was ordered for tilt-table test in the setting of postural tachycardia workup but was not able to complete the test as she had lost her script  She denies any dizziness, syncope, nausea, vomiting, diaphoresis, abdominal pain, orthopnea, PND or lower extremity edema  Review of Systems  ROS as noted above       Historical Information   Social History     Socioeconomic History    Marital status: /Civil Union     Spouse name: Not on file    Number of children: 3    Years of education: Not on file    Highest education level: Not on file   Occupational History    Occupation: Full-time   Social Needs    Financial resource strain: Not hard at all   Playviews insecurity:     Worry: Sometimes true     Inability: Sometimes true   Bilna needs:     Medical: No     Non-medical: No   Tobacco Use    Smoking status: Never Smoker    Smokeless tobacco: Never Used   Substance and Sexual Activity    Alcohol use: Yes     Frequency: Monthly or less    Drug use: Never    Sexual activity: Yes     Partners: Male     Birth control/protection: Female Sterilization, Injection     Comment: Depo Provera   Lifestyle    Physical activity:     Days per week: 5 days     Minutes per session: 80 min    Stress: Very much   Relationships    Social connections:     Talks on phone: Twice a week     Gets together: Once a week     Attends Hoahaoism service: Never     Active member of club or organization: No     Attends meetings of clubs or organizations: Never     Relationship status:     Intimate partner violence:     Fear of current or ex partner: No     Emotionally abused: No     Physically abused: No     Forced sexual activity: No   Other Topics Concern    Not on file   Social History Narrative    Daily caffeine consumption    Domestic partner    Parent     Past Medical History:   Diagnosis Date    Abdominal pain     Anemia     Anxiety     Back pain     Bilateral fibrocystic breast changes     resolved: 02/21/2017    Breast disorder     Chest pain     Chronic pelvic pain in female     last assessed: 09/07/2016    Cluster headaches     Constipation     Cough     Depression     Difficulty concentrating     Disease of thyroid gland     Dizziness     Dyspareunia in female     last assessed: 08/18/2016    Easy bruising     Fainting episodes     Fatigue     Fever due to infection     Fibromyalgia     Frequent urination at night     Gallbladder disease     Herniated intervertebral disc of lumbar spine     Hypotension     Loss of bladder control     Memory loss     last assessed: 06/15/2015    Mixed incontinence     last assessed: 01/11/2016    Myofascial pain     last assessed: 06/01/2016    Numbness and tingling     Painful swelling of joint     Palpitations     Panic attack     Sciatica     last assessed: 06/15/2015    Situational anxiety     last assessed: 02/21/2017    SOB (shortness of breath)     Thyroid disease     Uterus, adenomyosis     last assessed: 09/07/2016    Weakness     Weight disorder      Past Surgical History:   Procedure Laterality Date    CHOLECYSTECTOMY LAPAROSCOPIC N/A 10/22/2017    Procedure: CHOLECYSTECTOMY LAPAROSCOPIC;  Surgeon: Demarco Carrillo MD;  [facility];  Service: General    ENDOMETRIAL BIOPSY      resolved: 02/21/2017    GA VAGINAL HYSTERECTOMY,UTERUS 250 GMS/< N/A 4/20/2018    Procedure: HYSTERECTOMY VAGINAL TOTAL (TVH), BILATERAL SALPINGECTOMY;  Surgeon: Meliton Arteaga MD;  [facility];  Service: Gynecology    TUBAL LIGATION      last assessed: 10/20/2014     Social History     Substance and Sexual Activity   Alcohol Use Yes    Frequency: Monthly or less     Social History     Substance and Sexual Activity   Drug Use Never     Social History     Tobacco Use   Smoking Status Never Smoker   Smokeless Tobacco Never Used     Family History   Problem Relation Age of Onset    Hypertension Mother     Arthritis Mother     Diabetes Maternal Grandmother     Leukemia Maternal Grandmother     Breast cancer Family     Cancer Family     Diabetes Family     Heart disease Family     Hyperlipidemia Family         reported cholesterol level was high    Hypertension Other     Hyperlipidemia Father     Cancer Maternal Grandfather        Meds/Allergies       Current Outpatient Medications:     Baclofen 5 MG TABS, Take 1 tablet by mouth daily at bedtime as needed (muscle spasms), Disp: 30 tablet, Rfl: 0    hydrOXYzine HCL (ATARAX) 25 mg tablet, Take 1 tablet (25 mg total) by mouth 2 (two) times a day as needed for anxiety, Disp: 30 tablet, Rfl: 0    levothyroxine 88 mcg tablet, Take 1 tablet (88 mcg total) by mouth daily, Disp: 90 tablet, Rfl: 0    naproxen (NAPROSYN) 500 mg tablet, Take 1 tablet (500 mg total) by mouth 2 (two) times a day with meals, Disp: 60 tablet, Rfl: 1    Allergies   Allergen Reactions    Morphine Chest Pain    Iv Contrast [Iodinated Diagnostic Agents] Hives       Objective   Vitals: Blood pressure 100/68, pulse 84, height 5' 6" (1 676 m), weight 86 8 kg (191 lb 6 4 oz), last menstrual period 02/28/2018, SpO2 97 %       Physical Exam  General Appearance:    Alert, cooperative, no distress, appears stated age   Head:    Normocephalic, atraumatic   Eyes:    PERRL, conjunctiva/corneas clear, EOM's intact   Neck:   Supple, No thyroid enlargement/ no carotid bruit or JVD   Lungs:     Clear to auscultation bilaterally, respirations unlabored   Heart:   Normal Rate, Regular rhythm, S1 and S2 normal, no murmur, rub or gallop   Abdomen:     Soft, non-tender, bowel sounds active all four quadrants   Extremities:   Extremities normal, atraumatic, no edema, warm to touch   Pulses:   2+ and symmetric all extremities   Skin:   Warm, Dry, intact   Neurologic:   CNII-XII intact  No gross focal deficit     Labs:  No visits with results within 2 Month(s) from this visit     Latest known visit with results is:   Appointment on 10/22/2019   Component Date Value    T3, Total 10/22/2019 1 00     Free T4 10/22/2019 1 10     TSH [address] [place] [address] 10/22/2019 2 710      Appointment on 10/22/2019   Component Date Value    T3, Total 10/22/2019 1 00     Free T4 10/22/2019 1 10     TSH [address] [place] [address] 10/22/2019 2 710    Appointment on 08/07/2019   Component Date Value    Free T4 08/07/2019 0 98     TSH 3RD [facility] 08/07/2019 4 020*   Admission on 07/19/2019, Discharged on 07/19/2019   Component Date Value    WBC 07/19/2019 9 11     RBC 07/19/2019 4 42     Hemoglobin 07/19/2019 11 9     Hematocrit 07/19/2019 37 6     MCV 07/19/2019 85     MCH 07/19/2019 26 9     MCHC 07/19/2019 31 6     RDW 07/19/2019 14 4     MPV 07/19/2019 11 7     Platelets 28/38/5460 203     nRBC 07/19/2019 0     Neutrophils Relative 07/19/2019 63     Immat GRANS % 07/19/2019 0     Lymphocytes Relative 07/19/2019 28     Monocytes Relative 07/19/2019 8     Eosinophils Relative 07/19/2019 1     Basophils Relative 07/19/2019 0     Neutrophils Absolute 07/19/2019 5 75     Immature Grans Absolute 07/19/2019 0 04     Lymphocytes Absolute 07/19/2019 2 52     Monocytes Absolute 07/19/2019 0 69     Eosinophils Absolute 07/19/2019 0 07     Basophils Absolute 07/19/2019 0 04     Sodium 07/19/2019 138     Potassium 07/19/2019 3 8     Chloride 07/19/2019 105     CO2 07/19/2019 26     ANION GAP 07/19/2019 7     BUN 07/19/2019 12     Creatinine 07/19/2019 0 74     Glucose 07/19/2019 93     Calcium 07/19/2019 8 5     AST 07/19/2019 16     ALT 07/19/2019 17     Alkaline Phosphatase 07/19/2019 78     Total Protein 07/19/2019 7 6     Albumin 07/19/2019 3 5     Total Bilirubin 07/19/2019 0 36     eGFR 07/19/2019 100     Troponin I 07/19/2019 <0 02     TSH 3RD GENERATON 07/19/2019 3 910*    Ventricular Rate 07/19/2019 72     Atrial Rate 07/19/2019 72     AK Interval 07/19/2019 150     QRSD Interval 07/19/2019 78     QT Interval 07/19/2019 396     QTC Interval 07/19/2019 433     P Axis 07/19/2019 34     QRS Axis 07/19/2019 71     T Wave Axis 07/19/2019 26     Free T4 07/19/2019 1 11        Imaging: I have personally reviewed pertinent reports  ECHO: No results found for this or any previous visit  CATH:  None documented     HOLTER:  10/09/2018 - 48 hour Holter    IMPRESSIONS:  1  Sinus rhythm with rare ventricular and supraventricular ectopic activity  2  Episodes of Mobitz 1/Wenckebach block  3  Patient-reported symptoms did not correlate with arrhythmias or ectopy      Interpreted by: Li Lopez MD    STRESS TEST:  11/12/2018 -  exercise stress echo    STRESS RESULTS: POX pretest= 98%, peak= 99%  Duration of exercise was 8 min and 13 sec  The patient exercised to protocol stage 3  Maximal work rate was 10 1 METs  Maximal heart rate during stress was 162 bpm ( 91 % of maximal predicted  heart rate)  Target heart rate was achieved  The heart rate response to stress was normal  Maximal systolic blood pressure during stress was 132 mmHg  There was normal resting blood pressure with a blunted response to stress   The  rate-pressure product for the peak heart rate and blood pressure was 67144  There was no chest pain during stress  The stress test was terminated due to moderate fatigue  After the procedure, the patient was discharged to home      ECG CONCLUSIONS: The stress ECG was negative for ischemia  There were no stress arrhythmias or conduction abnormalities      STRESS 2D ECHO RESULTS:     BASELINE: Left ventricular size was normal  Overall left ventricular systolic function was normal  Estimated left ventricular ejection fraction was 55 %       PEAK STRESS: Although no diagnostic regional wall motion abnormality was identified, this possibility cannot be completely excluded on the basis of this study  There was an appropriate reduction in left ventricular size  There was an  appropriate augmentation in LV function      OTHER ECHO FINDINGS: The right ventricle appeared mildly dilated with somewhat decreased systolic function   Trace mitral regurgitation      Prepared and electronically signed by  Bryanna Boone MD  Signed [id number] 13:58:08    EKG:   None obtained in office today    11/25/2019 - [facility]  INTERPRETATION:   NORMAL SINUS RHYTHM  ST & T WAVE ABNORMALITY, CONSIDER ANTERIOR ISCHEMIA  PROLONGED QT  ABNORMAL ECG  WHEN COMPARED WITH ECG OF 20-NOV-2015 13:19,  INVERTED T WAVES HAVE REPLACED NONSPECIFIC T WAVE ABNORMALITY IN ANTERIOR LEADS  Confirmed by Lily Addison MD, [facility] ([id number]) on 11/26/2019 2:11:10 PM

## 2020-01-03 ENCOUNTER — TELEPHONE (OUTPATIENT)
Dept: CARDIOLOGY CLINIC | Facility: CLINIC | Age: 45
End: 2020-01-03

## 2020-01-03 RX ORDER — METHYLPREDNISOLONE 32 MG/1
32 TABLET ORAL DAILY
Qty: 2 TABLET | Refills: 0 | Status: SHIPPED | OUTPATIENT
Start: 2020-01-03 | End: 2020-01-05

## 2020-01-03 RX ORDER — METOPROLOL SUCCINATE 25 MG/1
25 TABLET, EXTENDED RELEASE ORAL DAILY
Qty: 30 TABLET | Refills: 0 | Status: SHIPPED | OUTPATIENT
Start: 2020-01-03 | End: 2020-01-03

## 2020-01-03 RX ORDER — METOPROLOL SUCCINATE 25 MG/1
25 TABLET, EXTENDED RELEASE ORAL ONCE
Qty: 30 TABLET | Refills: 0 | Status: SHIPPED | OUTPATIENT
Start: 2020-01-03 | End: 2020-03-12

## 2020-01-03 RX ORDER — METHYLPREDNISOLONE 32 MG/1
32 TABLET ORAL DAILY
Qty: 2 TABLET | Refills: 0 | Status: SHIPPED | OUTPATIENT
Start: 2020-01-03 | End: 2020-01-03 | Stop reason: SDUPTHER

## 2020-01-03 NOTE — TELEPHONE ENCOUNTER
Called and spoke to patient  Informed per note and patient verbalized understanding  Also called pharmacy and verified that tizanidine is discontinued

## 2020-01-03 NOTE — TELEPHONE ENCOUNTER
Meds ordered and confirmed with pharmacy      Medrol 32 mg x2 doses: 12hrs and 2 hrs before test  Benadryl 50 mg x1 dose: 1 hr before test  Metoprolol succinate 25 mg x1 dose: morning of test    This was communicated to patient by Alistair Jansen

## 2020-01-03 NOTE — TELEPHONE ENCOUNTER
Received a call from [facility] stating in order for pt to receive cardiac CT scan  Pt will need the following:   An order for beta blocker in order for HR to be within range for testing  BUN/Creatinine orders   Pt is allergic to IV contrast she will need allergy prep orders that usually consist of 2 doses of Medrol and Benadryl  Order needs to be changed to cardiac CTA-please place new order  [phone]

## 2020-01-07 ENCOUNTER — PATIENT OUTREACH (OUTPATIENT)
Dept: INTERNAL MEDICINE CLINIC | Facility: CLINIC | Age: 45
End: 2020-01-07

## 2020-01-07 NOTE — PROGRESS NOTES
Per referral, I contacted pt in regards to [address] providers  Pt discussed with provider here in our office that she is not happy with [facility] and she is looking for a new [address] provider  Pt has no SI or HI, but would like to continue with MH services  Pt resides in [place] and would like to stay close to home if possible  Pt gave several locations that she is not interested in due to many friends and family going to the same place  Pt is going to call [facility] at [phone] and inquire if they are accepting new patients  We also discussed if pt would like a private individual counselor instead of a group office, she would be able to call the [facility] number on her insurance card and they can provide her the list of all [address] providers in the area that accept her insurance  [facility] inquired if pt would like to make an appointment for assistance, however pt states she will attempt from home and call [facility] if she needs assistance

## 2020-01-07 NOTE — NURSING NOTE
Cardiac CTA Questionairre     Cardiac history    Reason for exam: Assess for CAD, anomalous coronaries    Have you been diagnosed with heart disease? No    Do you have a family history of heart disease? No    Have you ever experienced chest pain? "on and off"    Have you had a CABG, angioplasty, cardiac cath, stent placement? No    Do you have a pacemaker or defibrillator? No    Have you ever been diagnosed with an irregular heart beat? Yes    Do you have a history of having COPD or asthma? No    Do you have high blood pressure? No    Do you have diabetes? no    Do you have high cholesterol? no    Do you smoke?no, quit 11 yrs ago    General Information    Do you have an allergy to intravenous contrast or dye? Yes, reaction of hives with previous CT scan  Standard allergy prep called in by Ari Haynes  Patient instructed  Do you have kidney disease? No    Height:            5'6                   Weight: 191      Because we give a nitrate during testing, called nitroglycerin, we need to   remind you: If you take any male enhancement medications such as Viagra, Levitra or Cialis; you will need to hold these medications for 3 days prior to testing and may resume these medications 24 hours after your procedure to prevent a  life threatening drop in your blood pressure  (Women may take for pulmonary hypertension)    Patient verbalizes understanding of the above warning/information: Pt does not take PDE5 inhibitors    Instructions:           Nothing to eat  for 4 hours prior to testing, you may drink  Increase fluids 1 day prior to exam unless contraindicated  No caffeine for 12 hours prior to testing  This includes caffeine free beverages and any tea beverages, or energy drinks  If applicable, Hold PDE5 for 3 days prior to test, resume 24 hrs after test      If applicable, take beta blockade medication 1 hr prior to exam as instructed by physician       Take any medication as prescribed by your doctor before this procedure unless directed otherwise  Testing time is approximately 30 - 45 minutes  Bring picture ID and insurance information; if you have either of these  Questionnaire completed with patient via phone 1/3/19  Procedure/medications explained, all questions answered  Spoke with Cardiology Fellow Nickie Fabian, he will order standard allergy prep for patient allergy to IV contrast  Beta blocker also ordered, Toprol XL 25 mg  Medication/allergy verified  Follow up phone call with patient today, she is aware of allergy prep and betablockade ordered, instructions reviewed, pt has yet to  scripts form [facility]  Above instructions given   11/25/19 [facility]  Patient verbalizes understanding of education and instructions given

## 2020-01-10 ENCOUNTER — APPOINTMENT (OUTPATIENT)
Dept: LAB | Facility: HOSPITAL | Age: 45
End: 2020-01-10
Payer: COMMERCIAL

## 2020-01-10 ENCOUNTER — HOSPITAL ENCOUNTER (OUTPATIENT)
Dept: CT IMAGING | Facility: HOSPITAL | Age: 45
Discharge: HOME | End: 2020-01-10

## 2020-01-10 DIAGNOSIS — E03.8 HYPOTHYROIDISM DUE TO HASHIMOTO'S THYROIDITIS: ICD-10-CM

## 2020-01-10 DIAGNOSIS — E06.3 HYPOTHYROIDISM DUE TO HASHIMOTO'S THYROIDITIS: ICD-10-CM

## 2020-01-10 DIAGNOSIS — R07.9 CHEST PAIN, UNSPECIFIED TYPE: ICD-10-CM

## 2020-01-10 LAB
ANION GAP SERPL CALCULATED.3IONS-SCNC: 1 MMOL/L (ref 4–13)
BUN SERPL-MCNC: 11 MG/DL (ref 5–25)
CALCIUM SERPL-MCNC: 9 MG/DL (ref 8.3–10.1)
CHLORIDE SERPL-SCNC: 106 MMOL/L (ref 100–108)
CHOLEST SERPL-MCNC: 187 MG/DL (ref 50–200)
CO2 SERPL-SCNC: 30 MMOL/L (ref 21–32)
CREAT SERPL-MCNC: 0.7 MG/DL (ref 0.6–1.3)
GFR SERPL CREATININE-BSD FRML MDRD: 106 ML/MIN/1.73SQ M
GLUCOSE P FAST SERPL-MCNC: 88 MG/DL (ref 65–99)
HDLC SERPL-MCNC: 66 MG/DL
LDLC SERPL CALC-MCNC: 106 MG/DL (ref 0–100)
NONHDLC SERPL-MCNC: 121 MG/DL
POTASSIUM SERPL-SCNC: 4.2 MMOL/L (ref 3.5–5.3)
SODIUM SERPL-SCNC: 137 MMOL/L (ref 136–145)
T4 FREE SERPL-MCNC: 1.17 NG/DL (ref 0.76–1.46)
TRIGL SERPL-MCNC: 73 MG/DL
TSH SERPL DL<=0.05 MIU/L-ACNC: 2.02 UIU/ML (ref 0.36–3.74)

## 2020-01-10 PROCEDURE — 36415 COLL VENOUS BLD VENIPUNCTURE: CPT

## 2020-01-10 PROCEDURE — 80061 LIPID PANEL: CPT

## 2020-01-10 PROCEDURE — 80048 BASIC METABOLIC PNL TOTAL CA: CPT

## 2020-01-10 PROCEDURE — 84439 ASSAY OF FREE THYROXINE: CPT

## 2020-01-10 PROCEDURE — 84443 ASSAY THYROID STIM HORMONE: CPT

## 2020-01-20 DIAGNOSIS — E06.3 HYPOTHYROIDISM DUE TO HASHIMOTO'S THYROIDITIS: ICD-10-CM

## 2020-01-20 DIAGNOSIS — E03.8 HYPOTHYROIDISM DUE TO HASHIMOTO'S THYROIDITIS: ICD-10-CM

## 2020-01-20 RX ORDER — LEVOTHYROXINE SODIUM 88 UG/1
88 TABLET ORAL DAILY
Qty: 90 TABLET | Refills: 1 | Status: SHIPPED | OUTPATIENT
Start: 2020-01-20 | End: 2020-08-10

## 2020-01-22 ENCOUNTER — HOSPITAL ENCOUNTER (OUTPATIENT)
Dept: MAMMOGRAPHY | Facility: MEDICAL CENTER | Age: 45
Discharge: HOME | End: 2020-01-22
Payer: COMMERCIAL

## 2020-01-22 VITALS — WEIGHT: 191 LBS | BODY MASS INDEX: 30.7 KG/M2 | HEIGHT: 66 IN

## 2020-01-22 DIAGNOSIS — Z12.31 VISIT FOR SCREENING MAMMOGRAM: ICD-10-CM

## 2020-01-22 PROCEDURE — 77067 SCR MAMMO BI INCL CAD: CPT

## 2020-01-22 PROCEDURE — 77063 BREAST TOMOSYNTHESIS BI: CPT

## 2020-01-23 ENCOUNTER — TELEPHONE (OUTPATIENT)
Dept: OTHER | Facility: OTHER | Age: 45
End: 2020-01-23

## 2020-01-23 ENCOUNTER — HOSPITAL ENCOUNTER (OUTPATIENT)
Dept: CT IMAGING | Facility: HOSPITAL | Age: 45
Discharge: HOME | End: 2020-01-23
Payer: COMMERCIAL

## 2020-01-23 VITALS
HEART RATE: 69 BPM | OXYGEN SATURATION: 100 % | RESPIRATION RATE: 20 BRPM | SYSTOLIC BLOOD PRESSURE: 104 MMHG | DIASTOLIC BLOOD PRESSURE: 54 MMHG

## 2020-01-23 DIAGNOSIS — R07.9 CHEST PAIN, UNSPECIFIED TYPE: ICD-10-CM

## 2020-01-23 PROCEDURE — 75574 CT ANGIO HRT W/3D IMAGE: CPT

## 2020-01-23 RX ORDER — METOPROLOL TARTRATE 5 MG/5ML
5 INJECTION INTRAVENOUS
Status: DISCONTINUED | OUTPATIENT
Start: 2020-01-23 | End: 2020-01-24 | Stop reason: HOSPADM

## 2020-01-23 RX ORDER — NITROGLYCERIN 0.4 MG/1
0.4 TABLET SUBLINGUAL ONCE
Status: COMPLETED | OUTPATIENT
Start: 2020-01-23 | End: 2020-01-23

## 2020-01-23 RX ADMIN — METOPROLOL TARTRATE 5 MG: 5 INJECTION INTRAVENOUS at 10:35

## 2020-01-23 RX ADMIN — NITROGLYCERIN 0.4 MG: 0.4 TABLET SUBLINGUAL at 10:41

## 2020-01-23 RX ADMIN — IODIXANOL 100 ML: 320 INJECTION, SOLUTION INTRAVASCULAR at 10:47

## 2020-01-23 RX ADMIN — METOPROLOL TARTRATE 5 MG: 5 INJECTION INTRAVENOUS at 10:29

## 2020-01-23 RX ADMIN — METOPROLOL TARTRATE 5 MG: 5 INJECTION INTRAVENOUS at 10:24

## 2020-01-23 NOTE — NURSING NOTE
Patient arrived for cardiac CTA  Procedure reviewed, all questions answered  Verified with patient she took Toprol XL and Standard allergy prep doses at instructed times  IV metoprolol given 5 mg x 3 doses to maintain constant desired heart rate for test   Patient tolerated test well, see vitals flowsheet  Patient remained asymptomatic during and post test   No s/s allergic reaction, no rash,itching or shortness of breath noted upon inspection  Patient remained in department for monitoring post test per protocol  Discharged stable without any complaints to home accompanied by spouse

## 2020-01-24 NOTE — TELEPHONE ENCOUNTER
Patients face feels like its on fire due to Dye test that was done earlier today, is taking Banophen 50 mg and took it at about 4:30 pm and wants to know if can take another dose as is not having any effect or if should take something else

## 2020-01-28 ENCOUNTER — TELEPHONE (OUTPATIENT)
Dept: CARDIOLOGY CLINIC | Facility: CLINIC | Age: 45
End: 2020-01-28

## 2020-02-24 ENCOUNTER — TELEPHONE (OUTPATIENT)
Dept: INTERNAL MEDICINE CLINIC | Facility: CLINIC | Age: 45
End: 2020-02-24

## 2020-03-12 ENCOUNTER — OFFICE VISIT (OUTPATIENT)
Dept: CARDIOLOGY CLINIC | Facility: CLINIC | Age: 45
End: 2020-03-12
Payer: COMMERCIAL

## 2020-03-12 VITALS
WEIGHT: 198.2 LBS | HEIGHT: 66 IN | DIASTOLIC BLOOD PRESSURE: 64 MMHG | SYSTOLIC BLOOD PRESSURE: 100 MMHG | BODY MASS INDEX: 31.85 KG/M2 | OXYGEN SATURATION: 97 % | HEART RATE: 88 BPM

## 2020-03-12 DIAGNOSIS — I44.1 ATRIOVENTRICULAR BLOCK, MOBITZ TYPE 1, WENCKEBACH: Primary | ICD-10-CM

## 2020-03-12 PROCEDURE — 99214 OFFICE O/P EST MOD 30 MIN: CPT | Performed by: INTERNAL MEDICINE

## 2020-03-12 PROCEDURE — 1036F TOBACCO NON-USER: CPT | Performed by: INTERNAL MEDICINE

## 2020-03-12 PROCEDURE — 3078F DIAST BP <80 MM HG: CPT | Performed by: INTERNAL MEDICINE

## 2020-03-12 PROCEDURE — 3008F BODY MASS INDEX DOCD: CPT | Performed by: INTERNAL MEDICINE

## 2020-03-12 PROCEDURE — 3074F SYST BP LT 130 MM HG: CPT | Performed by: INTERNAL MEDICINE

## 2020-03-12 NOTE — PROGRESS NOTES
Cardiology Progress Note - Yulia Cam 41 y o  female MRN: [id number]    Unit/Bed#:  Encounter: [id number]    Laura Carrillo PA-C  Consults      Assessment:    Risk factors:  -overweight    1  Chest pain/pressure  -likely noncardiac  -negative stress echo x2 in 2015 and 2018  -no family history of sudden cardiac death, arrhythmia or device implantation that she knows of  -48 hour Holter monitor in 10/2018 with normal sinus rhythm, average heart rate 84 and sporadic runs of Mobitz type I/Wenckebach  -ECGs dating back to 2003 with normal sinus rhythm and nonspecific ST/T-wave changes  -on no cardiac medication  01/2020 labs:  TSH 2, free T4 1 2  -negative A1c dating back to April 2018  -negative workup at [facility] on 11/25/2019 with negative troponin, normal ECG and grossly normal lab work  -01/2020 coronary CTA:  No evidence of coronary artery disease, coronary calcium score 0  -HERNANDEZ 10 year risk of coronary event is low at 0 7% or 0 6% when coronary calcium score is not factored      Plan:  1  Chest pain is less likely to be cardiac in origin based on the information available  There is no indication for further ischemic evaluation at this time    2  In regards to palpitations, will repeat 24 hour Holter to rule out progression of her AV block which is less likely    3  If recurrence of chest pain, can consider obtaining transthoracic echo however rest images obtained with her stress echo did not reveal any structural abnormalities in 2008     4  Patient to follow-up with cardiology in 6 months or earlier if any abnormalities are identified on her Holter        Case discussed and reviewed with Dr Antonetta Aschoff who agrees with my assessment and plan  Thank you for involving us in the care of your patient  Interval History:   Since last encounter, patient underwent coronary CT angiography which showed no evidence of coronary artery disease; calcium score 0   The frequency of her chest pain has decreased significantly  She continues to have intermittent palpitations in the form of short lasting heart flutters    Denies interval hospitalization, dizziness, syncope, headache, vision changes, diaphoresis, exertional shortness of breath, PND, orthopnea, nausea, vomiting, abdominal pain or lower extremity edema  Cardiac History Summary:   Tri Gonzalez is a 41y o  year old female with a history of chest pain/palpitation of unclear etiology (negative workup to date), hypertension of unclear etiology, fibromyalgia, hypothyroidism due to Hashimoto's thyroiditis and generalized anxiety disorder who follows with [facility] outpatient Cardiology for chest pain/palpitations of unclear etiology  To date, patient has had multiple tests without any clear etiology identified  His Holter monitor revealed a negligible burden of atrial/ventricular ectopy, an average heart rate of 84 in episodes of Mobitz 1/Wenckebach AV block  Exercise stress echo was unremarkable for ischemia  Ultimately a coronary CT angio was obtained and revealed no evidence of coronary disease with a calcium score 0  Objective:  Review of Systems  ROS as noted in interval history  Physical Exam:  Vitals: Blood pressure 100/64, pulse 88, height 5' 6" (1 676 m), weight 89 9 kg (198 lb 3 2 oz), last menstrual period 02/28/2018, SpO2 97 %  , Body mass index is 31 99 kg/m² ,     GEN: Tri Gonzalez appears well, alert and oriented x 3, pleasant and cooperative   HEENT:  Normocephalic, atraumatic, anicteric, moist mucous membranes  NECK: No JVD or carotid bruits   HEART: Regular rhythm, normal S1 and S2, no murmurs, clicks, gallops or rubs   LUNGS: Clear to auscultation bilaterally; no wheezes, rales, or rhonchi; respiration nonlabored   ABDOMEN:  Normoactive bowel sounds, soft, no tenderness, no distention  EXTREMITIES: peripheral pulses palpable; no edema  NEURO: no gross focal findings; cranial nerves grossly intact   SKIN:  Dry, intact, warm to touch    Home Medications:   (Not in a hospital admission)    Current Outpatient Medications:     diphenhydrAMINE (BENADRYL) 50 MG tablet, Take 1 tablet (50 mg total) by mouth once for 1 dose Take 1 hour before test @ 9AM on 1/23/2020, Disp: 30 tablet, Rfl: 0    hydrOXYzine HCL (ATARAX) 25 mg tablet, Take 1 tablet (25 mg total) by mouth 2 (two) times a day as needed for anxiety, Disp: 30 tablet, Rfl: 0    levothyroxine 88 mcg tablet, Take 1 tablet (88 mcg total) by mouth daily, Disp: 90 tablet, Rfl: 1    metoprolol succinate (TOPROL-XL) 25 mg 24 hr tablet, Take 1 tablet (25 mg total) by mouth once for 1 dose Take the day of the test early in the morning on 1/23/2020, Disp: 30 tablet, Rfl: 0    naproxen (NAPROSYN) 500 mg tablet, Take 1 tablet (500 mg total) by mouth 2 (two) times a day with meals, Disp: 60 tablet, Rfl: 1    Labs & Results:    Lab Results   Component Value Date    CKTOTAL 69 03/30/2017    TROPONINI <0 02 07/19/2019    TROPONINI <0 02 05/31/2019    TROPONINI <0 02 05/07/2018       Lab Results   Component Value Date    GLUCOSE 106 04/06/2015    CALCIUM 9 0 01/10/2020     04/06/2015    K 4 2 01/10/2020    CO2 30 01/10/2020     01/10/2020    BUN 11 01/10/2020    CREATININE 0 70 01/10/2020       Lab Results   Component Value Date    WBC 9 11 07/19/2019    HGB 11 9 07/19/2019    HCT 37 6 07/19/2019    MCV 85 07/19/2019     07/19/2019           Lab Results   Component Value Date    CHOL 152 09/25/2015     Lab Results   Component Value Date    HDL 66 01/10/2020    HDL 68 09/25/2015     Lab Results   Component Value Date    LDLCALC 106 (H) 01/10/2020    LDLCALC 75 09/25/2015     Lab Results   Component Value Date    TRIG 73 01/10/2020    TRIG 47 09/25/2015       Lab Results   Component Value Date    ALT 17 07/19/2019    AST 16 07/19/2019           EKG:  Date:  07/19/2019  Interpretation:  Normal sinus rhythm with nonspecific ST abnormalities      Holter/Device Interrogation:   10/09/2018 - 48 hour Holter     IMPRESSIONS:  1  Sinus rhythm with rare ventricular and supraventricular ectopic activity  2  Episodes of Mobitz 1/Wenckebach block  3  Patient-reported symptoms did not correlate with arrhythmias or ectopy      Interpreted by: Zakiya Grajeda MD    Imaging: I have personally reviewed pertinent reports  and I have personally reviewed pertinent films in PACS      Echo: No results found for this or any previous visit  Coronary CTA:  01/23/2020  CORONARY CT ANGIOGRAM AND CT CALCIUM SCORE - WITHOUT AND WITH IV CONTRAST     INDICATION:  44 years  R07 9: Chest pain, unspecified       CALCIUM SCORE: 0        CORONARY ANATOMY:  Patient demonstrates right dominant coronary circulation  Coronary anatomy is typical      CORONARY ATHEROSCLEROTIC PLAQUE:  There is no evidence of calcified or noncalcified atherosclerotic coronary arterial plaque  There is no evidence of significant coronary artery stenosis      CARDIAC STRUCTURES:  The heart appears to be at the upper limits of normal in size and there is questionable mild thickening of the left ventricular wall  No aortic or mitral valvular abnormality is noted  There is no pericardial effusion      REMAINDER OF THE VISUALIZED CHEST:  Visualized lung fields are clear  No significant pleural effusion  The visualized structures in the upper abdomen appear unremarkable  Visualized osseous structures appear unremarkable      IMPRESSION:     No CT angiographic evidence of coronary artery disease      Total coronary calcium score equals 0  For more useful information regarding the prognostic significance of the calcium score, please consult the calculator at the website [email or web address] laila-young org/  aspx      Heart appears to be at the upper limits of normal in size and there is apparent thickening of the left ventricular myocardium suggesting early or mild changes of dilated cardiomyopathy      Workstation performed: [id number]    Stress:   11/12/2018 -  exercise stress echo     STRESS RESULTS: POX pretest= 98%, peak= 99%  Duration of exercise was 8 min and 13 sec  The patient exercised to protocol stage 3  Maximal work rate was 10 1 METs  Maximal heart rate during stress was 162 bpm ( 91 % of maximal predicted  heart rate)  Target heart rate was achieved  The heart rate response to stress was normal  Maximal systolic blood pressure during stress was 132 mmHg  There was normal resting blood pressure with a blunted response to stress  The  rate-pressure product for the peak heart rate and blood pressure was 82883  There was no chest pain during stress  The stress test was terminated due to moderate fatigue  After the procedure, the patient was discharged to home      ECG CONCLUSIONS: The stress ECG was negative for ischemia  There were no stress arrhythmias or conduction abnormalities      STRESS 2D ECHO RESULTS:     BASELINE: Left ventricular size was normal  Overall left ventricular systolic function was normal  Estimated left ventricular ejection fraction was 55 %       PEAK STRESS: Although no diagnostic regional wall motion abnormality was identified, this possibility cannot be completely excluded on the basis of this study  There was an appropriate reduction in left ventricular size  There was an  appropriate augmentation in LV function      OTHER ECHO FINDINGS: The right ventricle appeared mildly dilated with somewhat decreased systolic function   Trace mitral regurgitation      Prepared and electronically signed by  Roverto Clayton MD  Signed [id number] 13:58:08

## 2020-04-03 ENCOUNTER — TELEPHONE (OUTPATIENT)
Dept: INTERNAL MEDICINE CLINIC | Facility: CLINIC | Age: 45
End: 2020-04-03

## 2020-04-03 ENCOUNTER — TELEMEDICINE (OUTPATIENT)
Dept: INTERNAL MEDICINE CLINIC | Facility: CLINIC | Age: 45
End: 2020-04-03

## 2020-04-03 DIAGNOSIS — Z71.89 ADVICE GIVEN ABOUT COVID-19 VIRUS BY TELEPHONE: ICD-10-CM

## 2020-04-03 DIAGNOSIS — Z20.822 CLOSE EXPOSURE TO COVID-19 VIRUS: Primary | ICD-10-CM

## 2020-04-03 PROCEDURE — G2012 BRIEF CHECK IN BY MD/QHP: HCPCS | Performed by: PHYSICIAN ASSISTANT

## 2020-04-20 ENCOUNTER — TELEMEDICINE (OUTPATIENT)
Dept: INTERNAL MEDICINE CLINIC | Facility: CLINIC | Age: 45
End: 2020-04-20

## 2020-04-20 DIAGNOSIS — K04.7 DENTAL ABSCESS: Primary | ICD-10-CM

## 2020-04-20 DIAGNOSIS — R09.81 NASAL CONGESTION: ICD-10-CM

## 2020-04-20 PROCEDURE — 99213 OFFICE O/P EST LOW 20 MIN: CPT | Performed by: PHYSICIAN ASSISTANT

## 2020-04-20 RX ORDER — IPRATROPIUM BROMIDE 21 UG/1
2 SPRAY, METERED NASAL EVERY 12 HOURS
Qty: 30 ML | Refills: 2 | Status: SHIPPED | OUTPATIENT
Start: 2020-04-20 | End: 2020-08-03 | Stop reason: ALTCHOICE

## 2020-04-20 RX ORDER — CLINDAMYCIN HYDROCHLORIDE 300 MG/1
300 CAPSULE ORAL 4 TIMES DAILY
Qty: 28 CAPSULE | Refills: 0 | Status: SHIPPED | OUTPATIENT
Start: 2020-04-20 | End: 2020-04-27

## 2020-04-29 ENCOUNTER — TELEMEDICINE (OUTPATIENT)
Dept: INTERNAL MEDICINE CLINIC | Facility: CLINIC | Age: 45
End: 2020-04-29

## 2020-04-29 DIAGNOSIS — Z20.828 EXPOSURE TO SARS-ASSOCIATED CORONAVIRUS: Primary | ICD-10-CM

## 2020-04-29 DIAGNOSIS — R06.02 SHORTNESS OF BREATH: ICD-10-CM

## 2020-04-29 DIAGNOSIS — Z71.89 ADVICE GIVEN ABOUT COVID-19 VIRUS INFECTION: ICD-10-CM

## 2020-04-29 PROCEDURE — 99214 OFFICE O/P EST MOD 30 MIN: CPT | Performed by: PHYSICIAN ASSISTANT

## 2020-04-30 DIAGNOSIS — Z20.828 EXPOSURE TO SARS-ASSOCIATED CORONAVIRUS: ICD-10-CM

## 2020-04-30 PROCEDURE — U0003 INFECTIOUS AGENT DETECTION BY NUCLEIC ACID (DNA OR RNA); SEVERE ACUTE RESPIRATORY SYNDROME CORONAVIRUS 2 (SARS-COV-2) (CORONAVIRUS DISEASE [COVID-19]), AMPLIFIED PROBE TECHNIQUE, MAKING USE OF HIGH THROUGHPUT TECHNOLOGIES AS DESCRIBED BY CMS-2020-01-R: HCPCS

## 2020-05-01 DIAGNOSIS — R06.02 SHORTNESS OF BREATH: Primary | ICD-10-CM

## 2020-05-01 LAB — SARS-COV-2 RNA SPEC QL NAA+PROBE: NOT DETECTED

## 2020-05-01 RX ORDER — ALBUTEROL SULFATE 90 UG/1
2 AEROSOL, METERED RESPIRATORY (INHALATION) EVERY 6 HOURS PRN
Qty: 6.7 G | Refills: 0 | Status: SHIPPED | OUTPATIENT
Start: 2020-05-01 | End: 2020-07-30

## 2020-05-08 ENCOUNTER — APPOINTMENT (OUTPATIENT)
Dept: LAB | Facility: HOSPITAL | Age: 45
End: 2020-05-08
Payer: COMMERCIAL

## 2020-05-08 DIAGNOSIS — E06.3 HYPOTHYROIDISM DUE TO HASHIMOTO'S THYROIDITIS: ICD-10-CM

## 2020-05-08 DIAGNOSIS — E03.8 HYPOTHYROIDISM DUE TO HASHIMOTO'S THYROIDITIS: ICD-10-CM

## 2020-05-08 LAB
T4 FREE SERPL-MCNC: 1.19 NG/DL (ref 0.76–1.46)
TSH SERPL DL<=0.05 MIU/L-ACNC: 4.63 UIU/ML (ref 0.36–3.74)

## 2020-05-08 PROCEDURE — 84439 ASSAY OF FREE THYROXINE: CPT

## 2020-05-08 PROCEDURE — 36415 COLL VENOUS BLD VENIPUNCTURE: CPT

## 2020-05-08 PROCEDURE — 84443 ASSAY THYROID STIM HORMONE: CPT

## 2020-05-12 DIAGNOSIS — E06.3 HYPOTHYROIDISM DUE TO HASHIMOTO'S THYROIDITIS: Primary | ICD-10-CM

## 2020-05-12 DIAGNOSIS — E03.8 HYPOTHYROIDISM DUE TO HASHIMOTO'S THYROIDITIS: Primary | ICD-10-CM

## 2020-05-19 ENCOUNTER — TELEPHONE (OUTPATIENT)
Dept: PAIN MEDICINE | Facility: CLINIC | Age: 45
End: 2020-05-19

## 2020-05-19 DIAGNOSIS — M54.2 NECK PAIN: Primary | ICD-10-CM

## 2020-05-21 ENCOUNTER — TELEPHONE (OUTPATIENT)
Dept: INTERNAL MEDICINE CLINIC | Facility: CLINIC | Age: 45
End: 2020-05-21

## 2020-05-21 ENCOUNTER — TELEMEDICINE (OUTPATIENT)
Dept: INTERNAL MEDICINE CLINIC | Facility: CLINIC | Age: 45
End: 2020-05-21

## 2020-05-21 DIAGNOSIS — G44.209 TENSION HEADACHE: Primary | ICD-10-CM

## 2020-05-21 DIAGNOSIS — E06.3 HYPOTHYROIDISM DUE TO HASHIMOTO'S THYROIDITIS: ICD-10-CM

## 2020-05-21 DIAGNOSIS — E03.8 HYPOTHYROIDISM DUE TO HASHIMOTO'S THYROIDITIS: ICD-10-CM

## 2020-05-21 PROBLEM — Z12.31 VISIT FOR SCREENING MAMMOGRAM: Chronic | Status: RESOLVED | Noted: 2019-12-30 | Resolved: 2020-05-21

## 2020-05-21 PROBLEM — N93.9 ABNORMAL UTERINE BLEEDING (AUB): Status: RESOLVED | Noted: 2018-03-01 | Resolved: 2020-05-21

## 2020-05-21 PROCEDURE — 99214 OFFICE O/P EST MOD 30 MIN: CPT | Performed by: PHYSICIAN ASSISTANT

## 2020-05-21 RX ORDER — BACLOFEN 10 MG/1
TABLET ORAL
Qty: 10 TABLET | Refills: 0 | Status: SHIPPED | OUTPATIENT
Start: 2020-05-21 | End: 2020-08-03 | Stop reason: ALTCHOICE

## 2020-06-01 DIAGNOSIS — R06.02 SHORTNESS OF BREATH: ICD-10-CM

## 2020-06-01 RX ORDER — ALBUTEROL SULFATE 90 UG/1
2 AEROSOL, METERED RESPIRATORY (INHALATION) EVERY 6 HOURS PRN
Qty: 6.7 INHALER | Refills: 0 | OUTPATIENT
Start: 2020-06-01 | End: 2020-08-30

## 2020-06-17 ENCOUNTER — HOSPITAL ENCOUNTER (EMERGENCY)
Facility: HOSPITAL | Age: 45
Discharge: HOME | End: 2020-06-18
Attending: EMERGENCY MEDICINE | Admitting: EMERGENCY MEDICINE
Payer: COMMERCIAL

## 2020-06-17 DIAGNOSIS — K29.70 GASTRITIS: ICD-10-CM

## 2020-06-17 DIAGNOSIS — R07.9 CHEST PAIN: Primary | ICD-10-CM

## 2020-06-17 PROCEDURE — 99285 EMERGENCY DEPT VISIT HI MDM: CPT

## 2020-06-17 PROCEDURE — 99285 EMERGENCY DEPT VISIT HI MDM: CPT | Performed by: EMERGENCY MEDICINE

## 2020-06-18 ENCOUNTER — APPOINTMENT (EMERGENCY)
Dept: RADIOLOGY | Facility: HOSPITAL | Age: 45
End: 2020-06-18
Payer: COMMERCIAL

## 2020-06-18 VITALS
RESPIRATION RATE: 14 BRPM | OXYGEN SATURATION: 98 % | HEART RATE: 74 BPM | SYSTOLIC BLOOD PRESSURE: 118 MMHG | BODY MASS INDEX: 31.47 KG/M2 | DIASTOLIC BLOOD PRESSURE: 61 MMHG | WEIGHT: 195 LBS

## 2020-06-18 LAB
ALBUMIN SERPL BCP-MCNC: 3.5 G/DL (ref 3.5–5)
ALP SERPL-CCNC: 71 U/L (ref 46–116)
ALT SERPL W P-5'-P-CCNC: 26 U/L (ref 12–78)
ANION GAP SERPL CALCULATED.3IONS-SCNC: 3 MMOL/L (ref 4–13)
AST SERPL W P-5'-P-CCNC: 13 U/L (ref 5–45)
ATRIAL RATE: 76 BPM
BASOPHILS # BLD AUTO: 0.05 THOUSANDS/ΜL (ref 0–0.1)
BASOPHILS NFR BLD AUTO: 1 % (ref 0–1)
BILIRUB SERPL-MCNC: 0.25 MG/DL (ref 0.2–1)
BUN SERPL-MCNC: 12 MG/DL (ref 5–25)
CALCIUM SERPL-MCNC: 8.4 MG/DL (ref 8.3–10.1)
CHLORIDE SERPL-SCNC: 101 MMOL/L (ref 100–108)
CO2 SERPL-SCNC: 30 MMOL/L (ref 21–32)
CREAT SERPL-MCNC: 0.7 MG/DL (ref 0.6–1.3)
EOSINOPHIL # BLD AUTO: 0.14 THOUSAND/ΜL (ref 0–0.61)
EOSINOPHIL NFR BLD AUTO: 2 % (ref 0–6)
ERYTHROCYTE [DISTWIDTH] IN BLOOD BY AUTOMATED COUNT: 13.2 % (ref 11.6–15.1)
GFR SERPL CREATININE-BSD FRML MDRD: 106 ML/MIN/1.73SQ M
GLUCOSE SERPL-MCNC: 102 MG/DL (ref 65–140)
HCT VFR BLD AUTO: 39.4 % (ref 34.8–46.1)
HGB BLD-MCNC: 12.7 G/DL (ref 11.5–15.4)
IMM GRANULOCYTES # BLD AUTO: 0.03 THOUSAND/UL (ref 0–0.2)
IMM GRANULOCYTES NFR BLD AUTO: 0 % (ref 0–2)
LIPASE SERPL-CCNC: 206 U/L (ref 73–393)
LYMPHOCYTES # BLD AUTO: 2.14 THOUSANDS/ΜL (ref 0.6–4.47)
LYMPHOCYTES NFR BLD AUTO: 25 % (ref 14–44)
MCH RBC QN AUTO: 28.4 PG (ref 26.8–34.3)
MCHC RBC AUTO-ENTMCNC: 32.2 G/DL (ref 31.4–37.4)
MCV RBC AUTO: 88 FL (ref 82–98)
MONOCYTES # BLD AUTO: 0.81 THOUSAND/ΜL (ref 0.17–1.22)
MONOCYTES NFR BLD AUTO: 9 % (ref 4–12)
NEUTROPHILS # BLD AUTO: 5.45 THOUSANDS/ΜL (ref 1.85–7.62)
NEUTS SEG NFR BLD AUTO: 63 % (ref 43–75)
NRBC BLD AUTO-RTO: 0 /100 WBCS
P AXIS: 39 DEGREES
PLATELET # BLD AUTO: 200 THOUSANDS/UL (ref 149–390)
PMV BLD AUTO: 11.3 FL (ref 8.9–12.7)
POTASSIUM SERPL-SCNC: 3.7 MMOL/L (ref 3.5–5.3)
PR INTERVAL: 162 MS
PROT SERPL-MCNC: 8 G/DL (ref 6.4–8.2)
QRS AXIS: 35 DEGREES
QRSD INTERVAL: 84 MS
QT INTERVAL: 410 MS
QTC INTERVAL: 461 MS
RBC # BLD AUTO: 4.47 MILLION/UL (ref 3.81–5.12)
SODIUM SERPL-SCNC: 134 MMOL/L (ref 136–145)
T WAVE AXIS: 9 DEGREES
TROPONIN I SERPL-MCNC: <0.02 NG/ML
VENTRICULAR RATE: 76 BPM
WBC # BLD AUTO: 8.62 THOUSAND/UL (ref 4.31–10.16)

## 2020-06-18 PROCEDURE — 84484 ASSAY OF TROPONIN QUANT: CPT | Performed by: EMERGENCY MEDICINE

## 2020-06-18 PROCEDURE — 93005 ELECTROCARDIOGRAM TRACING: CPT

## 2020-06-18 PROCEDURE — 93010 ELECTROCARDIOGRAM REPORT: CPT | Performed by: INTERNAL MEDICINE

## 2020-06-18 PROCEDURE — 36415 COLL VENOUS BLD VENIPUNCTURE: CPT | Performed by: EMERGENCY MEDICINE

## 2020-06-18 PROCEDURE — 85025 COMPLETE CBC W/AUTO DIFF WBC: CPT | Performed by: EMERGENCY MEDICINE

## 2020-06-18 PROCEDURE — 71045 X-RAY EXAM CHEST 1 VIEW: CPT

## 2020-06-18 PROCEDURE — 83690 ASSAY OF LIPASE: CPT | Performed by: EMERGENCY MEDICINE

## 2020-06-18 PROCEDURE — 80053 COMPREHEN METABOLIC PANEL: CPT | Performed by: EMERGENCY MEDICINE

## 2020-06-18 RX ORDER — SUCRALFATE ORAL 1 G/10ML
1000 SUSPENSION ORAL ONCE
Status: COMPLETED | OUTPATIENT
Start: 2020-06-18 | End: 2020-06-18

## 2020-06-18 RX ORDER — FAMOTIDINE 40 MG/1
20 TABLET, FILM COATED ORAL 2 TIMES DAILY
Qty: 20 TABLET | Refills: 0 | Status: SHIPPED | OUTPATIENT
Start: 2020-06-18 | End: 2020-08-24

## 2020-06-18 RX ORDER — SUCRALFATE 1 G/1
1 TABLET ORAL 4 TIMES DAILY
Qty: 28 TABLET | Refills: 0 | Status: SHIPPED | OUTPATIENT
Start: 2020-06-18 | End: 2020-08-03 | Stop reason: ALTCHOICE

## 2020-06-18 RX ORDER — ACETAMINOPHEN 325 MG/1
975 TABLET ORAL ONCE
Status: DISCONTINUED | OUTPATIENT
Start: 2020-06-18 | End: 2020-06-18 | Stop reason: HOSPADM

## 2020-06-18 RX ORDER — LIDOCAINE HYDROCHLORIDE 20 MG/ML
15 SOLUTION OROPHARYNGEAL ONCE
Status: COMPLETED | OUTPATIENT
Start: 2020-06-18 | End: 2020-06-18

## 2020-06-18 RX ORDER — FAMOTIDINE 20 MG/1
20 TABLET, FILM COATED ORAL ONCE
Status: COMPLETED | OUTPATIENT
Start: 2020-06-18 | End: 2020-06-18

## 2020-06-18 RX ADMIN — SUCRALFATE 1000 MG: 1 SUSPENSION ORAL at 00:13

## 2020-06-18 RX ADMIN — LIDOCAINE HYDROCHLORIDE 15 ML: 20 SOLUTION ORAL; TOPICAL at 00:13

## 2020-06-18 RX ADMIN — FAMOTIDINE 20 MG: 20 TABLET ORAL at 00:13

## 2020-06-30 ENCOUNTER — HOSPITAL ENCOUNTER (OUTPATIENT)
Dept: NON INVASIVE DIAGNOSTICS | Facility: HOSPITAL | Age: 45
Discharge: HOME | End: 2020-06-30
Payer: COMMERCIAL

## 2020-06-30 PROCEDURE — 93225 XTRNL ECG REC<48 HRS REC: CPT

## 2020-06-30 PROCEDURE — 93226 XTRNL ECG REC<48 HR SCAN A/R: CPT

## 2020-07-07 ENCOUNTER — TELEPHONE (OUTPATIENT)
Dept: CARDIOLOGY CLINIC | Facility: CLINIC | Age: 45
End: 2020-07-07

## 2020-07-07 NOTE — TELEPHONE ENCOUNTER
----- Message from Gale Wolff MD sent at 7/3/2020  5:34 PM EDT -----  Jasmeet Vital    Please reach out to [facility] and let her know that her holter was overall normal      Thank you   ----- Message -----  From: Ricki Bailey MD  Sent: 7/2/2020  12:40 PM EDT  To: Gale Wolff MD

## 2020-07-09 ENCOUNTER — OFFICE VISIT (OUTPATIENT)
Dept: INTERNAL MEDICINE CLINIC | Facility: CLINIC | Age: 45
End: 2020-07-09

## 2020-07-09 VITALS
SYSTOLIC BLOOD PRESSURE: 114 MMHG | HEART RATE: 74 BPM | DIASTOLIC BLOOD PRESSURE: 70 MMHG | HEIGHT: 66 IN | WEIGHT: 197.09 LBS | OXYGEN SATURATION: 98 % | TEMPERATURE: 98.4 F | BODY MASS INDEX: 31.68 KG/M2

## 2020-07-09 DIAGNOSIS — R10.13 EPIGASTRIC PAIN: Primary | ICD-10-CM

## 2020-07-09 PROCEDURE — 3074F SYST BP LT 130 MM HG: CPT | Performed by: PHYSICIAN ASSISTANT

## 2020-07-09 PROCEDURE — 3008F BODY MASS INDEX DOCD: CPT | Performed by: PHYSICIAN ASSISTANT

## 2020-07-09 PROCEDURE — 99213 OFFICE O/P EST LOW 20 MIN: CPT | Performed by: PHYSICIAN ASSISTANT

## 2020-07-09 PROCEDURE — 3078F DIAST BP <80 MM HG: CPT | Performed by: PHYSICIAN ASSISTANT

## 2020-07-09 PROCEDURE — 1036F TOBACCO NON-USER: CPT | Performed by: PHYSICIAN ASSISTANT

## 2020-07-09 RX ORDER — OMEPRAZOLE 40 MG/1
CAPSULE, DELAYED RELEASE ORAL
Qty: 30 CAPSULE | Refills: 0 | Status: SHIPPED | OUTPATIENT
Start: 2020-07-09 | End: 2020-08-10

## 2020-07-09 NOTE — PROGRESS NOTES
Assessment/Plan:  As we reviewed today we are placing you on the omeprazole 1 tablet daily  Because you have to take your thyroid medication in the morning please take the omeprazole 30 minutes before your evening meal every day  Continue to monitor your diet and I have included additional information  Please also try to elevate the entire head of the bed rather than just trying to prop herself up on pillows  Additional dietary information provided today in your summary  Please call and schedule with the gastroenterologist   While you have only been on treatment for 5 days you are continuing to have significant symptoms and may need to have an endoscopy for further evaluation  May also need to be screen for H pylori bacteria as per our discussion  No problem-specific Assessment & Plan notes found for this encounter  Diagnoses and all orders for this visit:    Epigastric pain  -     omeprazole (PriLOSEC) 40 MG capsule; Take 1 tablet daily 30 minutes before dinner  -     Ambulatory referral to Gastroenterology; Future          Subjective:      Patient ID: Liz Ontiveros is a 41 y o  female  Patient presents today for ER follow-up  Record review shows patient was in the emergency room on June 17th for chest pain did follow up with cardiologist had Holter monitor and was advised was normal   Patient no longer experiencing that symptom  She was also reporting epigastric abdominal pain that was worse after meals  States tried pepcid  Could not take the sucrafate 4X a day due to her thyroid meds in am no help did start to take omeprazole OTC twice a day for past 5 days and states will help about 30 minutes later but does not completely alleviate her pain  Is elevated head for sleeping  Still has some pain almost all of the time but worse after eating      States has made some diet changes  Patient denies any nausea, vomiting or diarrhea  Patient reports pain is not radiating into esophagus or throat  The following portions of the patient's history were reviewed and updated as appropriate: allergies, current medications, past family history, past medical history, past social history, past surgical history and problem list     Review of Systems   Constitutional: Negative for chills and fever  Respiratory: Negative  Negative for cough  Gastrointestinal: Positive for abdominal pain  Negative for constipation, nausea and vomiting  Genitourinary: Negative  Musculoskeletal: Negative  Objective:      /70 (BP Location: Right arm, Patient Position: Sitting, Cuff Size: Large)   Pulse 74   Temp 98 4 °F (36 9 °C) (Temporal)   Ht 5' 6" (1 676 m)   Wt 89 4 kg (197 lb 1 5 oz)   LMP 02/28/2018 (Exact Date)   SpO2 98%   BMI 31 81 kg/m²          Physical Exam   Constitutional: She appears well-nourished  Neck: Neck supple  No thyromegaly present  Cardiovascular: Normal rate, regular rhythm and normal heart sounds  Pulmonary/Chest: Effort normal and breath sounds normal    Abdominal: Soft  Bowel sounds are normal  She exhibits no distension and no mass  There is tenderness in the epigastric area  There is no rigidity, no rebound and no guarding  Patient does have significant tenderness to mid epigastric and mild discomfort to left upper quadrant but no pain or discomfort to lower abdomen or right upper quadrant   Neurological: She is alert  Skin: No rash noted  Psychiatric: She has a normal mood and affect  Her behavior is normal    Nursing note and vitals reviewed

## 2020-07-09 NOTE — PATIENT INSTRUCTIONS
As we reviewed today we are placing you on the omeprazole 1 tablet daily  Because you have to take your thyroid medication in the morning please take the omeprazole 30 minutes before your evening meal every day  Continue to monitor your diet and I have included additional information  Please also try to elevate the entire head of the bed rather than just trying to prop herself up on pillows  Additional dietary information provided today in your summary  Please call and schedule with the gastroenterologist   While you have only been on treatment for 5 days you are continuing to have significant symptoms and may need to have an endoscopy for further evaluation  May also need to be screen for H pylori bacteria as per our discussion  Diet for Stomach Ulcers and Gastritis   WHAT YOU NEED TO KNOW:   What is a diet for stomach ulcers and gastritis? A diet for ulcers and gastritis is a meal plan that limits foods that irritate your stomach  Certain foods may worsen symptoms such as stomach pain, bloating, heartburn, or indigestion  Which foods should I limit or avoid? You may need to avoid acidic, spicy, or high-fat foods  Not all foods affect everyone the same way  You will need to learn which foods worsen your symptoms and limit those foods   The following are some foods that may worsen ulcer or gastritis symptoms:  · Beverages:      ¨ Whole milk and chocolate milk    ¨ Hot cocoa and cola    ¨ Any beverage with caffeine    ¨ Regular and decaffeinated coffee    ¨ Peppermint and spearmint tea    ¨ Green and black tea, with or without caffeine    ¨ Orange and grapefruit juices    ¨ Drinks that contain alcohol    · Spices and seasonings:      ¨ Black and red pepper    ¨ Chili powder    ¨ Mustard seed and nutmeg    · Other foods:      ¨ Dairy foods made from whole milk or cream    ¨ Chocolate    ¨ Spicy or strongly flavored cheeses, such as jalapeno or black pepper    ¨ Highly seasoned, high-fat meats, such as sausage, salami, zhao, ham, and cold cuts    ¨ Hot chiles and peppers    ¨ Tomato products, such as tomato paste, tomato sauce, or tomato juice  Which foods can I eat and drink? Eat a variety of healthy foods from all the food groups  Eat fruits, vegetables, whole grains, and fat-free or low-fat dairy foods  Whole grains include whole-wheat breads, cereals, pasta, and brown rice  Choose lean meats, poultry (chicken and turkey), fish, beans, eggs, and nuts  A healthy meal plan is low in unhealthy fats, salt, and added sugar  Healthy fats include olive oil and canola oil  Ask your dietitian for more information about a healthy meal plan  What other guidelines may be helpful? · Do not eat right before bedtime  Stop eating at least 2 hours before bedtime  · Eat small, frequent meals  Your stomach may tolerate small, frequent meals better than large meals  CARE AGREEMENT:   You have the right to help plan your care  Discuss treatment options with your caregivers to decide what care you want to receive  You always have the right to refuse treatment  The above information is an  only  It is not intended as medical advice for individual conditions or treatments  Talk to your doctor, nurse or pharmacist before following any medical regimen to see if it is safe and effective for you  © 2017 [address] Information is for End User's use only and may not be sold, redistributed or otherwise used for commercial purposes  All illustrations and images included in CareNotes® are the copyrighted property of [employer]  or [employer]

## 2020-07-15 ENCOUNTER — TELEPHONE (OUTPATIENT)
Dept: INTERNAL MEDICINE CLINIC | Facility: CLINIC | Age: 45
End: 2020-07-15

## 2020-07-15 NOTE — TELEPHONE ENCOUNTER
Patient called in stating she has been experiencing fatigue, tired, short of breath, chest tightness for about 5 days  Patient was recently seen by PCP and cardiology for these symptoms    Patient was advised if these symptoms do get worst to go to the urgent care or ED

## 2020-07-22 ENCOUNTER — OFFICE VISIT (OUTPATIENT)
Dept: GASTROENTEROLOGY | Facility: CLINIC | Age: 45
End: 2020-07-22
Payer: COMMERCIAL

## 2020-07-22 VITALS
HEIGHT: 66 IN | TEMPERATURE: 98.4 F | SYSTOLIC BLOOD PRESSURE: 104 MMHG | BODY MASS INDEX: 31.5 KG/M2 | WEIGHT: 196 LBS | HEART RATE: 88 BPM | DIASTOLIC BLOOD PRESSURE: 74 MMHG

## 2020-07-22 DIAGNOSIS — R10.13 EPIGASTRIC ABDOMINAL PAIN: Primary | ICD-10-CM

## 2020-07-22 DIAGNOSIS — R19.7 DIARRHEA, UNSPECIFIED TYPE: ICD-10-CM

## 2020-07-22 DIAGNOSIS — R10.13 EPIGASTRIC PAIN: ICD-10-CM

## 2020-07-22 PROCEDURE — 99243 OFF/OP CNSLTJ NEW/EST LOW 30: CPT | Performed by: STUDENT IN AN ORGANIZED HEALTH CARE EDUCATION/TRAINING PROGRAM

## 2020-07-22 NOTE — ASSESSMENT & PLAN NOTE
· Suspect PPI side-effect based on timing, but could also be related to thyroid disease  · No alarm symptoms, no family history of GI malignancies or IBD  · Continue current care  · Will defer colonoscopy for now but will discuss at subsequent follow-up as patient approaches age indication for CRC screening

## 2020-07-22 NOTE — PROGRESS NOTES
[facility] - Outpatient Consultation  Ambrosio Valenzuela 41 y o  female MRN: [id number]  Encounter: [id number]      ASSESSMENT AND PLAN:      Problem List Items Addressed This Visit        Other    Epigastric abdominal pain - Primary     · Etiology unclear but possibly NSAID-related PUD given reported history of frequent NSAID use versus H pylori infection versus other  · Continue omeprazole 40 mg daily  · Will schedule patient for EGD for evaluation of suspected ulcer  · Trial Magic Mouthwash every 4 hours as needed for additional relief of epigastric symptoms  · Advised to avoid any further NSAID use         Relevant Medications    al mag oxide-diphenhydramine-lidocaine viscous (MAGIC MOUTHWASH) 1:1:1 suspension    Other Relevant Orders    EGD    PAT Covid Screening    Diarrhea     · Suspect PPI side-effect based on timing, but could also be related to thyroid disease  · No alarm symptoms, no family history of GI malignancies or IBD  · Continue current care  · Will defer colonoscopy for now but will discuss at subsequent follow-up as patient approaches age indication for CRC screening           Other Visit Diagnoses     Epigastric pain            ______________________________________________________________________    HPI:  72-year-old female referred to GI by PCP for evaluation of epigastric abdominal pain  Reports pain started approximately 2 months ago after taking antibiotics for dental-related infection  Reports she experienced burning epigastric pain which has worsened since onset  Now described as more sharp and gnawing in quality, non-radiating, worsened with food intake  Patient denies any nausea, vomiting, hematemesis, dysphagia, odynophagia, constipation, hematochezia, melena, or unintentional weight loss  Patient does report diarrhea for the last 2 months or so when she was started on PPI  Suspect this is medication related   Nonsmoker, admits to only social alcohol consumption  Denies any previous endoscopy or colonoscopy  Denies family history of GI malignancies or IBD  Patient does state that she used to take naproxen on a daily basis for back pain  She was taking naproxen for nearly 5 years and stopped approximately 2 months ago when she started taking the antibiotic      REVIEW OF SYSTEMS:    CONSTITUTIONAL: Denies any fever, chills, rigors, and weight loss  HEENT: No earache or tinnitus, denies hearing loss or visual disturbances  CARDIOVASCULAR: No chest pain or palpitations  RESPIRATORY: Denies any cough, hemoptysis, shortness of breath or dyspnea on exertion  GASTROINTESTINAL: As noted in the History of Present Illness  GENITOURINARY: No problems with urination, denies any hematuria or dysuria  NEUROLOGIC: No dizziness or vertigo, denies headaches   MUSCULOSKELETAL: Denies any muscle or joint pain   SKIN: Denies skin rashes or itching  ENDOCRINE: Denies excessive thirst, denies intolerance to heat or cold  PSYCHOSOCIAL: Denies depression or anxiety, denies any recent memory loss     Historical Information   Past Medical History:   Diagnosis Date    Abdominal pain     Anemia     Anxiety     Back pain     Bilateral fibrocystic breast changes     resolved: 02/21/2017    Breast disorder     Chest pain     Chronic pelvic pain in female     last assessed: 09/07/2016    Cluster headaches     Constipation     Cough     Depression     Difficulty concentrating     Disease of thyroid gland     Dizziness     Dyspareunia in female     last assessed: 08/18/2016    Easy bruising     Fainting episodes     Fatigue     Fever due to infection     Fibromyalgia     Frequent urination at night     Gallbladder disease     Herniated intervertebral disc of lumbar spine     Hypotension     Loss of bladder control     Memory loss     last assessed: 06/15/2015    Mixed incontinence     last assessed: 01/11/2016    Myofascial pain     last assessed: 06/01/2016    Numbness and tingling     Painful swelling of joint     Palpitations     Panic attack     Sciatica     last assessed: 06/15/2015    Situational anxiety     last assessed: 02/21/2017    SOB (shortness of breath)     Thyroid disease     Uterus, adenomyosis     last assessed: 09/07/2016    Weakness     Weight disorder      Past Surgical History:   Procedure Laterality Date    CHOLECYSTECTOMY LAPAROSCOPIC N/A 10/22/2017    Procedure: CHOLECYSTECTOMY LAPAROSCOPIC;  Surgeon: Marcell Mccormick MD;  [facility];  Service: General    ENDOMETRIAL BIOPSY      resolved: 02/21/2017    HYSTERECTOMY  2016    FL VAGINAL HYSTERECTOMY,UTERUS 250 GMS/< N/A 4/20/2018    Procedure: HYSTERECTOMY VAGINAL TOTAL (TVH), BILATERAL SALPINGECTOMY;  Surgeon: Radha Chambers MD;  [facility];  Service: Gynecology    TUBAL LIGATION      last assessed: 10/20/2014     Social History   Social History     Substance and Sexual Activity   Alcohol Use Yes    Frequency: Monthly or less     Social History     Substance and Sexual Activity   Drug Use Never     Social History     Tobacco Use   Smoking Status Never Smoker   Smokeless Tobacco Never Used     Family History   Problem Relation Age of Onset    Hypertension Mother     Arthritis Mother     Diabetes Maternal Grandmother     Breast cancer Family     Cancer Family     Diabetes Family     Heart disease Family     Hyperlipidemia Family         reported cholesterol level was high    Hypertension Other     Hyperlipidemia Father     No Known Problems Sister     No Known Problems Daughter     Breast cancer Paternal Grandmother 28    Leukemia Paternal Aunt     No Known Problems Sister     No Known Problems Sister     No Known Problems Sister     No Known Problems Sister     No Known Problems Daughter     No Known Problems Paternal Aunt        Meds/Allergies     (Not in a hospital admission)  No current facility-administered medications for this visit  Allergies   Allergen Reactions    Morphine Chest Pain    Iv Contrast [Iodinated Diagnostic Agents] Hives         PHYSICAL EXAM:      Objective   Blood pressure 104/74, pulse 88, temperature 98 4 °F (36 9 °C), temperature source Tympanic, height 5' 6" (1 676 m), weight 88 9 kg (196 lb), last menstrual period 02/28/2018  Body mass index is 31 64 kg/m²  General Appearance:   Alert, cooperative, no distress   HEENT:   Normocephalic, atraumatic, anicteric     Neck:   Supple, symmetrical, trachea midline   Lungs:   Clear to auscultation bilaterally; no rales, rhonchi or wheezing; respirations unlabored    Heart:   Regular rate and rhythm; no murmur, rub, or gallop   Abdomen:   Soft, tenderness in epigastrium with palpation, non-distended; normal bowel sounds; no masses, no organomegaly    Genitalia:   Deferred    Rectal:   Deferred    Extremities:   No cyanosis, clubbing or edema    Pulses:   2+ and symmetric all extremities    Skin:   No jaundice, rashes, or lesions    Lymph Nodes:   No palpable cervical lymphadenopathy        LAB RESULTS:     No visits with results within 1 Day(s) from this visit     Latest known visit with results is:   Admission on 06/17/2020, Discharged on 06/18/2020   Component Date Value    WBC 06/18/2020 8 62     RBC 06/18/2020 4 47     Hemoglobin 06/18/2020 12 7     Hematocrit 06/18/2020 39 4     MCV 06/18/2020 88     MCH 06/18/2020 28 4     MCHC 06/18/2020 32 2     RDW 06/18/2020 13 2     MPV 06/18/2020 11 3     Platelets 43/18/9485 200     nRBC 06/18/2020 0     Neutrophils Relative 06/18/2020 63     Immat GRANS % 06/18/2020 0     Lymphocytes Relative 06/18/2020 25     Monocytes Relative 06/18/2020 9     Eosinophils Relative 06/18/2020 2     Basophils Relative 06/18/2020 1     Neutrophils Absolute 06/18/2020 5 45     Immature Grans Absolute 06/18/2020 0 03     Lymphocytes Absolute 06/18/2020 2 14     Monocytes Absolute 06/18/2020 0 81     Eosinophils Absolute 06/18/2020 0 14     Basophils Absolute 06/18/2020 0 05     Sodium 06/18/2020 134*    Potassium 06/18/2020 3 7     Chloride 06/18/2020 101     CO2 06/18/2020 30     ANION GAP 06/18/2020 3*    BUN 06/18/2020 12     Creatinine 06/18/2020 0 70     Glucose 06/18/2020 102     Calcium 06/18/2020 8 4     AST 06/18/2020 13     ALT 06/18/2020 26     Alkaline Phosphatase 06/18/2020 71     Total Protein 06/18/2020 8 0     Albumin 06/18/2020 3 5     Total Bilirubin 06/18/2020 0 25     eGFR 06/18/2020 106     Troponin I 06/18/2020 <0 02     Lipase 06/18/2020 206     Ventricular Rate 06/18/2020 76     Atrial Rate 06/18/2020 76     SC Interval 06/18/2020 162     QRSD Interval 06/18/2020 84     QT Interval 06/18/2020 410     QTC Interval 06/18/2020 461     P Axis 06/18/2020 39     QRS Axis 06/18/2020 35     T Wave Axis 06/18/2020 9        RADIOLOGY RESULTS: I have personally reviewed pertinent imaging studies     ==  Archana Lozada, DO  Gastroenterology Fellow, PGY-4  [facility]

## 2020-07-22 NOTE — ASSESSMENT & PLAN NOTE
· Etiology unclear but possibly NSAID-related PUD given reported history of frequent NSAID use versus H pylori infection versus other  · Continue omeprazole 40 mg daily  · Will schedule patient for EGD for evaluation of suspected ulcer  · Trial Magic Mouthwash every 4 hours as needed for additional relief of epigastric symptoms  · Advised to avoid any further NSAID use

## 2020-07-24 ENCOUNTER — ANESTHESIA EVENT (OUTPATIENT)
Dept: GASTROENTEROLOGY | Facility: AMBULARY SURGERY CENTER | Age: 45
End: 2020-07-24

## 2020-07-27 DIAGNOSIS — R10.13 EPIGASTRIC ABDOMINAL PAIN: ICD-10-CM

## 2020-07-27 PROCEDURE — U0003 INFECTIOUS AGENT DETECTION BY NUCLEIC ACID (DNA OR RNA); SEVERE ACUTE RESPIRATORY SYNDROME CORONAVIRUS 2 (SARS-COV-2) (CORONAVIRUS DISEASE [COVID-19]), AMPLIFIED PROBE TECHNIQUE, MAKING USE OF HIGH THROUGHPUT TECHNOLOGIES AS DESCRIBED BY CMS-2020-01-R: HCPCS

## 2020-07-28 LAB — SARS-COV-2 RNA SPEC QL NAA+PROBE: NOT DETECTED

## 2020-08-03 ENCOUNTER — HOSPITAL ENCOUNTER (OUTPATIENT)
Dept: GASTROENTEROLOGY | Facility: AMBULARY SURGERY CENTER | Age: 45
Setting detail: OUTPATIENT SURGERY
Discharge: HOME | End: 2020-08-03
Admitting: INTERNAL MEDICINE
Payer: COMMERCIAL

## 2020-08-03 ENCOUNTER — ANESTHESIA (OUTPATIENT)
Dept: GASTROENTEROLOGY | Facility: AMBULARY SURGERY CENTER | Age: 45
End: 2020-08-03

## 2020-08-03 VITALS
DIASTOLIC BLOOD PRESSURE: 65 MMHG | RESPIRATION RATE: 15 BRPM | SYSTOLIC BLOOD PRESSURE: 106 MMHG | WEIGHT: 196 LBS | HEIGHT: 65 IN | HEART RATE: 68 BPM | TEMPERATURE: 97.8 F | OXYGEN SATURATION: 99 % | BODY MASS INDEX: 32.65 KG/M2

## 2020-08-03 DIAGNOSIS — R10.13 EPIGASTRIC ABDOMINAL PAIN: ICD-10-CM

## 2020-08-03 PROCEDURE — 88305 TISSUE EXAM BY PATHOLOGIST: CPT | Performed by: PATHOLOGY

## 2020-08-03 PROCEDURE — 88342 IMHCHEM/IMCYTCHM 1ST ANTB: CPT | Performed by: PATHOLOGY

## 2020-08-03 PROCEDURE — 43239 EGD BIOPSY SINGLE/MULTIPLE: CPT | Performed by: INTERNAL MEDICINE

## 2020-08-03 RX ORDER — PROPOFOL 10 MG/ML
INJECTION, EMULSION INTRAVENOUS AS NEEDED
Status: DISCONTINUED | OUTPATIENT
Start: 2020-08-03 | End: 2020-08-03

## 2020-08-03 RX ORDER — SODIUM CHLORIDE, SODIUM LACTATE, POTASSIUM CHLORIDE, CALCIUM CHLORIDE 600; 310; 30; 20 MG/100ML; MG/100ML; MG/100ML; MG/100ML
100 INJECTION, SOLUTION INTRAVENOUS CONTINUOUS
Status: DISCONTINUED | OUTPATIENT
Start: 2020-08-03 | End: 2020-08-07 | Stop reason: HOSPADM

## 2020-08-03 RX ORDER — LIDOCAINE HYDROCHLORIDE 10 MG/ML
INJECTION, SOLUTION EPIDURAL; INFILTRATION; INTRACAUDAL; PERINEURAL AS NEEDED
Status: DISCONTINUED | OUTPATIENT
Start: 2020-08-03 | End: 2020-08-03

## 2020-08-03 RX ADMIN — PROPOFOL 50 MG: 10 INJECTION, EMULSION INTRAVENOUS at 12:21

## 2020-08-03 RX ADMIN — LIDOCAINE HYDROCHLORIDE 50 MG: 10 INJECTION, SOLUTION EPIDURAL; INFILTRATION; INTRACAUDAL; PERINEURAL at 12:17

## 2020-08-03 RX ADMIN — SODIUM CHLORIDE, SODIUM LACTATE, POTASSIUM CHLORIDE, AND CALCIUM CHLORIDE 100 ML/HR: .6; .31; .03; .02 INJECTION, SOLUTION INTRAVENOUS at 11:14

## 2020-08-03 RX ADMIN — PROPOFOL 30 MG: 10 INJECTION, EMULSION INTRAVENOUS at 12:19

## 2020-08-03 RX ADMIN — PROPOFOL 120 MG: 10 INJECTION, EMULSION INTRAVENOUS at 12:17

## 2020-08-03 NOTE — H&P
History and Physical - [facility]  Adryan Johnson 41 y o  female MRN: [id number]                  HPI: Adryan Johnson is a 41y o  year old female who presents for EGD      REVIEW OF SYSTEMS: Per the HPI, and otherwise unremarkable      Historical Information   Past Medical History:   Diagnosis Date    Abdominal pain     Anemia     Anxiety     Back pain     Bilateral fibrocystic breast changes     resolved: 02/21/2017    Breast disorder     Chest pain     Chronic pelvic pain in female     last assessed: 09/07/2016    Cluster headaches     Constipation     Cough     Depression     Difficulty concentrating     Disease of thyroid gland     Dizziness     Dyspareunia in female     last assessed: 08/18/2016    Easy bruising     Fainting episodes     Fatigue     Fever due to infection     Fibromyalgia     Frequent urination at night     Gallbladder disease     Herniated intervertebral disc of lumbar spine     Hypotension     Loss of bladder control     Memory loss     last assessed: 06/15/2015    Mixed incontinence     last assessed: 01/11/2016    Myofascial pain     last assessed: 06/01/2016    Numbness and tingling     Painful swelling of joint     Palpitations     Panic attack     Sciatica     last assessed: 06/15/2015    Situational anxiety     last assessed: 02/21/2017    SOB (shortness of breath)     Thyroid disease     Uterus, adenomyosis     last assessed: 09/07/2016    Weakness     Weight disorder      Past Surgical History:   Procedure Laterality Date    CHOLECYSTECTOMY      CHOLECYSTECTOMY LAPAROSCOPIC N/A 10/22/2017    Procedure: CHOLECYSTECTOMY LAPAROSCOPIC;  Surgeon: Joshua Valdes MD;  [facility];  Service: General    ENDOMETRIAL BIOPSY      resolved: 02/21/2017    HYSTERECTOMY  2016    MS VAGINAL HYSTERECTOMY,UTERUS 250 GMS/< N/A 4/20/2018    Procedure: HYSTERECTOMY VAGINAL TOTAL (TVH), BILATERAL SALPINGECTOMY;  Surgeon: Jaguar Butler MD;  [facility];  Service: Gynecology    TUBAL LIGATION      last assessed: 10/20/2014     Social History   Social History     Substance and Sexual Activity   Alcohol Use Yes    Frequency: Monthly or less     Social History     Substance and Sexual Activity   Drug Use Never     Social History     Tobacco Use   Smoking Status Never Smoker   Smokeless Tobacco Never Used     Family History   Problem Relation Age of Onset    Hypertension Mother     Arthritis Mother     Diabetes Maternal Grandmother     Breast cancer Family     Cancer Family     Diabetes Family     Heart disease Family     Hyperlipidemia Family         reported cholesterol level was high    Hypertension Other     Hyperlipidemia Father     No Known Problems Sister     No Known Problems Daughter     Breast cancer Paternal Grandmother 28    Leukemia Paternal Aunt     No Known Problems Sister     No Known Problems Sister     No Known Problems Sister     No Known Problems Sister     No Known Problems Daughter     No Known Problems Paternal Aunt        Meds/Allergies     (Not in a hospital admission)      Allergies   Allergen Reactions    Morphine Chest Pain    Iv Contrast [Iodinated Diagnostic Agents] Hives       Objective     BP 93/53   Pulse 86   Temp 97 6 °F (36 4 °C) (Temporal)   Resp 20   Ht 5' 5"   Wt 88 9 kg (196 lb)   LMP 02/28/2018 (Exact Date)   SpO2 96%   BMI 32 62 kg/m²       PHYSICAL EXAM    Gen: NAD  CV: RRR  CHEST: Clear  ABD: soft, NT/ND  EXT: no edema      ASSESSMENT/PLAN:  This is a 41y o  year old female here for EGD, and she is stable and optimized for her procedure

## 2020-08-03 NOTE — ANESTHESIA PREPROCEDURE EVALUATION
Procedure:  EGD    Relevant Problems   ENDO   (+) Hypothyroidism due to Hashimoto's thyroiditis      GI/HEPATIC  epigastric pain      GYN  s/p tubal      NEURO/PSYCH   (+) Fibromyalgia   (+) Generalized anxiety disorder      PULMONARY  rare inhaler use for allergy induced bronchospasm (1x per year)      Other   (+) Class 1 obesity due to excess calories without serious comorbidity with body mass index (BMI) of 32 0 to 32 9 in adult        Physical Exam    Airway    Mallampati score: II  TM Distance: >3 FB  Neck ROM: full     Dental   No notable dental hx     Cardiovascular      Pulmonary      Other Findings       Lab Results   Component Value Date    WBC 8 62 06/18/2020    HGB 12 7 06/18/2020     06/18/2020     Lab Results   Component Value Date    SODIUM 134 (L) 06/18/2020    K 3 7 06/18/2020    BUN 12 06/18/2020    CREATININE 0 70 06/18/2020    EGFR 106 06/18/2020    GLUCOSE 106 04/06/2015     COVID neg 7/27    Anesthesia Plan  ASA Score- 2     Anesthesia Type- IV sedation with anesthesia with ASA Monitors  Additional Monitors:   Airway Plan:           Plan Factors-Exercise tolerance (METS): >4 METS  Chart reviewed  Existing labs reviewed  Patient summary reviewed  Patient is not a current smoker  Induction- intravenous  Postoperative Plan-     Informed Consent- Anesthetic plan and risks discussed with patient  I personally reviewed this patient with the CRNA  Discussed and agreed on the Anesthesia Plan with the CRNA  Dayne Rubio

## 2020-08-05 ENCOUNTER — APPOINTMENT (EMERGENCY)
Dept: RADIOLOGY | Facility: HOSPITAL | Age: 45
End: 2020-08-05
Payer: COMMERCIAL

## 2020-08-05 ENCOUNTER — HOSPITAL ENCOUNTER (EMERGENCY)
Facility: HOSPITAL | Age: 45
Discharge: HOME | End: 2020-08-05
Attending: EMERGENCY MEDICINE
Payer: COMMERCIAL

## 2020-08-05 VITALS
RESPIRATION RATE: 18 BRPM | SYSTOLIC BLOOD PRESSURE: 147 MMHG | DIASTOLIC BLOOD PRESSURE: 65 MMHG | WEIGHT: 196 LBS | TEMPERATURE: 99 F | OXYGEN SATURATION: 98 % | BODY MASS INDEX: 32.62 KG/M2 | HEART RATE: 68 BPM

## 2020-08-05 DIAGNOSIS — R07.9 CHEST PAIN, UNSPECIFIED TYPE: Primary | ICD-10-CM

## 2020-08-05 LAB
ALBUMIN SERPL BCP-MCNC: 3.4 G/DL (ref 3.5–5)
ALP SERPL-CCNC: 71 U/L (ref 46–116)
ALT SERPL W P-5'-P-CCNC: 19 U/L (ref 12–78)
ANION GAP SERPL CALCULATED.3IONS-SCNC: 7 MMOL/L (ref 4–13)
AST SERPL W P-5'-P-CCNC: 12 U/L (ref 5–45)
BASOPHILS # BLD AUTO: 0.04 THOUSANDS/ΜL (ref 0–0.1)
BASOPHILS NFR BLD AUTO: 1 % (ref 0–1)
BILIRUB SERPL-MCNC: 0.38 MG/DL (ref 0.2–1)
BUN SERPL-MCNC: 15 MG/DL (ref 5–25)
CALCIUM SERPL-MCNC: 8.5 MG/DL (ref 8.3–10.1)
CHLORIDE SERPL-SCNC: 107 MMOL/L (ref 100–108)
CO2 SERPL-SCNC: 25 MMOL/L (ref 21–32)
CREAT SERPL-MCNC: 0.79 MG/DL (ref 0.6–1.3)
EOSINOPHIL # BLD AUTO: 0.12 THOUSAND/ΜL (ref 0–0.61)
EOSINOPHIL NFR BLD AUTO: 2 % (ref 0–6)
ERYTHROCYTE [DISTWIDTH] IN BLOOD BY AUTOMATED COUNT: 13.5 % (ref 11.6–15.1)
GFR SERPL CREATININE-BSD FRML MDRD: 91 ML/MIN/1.73SQ M
GLUCOSE SERPL-MCNC: 110 MG/DL (ref 65–140)
HCT VFR BLD AUTO: 42.3 % (ref 34.8–46.1)
HGB BLD-MCNC: 13.7 G/DL (ref 11.5–15.4)
IMM GRANULOCYTES # BLD AUTO: 0.03 THOUSAND/UL (ref 0–0.2)
IMM GRANULOCYTES NFR BLD AUTO: 0 % (ref 0–2)
LYMPHOCYTES # BLD AUTO: 2.24 THOUSANDS/ΜL (ref 0.6–4.47)
LYMPHOCYTES NFR BLD AUTO: 28 % (ref 14–44)
MCH RBC QN AUTO: 28.8 PG (ref 26.8–34.3)
MCHC RBC AUTO-ENTMCNC: 32.4 G/DL (ref 31.4–37.4)
MCV RBC AUTO: 89 FL (ref 82–98)
MONOCYTES # BLD AUTO: 0.6 THOUSAND/ΜL (ref 0.17–1.22)
MONOCYTES NFR BLD AUTO: 7 % (ref 4–12)
NEUTROPHILS # BLD AUTO: 5.03 THOUSANDS/ΜL (ref 1.85–7.62)
NEUTS SEG NFR BLD AUTO: 62 % (ref 43–75)
NRBC BLD AUTO-RTO: 0 /100 WBCS
PLATELET # BLD AUTO: 235 THOUSANDS/UL (ref 149–390)
PMV BLD AUTO: 10.8 FL (ref 8.9–12.7)
POTASSIUM SERPL-SCNC: 3.6 MMOL/L (ref 3.5–5.3)
PROT SERPL-MCNC: 8.2 G/DL (ref 6.4–8.2)
RBC # BLD AUTO: 4.76 MILLION/UL (ref 3.81–5.12)
SODIUM SERPL-SCNC: 139 MMOL/L (ref 136–145)
TROPONIN I SERPL-MCNC: <0.02 NG/ML
TROPONIN I SERPL-MCNC: <0.02 NG/ML
WBC # BLD AUTO: 8.06 THOUSAND/UL (ref 4.31–10.16)

## 2020-08-05 PROCEDURE — 99285 EMERGENCY DEPT VISIT HI MDM: CPT | Performed by: EMERGENCY MEDICINE

## 2020-08-05 PROCEDURE — 84484 ASSAY OF TROPONIN QUANT: CPT | Performed by: EMERGENCY MEDICINE

## 2020-08-05 PROCEDURE — 85025 COMPLETE CBC W/AUTO DIFF WBC: CPT | Performed by: EMERGENCY MEDICINE

## 2020-08-05 PROCEDURE — 96372 THER/PROPH/DIAG INJ SC/IM: CPT

## 2020-08-05 PROCEDURE — 93005 ELECTROCARDIOGRAM TRACING: CPT

## 2020-08-05 PROCEDURE — 99285 EMERGENCY DEPT VISIT HI MDM: CPT

## 2020-08-05 PROCEDURE — 71045 X-RAY EXAM CHEST 1 VIEW: CPT

## 2020-08-05 PROCEDURE — 36415 COLL VENOUS BLD VENIPUNCTURE: CPT

## 2020-08-05 PROCEDURE — 80053 COMPREHEN METABOLIC PANEL: CPT | Performed by: EMERGENCY MEDICINE

## 2020-08-05 RX ORDER — KETOROLAC TROMETHAMINE 30 MG/ML
15 INJECTION, SOLUTION INTRAMUSCULAR; INTRAVENOUS ONCE
Status: COMPLETED | OUTPATIENT
Start: 2020-08-05 | End: 2020-08-05

## 2020-08-05 RX ADMIN — KETOROLAC TROMETHAMINE 15 MG: 30 INJECTION, SOLUTION INTRAMUSCULAR at 03:58

## 2020-08-05 NOTE — ED ATTENDING ATTESTATION
8/5/2020  Ayden NAVARRO MD, saw and evaluated the patient  I have discussed the patient with the resident/non-physician practitioner and agree with the resident's/non-physician practitioner's findings, Plan of Care, and MDM as documented in the resident's/non-physician practitioner's note, except where noted  All available labs and Radiology studies were reviewed  I was present for key portions of any procedure(s) performed by the resident/non-physician practitioner and I was immediately available to provide assistance  At this point I agree with the current assessment done in the Emergency Department  I have conducted an independent evaluation of this patient a history and physical is as follows:    ED Course      Emergency Department Note- Cynthia Garcia 41 y o  female MRN: [id number]    Unit/Bed#: ED 25 Encounter: [id number]    Cynthia Garcia is a 41 y o  female who presents with   Chief Complaint   Patient presents with    Chest Pain     pt states midsternal chest pain that woke her up this morning radiating to back and L shoulder  History of Present Illness   HPI:  Cynthia Garcia is a 41 y o  female who presents for evaluation of:  Chest cramping  Patient developed abrupt onset of chest cramping that lasted about 1 minute in duration  Chest cramping radiated to her back  Patient has no H/O cardiac dz  Patient denies diaphoresis  Review of Systems   Constitutional: Positive for chills  Negative for fever  HENT: Negative for congestion and rhinorrhea  Respiratory: Positive for shortness of breath  Negative for cough  Cardiovascular: Positive for chest pain  Negative for palpitations  Gastrointestinal: Negative for nausea and vomiting  Neurological: Negative for light-headedness and headaches  All other systems reviewed and are negative        Historical Information   Past Medical History:   Diagnosis Date    Abdominal pain     Anemia     Anxiety     Back pain  Bilateral fibrocystic breast changes     resolved: 02/21/2017    Breast disorder     Chest pain     Chronic pelvic pain in female     last assessed: 09/07/2016    Cluster headaches     Constipation     Cough     Depression     Difficulty concentrating     Disease of thyroid gland     Dizziness     Dyspareunia in female     last assessed: 08/18/2016    Easy bruising     Fainting episodes     Fatigue     Fever due to infection     Fibromyalgia     Frequent urination at night     Gallbladder disease     Herniated intervertebral disc of lumbar spine     Hypotension     Loss of bladder control     Memory loss     last assessed: 06/15/2015    Mixed incontinence     last assessed: 01/11/2016    Myofascial pain     last assessed: 06/01/2016    Numbness and tingling     Painful swelling of joint     Palpitations     Panic attack     Sciatica     last assessed: 06/15/2015    Situational anxiety     last assessed: 02/21/2017    SOB (shortness of breath)     Thyroid disease     Uterus, adenomyosis     last assessed: 09/07/2016    Weakness     Weight disorder      Past Surgical History:   Procedure Laterality Date    CHOLECYSTECTOMY      CHOLECYSTECTOMY LAPAROSCOPIC N/A 10/22/2017    Procedure: CHOLECYSTECTOMY LAPAROSCOPIC;  Surgeon: Chao Cooper MD;  [facility];  Service: General    ENDOMETRIAL BIOPSY      resolved: 02/21/2017    HYSTERECTOMY  2016    FL VAGINAL HYSTERECTOMY,UTERUS 250 GMS/< N/A 4/20/2018    Procedure: HYSTERECTOMY VAGINAL TOTAL (TVH), BILATERAL SALPINGECTOMY;  Surgeon: Hussein Hendricks MD;  [facility];  Service: Gynecology    TUBAL LIGATION      last assessed: 10/20/2014     Social History   Social History     Substance and Sexual Activity   Alcohol Use Yes    Frequency: Monthly or less     Social History     Substance and Sexual Activity   Drug Use Never     Social History     Tobacco Use   Smoking Status Never Smoker   Smokeless Tobacco Never Used     Family History: non-contributory    Meds/Allergies   all medications and allergies reviewed  Allergies   Allergen Reactions    Morphine Chest Pain    Iv Contrast [Iodinated Diagnostic Agents] Hives       Objective   First Vitals:   Blood Pressure: 151/69 (20 0326)  Pulse: 84 (20 0326)  Temperature: 99 °F (37 2 °C) (20 0326)  Temp Source: Oral (20 0326)  Respirations: 18 (20 032)  Weight - Scale: 88 9 kg (196 lb) (20 0326)  SpO2: 97 % (20 0326)    Current Vitals:   Blood Pressure: 151/69 (20 0330)  Pulse: 72 (20 0330)  Temperature: 99 °F (37 2 °C) (20 0326)  Temp Source: Oral (20 0326)  Respirations: 20 (20 0330)  Weight - Scale: 88 9 kg (196 lb) (20 032)  SpO2: 97 % (20 0330)    No intake or output data in the 24 hours ending 20 0403    Invasive Devices     Peripheral Intravenous Line            Peripheral IV 20 Right Antecubital less than 1 day                Physical Exam   Constitutional: She is oriented to person, place, and time  She appears well-developed  HENT:   Head: Normocephalic and atraumatic  Cardiovascular: Normal rate and regular rhythm  Pulmonary/Chest: Effort normal and breath sounds normal    Abdominal: Soft  Bowel sounds are normal    Musculoskeletal: Normal range of motion  Neurological: She is alert and oriented to person, place, and time  Skin: Skin is warm and dry  Psychiatric: Her behavior is normal  Mood normal    Nursing note and vitals reviewed  Medical Decision Makin   Acute transient chest pain: low likelyhood of ACS ECG and Tn r/o ACS    Recent Results (from the past 36 hour(s))   CBC and differential    Collection Time: 20  3:30 AM   Result Value Ref Range    WBC 8 06 4 31 - 10 16 Thousand/uL    RBC 4 76 3 81 - 5 12 Million/uL    Hemoglobin 13 7 11 5 - 15 4 g/dL    Hematocrit 42 3 34 8 - 46 1 %    MCV 89 82 - 98 fL    MCH 28 8 26 8 - 34 3 pg    MCHC 32 4 31 4 - 37 4 g/dL    RDW 13 5 11 6 - 15 1 %    MPV 10 8 8 9 - 12 7 fL    Platelets 613 838 - 861 Thousands/uL    nRBC 0 /100 WBCs    Neutrophils Relative 62 43 - 75 %    Immat GRANS % 0 0 - 2 %    Lymphocytes Relative 28 14 - 44 %    Monocytes Relative 7 4 - 12 %    Eosinophils Relative 2 0 - 6 %    Basophils Relative 1 0 - 1 %    Neutrophils Absolute 5 03 1 85 - 7 62 Thousands/µL    Immature Grans Absolute 0 03 0 00 - 0 20 Thousand/uL    Lymphocytes Absolute 2 24 0 60 - 4 47 Thousands/µL    Monocytes Absolute 0 60 0 17 - 1 22 Thousand/µL    Eosinophils Absolute 0 12 0 00 - 0 61 Thousand/µL    Basophils Absolute 0 04 0 00 - 0 10 Thousands/µL   Comprehensive metabolic panel    Collection Time: 08/05/20  3:30 AM   Result Value Ref Range    Sodium 139 136 - 145 mmol/L    Potassium 3 6 3 5 - 5 3 mmol/L    Chloride 107 100 - 108 mmol/L    CO2 25 21 - 32 mmol/L    ANION GAP 7 4 - 13 mmol/L    BUN 15 5 - 25 mg/dL    Creatinine 0 79 0 60 - 1 30 mg/dL    Glucose 110 65 - 140 mg/dL    Calcium 8 5 8 3 - 10 1 mg/dL    AST 12 5 - 45 U/L    ALT 19 12 - 78 U/L    Alkaline Phosphatase 71 46 - 116 U/L    Total Protein 8 2 6 4 - 8 2 g/dL    Albumin 3 4 (L) 3 5 - 5 0 g/dL    Total Bilirubin 0 38 0 20 - 1 00 mg/dL    eGFR 91 ml/min/1 73sq m     XR chest 1 view portable    (Results Pending)         Portions of the record may have been created with voice recognition software  Occasional wrong word or "sound a like" substitutions may have occurred due to the inherent limitations of voice recognition software  Read the chart carefully and recognize, using context, where substitutions have occurred            Critical Care Time  Procedures

## 2020-08-05 NOTE — DISCHARGE INSTRUCTIONS
You have been seen for chest pain  You should return to the ED if you develop worsening chest pain, trouble breathing, or other worsening symptoms  Follow up with your primary care doctor  You may take advil or tylenol for pain  You may take medications for reflux as needed

## 2020-08-05 NOTE — ED PROVIDER NOTES
History  Chief Complaint   Patient presents with    Chest Pain     pt states midsternal chest pain that woke her up this morning radiating to back and L shoulder  56-year-old female with a past medical history of hypertension, GERD, and fibromyalgia presents with chest pain  Patient states that it began today at 2 in the morning and woke her from sleep  It was a sudden sharp pain in the center of her chest   It radiates to her back  It is not reproducible or pleuritic  Patient says that she does have some trouble breathing when the pain is maximal   The pain has not been constant since it started but it comes in episodes  She always has some minor pain, but at times it gets worse  She also feels like her left leg is "more tired" than her right leg  It is not weak or numb  She tried using [employer] and Pepcid to relieve pain but it did not work  Patient denies any heavy lifting or straining of her chest muscles yesterday  She follows with a cardiologist due to her hypertension  She had an echo many years ago, which she believes to have been normal   She does not smoke, use estrogen containing products, have any recent immobilization, or have any recent surgeries  She has no family history of cardiac disease  She has not had a recent URI  Prior to Admission Medications   Prescriptions Last Dose Informant Patient Reported? Taking?    al mag oxide-diphenhydramine-lidocaine viscous (MAGIC MOUTHWASH) 1:1:1 suspension   No No   Sig: Swish and swallow 10 mL every 4 (four) hours as needed (abdominal pain)   famotidine (PEPCID) 40 MG tablet 8/4/2020 at Unknown time  No Yes   Sig: Take 0 5 tablets (20 mg total) by mouth 2 (two) times a day   levothyroxine 88 mcg tablet   No No   Sig: Take 1 tablet (88 mcg total) by mouth daily   omeprazole (PriLOSEC) 40 MG capsule 8/4/2020 at Unknown time  No Yes   Sig: Take 1 tablet daily 30 minutes before dinner      Facility-Administered Medications: None Past Medical History:   Diagnosis Date    Abdominal pain     Anemia     Anxiety     Back pain     Bilateral fibrocystic breast changes     resolved: 02/21/2017    Breast disorder     Chest pain     Chronic pelvic pain in female     last assessed: 09/07/2016    Cluster headaches     Constipation     Cough     Depression     Difficulty concentrating     Disease of thyroid gland     Dizziness     Dyspareunia in female     last assessed: 08/18/2016    Easy bruising     Fainting episodes     Fatigue     Fever due to infection     Fibromyalgia     Frequent urination at night     Gallbladder disease     Herniated intervertebral disc of lumbar spine     Hypotension     Loss of bladder control     Memory loss     last assessed: 06/15/2015    Mixed incontinence     last assessed: 01/11/2016    Myofascial pain     last assessed: 06/01/2016    Numbness and tingling     Painful swelling of joint     Palpitations     Panic attack     Sciatica     last assessed: 06/15/2015    Situational anxiety     last assessed: 02/21/2017    SOB (shortness of breath)     Thyroid disease     Uterus, adenomyosis     last assessed: 09/07/2016    Weakness     Weight disorder        Past Surgical History:   Procedure Laterality Date    CHOLECYSTECTOMY      CHOLECYSTECTOMY LAPAROSCOPIC N/A 10/22/2017    Procedure: CHOLECYSTECTOMY LAPAROSCOPIC;  Surgeon: Aviva Ribera MD;  [facility];  Service: General    ENDOMETRIAL BIOPSY      resolved: 02/21/2017    HYSTERECTOMY  2016    ND VAGINAL HYSTERECTOMY,UTERUS 250 GMS/< N/A 4/20/2018    Procedure: HYSTERECTOMY VAGINAL TOTAL (TVH), BILATERAL SALPINGECTOMY;  Surgeon: Aura Nielsen MD;  [facility];  Service: Gynecology    TUBAL LIGATION      last assessed: 10/20/2014       Family History   Problem Relation Age of Onset    Hypertension Mother    Yu Hoang Arthritis Mother     Diabetes Maternal Grandmother     Breast cancer Family     Cancer Family     Diabetes Family     Heart disease Family     Hyperlipidemia Family         reported cholesterol level was high    Hypertension Other     Hyperlipidemia Father     No Known Problems Sister     No Known Problems Daughter     Breast cancer Paternal Grandmother 28    Leukemia Paternal Aunt     No Known Problems Sister     No Known Problems Sister     No Known Problems Sister     No Known Problems Sister     No Known Problems Daughter     No Known Problems Paternal Aunt      I have reviewed and agree with the history as documented  E-Cigarette/Vaping    E-Cigarette Use Never User      E-Cigarette/Vaping Substances    Nicotine No     THC No     CBD No     Flavoring No     Other No     Unknown No      Social History     Tobacco Use    Smoking status: Never Smoker    Smokeless tobacco: Never Used   Substance Use Topics    Alcohol use: Yes     Frequency: Monthly or less    Drug use: Never        Review of Systems   Constitutional: Negative for chills and fever  HENT: Negative for rhinorrhea, sinus pressure, sinus pain and sore throat  Respiratory: Negative for cough, chest tightness, shortness of breath and wheezing  Cardiovascular: Positive for chest pain  Negative for palpitations and leg swelling  Gastrointestinal: Negative for diarrhea, nausea and vomiting  Musculoskeletal: Positive for back pain  Negative for gait problem and myalgias  Skin: Negative for color change, pallor and rash  Neurological: Negative for dizziness, syncope, weakness, light-headedness, numbness and headaches         Physical Exam  ED Triage Vitals   Temperature Pulse Respirations Blood Pressure SpO2   08/05/20 0326 08/05/20 0326 08/05/20 0326 08/05/20 0326 08/05/20 0326   99 °F (37 2 °C) 84 18 151/69 97 %      Temp Source Heart Rate Source Patient Position - Orthostatic VS BP Location FiO2 (%)   08/05/20 0326 08/05/20 0330 08/05/20 0326 08/05/20 0326 --   Oral Monitor Lying Right arm Pain Score       08/05/20 0326       5             Orthostatic Vital Signs  Vitals:    08/05/20 0326 08/05/20 0330 08/05/20 0534   BP: 151/69 151/69 147/65   Pulse: 84 72 68   Patient Position - Orthostatic VS: Lying Lying Lying       Physical Exam  Constitutional:       Appearance: She is well-developed  HENT:      Head: Normocephalic and atraumatic  Neck:      Musculoskeletal: Normal range of motion and neck supple  Vascular: No JVD  Cardiovascular:      Rate and Rhythm: Normal rate and regular rhythm  Pulses:           Radial pulses are 2+ on the right side and 2+ on the left side  Posterior tibial pulses are 2+ on the right side and 2+ on the left side  Heart sounds: Normal heart sounds  Heart sounds not distant  No murmur  No friction rub  Pulmonary:      Effort: Pulmonary effort is normal  No tachypnea, accessory muscle usage or respiratory distress  Breath sounds: No decreased breath sounds, wheezing, rhonchi or rales  Chest:      Chest wall: No tenderness  Abdominal:      Palpations: Abdomen is soft  Tenderness: There is no abdominal tenderness  There is no guarding or rebound  Musculoskeletal:      Right lower leg: She exhibits no tenderness  No edema  Left lower leg: She exhibits no tenderness  No edema  Skin:     General: Skin is warm and dry  Coloration: Skin is not cyanotic  Findings: No erythema  Neurological:      General: No focal deficit present  Mental Status: She is alert and oriented to person, place, and time           ED Medications  Medications   ketorolac (TORADOL) injection 15 mg (15 mg Intramuscular Given 8/5/20 0358)       Diagnostic Studies  Results Reviewed     Procedure Component Value Units Date/Time    Troponin I [[id number]]  (Normal) Collected:  08/05/20 0518    Lab Status:  Final result Specimen:  Blood from Arm, Left Updated:  08/05/20 0602     Troponin I <0 02 ng/mL     Troponin I [[id number]]  (Normal) Collected:  08/05/20 0330    Lab Status:  Final result Specimen:  Blood from Arm, Left Updated:  08/05/20 0418     Troponin I <0 02 ng/mL     Comprehensive metabolic panel [[id number]]  (Abnormal) Collected:  08/05/20 0330    Lab Status:  Final result Specimen:  Blood from Arm, Left Updated:  08/05/20 0353     Sodium 139 mmol/L      Potassium 3 6 mmol/L      Chloride 107 mmol/L      CO2 25 mmol/L      ANION GAP 7 mmol/L      BUN 15 mg/dL      Creatinine 0 79 mg/dL      Glucose 110 mg/dL      Calcium 8 5 mg/dL      AST 12 U/L      ALT 19 U/L      Alkaline Phosphatase 71 U/L      Total Protein 8 2 g/dL      Albumin 3 4 g/dL      Total Bilirubin 0 38 mg/dL      eGFR 91 ml/min/1 73sq m     Narrative:       National Kidney Disease Foundation guidelines for Chronic Kidney Disease (CKD):     Stage 1 with normal or high GFR (GFR > 90 mL/min/1 73 square meters)    Stage 2 Mild CKD (GFR = 60-89 mL/min/1 73 square meters)    Stage 3A Moderate CKD (GFR = 45-59 mL/min/1 73 square meters)    Stage 3B Moderate CKD (GFR = 30-44 mL/min/1 73 square meters)    Stage 4 Severe CKD (GFR = 15-29 mL/min/1 73 square meters)    Stage 5 End Stage CKD (GFR <15 mL/min/1 73 square meters)  Note: GFR calculation is accurate only with a steady state creatinine    CBC and differential [[id number]] Collected:  08/05/20 0330    Lab Status:  Final result Specimen:  Blood from Arm, Left Updated:  08/05/20 0342     WBC 8 06 Thousand/uL      RBC 4 76 Million/uL      Hemoglobin 13 7 g/dL      Hematocrit 42 3 %      MCV 89 fL      MCH 28 8 pg      MCHC 32 4 g/dL      RDW 13 5 %      MPV 10 8 fL      Platelets 657 Thousands/uL      nRBC 0 /100 WBCs      Neutrophils Relative 62 %      Immat GRANS % 0 %      Lymphocytes Relative 28 %      Monocytes Relative 7 %      Eosinophils Relative 2 %      Basophils Relative 1 %      Neutrophils Absolute 5 03 Thousands/µL      Immature Grans Absolute 0 03 Thousand/uL      Lymphocytes Absolute 2 24 Thousands/µL Monocytes Absolute 0 60 Thousand/µL      Eosinophils Absolute 0 12 Thousand/µL      Basophils Absolute 0 04 Thousands/µL                  XR chest 1 view portable   ED Interpretation by Claudia Christy DO (08/05 8948)   The trachea and bronchi are midline  The lung markings are normal  The lung fields are normal  The costophrenic angles are not blunted  The position of the diaphragm is normal  The heart size and cardiac silhouette is normal  Examination of the bones shows no fractures  No acute cardiopulmonary disease  Procedures  Procedures      ED Course  ED Course as of Aug 05 0627   Wed Aug 05, 2020   4204 The heart rate is 80, which is normal  The rhythm is regular  The axis is normal  The P waves are normal and the NC interval is normal  The QRS height is is normal and width is normal  There are Q waves in inferiorly  The ST segments are not elevated or depressed  The T waves are inverted in anterior leads  The QT segment is normal  This ecg shows sinus rhythm  ECG 12 lead   0452 Pt is feeling better after receiving toradol  US AUDIT      Most Recent Value   Initial Alcohol Screen: US AUDIT-C    1  How often do you have a drink containing alcohol?  0 Filed at: 08/05/2020 0340   2  How many drinks containing alcohol do you have on a typical day you are drinking? 0 Filed at: 08/05/2020 0340   3b  FEMALE Any Age, or MALE 65+: How often do you have 4 or more drinks on one occassion? 0 Filed at: 08/05/2020 0340   Audit-C Score  0 Filed at: 08/05/2020 0340            HEART Risk Score      Most Recent Value   Heart Score Risk Calculator   History  0 Filed at: 08/05/2020 0431   ECG  0 Filed at: 08/05/2020 0431   Age  0 Filed at: 08/05/2020 0431   Risk Factors  1 Filed at: 08/05/2020 0431   Troponin  0 Filed at: 08/05/2020 0431   HEART Score  1 Filed at: 08/05/2020 0431            JANESSA/DAST-10      Most Recent Value   How many times in the past year have you       Used an illegal drug or used a prescription medication for non-medical reasons? Never Filed at: 08/05/2020 4628                    Wells' Criteria for PE      Most Recent Value   Wells' Criteria for PE   Clinical signs and symptoms of DVT  0 Filed at: 08/05/2020 5508   PE is primary diagnosis or equally likely  0 Filed at: 08/05/2020 0432   HR >100  0 Filed at: 08/05/2020 0432   Immobilization at least 3 days or Surgery in the previous 4 weeks  0 Filed at: 08/05/2020 5625   Previous, objectively diagnosed PE or DVT  0 Filed at: 08/05/2020 0432   Hemoptysis  0 Filed at: 08/05/2020 9614   Malignancy with treatment within 6 months or palliative  0 Filed at: 08/05/2020 1486   Wells' Criteria Total  0 Filed at: 08/05/2020 0424                MDM  Number of Diagnoses or Management Options  Chest pain, unspecified type: established and improving  Diagnosis management comments: 60-year-old female with past medical history of hypertension and GERD presents with sudden onset chest pain  Patient has a heart score of 1  Will check EKG, troponins, lab work, and chest x-ray to rule out cardiac causes of chest pain  Patient's story is not concerning for PE and her Wells score is 0  Amount and/or Complexity of Data Reviewed  Clinical lab tests: ordered and reviewed  Tests in the radiology section of CPT®: ordered and reviewed  Review and summarize past medical records: yes  Discuss the patient with other providers: yes    Risk of Complications, Morbidity, and/or Mortality  Presenting problems: low  Diagnostic procedures: low  Management options: low    Patient Progress  Patient progress: stable    Pt's labwork, ecg, and chest XR do not show signs of an acute cardiac pathology  I explained this to pt  I told her that her pain could be due to the EGD that she had 2 days ago or due to muscle strain in the chest wall  I told pt that her pain could also be GI related  Pt was instructed to follow up with her primary care doctor   Return precautions were given  Pt was instructed to take tylenol or advil as needed for pain  Disposition  Final diagnoses:   Chest pain, unspecified type     Time reflects when diagnosis was documented in both MDM as applicable and the Disposition within this note     Time User Action Codes Description Comment    8/5/2020  6:10 AM Jossue Moseley Jerman [R07 9] Chest pain, unspecified type       ED Disposition     ED Disposition Condition Date/Time Comment    Discharge Good Wed Aug 5, 2020  6:05 AM Randa Boo discharge to home/self care  Follow-up Information     Follow up With Specialties Details Why Contact Info Additional Information    Maile Horton PA-C Internal Medicine, Physician Assistant Schedule an appointment as soon as possible for a visit  If symptoms worsen [address]  [facility]  16  [id number] [facility] Emergency Department Emergency Medicine Go today immediately if symptoms worsen or if you have any concerns [address] [place] [address], [address] [place] [address], [address] [phone]          Patient's Medications   Discharge Prescriptions    No medications on file     No discharge procedures on file  PDMP Review     None           ED Provider  Attending physically available and evaluated Bridget Mejia I managed the patient along with the ED Attending      Electronically Signed by         Gill Shankar DO  08/05/20 8373

## 2020-08-06 LAB
ATRIAL RATE: 83 BPM
P AXIS: 49 DEGREES
PR INTERVAL: 144 MS
QRS AXIS: 84 DEGREES
QRSD INTERVAL: 74 MS
QT INTERVAL: 344 MS
QTC INTERVAL: 404 MS
T WAVE AXIS: 63 DEGREES
VENTRICULAR RATE: 83 BPM

## 2020-08-06 PROCEDURE — 93010 ELECTROCARDIOGRAM REPORT: CPT | Performed by: INTERNAL MEDICINE

## 2020-08-10 ENCOUNTER — TELEMEDICINE (OUTPATIENT)
Dept: GASTROENTEROLOGY | Facility: CLINIC | Age: 45
End: 2020-08-10
Payer: COMMERCIAL

## 2020-08-10 DIAGNOSIS — E03.8 HYPOTHYROIDISM DUE TO HASHIMOTO'S THYROIDITIS: ICD-10-CM

## 2020-08-10 DIAGNOSIS — E06.3 HYPOTHYROIDISM DUE TO HASHIMOTO'S THYROIDITIS: ICD-10-CM

## 2020-08-10 DIAGNOSIS — K29.00 ACUTE GASTRITIS WITHOUT HEMORRHAGE, UNSPECIFIED GASTRITIS TYPE: Primary | ICD-10-CM

## 2020-08-10 PROCEDURE — 99213 OFFICE O/P EST LOW 20 MIN: CPT | Performed by: PHYSICIAN ASSISTANT

## 2020-08-10 PROCEDURE — 1036F TOBACCO NON-USER: CPT | Performed by: PHYSICIAN ASSISTANT

## 2020-08-10 PROCEDURE — 3077F SYST BP >= 140 MM HG: CPT | Performed by: PHYSICIAN ASSISTANT

## 2020-08-10 PROCEDURE — 3078F DIAST BP <80 MM HG: CPT | Performed by: PHYSICIAN ASSISTANT

## 2020-08-10 RX ORDER — PANTOPRAZOLE SODIUM 40 MG/1
40 TABLET, DELAYED RELEASE ORAL 2 TIMES DAILY
Qty: 60 TABLET | Refills: 1 | Status: SHIPPED | OUTPATIENT
Start: 2020-08-10 | End: 2020-08-24

## 2020-08-10 RX ORDER — LEVOTHYROXINE SODIUM 88 UG/1
TABLET ORAL
Qty: 90 TABLET | Refills: 1 | Status: SHIPPED | OUTPATIENT
Start: 2020-08-10 | End: 2021-02-09

## 2020-08-10 NOTE — PROGRESS NOTES
Jen Day  MRN [id number]    Virtual Regular Visit      Assessment/Plan:    Problem List Items Addressed This Visit     None      Visit Diagnoses     Acute gastritis without hemorrhage, unspecified gastritis type    -  Primary    Relevant Medications    pantoprazole (PROTONIX) 40 mg tablet        Patient with continued epigastric pain  EGD 8/3/2020 showing mild gastritis which patient reports started after taking Naproxyn and an antibiotic for tooth infection  Both of these have been discontinued and she is using Tylenol only for pain, PRN  She has been compliant with bland diet and reducing caffeine intake, compliant with omeprazole and pepcid as well  Will switch to twice daily PPI for 4 weeks to see if this will improve her symptoms  Since she is taking levothyroxine in the morning, will have her take PPI before lunch and bedtime  She will see us back in the office in 6 weeks with the goal of decreasing PPI to once daily and ultimately discontinuing as her symptoms improve  Patient expresses understanding and agreement with this plan  All her questions were answered  Reason for visit is   Chief Complaint   Patient presents with    Virtual Regular Visit        Encounter provider Arun Little PA-C    Provider located at [address] [place] [id number] [phone]      Recent Visits  No visits were found meeting these conditions  Showing recent visits within past 7 days and meeting all other requirements     Today's Visits  Date Type Provider Dept   08/10/20 [address] [place] [address],   [facility] today's visits and meeting all other requirements     Future Appointments  No visits were found meeting these conditions  Showing future appointments within next 150 days and meeting all other requirements        The patient was identified by name and date of birth   Josefa Styles Shree Stafford was informed that this is a telemedicine visit and that the visit is being conducted through [employer] and patient was informed that this is not a secure, HIPAA-complaint platform  She agrees to proceed     My office door was closed  No one else was in the room  She acknowledged consent and understanding of privacy and security of the video platform  The patient has agreed to participate and understands they can discontinue the visit at any time  Patient is aware this is a billable service  Arlon Lanes is a 41 y o  female who is seen in follow up from EGD for epigastric pain, ongoing  She had an EGD on 8/3/2020 with Dr Eloise Olson which showed mild gastritis  Biopsies were negative for H pylori  She states she has been taking the omeprazole and famotidine daily but is still having epigastric pain  She states she never had symptoms like this in the past   She had been taking Naprosyn and was given an antibiotic for a tooth infection, and this is when her abdominal pain symptoms started  Symptoms are worse after meals  She has limited herself to a bland diet and has cut back on daily caffeine intake  Her symptoms have persisted  No nausea/vomiting  No weight loss  No melena of hematochezia  No fevers, chills or night sweats        HPI     Past Medical History:   Diagnosis Date    Abdominal pain     Anemia     Anxiety     Back pain     Bilateral fibrocystic breast changes     resolved: 02/21/2017    Breast disorder     Chest pain     Chronic pelvic pain in female     last assessed: 09/07/2016    Cluster headaches     Constipation     Cough     Depression     Difficulty concentrating     Disease of thyroid gland     Dizziness     Dyspareunia in female     last assessed: 08/18/2016    Easy bruising     Fainting episodes     Fatigue     Fever due to infection     Fibromyalgia     Frequent urination at night     Gallbladder disease     Herniated intervertebral disc of lumbar spine     Hypotension     Loss of bladder control     Memory loss     last assessed: 06/15/2015    Mixed incontinence     last assessed: 01/11/2016    Myofascial pain     last assessed: 06/01/2016    Numbness and tingling     Painful swelling of joint     Palpitations     Panic attack     Sciatica     last assessed: 06/15/2015    Situational anxiety     last assessed: 02/21/2017    SOB (shortness of breath)     Thyroid disease     Uterus, adenomyosis     last assessed: 09/07/2016    Weakness     Weight disorder        Past Surgical History:   Procedure Laterality Date    CHOLECYSTECTOMY      CHOLECYSTECTOMY LAPAROSCOPIC N/A 10/22/2017    Procedure: CHOLECYSTECTOMY LAPAROSCOPIC;  Surgeon: Dawn Greco MD;  [facility];  Service: General    ENDOMETRIAL BIOPSY      resolved: 02/21/2017    HYSTERECTOMY  2016    NM VAGINAL HYSTERECTOMY,UTERUS 250 GMS/< N/A 4/20/2018    Procedure: HYSTERECTOMY VAGINAL TOTAL (TVH), BILATERAL SALPINGECTOMY;  Surgeon: Rebekah Flanagan MD;  [facility];  Service: Gynecology    TUBAL LIGATION      last assessed: 10/20/2014       Current Outpatient Medications   Medication Sig Dispense Refill    al mag oxide-diphenhydramine-lidocaine viscous (MAGIC MOUTHWASH) 1:1:1 suspension Swish and swallow 10 mL every 4 (four) hours as needed (abdominal pain) 1 Bottle 1    famotidine (PEPCID) 40 MG tablet Take 0 5 tablets (20 mg total) by mouth 2 (two) times a day 20 tablet 0    levothyroxine 88 mcg tablet Take 1 tablet (88 mcg total) by mouth daily 90 tablet 1    pantoprazole (PROTONIX) 40 mg tablet Take 1 tablet (40 mg total) by mouth 2 (two) times a day Take before lunch and bedtime since taking thyroid medication QAM 60 tablet 1     No current facility-administered medications for this visit           Allergies   Allergen Reactions    Morphine Chest Pain    Iv Contrast [Iodinated Diagnostic Agents] Hives       Review of Systems    Video Exam    There were no vitals filed for this visit  Physical Exam Patient is awake/alert, NAD  Answers appropriately  Respirations unlabored  I spent 15 minutes directly with the patient during this visit      VIRTUAL VISIT DISCLAIMER    El Gilbert acknowledges that she has consented to an online visit or consultation  She understands that the online visit is based solely on information provided by her, and that, in the absence of a face-to-face physical evaluation by the physician assistant, the diagnosis she receives is both limited and provisional in terms of accuracy and completeness  This is not intended to replace a full medical face-to-face evaluation by the physician assistant  El Gilbert understands and accepts these terms      Rob Goldberg PA-C

## 2020-08-24 RX ORDER — OMEPRAZOLE 20 MG/1
20 CAPSULE, DELAYED RELEASE ORAL DAILY
COMMUNITY
End: 2021-04-21 | Stop reason: SDUPTHER

## 2020-08-31 ENCOUNTER — OFFICE VISIT (OUTPATIENT)
Dept: INTERNAL MEDICINE CLINIC | Facility: CLINIC | Age: 45
End: 2020-08-31

## 2020-08-31 ENCOUNTER — APPOINTMENT (OUTPATIENT)
Dept: LAB | Facility: HOSPITAL | Age: 45
End: 2020-08-31
Payer: COMMERCIAL

## 2020-08-31 VITALS
HEIGHT: 66 IN | TEMPERATURE: 98 F | DIASTOLIC BLOOD PRESSURE: 76 MMHG | OXYGEN SATURATION: 98 % | BODY MASS INDEX: 30.95 KG/M2 | WEIGHT: 192.55 LBS | SYSTOLIC BLOOD PRESSURE: 110 MMHG | HEART RATE: 70 BPM

## 2020-08-31 DIAGNOSIS — E03.8 HYPOTHYROIDISM DUE TO HASHIMOTO'S THYROIDITIS: Primary | ICD-10-CM

## 2020-08-31 DIAGNOSIS — F41.1 GENERALIZED ANXIETY DISORDER: Chronic | ICD-10-CM

## 2020-08-31 DIAGNOSIS — E03.8 HYPOTHYROIDISM DUE TO HASHIMOTO'S THYROIDITIS: ICD-10-CM

## 2020-08-31 DIAGNOSIS — E66.09 CLASS 1 OBESITY DUE TO EXCESS CALORIES WITHOUT SERIOUS COMORBIDITY WITH BODY MASS INDEX (BMI) OF 31.0 TO 31.9 IN ADULT: ICD-10-CM

## 2020-08-31 DIAGNOSIS — E06.3 HYPOTHYROIDISM DUE TO HASHIMOTO'S THYROIDITIS: Primary | ICD-10-CM

## 2020-08-31 DIAGNOSIS — E06.3 HYPOTHYROIDISM DUE TO HASHIMOTO'S THYROIDITIS: ICD-10-CM

## 2020-08-31 DIAGNOSIS — M54.16 LUMBAR RADICULOPATHY: ICD-10-CM

## 2020-08-31 PROBLEM — M25.561 CHRONIC PAIN OF BOTH KNEES: Status: RESOLVED | Noted: 2018-08-17 | Resolved: 2020-08-31

## 2020-08-31 PROBLEM — M54.50 ACUTE EXACERBATION OF CHRONIC LOW BACK PAIN: Status: RESOLVED | Noted: 2019-02-07 | Resolved: 2020-08-31

## 2020-08-31 PROBLEM — G89.29 CHRONIC PAIN OF BOTH KNEES: Status: RESOLVED | Noted: 2018-08-17 | Resolved: 2020-08-31

## 2020-08-31 PROBLEM — I95.0 IDIOPATHIC HYPOTENSION: Status: RESOLVED | Noted: 2018-10-05 | Resolved: 2020-08-31

## 2020-08-31 PROBLEM — E66.811 CLASS 1 OBESITY DUE TO EXCESS CALORIES WITHOUT SERIOUS COMORBIDITY WITH BODY MASS INDEX (BMI) OF 31.0 TO 31.9 IN ADULT: Status: ACTIVE | Noted: 2019-04-23

## 2020-08-31 PROBLEM — G89.29 ACUTE EXACERBATION OF CHRONIC LOW BACK PAIN: Status: RESOLVED | Noted: 2019-02-07 | Resolved: 2020-08-31

## 2020-08-31 PROBLEM — M25.562 CHRONIC PAIN OF BOTH KNEES: Status: RESOLVED | Noted: 2018-08-17 | Resolved: 2020-08-31

## 2020-08-31 LAB — TSH SERPL DL<=0.05 MIU/L-ACNC: 2 UIU/ML (ref 0.36–3.74)

## 2020-08-31 PROCEDURE — 99241 PR OFFICE CONSULTATION NEW/ESTAB PATIENT 15 MIN: CPT | Performed by: PHYSICIAN ASSISTANT

## 2020-08-31 PROCEDURE — 36415 COLL VENOUS BLD VENIPUNCTURE: CPT

## 2020-08-31 PROCEDURE — 3008F BODY MASS INDEX DOCD: CPT | Performed by: PHYSICIAN ASSISTANT

## 2020-08-31 PROCEDURE — 84443 ASSAY THYROID STIM HORMONE: CPT

## 2020-08-31 RX ORDER — CHLORHEXIDINE GLUCONATE 0.12 MG/ML
RINSE ORAL
COMMUNITY
Start: 2020-07-28 | End: 2021-09-01

## 2020-08-31 RX ORDER — LIDOCAINE HYDROCHLORIDE 20 MG/ML
SOLUTION OROPHARYNGEAL
COMMUNITY
Start: 2020-07-22 | End: 2021-09-01

## 2020-08-31 NOTE — PATIENT INSTRUCTIONS
As we reviewed today based on your history and exam you should have no problem having the sedation for your MRI for pain management  We did review however that I would like you to get your thyroid blood test completed 1st as it was due to weeks ago  Once I have that result can advise you if we need to adjust your dose and can then also provide letter of clearance to get your MRI under sedation  Please remember also as we discussed that your treatment with the PPI for stomach acid does not mean that you can continue to eat the foods that cause you to have pain  That is your body's way of telling you not to eat that  As per discussion after your EGD with GI you were to remain on the PPI for 6-8 weeks  Additional information provided today for dietary advice  This will not only help you prevent the return of your acid reflux symptoms but can also help you with healthy meaningful weight loss  Please remember to get your flu vaccine this fall  Weight Management   AMBULATORY CARE:   Why it is important to manage your weight:  Being overweight increases your risk of health conditions such as heart disease, high blood pressure, type 2 diabetes, and certain types of cancer  It can also increase your risk for osteoarthritis, sleep apnea, and other respiratory problems  Aim for a slow, steady weight loss  Even a small amount of weight loss can lower your risk of health problems  How to lose weight safely:  A safe and healthy way to lose weight is to eat fewer calories and get regular exercise  You can lose up about 1 pound a week by decreasing the number of calories you eat by 500 calories each day  You can decrease calories by eating smaller portion sizes or by cutting out high-calorie foods  Read labels to find out how many calories are in the foods you eat  You can also burn calories with exercise such as walking, swimming, or biking   You will be more likely to keep weight off if you make these changes part of your lifestyle  Healthy meal plan for weight management:  A healthy meal plan includes a variety of foods, contains fewer calories, and helps you stay healthy  A healthy meal plan includes the following:  · Eat whole-grain foods more often  A healthy meal plan should contain fiber  Fiber is the part of grains, fruits, and vegetables that is not broken down by your body  Whole-grain foods are healthy and provide extra fiber in your diet  Some examples of whole-grain foods are whole-wheat breads and pastas, oatmeal, brown rice, and bulgur  · Eat a variety of vegetables every day  Include dark, leafy greens such as spinach, kale, erasmo greens, and mustard greens  Eat yellow and orange vegetables such as carrots, sweet potatoes, and winter squash  · Eat a variety of fruits every day  Choose fresh or canned fruit (canned in its own juice or light syrup) instead of juice  Fruit juice has very little or no fiber  · Eat low-fat dairy foods  Drink fat-free (skim) milk or 1% milk  Eat fat-free yogurt and low-fat cottage cheese  Try low-fat cheeses such as mozzarella and other reduced-fat cheeses  · Choose meat and other protein foods that are low in fat  Choose beans or other legumes such as split peas or lentils  Choose fish, skinless poultry (chicken or turkey), or lean cuts of red meat (beef or pork)  Before you cook meat or poultry, cut off any visible fat  · Use less fat and oil  Try baking foods instead of frying them  Add less fat, such as margarine, sour cream, regular salad dressing and mayonnaise to foods  Eat fewer high-fat foods  Some examples of high-fat foods include french fries, doughnuts, ice cream, and cakes  · Eat fewer sweets  Limit foods and drinks that are high in sugar  This includes candy, cookies, regular soda, and sweetened drinks  Ways to decrease calories:   · Eat smaller portions  ¨ Use a small plate with smaller servings      ¨ Do not eat second helpings  ¨ When you eat at a restaurant, ask for a box and place half of your meal in the box before you eat  ¨ Share an entrée with someone else  · Replace high-calorie snacks with healthy, low-calorie snacks  ¨ Choose fresh fruit, vegetables, fat-free rice cakes, or air-popped popcorn instead of potato chips, nuts, or chocolate  ¨ Choose water or calorie-free drinks instead of soda or sweetened drinks  · Eat regular meals  Skipping meals can lead to overeating later in the day  Eat a healthy snack in place of a meal if you do not have time to eat a regular meal      · Do not shop for groceries when you are hungry  You may be more likely to make unhealthy food choices  Take a grocery list of healthy foods and shop after you have eaten  Exercise:  Exercise at least 30 minutes per day on most days of the week  Some examples of exercise include walking, biking, dancing, and swimming  You can also fit in more physical activity by taking the stairs instead of the elevator or parking farther away from stores  Ask your healthcare provider about the best exercise plan for you  Other things to consider as you try to lose weight:   · Be aware of situations that may give you the urge to overeat, such as eating while watching television  Find ways to avoid these situations  For example, read a book, go for a walk, or do crafts  · Meet with a weight loss support group or friends who are also trying to lose weight  This may help you stay motivated to continue working on your weight loss goals  © 2017 [employer] Information is for End User's use only and may not be sold, redistributed or otherwise used for commercial purposes  All illustrations and images included in CareNotes® are the copyrighted property of [employer]  or [employer]  The above information is an  only  It is not intended as medical advice for individual conditions or treatments   Talk to your doctor, nurse or pharmacist before following any medical regimen to see if it is safe and effective for you  Heart Healthy Diet   AMBULATORY CARE:   A heart healthy diet  is an eating plan low in total fat, unhealthy fats, and sodium (salt)  A heart healthy diet helps decrease your risk for heart disease and stroke  Limit the amount of fat you eat to 25% to 35% of your total daily calories  Limit sodium to less than 2,300 mg each day  Healthy fats:  Healthy fats can help improve cholesterol levels  The risk for heart disease is decreased when cholesterol levels are normal  Choose healthy fats, such as the following:  · Unsaturated fat  is found in foods such as soybean, canola, olive, corn, and safflower oils  It is also found in soft tub margarine that is made with liquid vegetable oil  · Omega-3 fat  is found in certain fish, such as salmon, tuna, and trout, and in walnuts and flaxseed  Unhealthy fats:  Unhealthy fats can cause unhealthy cholesterol levels in your blood and increase your risk of heart disease  Limit unhealthy fats, such as the following:  · Cholesterol  is found in animal foods, such as eggs and lobster, and in dairy products made from whole milk  Limit cholesterol to less than 300 milligrams (mg) each day  You may need to limit cholesterol to 200 mg each day if you have heart disease  · Saturated fat  is found in meats, such as zhao and hamburger  It is also found in chicken or turkey skin, whole milk, and butter  Limit saturated fat to less than 7% of your total daily calories  Limit saturated fat to less than 6% if you have heart disease or are at increased risk for it  · Trans fat  is found in packaged foods, such as potato chips and cookies  It is also in hard margarine, some fried foods, and shortening  Avoid trans fats as much as possible    Heart healthy foods and drinks to include:  Ask your dietitian or healthcare provider how many servings to have from each of the following food groups:  · Grains:      ¨ Whole-wheat breads, cereals, and pastas, and brown rice    ¨ Low-fat, low-sodium crackers and chips    · Vegetables:      ¨ Broccoli, green beans, green peas, and spinach    ¨ Collards, kale, and lima beans    ¨ Carrots, sweet potatoes, tomatoes, and peppers    ¨ Canned vegetables with no salt added    · Fruits:      ¨ Bananas, peaches, pears, and pineapple    ¨ Grapes, raisins, and dates    ¨ Oranges, tangerines, grapefruit, orange juice, and grapefruit juice    ¨ Apricots, mangoes, melons, and papaya    ¨ Raspberries and strawberries    ¨ Canned fruit with no added sugar    · Low-fat dairy products:      ¨ Nonfat (skim) milk, 1% milk, and low-fat almond, cashew, or soy milks fortified with calcium    ¨ Low-fat cheese, regular or frozen yogurt, and cottage cheese    · Meats and proteins , such as lean cuts of beef and pork (loin, leg, round), skinless chicken and turkey, legumes, soy products, egg whites, and nuts  Foods and drinks to limit or avoid:  Ask your dietitian or healthcare provider about these and other foods that are high in unhealthy fat, sodium, and sugar:  · Snack or packaged foods , such as frozen dinners, cookies, macaroni and cheese, and cereals with more than 300 mg of sodium per serving    · Canned or dry mixes  for cakes, soups, sauces, or gravies    · Vegetables with added sodium , such as instant potatoes, vegetables with added sauces, or regular canned vegetables    · Other foods high in sodium , such as ketchup, barbecue sauce, salad dressing, pickles, olives, soy sauce, and miso    · High-fat dairy foods  such as whole or 2% milk, cream cheese, or sour cream, and cheeses     · High-fat protein foods  such as high-fat cuts of beef (T-bone steaks, ribs), chicken or turkey with skin, and organ meats, such as liver    · Cured or smoked meats , such as hot dogs, zhao, and sausage    · Unhealthy fats and oils , such as butter, stick margarine, shortening, and cooking oils such as coconut or palm oil    · Food and drinks high in sugar , such as soft drinks (soda), sports drinks, sweetened tea, candy, cake, cookies, pies, and doughnuts  Other diet guidelines to follow:   · Eat more foods containing omega-3 fats  Eat fish high in omega-3 fats at least 2 times a week  · Limit alcohol  Too much alcohol can damage your heart and raise your blood pressure  Women should limit alcohol to 1 drink a day  Men should limit alcohol to 2 drinks a day  A drink of alcohol is 12 ounces of beer, 5 ounces of wine, or 1½ ounces of liquor  · Choose low-sodium foods  High-sodium foods can lead to high blood pressure  Add little or no salt to food you prepare  Use herbs and spices in place of salt  · Eat more fiber  to help lower cholesterol levels  Eat at least 5 servings of fruits and vegetables each day  Eat 3 ounces of whole-grain foods each day  Legumes (beans) are also a good source of fiber  Lifestyle guidelines:   · Do not smoke  Nicotine and other chemicals in cigarettes and cigars can cause lung and heart damage  Ask your healthcare provider for information if you currently smoke and need help to quit  E-cigarettes or smokeless tobacco still contain nicotine  Talk to your healthcare provider before you use these products  · Exercise regularly  to help you maintain a healthy weight and improve your blood pressure and cholesterol levels  Ask your healthcare provider about the best exercise plan for you  Do not start an exercise program without asking your healthcare provider  Follow up with your healthcare provider as directed:  Write down your questions so you remember to ask them during your visits  © 2017 [employer] Information is for End User's use only and may not be sold, redistributed or otherwise used for commercial purposes   All illustrations and images included in CareNotes® are the copyrighted property of [employer]  or [employer]  The above information is an  only  It is not intended as medical advice for individual conditions or treatments  Talk to your doctor, nurse or pharmacist before following any medical regimen to see if it is safe and effective for you

## 2020-08-31 NOTE — PROGRESS NOTES
Assessment/Plan:  As we reviewed today based on your history and exam you should have no problem having the sedation for your MRI for pain management  We did review however that I would like you to get your thyroid blood test completed 1st as it was due to weeks ago  Once I have that result can advise you if we need to adjust your dose and can then also provide letter of clearance to get your MRI under sedation  Please remember also as we discussed that your treatment with the PPI for stomach acid does not mean that you can continue to eat the foods that cause you to have pain  That is your body's way of telling you not to eat that  As per discussion after your EGD with GI you were to remain on the PPI for 6-8 weeks  Additional information provided today for dietary advice  This will not only help you prevent the return of your acid reflux symptoms but can also help you with healthy meaningful weight loss  Please remember to get your flu vaccine this fall  Addendum August 31, 2025 11:00 p m  Johana Magallon Patient completed her thyroid testing TSH is normal will continue same dose levothyroxine 88 mcg and patient is medically cleared for sedation for her MRI  No problem-specific Assessment & Plan notes found for this encounter  Diagnoses and all orders for this visit:    Hypothyroidism due to Hashimoto's thyroiditis    Generalized anxiety disorder    Lumbar radiculopathy    Class 1 obesity due to excess calories without serious comorbidity with body mass index (BMI) of 31 0 to 31 9 in adult    Other orders  -     chlorhexidine (PERIDEX) 0 12 % solution  -     Lidocaine Viscous HCl (XYLOCAINE) 2 % mucosal solution          Subjective:      Patient ID: Yara Garcia is a 41 y o  female  Patient presents to the office today for medical evaluation at the request of pain management and Radiology for MRI with sedation    Patient has significant claustrophobia and is not able to have an MRI without sedation  Patient has past medical history is significant for hypothyroidism  It is noted she was due for her thyroid labs 2 weeks ago  These will need to be completed prior to clearance in case her dose needs to be adjusted  Patient also has fibromyalgia and as noted follows with pain management for chronic back pain  Patient reports overall she has been feeling well except for her back pain as noted under treatment with pain management  Patient does admit to being slightly stressed out today as this was the start of the on 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  The following portions of the patient's history were reviewed and updated as appropriate: allergies, current medications, past family history, past medical history, past social history, past surgical history and problem list     Review of Systems   Constitutional: Negative for activity change, chills and fever  HENT: Negative  Negative for dental problem and trouble swallowing  Respiratory: Negative  Negative for cough and shortness of breath  Cardiovascular: Negative  Negative for chest pain and palpitations  Gastrointestinal: Positive for abdominal pain  Negative for diarrhea, nausea and vomiting  Patient did recently have EGD in July with positive H pylori  Completed treatment is still on PPI  Patient states she know she cannot eat the junk food that she wants to because it causes stomach pain  Endocrine: Negative  Negative for cold intolerance and heat intolerance  Musculoskeletal: Positive for back pain  Skin: Negative  Neurological: Negative for dizziness, tremors, weakness and headaches  Psychiatric/Behavioral: The patient is nervous/anxious            Objective:      /76 (BP Location: Right arm, Patient Position: Sitting, Cuff Size: Standard)   Pulse 70   Temp 98 °F (36 7 °C) (Temporal)   Ht 5' 6" (1 676 m)   Wt 87 3 kg (192 lb 8 8 oz)   LMP 02/28/2018 (Exact Date)   SpO2 98%   BMI 31 08 kg/m²          Physical Exam  Vitals signs and nursing note reviewed  Constitutional:       General: She is not in acute distress  HENT:      Head: Normocephalic  Mouth/Throat:      Mouth: Mucous membranes are moist       Pharynx: Oropharynx is clear  Comments: Patent oropharynx  Eyes:      Pupils: Pupils are equal, round, and reactive to light  Neck:      Musculoskeletal: Normal range of motion and neck supple  No muscular tenderness  Cardiovascular:      Rate and Rhythm: Normal rate and regular rhythm  Heart sounds: Normal heart sounds  Pulmonary:      Effort: Pulmonary effort is normal       Breath sounds: Normal breath sounds  Abdominal:      Tenderness: There is no abdominal tenderness  Musculoskeletal:      Right lower leg: No edema  Left lower leg: No edema  Skin:     Findings: No rash  Neurological:      General: No focal deficit present  Mental Status: She is alert  Mental status is at baseline  Psychiatric:         Mood and Affect: Mood normal          Thought Content: Thought content normal          BMI Counseling: Body mass index is 31 08 kg/m²  The BMI is above normal  Nutrition recommendations include reducing portion sizes, decreasing overall calorie intake, consuming healthier snacks, decreasing soda and/or juice intake, moderation in carbohydrate intake, reducing intake of saturated fat and trans fat and reducing intake of cholesterol  Exercise recommendations include exercising 3-5 times per week

## 2020-09-09 ENCOUNTER — HOSPITAL ENCOUNTER (OUTPATIENT)
Dept: RADIOLOGY | Facility: HOSPITAL | Age: 45
Discharge: HOME | End: 2020-09-09
Attending: ANESTHESIOLOGY

## 2020-10-09 ENCOUNTER — TELEPHONE (OUTPATIENT)
Dept: INTERNAL MEDICINE CLINIC | Facility: CLINIC | Age: 45
End: 2020-10-09

## 2020-10-09 ENCOUNTER — APPOINTMENT (EMERGENCY)
Dept: RADIOLOGY | Facility: HOSPITAL | Age: 45
End: 2020-10-09
Payer: COMMERCIAL

## 2020-10-09 ENCOUNTER — HOSPITAL ENCOUNTER (EMERGENCY)
Facility: HOSPITAL | Age: 45
Discharge: HOME | End: 2020-10-09
Attending: EMERGENCY MEDICINE | Admitting: EMERGENCY MEDICINE
Payer: COMMERCIAL

## 2020-10-09 VITALS
SYSTOLIC BLOOD PRESSURE: 113 MMHG | RESPIRATION RATE: 16 BRPM | TEMPERATURE: 98 F | BODY MASS INDEX: 30.51 KG/M2 | DIASTOLIC BLOOD PRESSURE: 60 MMHG | WEIGHT: 189 LBS | HEART RATE: 74 BPM | OXYGEN SATURATION: 98 %

## 2020-10-09 DIAGNOSIS — R07.9 CHEST PAIN: Primary | ICD-10-CM

## 2020-10-09 LAB
ALBUMIN SERPL BCP-MCNC: 3.6 G/DL (ref 3.5–5)
ALP SERPL-CCNC: 76 U/L (ref 46–116)
ALT SERPL W P-5'-P-CCNC: 28 U/L (ref 12–78)
ANION GAP SERPL CALCULATED.3IONS-SCNC: 2 MMOL/L (ref 4–13)
AST SERPL W P-5'-P-CCNC: 18 U/L (ref 5–45)
ATRIAL RATE: 82 BPM
BASOPHILS # BLD AUTO: 0.04 THOUSANDS/ΜL (ref 0–0.1)
BASOPHILS NFR BLD AUTO: 1 % (ref 0–1)
BILIRUB SERPL-MCNC: 0.41 MG/DL (ref 0.2–1)
BUN SERPL-MCNC: 11 MG/DL (ref 5–25)
CALCIUM SERPL-MCNC: 8.9 MG/DL (ref 8.3–10.1)
CHLORIDE SERPL-SCNC: 107 MMOL/L (ref 100–108)
CO2 SERPL-SCNC: 29 MMOL/L (ref 21–32)
CREAT SERPL-MCNC: 0.71 MG/DL (ref 0.6–1.3)
D DIMER PPP FEU-MCNC: <0.27 UG/ML FEU
EOSINOPHIL # BLD AUTO: 0.09 THOUSAND/ΜL (ref 0–0.61)
EOSINOPHIL NFR BLD AUTO: 1 % (ref 0–6)
ERYTHROCYTE [DISTWIDTH] IN BLOOD BY AUTOMATED COUNT: 13.5 % (ref 11.6–15.1)
GFR SERPL CREATININE-BSD FRML MDRD: 104 ML/MIN/1.73SQ M
GLUCOSE SERPL-MCNC: 91 MG/DL (ref 65–140)
HCT VFR BLD AUTO: 39.2 % (ref 34.8–46.1)
HGB BLD-MCNC: 13.1 G/DL (ref 11.5–15.4)
IMM GRANULOCYTES # BLD AUTO: 0.03 THOUSAND/UL (ref 0–0.2)
IMM GRANULOCYTES NFR BLD AUTO: 0 % (ref 0–2)
LYMPHOCYTES # BLD AUTO: 2 THOUSANDS/ΜL (ref 0.6–4.47)
LYMPHOCYTES NFR BLD AUTO: 27 % (ref 14–44)
MCH RBC QN AUTO: 29.2 PG (ref 26.8–34.3)
MCHC RBC AUTO-ENTMCNC: 33.4 G/DL (ref 31.4–37.4)
MCV RBC AUTO: 87 FL (ref 82–98)
MONOCYTES # BLD AUTO: 0.52 THOUSAND/ΜL (ref 0.17–1.22)
MONOCYTES NFR BLD AUTO: 7 % (ref 4–12)
NEUTROPHILS # BLD AUTO: 4.65 THOUSANDS/ΜL (ref 1.85–7.62)
NEUTS SEG NFR BLD AUTO: 64 % (ref 43–75)
NRBC BLD AUTO-RTO: 0 /100 WBCS
P AXIS: 19 DEGREES
PLATELET # BLD AUTO: 231 THOUSANDS/UL (ref 149–390)
PMV BLD AUTO: 11.3 FL (ref 8.9–12.7)
POTASSIUM SERPL-SCNC: 3.7 MMOL/L (ref 3.5–5.3)
PR INTERVAL: 154 MS
PROT SERPL-MCNC: 8.3 G/DL (ref 6.4–8.2)
QRS AXIS: 42 DEGREES
QRSD INTERVAL: 82 MS
QT INTERVAL: 368 MS
QTC INTERVAL: 429 MS
RBC # BLD AUTO: 4.49 MILLION/UL (ref 3.81–5.12)
SODIUM SERPL-SCNC: 138 MMOL/L (ref 136–145)
T WAVE AXIS: -2 DEGREES
TROPONIN I SERPL-MCNC: <0.02 NG/ML
VENTRICULAR RATE: 82 BPM
WBC # BLD AUTO: 7.33 THOUSAND/UL (ref 4.31–10.16)

## 2020-10-09 PROCEDURE — 93005 ELECTROCARDIOGRAM TRACING: CPT

## 2020-10-09 PROCEDURE — 84484 ASSAY OF TROPONIN QUANT: CPT | Performed by: EMERGENCY MEDICINE

## 2020-10-09 PROCEDURE — 80053 COMPREHEN METABOLIC PANEL: CPT | Performed by: EMERGENCY MEDICINE

## 2020-10-09 PROCEDURE — 85379 FIBRIN DEGRADATION QUANT: CPT | Performed by: EMERGENCY MEDICINE

## 2020-10-09 PROCEDURE — 99285 EMERGENCY DEPT VISIT HI MDM: CPT | Performed by: EMERGENCY MEDICINE

## 2020-10-09 PROCEDURE — 71046 X-RAY EXAM CHEST 2 VIEWS: CPT

## 2020-10-09 PROCEDURE — 99285 EMERGENCY DEPT VISIT HI MDM: CPT

## 2020-10-09 PROCEDURE — 36415 COLL VENOUS BLD VENIPUNCTURE: CPT | Performed by: EMERGENCY MEDICINE

## 2020-10-09 PROCEDURE — 93010 ELECTROCARDIOGRAM REPORT: CPT | Performed by: INTERNAL MEDICINE

## 2020-10-09 PROCEDURE — 85025 COMPLETE CBC W/AUTO DIFF WBC: CPT | Performed by: EMERGENCY MEDICINE

## 2020-10-13 ENCOUNTER — TELEPHONE (OUTPATIENT)
Dept: OBGYN CLINIC | Facility: CLINIC | Age: 45
End: 2020-10-13

## 2020-10-14 ENCOUNTER — ANNUAL EXAM (OUTPATIENT)
Dept: OBGYN CLINIC | Facility: CLINIC | Age: 45
End: 2020-10-14

## 2020-10-14 VITALS
BODY MASS INDEX: 30.7 KG/M2 | DIASTOLIC BLOOD PRESSURE: 71 MMHG | HEIGHT: 66 IN | WEIGHT: 191 LBS | TEMPERATURE: 98.2 F | SYSTOLIC BLOOD PRESSURE: 101 MMHG | HEART RATE: 90 BPM

## 2020-10-14 DIAGNOSIS — N39.3 STRESS INCONTINENCE, FEMALE: ICD-10-CM

## 2020-10-14 DIAGNOSIS — F41.1 GENERALIZED ANXIETY DISORDER: Chronic | ICD-10-CM

## 2020-10-14 DIAGNOSIS — R10.2 PELVIC PAIN: ICD-10-CM

## 2020-10-14 DIAGNOSIS — Z01.419 WOMEN'S ANNUAL ROUTINE GYNECOLOGICAL EXAMINATION: Primary | ICD-10-CM

## 2020-10-14 DIAGNOSIS — Z11.3 SCREENING FOR STD (SEXUALLY TRANSMITTED DISEASE): ICD-10-CM

## 2020-10-14 PROCEDURE — 3725F SCREEN DEPRESSION PERFORMED: CPT | Performed by: OBSTETRICS & GYNECOLOGY

## 2020-10-14 PROCEDURE — 1036F TOBACCO NON-USER: CPT | Performed by: OBSTETRICS & GYNECOLOGY

## 2020-10-14 PROCEDURE — 87591 N.GONORRHOEAE DNA AMP PROB: CPT | Performed by: OBSTETRICS & GYNECOLOGY

## 2020-10-14 PROCEDURE — 3008F BODY MASS INDEX DOCD: CPT | Performed by: OBSTETRICS & GYNECOLOGY

## 2020-10-14 PROCEDURE — 87491 CHLMYD TRACH DNA AMP PROBE: CPT | Performed by: OBSTETRICS & GYNECOLOGY

## 2020-10-14 PROCEDURE — 99396 PREV VISIT EST AGE 40-64: CPT | Performed by: OBSTETRICS & GYNECOLOGY

## 2020-10-16 LAB
C TRACH DNA SPEC QL NAA+PROBE: NEGATIVE
N GONORRHOEA DNA SPEC QL NAA+PROBE: NEGATIVE

## 2020-10-29 ENCOUNTER — TELEPHONE (OUTPATIENT)
Dept: OBGYN CLINIC | Facility: CLINIC | Age: 45
End: 2020-10-29

## 2020-11-23 ENCOUNTER — TELEPHONE (OUTPATIENT)
Dept: PAIN MEDICINE | Facility: CLINIC | Age: 45
End: 2020-11-23

## 2020-12-02 ENCOUNTER — OFFICE VISIT (OUTPATIENT)
Dept: PAIN MEDICINE | Facility: CLINIC | Age: 45
End: 2020-12-02
Payer: COMMERCIAL

## 2020-12-02 VITALS
HEART RATE: 73 BPM | BODY MASS INDEX: 30.83 KG/M2 | HEIGHT: 66 IN | TEMPERATURE: 98.5 F | DIASTOLIC BLOOD PRESSURE: 79 MMHG | SYSTOLIC BLOOD PRESSURE: 120 MMHG

## 2020-12-02 DIAGNOSIS — M54.9 MID BACK PAIN: ICD-10-CM

## 2020-12-02 DIAGNOSIS — M79.7 FIBROMYALGIA: ICD-10-CM

## 2020-12-02 DIAGNOSIS — M79.18 MYOFASCIAL PAIN SYNDROME: ICD-10-CM

## 2020-12-02 DIAGNOSIS — M54.12 CERVICAL RADICULOPATHY: ICD-10-CM

## 2020-12-02 DIAGNOSIS — M54.16 LUMBAR RADICULOPATHY: ICD-10-CM

## 2020-12-02 DIAGNOSIS — M54.2 NECK PAIN: Primary | ICD-10-CM

## 2020-12-02 PROCEDURE — 3078F DIAST BP <80 MM HG: CPT | Performed by: NURSE PRACTITIONER

## 2020-12-02 PROCEDURE — 99214 OFFICE O/P EST MOD 30 MIN: CPT | Performed by: NURSE PRACTITIONER

## 2020-12-02 PROCEDURE — 1036F TOBACCO NON-USER: CPT | Performed by: NURSE PRACTITIONER

## 2020-12-02 PROCEDURE — 3074F SYST BP LT 130 MM HG: CPT | Performed by: NURSE PRACTITIONER

## 2020-12-02 RX ORDER — TIZANIDINE 2 MG/1
2 TABLET ORAL EVERY 8 HOURS PRN
Qty: 90 TABLET | Refills: 1 | Status: SHIPPED | OUTPATIENT
Start: 2020-12-02 | End: 2021-03-19

## 2020-12-03 ENCOUNTER — TRANSCRIBE ORDERS (OUTPATIENT)
Dept: RADIOLOGY | Facility: HOSPITAL | Age: 45
End: 2020-12-03

## 2020-12-03 ENCOUNTER — HOSPITAL ENCOUNTER (OUTPATIENT)
Dept: RADIOLOGY | Facility: HOSPITAL | Age: 45
Discharge: HOME | End: 2020-12-03
Payer: COMMERCIAL

## 2020-12-03 DIAGNOSIS — M54.2 NECK PAIN: ICD-10-CM

## 2020-12-03 DIAGNOSIS — M54.12 CERVICAL RADICULOPATHY: ICD-10-CM

## 2020-12-03 DIAGNOSIS — M54.9 MID BACK PAIN: ICD-10-CM

## 2020-12-03 PROCEDURE — 72070 X-RAY EXAM THORAC SPINE 2VWS: CPT

## 2020-12-03 PROCEDURE — 72050 X-RAY EXAM NECK SPINE 4/5VWS: CPT

## 2020-12-08 ENCOUNTER — TELEPHONE (OUTPATIENT)
Dept: PAIN MEDICINE | Facility: CLINIC | Age: 45
End: 2020-12-08

## 2020-12-09 ENCOUNTER — EVALUATION (OUTPATIENT)
Dept: PHYSICAL THERAPY | Facility: REHABILITATION | Age: 45
End: 2020-12-09
Payer: COMMERCIAL

## 2020-12-09 DIAGNOSIS — M54.2 NECK PAIN: Primary | ICD-10-CM

## 2020-12-09 DIAGNOSIS — M54.12 CERVICAL RADICULOPATHY: ICD-10-CM

## 2020-12-09 DIAGNOSIS — M54.16 LUMBAR RADICULOPATHY: ICD-10-CM

## 2020-12-09 DIAGNOSIS — M54.9 MID BACK PAIN: ICD-10-CM

## 2020-12-09 PROCEDURE — 97162 PT EVAL MOD COMPLEX 30 MIN: CPT

## 2020-12-14 ENCOUNTER — APPOINTMENT (OUTPATIENT)
Dept: PHYSICAL THERAPY | Facility: REHABILITATION | Age: 45
End: 2020-12-14
Payer: COMMERCIAL

## 2020-12-16 ENCOUNTER — APPOINTMENT (OUTPATIENT)
Dept: PHYSICAL THERAPY | Facility: REHABILITATION | Age: 45
End: 2020-12-16
Payer: COMMERCIAL

## 2020-12-21 ENCOUNTER — OFFICE VISIT (OUTPATIENT)
Dept: PHYSICAL THERAPY | Facility: REHABILITATION | Age: 45
End: 2020-12-21
Payer: COMMERCIAL

## 2020-12-21 DIAGNOSIS — M54.9 MID BACK PAIN: ICD-10-CM

## 2020-12-21 DIAGNOSIS — M54.12 CERVICAL RADICULOPATHY: ICD-10-CM

## 2020-12-21 DIAGNOSIS — M54.16 LUMBAR RADICULOPATHY: Primary | ICD-10-CM

## 2020-12-21 DIAGNOSIS — M54.2 NECK PAIN: ICD-10-CM

## 2020-12-21 PROCEDURE — 97110 THERAPEUTIC EXERCISES: CPT

## 2020-12-21 PROCEDURE — 97112 NEUROMUSCULAR REEDUCATION: CPT

## 2020-12-21 PROCEDURE — 97140 MANUAL THERAPY 1/> REGIONS: CPT

## 2020-12-23 ENCOUNTER — OFFICE VISIT (OUTPATIENT)
Dept: PHYSICAL THERAPY | Facility: REHABILITATION | Age: 45
End: 2020-12-23
Payer: COMMERCIAL

## 2020-12-23 DIAGNOSIS — M54.16 LUMBAR RADICULOPATHY: Primary | ICD-10-CM

## 2020-12-23 DIAGNOSIS — M54.9 MID BACK PAIN: ICD-10-CM

## 2020-12-23 DIAGNOSIS — M54.2 NECK PAIN: ICD-10-CM

## 2020-12-23 DIAGNOSIS — M54.12 CERVICAL RADICULOPATHY: ICD-10-CM

## 2020-12-23 PROCEDURE — 97110 THERAPEUTIC EXERCISES: CPT | Performed by: PHYSICAL MEDICINE & REHABILITATION

## 2020-12-23 PROCEDURE — 97112 NEUROMUSCULAR REEDUCATION: CPT | Performed by: PHYSICAL MEDICINE & REHABILITATION

## 2020-12-23 PROCEDURE — 97140 MANUAL THERAPY 1/> REGIONS: CPT | Performed by: PHYSICAL MEDICINE & REHABILITATION

## 2020-12-30 ENCOUNTER — OFFICE VISIT (OUTPATIENT)
Dept: PHYSICAL THERAPY | Facility: REHABILITATION | Age: 45
End: 2020-12-30
Payer: COMMERCIAL

## 2020-12-30 DIAGNOSIS — M54.12 CERVICAL RADICULOPATHY: ICD-10-CM

## 2020-12-30 DIAGNOSIS — M54.9 MID BACK PAIN: ICD-10-CM

## 2020-12-30 DIAGNOSIS — M54.2 NECK PAIN: ICD-10-CM

## 2020-12-30 DIAGNOSIS — M54.16 LUMBAR RADICULOPATHY: Primary | ICD-10-CM

## 2020-12-30 PROCEDURE — 97110 THERAPEUTIC EXERCISES: CPT

## 2020-12-30 PROCEDURE — 97140 MANUAL THERAPY 1/> REGIONS: CPT

## 2020-12-30 PROCEDURE — 97112 NEUROMUSCULAR REEDUCATION: CPT

## 2021-01-04 ENCOUNTER — OFFICE VISIT (OUTPATIENT)
Dept: PHYSICAL THERAPY | Facility: REHABILITATION | Age: 46
End: 2021-01-04
Payer: COMMERCIAL

## 2021-01-04 DIAGNOSIS — M54.2 NECK PAIN: ICD-10-CM

## 2021-01-04 DIAGNOSIS — M54.12 CERVICAL RADICULOPATHY: ICD-10-CM

## 2021-01-04 DIAGNOSIS — M54.16 LUMBAR RADICULOPATHY: Primary | ICD-10-CM

## 2021-01-04 DIAGNOSIS — M54.9 MID BACK PAIN: ICD-10-CM

## 2021-01-04 PROCEDURE — 97110 THERAPEUTIC EXERCISES: CPT

## 2021-01-04 PROCEDURE — 97112 NEUROMUSCULAR REEDUCATION: CPT

## 2021-01-04 PROCEDURE — 97140 MANUAL THERAPY 1/> REGIONS: CPT

## 2021-01-04 NOTE — PROGRESS NOTES
Daily Note     Today's date: 2021  Patient name: Kurtis Concepcion  : 1975  MRN: [id number]  Referring provider: ALTA Dong  Dx:   Encounter Diagnosis     ICD-10-CM    1  Lumbar radiculopathy  M54 16    2  Mid back pain  M54 9    3  Neck pain  M54 2    4  Cervical radiculopathy  M54 12                   Subjective: Pt reports some soreness in the mid chest following last treatment session, otherwise no new complaints  Objective: See treatment diary below      Assessment: Tolerated treatment well  No adverse effects reported following tx session  Patient exhibited good technique with therapeutic exercises and would benefit from continued PT  Plan: Progress treatment as tolerated         Precautions: None  PMH: Anemia, anxiety, depression, hypotension      Manuals          Grade III-IV P-A mob T3-T8 SD 8' LH SD 8' SD 8'                                                Neuro Re-Ed             Chin tucks 5"x10 10x5" 5"x15 5"x20         Scap retraction with TB 3" 2x10 2x10x3" 3" 3x10 3" 3x15 ytb                                                                          Ther Ex             Nu step Bike 10' Bike 10' Nu step 10' Bike 10'         Open books 5x10" ea 5x10" ea 5x10" 5x10"         Prone IYTs             Thoracic ext over foam roll             Half roller thoracic mobility 30x hugs, serratus punches 30x ea 30x ea 30x ea         LTR 10"x10 10x10" 10"x10 10"x10         Rows / extensions   3" 2x10 10# GTB 2x15 ea         Ther Activity                                       Gait Training                                       Modalities

## 2021-01-11 ENCOUNTER — OFFICE VISIT (OUTPATIENT)
Dept: PHYSICAL THERAPY | Facility: REHABILITATION | Age: 46
End: 2021-01-11
Payer: COMMERCIAL

## 2021-01-11 DIAGNOSIS — M54.12 CERVICAL RADICULOPATHY: ICD-10-CM

## 2021-01-11 DIAGNOSIS — M54.9 MID BACK PAIN: ICD-10-CM

## 2021-01-11 DIAGNOSIS — M54.16 LUMBAR RADICULOPATHY: Primary | ICD-10-CM

## 2021-01-11 DIAGNOSIS — M54.2 NECK PAIN: ICD-10-CM

## 2021-01-11 PROCEDURE — 97112 NEUROMUSCULAR REEDUCATION: CPT

## 2021-01-11 PROCEDURE — 97140 MANUAL THERAPY 1/> REGIONS: CPT

## 2021-01-11 PROCEDURE — 97110 THERAPEUTIC EXERCISES: CPT

## 2021-01-11 NOTE — PROGRESS NOTES
Daily Note     Today's date: 2021  Patient name: [facility]  : 1975  MRN: [id number]  Referring provider: ALTA Madera  Dx:   Encounter Diagnosis     ICD-10-CM    1  Lumbar radiculopathy  M54 16    2  Mid back pain  M54 9    3  Neck pain  M54 2    4  Cervical radiculopathy  M54 12                   Subjective: Pt reports no new complaints this visit  Objective: See treatment diary below      Assessment: Tolerated treatment well  Pt reports no adverse effects or increased pain following tx  Patient exhibited good technique with therapeutic exercises and would benefit from continued PT  Plan: Continue per plan of care        Precautions: None  PMH: Anemia, anxiety, depression, hypotension      Manuals         Grade III-IV P-A mob T3-T8 SD 8' LH SD 8' SD 8' SD 8'                                               Neuro Re-Ed             Chin tucks 5"x10 10x5" 5"x15 5"x20 5"x30        Scap retraction with TB 3" 2x10 2x10x3" 3" 3x10 3" 3x15 ytb 3" 3x15 ytb                                                                         Ther Ex             Nu step Bike 10' Bike 10' Nu step 10' Bike 10' Bike 10'        Open books 5x10" ea 5x10" ea 5x10" 5x10" 5"x10        Prone IYTs             Thoracic ext over foam roll             Half roller thoracic mobility 30x hugs, serratus punches 30x ea 30x ea 30x ea 30x ea        LTR 10"x10 10x10" 10"x10 10"x10 10"x10        Rows / extensions   3" 2x10 10# GTB 2x15 ea GTB 2x20 ea        Ther Activity                                       Gait Training                                       Modalities

## 2021-01-19 DIAGNOSIS — M79.18 MYOFASCIAL PAIN SYNDROME: ICD-10-CM

## 2021-01-19 RX ORDER — TIZANIDINE 2 MG/1
2 TABLET ORAL EVERY 8 HOURS PRN
Qty: 90 TABLET | Refills: 1 | OUTPATIENT
Start: 2021-01-19

## 2021-02-05 ENCOUNTER — OFFICE VISIT (OUTPATIENT)
Dept: PAIN MEDICINE | Facility: CLINIC | Age: 46
End: 2021-02-05
Payer: COMMERCIAL

## 2021-02-05 ENCOUNTER — TRANSCRIBE ORDERS (OUTPATIENT)
Dept: RADIOLOGY | Facility: HOSPITAL | Age: 46
End: 2021-02-05

## 2021-02-05 ENCOUNTER — HOSPITAL ENCOUNTER (OUTPATIENT)
Dept: RADIOLOGY | Facility: HOSPITAL | Age: 46
Discharge: HOME | End: 2021-02-05
Payer: COMMERCIAL

## 2021-02-05 VITALS
BODY MASS INDEX: 30.53 KG/M2 | HEIGHT: 66 IN | SYSTOLIC BLOOD PRESSURE: 114 MMHG | HEART RATE: 94 BPM | DIASTOLIC BLOOD PRESSURE: 73 MMHG | TEMPERATURE: 99.5 F | WEIGHT: 190 LBS

## 2021-02-05 DIAGNOSIS — M54.9 MID BACK PAIN: ICD-10-CM

## 2021-02-05 DIAGNOSIS — R10.2 PELVIC PAIN: ICD-10-CM

## 2021-02-05 DIAGNOSIS — M54.12 CERVICAL RADICULOPATHY: Primary | ICD-10-CM

## 2021-02-05 DIAGNOSIS — M54.2 NECK PAIN: ICD-10-CM

## 2021-02-05 PROCEDURE — 76830 TRANSVAGINAL US NON-OB: CPT

## 2021-02-05 PROCEDURE — 76856 US EXAM PELVIC COMPLETE: CPT

## 2021-02-05 PROCEDURE — 99214 OFFICE O/P EST MOD 30 MIN: CPT | Performed by: ANESTHESIOLOGY

## 2021-02-05 NOTE — PROGRESS NOTES
Assessment  1  Cervical radiculopathy    2  Neck pain    3  Mid back pain        Plan  72-year-old female returning for follow-up of neck pain that radiates into bilateral upper extremities with associated numbness and paresthesias and chronic mid back pain centered between her scapulae  She denies any significant low back pain at this point  The patient has done a full course of physical therapy without any significant improvement in her neck and arm or midback pain  She does continue to take Tylenol p r n  With minimal relief  She is unable tolerate NSAIDs secondary to GI upset  She has previously tried and failed gabapentin, Lyrica, and Cymbalta  She is currently taking tizanidine 2 mg q 8 hours p r n  With some relief  She does predominantly take this at bedtime as she wants to avoid any daytime drowsiness  1  I will order a CT of the cervical and thoracic spine since the patient is unable to have MRIs without anesthesia secondary to severe claustrophobia and anxiety  2  The patient will continue with tizanidine 2 mg q 8 hours p r n   3  Patient will continue with her home exercise program  4  I will follow up the patient in 6 weeks       My impressions and treatment recommendations were discussed in detail with the patient who verbalized understanding and had no further questions  Discharge instructions were provided  I personally saw and examined the patient and I agree with the above discussed plan of care  No orders of the defined types were placed in this encounter  No orders of the defined types were placed in this encounter  History of Present Illness    Chuck Leija is a 41 y o  female returning for follow-up of neck pain that radiates into bilateral upper extremities with associated numbness and paresthesias and chronic mid back pain centered between her scapulae  She denies any weakness of the upper extremities    She denies any bladder or bowel incontinence or balance issues  She denies any pain radiating into the abdomen, but does feel some tightness in the chest when the midback pain becomes severe  She denies any significant low back pain at this point  The patient has done a full course of physical therapy without any significant improvement in her neck and arm or midback pain  She does continue to take Tylenol p r n  With minimal relief  She is unable tolerate NSAIDs secondary to GI upset  She has previously tried and failed gabapentin, Lyrica, and Cymbalta  She is currently taking tizanidine 2 mg q 8 hours p r n  With some relief  She does predominantly take this at bedtime as she wants to avoid any daytime drowsiness  The patient rates her pain as 6/10 and the pain is constant  The pain is worse in the evening  The pain is described as sharp, cramping, and pressure like  The pain is worse with standing, walking, bending, and exercise  The pain is alleviated with sitting, relaxation, and lying down  Other than as stated above, the patient denies any interval changes in medications, medical condition, mental condition, symptoms, or allergies since the last office visit  I have personally reviewed and/or updated the patient's past medical history, past surgical history, family history, social history, current medications, allergies, and vital signs today  Review of Systems   Constitutional: Negative for fever and unexpected weight change  HENT: Negative for trouble swallowing  Eyes: Negative for visual disturbance  Respiratory: Positive for shortness of breath  Negative for wheezing  Cardiovascular: Positive for chest pain  Negative for palpitations  Gastrointestinal: Positive for diarrhea  Negative for constipation, nausea and vomiting  Endocrine: Negative for cold intolerance, heat intolerance and polydipsia  Genitourinary: Negative for difficulty urinating and frequency     Musculoskeletal: Negative for arthralgias, gait problem, joint swelling and myalgias  Decreased ROM, pain in extremity   Skin: Negative for rash  Neurological: Positive for dizziness and weakness  Negative for seizures, syncope and headaches  Hematological: Does not bruise/bleed easily  Psychiatric/Behavioral: Negative for dysphoric mood  All other systems reviewed and are negative        Patient Active Problem List   Diagnosis    Fibromyalgia    Goiter    Hypothyroidism due to Hashimoto's thyroiditis    Stress incontinence, female    Hypermobility of urethra    Calcaneal spur of left foot    Thyroid nodule    Lumbar radiculopathy    Generalized anxiety disorder    Class 1 obesity due to excess calories without serious comorbidity with body mass index (BMI) of 31 0 to 31 9 in adult    Tension headache    Epigastric abdominal pain    Diarrhea       Past Medical History:   Diagnosis Date    Abdominal pain     Anemia     Anxiety     Back pain     Bilateral fibrocystic breast changes     resolved: 02/21/2017    Breast disorder     Chest pain     Chronic pelvic pain in female     last assessed: 09/07/2016    Cluster headaches     Constipation     Cough     Depression     Difficulty concentrating     Disease of thyroid gland     Dizziness     Dyspareunia in female     last assessed: 08/18/2016    Easy bruising     Fainting episodes     Fatigue     Fever due to infection     Fibromyalgia     Frequent urination at night     Gallbladder disease     Herniated intervertebral disc of lumbar spine     Hypotension     Loss of bladder control     Memory loss     last assessed: 06/15/2015    Mixed incontinence     last assessed: 01/11/2016    Myofascial pain     last assessed: 06/01/2016    Numbness and tingling     Painful swelling of joint     Palpitations     Panic attack     Sciatica     last assessed: 06/15/2015    Situational anxiety     last assessed: 02/21/2017    SOB (shortness of breath)     Thyroid disease     Uterus, adenomyosis     last assessed: 09/07/2016    Weakness     Weight disorder        Past Surgical History:   Procedure Laterality Date    CHOLECYSTECTOMY      CHOLECYSTECTOMY LAPAROSCOPIC N/A 10/22/2017    Procedure: CHOLECYSTECTOMY LAPAROSCOPIC;  Surgeon: Crystal Chan MD;  [facility];  Service: General    ENDOMETRIAL BIOPSY      resolved: 02/21/2017    HYSTERECTOMY  2016    MT VAGINAL HYSTERECTOMY,UTERUS 250 GMS/< N/A 4/20/2018    Procedure: HYSTERECTOMY VAGINAL TOTAL (TVH), BILATERAL SALPINGECTOMY;  Surgeon: Jonna Lobato MD;  [facility];  Service: Gynecology    TUBAL LIGATION      last assessed: 10/20/2014       Family History   Problem Relation Age of Onset    Hypertension Mother    Tim Jett Arthritis Mother     Diabetes Maternal Grandmother     Breast cancer Family     Cancer Family     Diabetes Family     Heart disease Family     Hyperlipidemia Family         reported cholesterol level was high    Hypertension Other     Hyperlipidemia Father     No Known Problems Sister     No Known Problems Daughter     Breast cancer Paternal Grandmother 28    Leukemia Paternal Aunt     No Known Problems Sister     No Known Problems Sister     No Known Problems Sister     No Known Problems Sister     No Known Problems Daughter     No Known Problems Paternal Aunt        Social History     Occupational History    Occupation: Full-time   Tobacco Use    Smoking status: Never Smoker    Smokeless tobacco: Never Used   Substance and Sexual Activity    Alcohol use: Yes     Frequency: Monthly or less     Drinks per session: 1 or 2     Binge frequency: Never    Drug use: Never    Sexual activity: Yes     Partners: Male     Birth control/protection: Female Sterilization, Surgical       Current Outpatient Medications on File Prior to Visit   Medication Sig    chlorhexidine (PERIDEX) 0 12 % solution     levothyroxine 88 mcg tablet TAKE 1 TABLET BY MOUTH EVERY DAY    Lidocaine Viscous HCl (XYLOCAINE) 2 % mucosal solution     omeprazole (PriLOSEC) 20 mg delayed release capsule Take 20 mg by mouth daily    tiZANidine (ZANAFLEX) 2 mg tablet Take 1 tablet (2 mg total) by mouth every 8 (eight) hours as needed for muscle spasms     No current facility-administered medications on file prior to visit  Allergies   Allergen Reactions    Morphine Chest Pain    Iv Contrast [Iodinated Diagnostic Agents] Hives       Physical Exam    /73   Pulse 94   Temp 99 5 °F (37 5 °C) (Oral)   Ht 5' 6" (1 676 m)   Wt 86 2 kg (190 lb)   LMP 02/28/2018 (Exact Date)   BMI 30 67 kg/m²     Constitutional: normal, well developed, well nourished, alert, in no distress and non-toxic and no overt pain behavior  Eyes: anicteric  HEENT: grossly intact  Neck: supple, symmetric, trachea midline and no masses   Pulmonary:even and unlabored  Cardiovascular:No edema or pitting edema present  Skin:Normal without rashes or lesions and well hydrated  Psychiatric:Mood and affect appropriate  Neurologic:Cranial Nerves II-XII grossly intact  Musculoskeletal:normal gait  Bilateral cervical paraspinals and trapezii tender to palpation ropy in texture  Bilateral upper extremity strength 5/5 in all muscle groups  Sensation intact to light touch in C5 through T1 dermatomes bilaterally  Negative Spurling's bilaterally  Bilateral thoracic paraspinal musculature tender to palpation and ropy in texture  Bilateral lumbar paraspinals nontender to palpation  Imaging        PACS Images     Show images for XR spine cervical complete 4 or 5 vw non injury   Study Result    CERVICAL SPINE     INDICATION:   M54 2: Cervicalgia  M54 12: Radiculopathy, cervical region      COMPARISON:  10/20/2014     VIEWS:  XR SPINE CERVICAL COMPLETE 4 OR 5 VW NON INJURY         FINDINGS:     No fracture       Anatomic alignment    Cervical lordosis straightening      C3-C4, C5-C6 mild disc space narrowing       Right C3-C4 neuroforamen narrowing      The prevertebral soft tissues are within normal limits        The lung apices are clear      IMPRESSION:     Cervical lordosis straightening      Mild degenerative changes        Workstation performed: [id number]         PACS Images     Show images for XR spine thoracic 2 vw   Study Result    THORACIC SPINE     INDICATION:   M54 9: Dorsalgia, unspecified      COMPARISON:  Two-view chest 10/9/2020      VIEWS:  XR SPINE THORACIC 2 VW        FINDINGS:     There is no fracture or pathologic bone lesion      Mild lower thoracic dextroscoliosis  No subluxation      No significant degenerative changes       There is no displacement of the paraspinal line       The pedicles appear intact      Right upper quadrant surgical clips      IMPRESSION:     No acute osseous abnormality  Mild dextroscoliosis          Workstation performed: [id number]

## 2021-02-07 NOTE — PROGRESS NOTES
OB/GYN VISIT  Keegan Rodriguez  2/7/2021  3:23 PM      Assessment/Plan:    Keegan Rodriguez is a 41 y o  [id number] female with RLQ pain     1  RLQ pain  - Pelvic ultrasounded completed 2/5/21, final read pending  On review with Dr Reagan Lock, ovaries appear grossly unremarkable  No pelvic free fluid visualized  No masses seen  Will follow up formal ultrasound results  - UA - evaluation of potential nephrolithiasis   - Per ACS guidelines, recommend routine colonoscopy screening at 39  Given patient's age, change in bowel habits and RLQ pain, recommend colonoscopy     2  Bilateral breast pain  - Mammogram ordered  - Possibly secondary to perimenopausal state  Discussed with patient that she may be beginning to experience menopause given constitutional symptoms  Patient had questions regarding medical management of alleviation of perimenopausal symptoms  Briefly discussed HRT, but plan to follow up imaging and re-assess symptoms in 3 months time  RTC in 3 months    D/w Dr Alfred Padron is a 41 y o  female here for pelvic ultrasound follow-up and persistent breast tenderness  Patient was last seen in the office in October 2020 for her annual well-woman exam  At that time she complained of bilateral breast tenderness, but L>R  She reported that her left breast tenderness will last for a few weeks and then recur  She also had c/o sharp RLQ pain in September for which she had a pelvic ultrasound completed 2/5/21  Since the last office visit patient states she has had intermittent sharp RLQ pain  She states the pain is sometimes related to bowel function, but otherwise is not related to activity  She states the quality of the pain is the same as at her prior office visit  Describes the pain as brief and sharp that will spontaneously resolve  She states she has had some changes in bowel function with more recurrent diarrhea  Denies dysuria, hematuria       In regards to breast pain, patient reports bilateral lateral breast tenderness that radiates up to the axilla  It is also similar to her last office visit  She does not have any breast cancer risk factors or family history  She denies nipple discharge  Patient also reports perimenopausal symptoms of hot flashes, fatigue and changes in her sleep cycle  Gynecologic History  S/p TVH, BS (2018)  Last Pap: 12/20/16 Results were: normal - NILM, HR-HPV negative  Last mammogram: 1/22/20  Results were: normal, Bi-RADS-1    Objective:    Vitals: Last menstrual period 02/28/2018, not currently breastfeeding  There is no height or weight on file to calculate BMI      Past Medical History:   Diagnosis Date    Abdominal pain     Anemia     Anxiety     Back pain     Bilateral fibrocystic breast changes     resolved: 02/21/2017    Breast disorder     Chest pain     Chronic pelvic pain in female     last assessed: 09/07/2016    Cluster headaches     Constipation     Cough     Depression     Difficulty concentrating     Disease of thyroid gland     Dizziness     Dyspareunia in female     last assessed: 08/18/2016    Easy bruising     Fainting episodes     Fatigue     Fever due to infection     Fibromyalgia     Frequent urination at night     Gallbladder disease     Herniated intervertebral disc of lumbar spine     Hypotension     Loss of bladder control     Memory loss     last assessed: 06/15/2015    Mixed incontinence     last assessed: 01/11/2016    Myofascial pain     last assessed: 06/01/2016    Numbness and tingling     Painful swelling of joint     Palpitations     Panic attack     Sciatica     last assessed: 06/15/2015    Situational anxiety     last assessed: 02/21/2017    SOB (shortness of breath)     Thyroid disease     Uterus, adenomyosis     last assessed: 09/07/2016    Weakness     Weight disorder      Past Surgical History:   Procedure Laterality Date    CHOLECYSTECTOMY      CHOLECYSTECTOMY LAPAROSCOPIC N/A 10/22/2017    Procedure: CHOLECYSTECTOMY LAPAROSCOPIC;  Surgeon: Crystal Chan MD;  [facility];  Service: General    ENDOMETRIAL BIOPSY      resolved: 02/21/2017    HYSTERECTOMY  2016    UT VAGINAL HYSTERECTOMY,UTERUS 250 GMS/< N/A 4/20/2018    Procedure: HYSTERECTOMY VAGINAL TOTAL (TVH), BILATERAL SALPINGECTOMY;  Surgeon: Jonna Lobato MD;  [facility];  Service: Gynecology    TUBAL LIGATION      last assessed: 10/20/2014     Review of Systems  Pertinent items are noted in HPI  Physical Exam  Constitutional:       Appearance: She is well-developed  Cardiovascular:      Rate and Rhythm: Normal rate and regular rhythm  Pulses: Normal pulses  Heart sounds: Normal heart sounds  Pulmonary:      Effort: Pulmonary effort is normal  No respiratory distress  Breath sounds: Normal breath sounds  No wheezing or rales  Abdominal:      General: Abdomen is flat  There is no distension  Palpations: Abdomen is soft  There is no mass  Tenderness: There is abdominal tenderness  There is no guarding or rebound  Comments: RLQ tenderness to deep palpation   Skin:     General: Skin is warm and dry  Neurological:      Mental Status: She is alert and oriented to person, place, and time     Psychiatric:         Behavior: Behavior normal            Alesha Marie MD  2/7/2021  3:23 PM

## 2021-02-08 ENCOUNTER — LAB (OUTPATIENT)
Dept: LAB | Facility: HOSPITAL | Age: 46
End: 2021-02-08
Payer: COMMERCIAL

## 2021-02-08 ENCOUNTER — OFFICE VISIT (OUTPATIENT)
Dept: OBGYN CLINIC | Facility: CLINIC | Age: 46
End: 2021-02-08

## 2021-02-08 VITALS
DIASTOLIC BLOOD PRESSURE: 72 MMHG | HEIGHT: 66 IN | SYSTOLIC BLOOD PRESSURE: 109 MMHG | BODY MASS INDEX: 30.7 KG/M2 | HEART RATE: 75 BPM | WEIGHT: 191 LBS

## 2021-02-08 DIAGNOSIS — Z12.31 BREAST CANCER SCREENING BY MAMMOGRAM: ICD-10-CM

## 2021-02-08 DIAGNOSIS — G89.29 CHRONIC RLQ PAIN: ICD-10-CM

## 2021-02-08 DIAGNOSIS — R10.31 CHRONIC RLQ PAIN: ICD-10-CM

## 2021-02-08 DIAGNOSIS — G89.29 CHRONIC RLQ PAIN: Primary | ICD-10-CM

## 2021-02-08 DIAGNOSIS — R10.31 CHRONIC RLQ PAIN: Primary | ICD-10-CM

## 2021-02-08 LAB
BILIRUB UR QL STRIP: NEGATIVE
CLARITY UR: CLEAR
COLOR UR: YELLOW
GLUCOSE UR STRIP-MCNC: NEGATIVE MG/DL
HGB UR QL STRIP.AUTO: NEGATIVE
KETONES UR STRIP-MCNC: NEGATIVE MG/DL
LEUKOCYTE ESTERASE UR QL STRIP: NEGATIVE
NITRITE UR QL STRIP: NEGATIVE
PH UR STRIP.AUTO: 6 [PH]
PROT UR STRIP-MCNC: NEGATIVE MG/DL
SP GR UR STRIP.AUTO: 1.01 (ref 1–1.03)
UROBILINOGEN UR QL STRIP.AUTO: 1 E.U./DL

## 2021-02-08 PROCEDURE — 81003 URINALYSIS AUTO W/O SCOPE: CPT

## 2021-02-08 PROCEDURE — 1036F TOBACCO NON-USER: CPT | Performed by: OBSTETRICS & GYNECOLOGY

## 2021-02-08 PROCEDURE — 99214 OFFICE O/P EST MOD 30 MIN: CPT | Performed by: OBSTETRICS & GYNECOLOGY

## 2021-02-09 DIAGNOSIS — E03.8 HYPOTHYROIDISM DUE TO HASHIMOTO'S THYROIDITIS: ICD-10-CM

## 2021-02-09 DIAGNOSIS — E06.3 HYPOTHYROIDISM DUE TO HASHIMOTO'S THYROIDITIS: ICD-10-CM

## 2021-02-09 RX ORDER — LEVOTHYROXINE SODIUM 88 UG/1
TABLET ORAL
Qty: 90 TABLET | Refills: 1 | Status: SHIPPED | OUTPATIENT
Start: 2021-02-09 | End: 2021-08-05

## 2021-02-10 ENCOUNTER — TELEPHONE (OUTPATIENT)
Dept: OBGYN CLINIC | Facility: CLINIC | Age: 46
End: 2021-02-10

## 2021-02-10 NOTE — TELEPHONE ENCOUNTER
----- Message from Dmitriy Francois MD sent at 2/10/2021 10:02 AM EST -----  Regarding: results  Please notify patient that urine results were negative so we have low suspicion for kidney stone  The final read of her ultrasound came back and it was normal, as we discussed at her visit on Monday  She should proceed with completion of colonoscopy as counseled on Monday  Thanks!

## 2021-02-11 ENCOUNTER — HOSPITAL ENCOUNTER (OUTPATIENT)
Dept: RADIOLOGY | Facility: HOSPITAL | Age: 46
Discharge: HOME | End: 2021-02-11
Attending: ANESTHESIOLOGY
Payer: COMMERCIAL

## 2021-02-11 DIAGNOSIS — M54.9 MID BACK PAIN: ICD-10-CM

## 2021-02-11 DIAGNOSIS — M54.12 CERVICAL RADICULOPATHY: ICD-10-CM

## 2021-02-11 PROCEDURE — 72125 CT NECK SPINE W/O DYE: CPT

## 2021-02-11 PROCEDURE — 72128 CT CHEST SPINE W/O DYE: CPT

## 2021-02-11 PROCEDURE — G1004 CDSM NDSC: HCPCS

## 2021-02-12 ENCOUNTER — TELEPHONE (OUTPATIENT)
Dept: PAIN MEDICINE | Facility: CLINIC | Age: 46
End: 2021-02-12

## 2021-02-12 NOTE — TELEPHONE ENCOUNTER
CT of the thoracic spine shows a small disc bulge at T3-4 without any significant central or foraminal stenosis (narrowing where nerves and spinal cord exist)  CT of the cervical spine shows minimal disc bulges and arthritis with mild left foraminal stenosis at C3-4 and mild central stenosis at C5-6  Mild left foraminal stenosis at C7-T1

## 2021-02-13 ENCOUNTER — HOSPITAL ENCOUNTER (EMERGENCY)
Facility: HOSPITAL | Age: 46
Discharge: HOME | End: 2021-02-13
Attending: EMERGENCY MEDICINE | Admitting: EMERGENCY MEDICINE
Payer: COMMERCIAL

## 2021-02-13 ENCOUNTER — NURSE TRIAGE (OUTPATIENT)
Dept: OTHER | Facility: OTHER | Age: 46
End: 2021-02-13

## 2021-02-13 ENCOUNTER — APPOINTMENT (EMERGENCY)
Dept: RADIOLOGY | Facility: HOSPITAL | Age: 46
End: 2021-02-13
Payer: COMMERCIAL

## 2021-02-13 VITALS
SYSTOLIC BLOOD PRESSURE: 117 MMHG | RESPIRATION RATE: 17 BRPM | WEIGHT: 191 LBS | OXYGEN SATURATION: 96 % | HEART RATE: 78 BPM | DIASTOLIC BLOOD PRESSURE: 50 MMHG | BODY MASS INDEX: 30.83 KG/M2 | TEMPERATURE: 98.5 F

## 2021-02-13 DIAGNOSIS — R00.1 BRADYCARDIA: ICD-10-CM

## 2021-02-13 DIAGNOSIS — R07.9 CHEST PAIN: Primary | ICD-10-CM

## 2021-02-13 LAB
ALBUMIN SERPL BCP-MCNC: 3.5 G/DL (ref 3.5–5)
ALP SERPL-CCNC: 77 U/L (ref 46–116)
ALT SERPL W P-5'-P-CCNC: 21 U/L (ref 12–78)
ANION GAP SERPL CALCULATED.3IONS-SCNC: 6 MMOL/L (ref 4–13)
AST SERPL W P-5'-P-CCNC: 13 U/L (ref 5–45)
BASOPHILS # BLD AUTO: 0.04 THOUSANDS/ΜL (ref 0–0.1)
BASOPHILS NFR BLD AUTO: 1 % (ref 0–1)
BILIRUB SERPL-MCNC: 0.23 MG/DL (ref 0.2–1)
BUN SERPL-MCNC: 12 MG/DL (ref 5–25)
CALCIUM SERPL-MCNC: 9 MG/DL (ref 8.3–10.1)
CHLORIDE SERPL-SCNC: 107 MMOL/L (ref 100–108)
CO2 SERPL-SCNC: 27 MMOL/L (ref 21–32)
CREAT SERPL-MCNC: 0.69 MG/DL (ref 0.6–1.3)
EOSINOPHIL # BLD AUTO: 0.13 THOUSAND/ΜL (ref 0–0.61)
EOSINOPHIL NFR BLD AUTO: 2 % (ref 0–6)
ERYTHROCYTE [DISTWIDTH] IN BLOOD BY AUTOMATED COUNT: 13 % (ref 11.6–15.1)
GFR SERPL CREATININE-BSD FRML MDRD: 105 ML/MIN/1.73SQ M
GLUCOSE SERPL-MCNC: 116 MG/DL (ref 65–140)
HCT VFR BLD AUTO: 41.5 % (ref 34.8–46.1)
HGB BLD-MCNC: 13.3 G/DL (ref 11.5–15.4)
IMM GRANULOCYTES # BLD AUTO: 0.04 THOUSAND/UL (ref 0–0.2)
IMM GRANULOCYTES NFR BLD AUTO: 1 % (ref 0–2)
LYMPHOCYTES # BLD AUTO: 2.15 THOUSANDS/ΜL (ref 0.6–4.47)
LYMPHOCYTES NFR BLD AUTO: 27 % (ref 14–44)
MCH RBC QN AUTO: 28.3 PG (ref 26.8–34.3)
MCHC RBC AUTO-ENTMCNC: 32 G/DL (ref 31.4–37.4)
MCV RBC AUTO: 88 FL (ref 82–98)
MONOCYTES # BLD AUTO: 0.55 THOUSAND/ΜL (ref 0.17–1.22)
MONOCYTES NFR BLD AUTO: 7 % (ref 4–12)
NEUTROPHILS # BLD AUTO: 4.98 THOUSANDS/ΜL (ref 1.85–7.62)
NEUTS SEG NFR BLD AUTO: 62 % (ref 43–75)
NRBC BLD AUTO-RTO: 0 /100 WBCS
PLATELET # BLD AUTO: 245 THOUSANDS/UL (ref 149–390)
PMV BLD AUTO: 11.4 FL (ref 8.9–12.7)
POTASSIUM SERPL-SCNC: 3.7 MMOL/L (ref 3.5–5.3)
PROT SERPL-MCNC: 8.1 G/DL (ref 6.4–8.2)
RBC # BLD AUTO: 4.7 MILLION/UL (ref 3.81–5.12)
SODIUM SERPL-SCNC: 140 MMOL/L (ref 136–145)
TROPONIN I SERPL-MCNC: <0.02 NG/ML
TSH SERPL DL<=0.05 MIU/L-ACNC: 2.47 UIU/ML (ref 0.36–3.74)
WBC # BLD AUTO: 7.89 THOUSAND/UL (ref 4.31–10.16)

## 2021-02-13 PROCEDURE — 84443 ASSAY THYROID STIM HORMONE: CPT | Performed by: EMERGENCY MEDICINE

## 2021-02-13 PROCEDURE — 71045 X-RAY EXAM CHEST 1 VIEW: CPT

## 2021-02-13 PROCEDURE — 99285 EMERGENCY DEPT VISIT HI MDM: CPT | Performed by: EMERGENCY MEDICINE

## 2021-02-13 PROCEDURE — 80053 COMPREHEN METABOLIC PANEL: CPT | Performed by: EMERGENCY MEDICINE

## 2021-02-13 PROCEDURE — 84484 ASSAY OF TROPONIN QUANT: CPT | Performed by: EMERGENCY MEDICINE

## 2021-02-13 PROCEDURE — 36415 COLL VENOUS BLD VENIPUNCTURE: CPT

## 2021-02-13 PROCEDURE — 85025 COMPLETE CBC W/AUTO DIFF WBC: CPT | Performed by: EMERGENCY MEDICINE

## 2021-02-13 PROCEDURE — 99285 EMERGENCY DEPT VISIT HI MDM: CPT

## 2021-02-13 PROCEDURE — 93005 ELECTROCARDIOGRAM TRACING: CPT

## 2021-02-13 RX ORDER — ACETAMINOPHEN 325 MG/1
975 TABLET ORAL ONCE
Status: COMPLETED | OUTPATIENT
Start: 2021-02-13 | End: 2021-02-13

## 2021-02-13 RX ORDER — KETOROLAC TROMETHAMINE 30 MG/ML
15 INJECTION, SOLUTION INTRAMUSCULAR; INTRAVENOUS ONCE
Status: COMPLETED | OUTPATIENT
Start: 2021-02-13 | End: 2021-02-13

## 2021-02-13 RX ADMIN — ACETAMINOPHEN 975 MG: 325 TABLET, FILM COATED ORAL at 17:53

## 2021-02-13 RX ADMIN — KETOROLAC TROMETHAMINE 15 MG: 30 INJECTION, SOLUTION INTRAMUSCULAR at 17:53

## 2021-02-13 NOTE — TELEPHONE ENCOUNTER
Pt stated she is symptomatic when her HR drops down into the 30's  Pt having frequent episodes all week  Reason for Disposition   Heart beating very slowly (e g , < 50 / minute)  (Exception: athlete)    Answer Assessment - Initial Assessment Questions  1  DESCRIPTION: "Please describe your heart rate or heart beat that you are having" (e g , fast/slow, regular/irregular, skipped or extra beats, "palpitations")      Low heart rate  2  ONSET: "When did it start?" (Minutes, hours or days)       Sx's on and off for a week now,  3  DURATION: "How long does it last" (e g , seconds, minutes, hours)      Lasted about 5 mins per pt  4  PATTERN "Does it come and go, or has it been constant since it started?"  "Does it get worse with exertion?"   "Are you feeling it now?"      Comes and goes  5  TAP: "Using your hand, can you tap out what you are feeling on a chair or table in front of you, so that I can hear?" (Note: not all patients can do this)        Unable   6  HEART RATE: "Can you tell me your heart rate?" "How many beats in 15 seconds?"  (Note: not all patients can do this)       [facility] lowest 39   7  RECURRENT SYMPTOM: "Have you ever had this before?" If so, ask: "When was the last time?" and "What happened that time?"       Yes this prior week   8  CAUSE: "What do you think is causing the palpitations?"      Unsure   9  CARDIAC HISTORY: "Do you have any history of heart disease?" (e g , heart attack, angina, bypass surgery, angioplasty, arrhythmia)       Palpitations and hx of heart skipping beats  10  OTHER SYMPTOMS: "Do you have any other symptoms?" (e g , dizziness, chest pain, sweating, difficulty breathing)        Chest pain and extreme fatigue    Answer Assessment - Initial Assessment Questions        Protocols used: HEART RATE AND HEARTBEAT QUESTIONS-ADULT-AH, CHEST PAIN-ADULT-AH

## 2021-02-13 NOTE — TELEPHONE ENCOUNTER
Regarding: Chect pains/heart rate   ----- Message from Tariq Choudhary sent at 2/13/2021  4:10 PM EST -----  PT stated having a little bit of chest pain or discomfort and heart rate is dropping to upper 30's lover 40's    Would like to know if that is normal  No fever

## 2021-02-13 NOTE — ED PROVIDER NOTES
History  Chief Complaint   Patient presents with    Chest Pain     Pt presents ambulatory c/o intermittent midsternal CP w/o radiation, WEST, nausea w/o vomiting, dizziness, fatigue, and bradycardia (HR in 30s) x 4 days  41 yo female with hx of hypothyroidism, obesity, presents for acute on chronic chest pain and bradycardia  Pt says she has had intermittent mid-sternal chest pain for the past couple of years, lasts about a minute per episode, more frequent over the past 4 days, more severe today, nonradiating  She also complains of fatigue, lightheadedness, and generalized weakness today  During one of these episodes of lightheadedness she checked her HR and found it to be around 38-42 BMP, which lasted <2 min  +WEST, nausea without vomiting intermittently throughout today  Did not take any meds for pain  No significant cardiac history  Former smoker - quit 15 years ago  No significant family hx of premature cardiac disease  No leg pain or swelling, recent immobilization or surgery, hemoptysis, personal or family history of VTE  Denies diaphoresis, cough, abd pain, urinary sx, focal neuro sx, headache  Prior to Admission Medications   Prescriptions Last Dose Informant Patient Reported? Taking?    Lidocaine Viscous HCl (XYLOCAINE) 2 % mucosal solution   Yes No   chlorhexidine (PERIDEX) 0 12 % solution   Yes No   levothyroxine 88 mcg tablet   No No   Sig: TAKE 1 TABLET BY MOUTH EVERY DAY   omeprazole (PriLOSEC) 20 mg delayed release capsule   Yes No   Sig: Take 20 mg by mouth daily   tiZANidine (ZANAFLEX) 2 mg tablet   No No   Sig: Take 1 tablet (2 mg total) by mouth every 8 (eight) hours as needed for muscle spasms      Facility-Administered Medications: None       Past Medical History:   Diagnosis Date    Abdominal pain     Anemia     Anxiety     Back pain     Bilateral fibrocystic breast changes     resolved: 02/21/2017    Breast disorder     Chest pain     Chronic pelvic pain in female last assessed: 09/07/2016    Cluster headaches     Constipation     Cough     Depression     Difficulty concentrating     Disease of thyroid gland     Dizziness     Dyspareunia in female     last assessed: 08/18/2016    Easy bruising     Fainting episodes     Fatigue     Fever due to infection     Fibromyalgia     Frequent urination at night     Gallbladder disease     Herniated intervertebral disc of lumbar spine     Hypotension     Loss of bladder control     Memory loss     last assessed: 06/15/2015    Mixed incontinence     last assessed: 01/11/2016    Myofascial pain     last assessed: 06/01/2016    Numbness and tingling     Painful swelling of joint     Palpitations     Panic attack     Sciatica     last assessed: 06/15/2015    Situational anxiety     last assessed: 02/21/2017    SOB (shortness of breath)     Thyroid disease     Uterus, adenomyosis     last assessed: 09/07/2016    Weakness     Weight disorder        Past Surgical History:   Procedure Laterality Date    CHOLECYSTECTOMY      CHOLECYSTECTOMY LAPAROSCOPIC N/A 10/22/2017    Procedure: CHOLECYSTECTOMY LAPAROSCOPIC;  Surgeon: Mane Lindsey MD;  [facility]: [facility];  Service: General    ENDOMETRIAL BIOPSY      resolved: 02/21/2017    HYSTERECTOMY  2016    NM VAGINAL HYSTERECTOMY,UTERUS 250 GMS/< N/A 4/20/2018    Procedure: HYSTERECTOMY VAGINAL TOTAL (TVH), BILATERAL SALPINGECTOMY;  Surgeon: Duncan Alejo MD;  [facility];  Service: Gynecology    TUBAL LIGATION      last assessed: 10/20/2014       Family History   Problem Relation Age of Onset    Hypertension Mother    Althea Lerner Arthritis Mother     Diabetes Maternal Grandmother     Breast cancer Family     Cancer Family     Diabetes Family     Heart disease Family     Hyperlipidemia Family         reported cholesterol level was high    Hypertension Other     Hyperlipidemia Father     No Known Problems Sister     No Known Problems Daughter    Althea Lerner Breast cancer Paternal Grandmother 28    Leukemia Paternal Aunt     No Known Problems Sister     No Known Problems Sister     No Known Problems Sister     No Known Problems Sister     No Known Problems Daughter     No Known Problems Paternal Aunt      I have reviewed and agree with the history as documented  E-Cigarette/Vaping    E-Cigarette Use Never User      E-Cigarette/Vaping Substances    Nicotine No     THC No     CBD No     Flavoring No     Other No     Unknown No      Social History     Tobacco Use    Smoking status: Never Smoker    Smokeless tobacco: Never Used   Substance Use Topics    Alcohol use: Yes     Frequency: Monthly or less     Drinks per session: 1 or 2     Binge frequency: Never    Drug use: Never        Review of Systems   Constitutional: Positive for fatigue  Negative for chills and fever  HENT: Negative  Negative for rhinorrhea  Eyes: Negative  Respiratory: Positive for shortness of breath  Negative for cough  Cardiovascular: Positive for chest pain  Negative for leg swelling  Gastrointestinal: Positive for nausea  Negative for abdominal pain, diarrhea and vomiting  Genitourinary: Negative  Negative for dysuria, flank pain and frequency  Musculoskeletal: Negative  Negative for back pain and neck pain  Skin: Negative  Negative for rash  Neurological: Positive for weakness and light-headedness  Negative for numbness and headaches  All other systems reviewed and are negative        Physical Exam  ED Triage Vitals [02/13/21 1717]   Temperature Pulse Respirations Blood Pressure SpO2   98 5 °F (36 9 °C) 85 16 116/67 96 %      Temp Source Heart Rate Source Patient Position - Orthostatic VS BP Location FiO2 (%)   Oral Monitor Lying Right arm --      Pain Score       4             Orthostatic Vital Signs  Vitals:    02/13/21 1717   BP: 116/67   Pulse: 85   Patient Position - Orthostatic VS: Lying       Physical Exam  Vitals signs and nursing note reviewed  Constitutional:       General: She is not in acute distress  Appearance: She is well-developed  She is obese  HENT:      Head: Normocephalic and atraumatic  Mouth/Throat:      Mouth: Mucous membranes are moist    Eyes:      Pupils: Pupils are equal, round, and reactive to light  Neck:      Musculoskeletal: Normal range of motion and neck supple  Cardiovascular:      Rate and Rhythm: Normal rate and regular rhythm  Pulses:           Radial pulses are 2+ on the right side and 2+ on the left side  Heart sounds: Normal heart sounds  No murmur  No friction rub  No gallop  Pulmonary:      Effort: Pulmonary effort is normal  No respiratory distress  Breath sounds: Normal breath sounds  No stridor  No decreased breath sounds, wheezing, rhonchi or rales  Abdominal:      Palpations: Abdomen is soft  Tenderness: There is no abdominal tenderness  There is no guarding or rebound  Musculoskeletal: Normal range of motion  General: No swelling or tenderness  Right lower leg: She exhibits no tenderness  No edema  Left lower leg: She exhibits no tenderness  No edema  Skin:     General: Skin is warm and dry  Capillary Refill: Capillary refill takes less than 2 seconds  Neurological:      Mental Status: She is alert and oriented to person, place, and time  Cranial Nerves: No cranial nerve deficit        Comments: Clear fluent speech         ED Medications  Medications   acetaminophen (TYLENOL) tablet 975 mg (975 mg Oral Given 2/13/21 1753)   ketorolac (TORADOL) injection 15 mg (15 mg Intravenous Given 2/13/21 1753)       Diagnostic Studies  Results Reviewed     Procedure Component Value Units Date/Time    TSH, 3rd generation with Free T4 reflex [[id number]]  (Normal) Collected: 02/13/21 1739    Lab Status: Final result Specimen: Blood from Arm, Left Updated: 02/13/21 1819     TSH 3RD GENERATON 2 470 uIU/mL     Narrative:      Patients undergoing fluorescein dye angiography may retain small amounts of fluorescein in the body for 48-72 hours post procedure  Samples containing fluorescein can produce falsely depressed TSH values  If the patient had this procedure,a specimen should be resubmitted post fluorescein clearance        Comprehensive metabolic panel [[id number]] Collected: 02/13/21 1739    Lab Status: Final result Specimen: Blood from Arm, Left Updated: 02/13/21 1813     Sodium 140 mmol/L      Potassium 3 7 mmol/L      Chloride 107 mmol/L      CO2 27 mmol/L      ANION GAP 6 mmol/L      BUN 12 mg/dL      Creatinine 0 69 mg/dL      Glucose 116 mg/dL      Calcium 9 0 mg/dL      AST 13 U/L      ALT 21 U/L      Alkaline Phosphatase 77 U/L      Total Protein 8 1 g/dL      Albumin 3 5 g/dL      Total Bilirubin 0 23 mg/dL      eGFR 105 ml/min/1 73sq m     Narrative:      [facility] guidelines for Chronic Kidney Disease (CKD):     Stage 1 with normal or high GFR (GFR > 90 mL/min/1 73 square meters)    Stage 2 Mild CKD (GFR = 60-89 mL/min/1 73 square meters)    Stage 3A Moderate CKD (GFR = 45-59 mL/min/1 73 square meters)    Stage 3B Moderate CKD (GFR = 30-44 mL/min/1 73 square meters)    Stage 4 Severe CKD (GFR = 15-29 mL/min/1 73 square meters)    Stage 5 End Stage CKD (GFR <15 mL/min/1 73 square meters)  Note: GFR calculation is accurate only with a steady state creatinine    Troponin I [[id number]]  (Normal) Collected: 02/13/21 1739    Lab Status: Final result Specimen: Blood from Arm, Left Updated: 02/13/21 1812     Troponin I <0 02 ng/mL     CBC and differential [[id number]] Collected: 02/13/21 1739    Lab Status: Final result Specimen: Blood from Arm, Left Updated: 02/13/21 1752     WBC 7 89 Thousand/uL      RBC 4 70 Million/uL      Hemoglobin 13 3 g/dL      Hematocrit 41 5 %      MCV 88 fL      MCH 28 3 pg      MCHC 32 0 g/dL      RDW 13 0 %      MPV 11 4 fL      Platelets 678 Thousands/uL      nRBC 0 /100 WBCs      Neutrophils Relative 62 %      Immat GRANS % 1 %      Lymphocytes Relative 27 %      Monocytes Relative 7 %      Eosinophils Relative 2 %      Basophils Relative 1 %      Neutrophils Absolute 4 98 Thousands/µL      Immature Grans Absolute 0 04 Thousand/uL      Lymphocytes Absolute 2 15 Thousands/µL      Monocytes Absolute 0 55 Thousand/µL      Eosinophils Absolute 0 13 Thousand/µL      Basophils Absolute 0 04 Thousands/µL                  XR chest 1 view portable   ED Interpretation by Dayne Lee MD (02/13 1831)   No acute cardiopulmonary disease            Procedures  Procedures      ED Course  ED Course as of Feb 13 1900   Sat Feb 13, 2021 1759 Procedure Note: EKG  Date/Time: 02/13/21 5:59 PM   Interpreted by: Gauri Yoo  Indications / Diagnosis: CP  ECG reviewed by me, the ED Provider: yes   The EKG demonstrates:  Rate: 80  Rhythm: normal sinus  Intervals: normal intervals  Axis: normal axis  QRS/Blocks: normal QRS  ST Changes: No acute ST Changes, no STD/KATHERINE                    HEART Risk Score      Most Recent Value   Heart Score Risk Calculator   History  0 Filed at: 02/13/2021 1757   ECG  0 Filed at: 02/13/2021 1757   Age  0 Filed at: 02/13/2021 1757   Risk Factors  1 Filed at: 02/13/2021 1757   Troponin  0 Filed at: 02/13/2021 1757   HEART Score  1 Filed at: 02/13/2021 1757              PERC Rule for PE      Most Recent Value   PERC Rule for PE   Age >=50  0 Filed at: 02/13/2021 1757   HR >=100  0 Filed at: 02/13/2021 1757   O2 Sat on room air < 95%  0 Filed at: 02/13/2021 1757   History of PE or DVT  0 Filed at: 02/13/2021 1757   Recent trauma or surgery  0 Filed at: 02/13/2021 1757   Hemoptysis  0 Filed at: 02/13/2021 1757   Exogenous estrogen  0 Filed at: 02/13/2021 1757   Unilateral leg swelling  0 Filed at: 02/13/2021 1757   PERC Rule for PE Results  0 Filed at: 02/13/2021 1757              SBIRT 20yo+      Most Recent Value   SBIRT (23 yo +)   In order to provide better care to our patients, we are screening all of our patients for alcohol and drug use  Would it be okay to ask you these screening questions? Yes Filed at: 02/13/2021 1719   Initial Alcohol Screen: US AUDIT-C    1  How often do you have a drink containing alcohol?  0 Filed at: 02/13/2021 1719   2  How many drinks containing alcohol do you have on a typical day you are drinking? 0 Filed at: 02/13/2021 1719   3b  FEMALE Any Age, or MALE 65+: How often do you have 4 or more drinks on one occassion? 0 Filed at: 02/13/2021 1719   Audit-C Score  0 Filed at: 02/13/2021 1719   JANESSA: How many times in the past year have you    Used an illegal drug or used a prescription medication for non-medical reasons? Never Filed at: 02/13/2021 1719                [facility]  Number of Diagnoses or Management Options  Bradycardia:   Chest pain:   Diagnosis management comments: 39 yo female with hx of hypothyroidism presents for acute on chronic chest pain and bradycardia  Within ddx consider ASC, dysrhythmia, pericarditis, pneumonia, pneumothorax  Patient is PERC negative  HEART score is 1  Will obtain cardiac panel to evaluate  Episode of bradycardia with brief and EKG shows no evidence of heart block so very low concern for life threatening etiology  Final assessment: Workup reassuring  No episodes of bradycardia in the ED  Patient feels well on re-exam  Advised close follow up with her cardiologist  She will need to come back to ER if she develops persistent bradycardia or worsening symptoms  Strict ED return precautions provided should symptoms worsen and patient can otherwise follow up outpatient  Patient expresses an understanding and agreement with the plan and remains in good condition for discharge         Disposition  Final diagnoses:   Chest pain   Bradycardia     Time reflects when diagnosis was documented in both MDM as applicable and the Disposition within this note     Time User Action Codes Description Comment    2/13/2021  6:55 PM Adriano Yoo Add [R07 9] Chest pain     2/13/2021  6:55 PM Minor, Aguri Add [R00 1] Bradycardia       ED Disposition     ED Disposition Condition Date/Time Comment    Discharge Good Sat Feb 13, 2021  6:59 PM [place] discharge to home/self care  Follow-up Information     Follow up With Specialties Details Why Contact Info Additional Information    Shaila Nolen PA-C Internal Medicine, Physician Assistant Call in 1 day  [address]  [facility]  [phone]  [phone]  [address] [place] [address] Emergency Department Emergency Medicine Go to  If symptoms worsen [address] [place] [address] Emergency Department, [address] [place] [address] 108    Your cardiologist  Call in 1 day             Patient's Medications   Discharge Prescriptions    No medications on file     No discharge procedures on file  PDMP Review     None           ED Provider  Attending physically available and evaluated [place] NAVARRO managed the patient along with the ED Attending      Electronically Signed by         Chely Singer MD  02/13/21 8251

## 2021-02-14 LAB
ATRIAL RATE: 80 BPM
P AXIS: 42 DEGREES
PR INTERVAL: 148 MS
QRS AXIS: 72 DEGREES
QRSD INTERVAL: 82 MS
QT INTERVAL: 350 MS
QTC INTERVAL: 403 MS
T WAVE AXIS: 9 DEGREES
VENTRICULAR RATE: 80 BPM

## 2021-02-14 PROCEDURE — 93010 ELECTROCARDIOGRAM REPORT: CPT | Performed by: INTERNAL MEDICINE

## 2021-02-14 NOTE — ED ATTENDING ATTESTATION
2/13/2021  ILeena MD, saw and evaluated the patient  I have discussed the patient with the resident/non-physician practitioner and agree with the resident's/non-physician practitioner's findings, Plan of Care, and MDM as documented in the resident's/non-physician practitioner's note, except where noted  All available labs and Radiology studies were reviewed  I was present for key portions of any procedure(s) performed by the resident/non-physician practitioner and I was immediately available to provide assistance  At this point I agree with the current assessment done in the Emergency Department    I have conducted an independent evaluation of this patient a history and physical is as follows:  Patient is followed by Cardiology for recurrent chest pain she also has had multiple Holter monitors she has had exercise stress test and stress echo in the past for this chest pain  The studies have been unremarkable  Patient states that her  bought her a fitness what to take her heart rate and she had episodes of heart rates in the mid upper 30s on several occasions today and felt tired when this happened  She also had episodes of chest discomfort that lasts for a minute or 2 at a time without radiation she has had no associated symptoms of nausea vomiting diaphoresis fever chills cough or shortness of breath  Cardiac risk factors none other than former smoker 15 years ago  At the present time heart rate is normal pulse ox a she is very well-appearing  HEENT unremarkable neck no JVD lungs clear heart regular no murmurs abdomen soft nontender extremities  Impression  fatigue with possible episodes of bradycardia  And atypical chest pain  Plan cardiac workup  TSH  EKG shows normal sinus rhythm with no acute ischemic changes nonspecific changes with similar to old EKG  Troponin negative   chest x-ray negative  TSH normal  Heart score is 1  Patient is stable for discharge careful follow-up with her cardiologist she may need a Holter monitor  Patient was given careful return precautions as well she agrees to do this she develops any new or worsening symptoms  ED Course         Critical Care Time  Procedures

## 2021-02-16 ENCOUNTER — TELEPHONE (OUTPATIENT)
Dept: INTERNAL MEDICINE CLINIC | Facility: CLINIC | Age: 46
End: 2021-02-16

## 2021-02-16 ENCOUNTER — TELEPHONE (OUTPATIENT)
Dept: CARDIOLOGY CLINIC | Facility: CLINIC | Age: 46
End: 2021-02-16

## 2021-02-16 DIAGNOSIS — R00.1 BRADYCARDIA: Primary | ICD-10-CM

## 2021-02-16 DIAGNOSIS — R42 LIGHTHEADEDNESS: ICD-10-CM

## 2021-02-16 NOTE — TELEPHONE ENCOUNTER
Marcia Villa, I can do a Zio  I called and talked to the patient  She is not taking metoprolol or any AVnodal blocker  Her HR was slow on the FitBit which could be erroneous if she's having PVC/PACs  She's okay with the zio  I ordered it  Please arrange for it  Appreciate it

## 2021-02-16 NOTE — TELEPHONE ENCOUNTER
P/C'd states she went to ER on 2/13/21 and had bradycardia 38-42  They suggested she have monitor worn, but did not write and order for it  She has been having chest tightness, exhaustion, near syncope episodes and blurred vision  Episode can last for couple minutes and happen again later in the day      Did you want to order her a Zio before appt, or do you want to see her first?    Please Tracy Awe

## 2021-02-16 NOTE — TELEPHONE ENCOUNTER
----- Message from [facility] sent at 2/16/2021  9:53 AM EST -----  Regarding: Visit Follow-Up Question  Contact: [phone]  I have a question about ECG 12-LEAD resulted on 2/14/21 at 4:38 AM what does this mean?

## 2021-02-17 ENCOUNTER — TELEPHONE (OUTPATIENT)
Dept: CARDIOLOGY CLINIC | Facility: CLINIC | Age: 46
End: 2021-02-17

## 2021-02-23 NOTE — TELEPHONE ENCOUNTER
She does not need auth for her Zio Patch per Jenn Alvarez at [facility]

## 2021-03-12 ENCOUNTER — CLINICAL SUPPORT (OUTPATIENT)
Dept: CARDIOLOGY CLINIC | Facility: CLINIC | Age: 46
End: 2021-03-12
Payer: COMMERCIAL

## 2021-03-12 DIAGNOSIS — R00.1 BRADYCARDIA: ICD-10-CM

## 2021-03-12 DIAGNOSIS — R42 LIGHTHEADEDNESS: ICD-10-CM

## 2021-03-12 PROCEDURE — 93248 EXT ECG>7D<15D REV&INTERPJ: CPT | Performed by: INTERNAL MEDICINE

## 2021-03-15 NOTE — RESULT ENCOUNTER NOTE
Preliminary Findings  Patient had a min HR of 39 bpm, max HR of 148 bpm, and avg HR of 83 bpm  Predominant underlying rhythm was Sinus Rhythm  1 run of Supraventricular Tachycardia occurred lasting 4 beats with a max rate of 106 bpm (avg 98 bpm)  Episode of Supraventricular Tachycardia may be possible Atrial Tachycardia with variable block  Second Degree AV Block-Mobitz I (Wenckebach) was present  Supraventricular Tachycardia and Wenckebach were detected within +/- 45 seconds of symptomatic patient event(s)  Isolated SVEs were rare (<1 0%), SVE Couplets were rare (<1 0%), and SVE Triplets were rare (<1 0%)  Isolated VEs were rare (<1 0%), and no VE Couplets or VE Triplets were present      PLAN:  -rare supraventricular ectopy making up <1 0% of total heartbeats  -16 patient triggered events  for palpitations or chest pain with 1 correlating with 4 beats of A-tach  -results were discussed with patient, no indication to start any new medications

## 2021-03-19 ENCOUNTER — OFFICE VISIT (OUTPATIENT)
Dept: PAIN MEDICINE | Facility: CLINIC | Age: 46
End: 2021-03-19
Payer: COMMERCIAL

## 2021-03-19 VITALS
WEIGHT: 192 LBS | SYSTOLIC BLOOD PRESSURE: 121 MMHG | BODY MASS INDEX: 30.86 KG/M2 | DIASTOLIC BLOOD PRESSURE: 80 MMHG | TEMPERATURE: 98.5 F | HEART RATE: 80 BPM | HEIGHT: 66 IN

## 2021-03-19 DIAGNOSIS — M54.9 MID BACK PAIN: ICD-10-CM

## 2021-03-19 DIAGNOSIS — M79.18 MYOFASCIAL PAIN SYNDROME: ICD-10-CM

## 2021-03-19 DIAGNOSIS — M54.12 CERVICAL RADICULOPATHY: Primary | ICD-10-CM

## 2021-03-19 PROCEDURE — 1036F TOBACCO NON-USER: CPT | Performed by: NURSE PRACTITIONER

## 2021-03-19 PROCEDURE — 99214 OFFICE O/P EST MOD 30 MIN: CPT | Performed by: NURSE PRACTITIONER

## 2021-03-19 PROCEDURE — 3008F BODY MASS INDEX DOCD: CPT | Performed by: OBSTETRICS & GYNECOLOGY

## 2021-03-19 PROCEDURE — 3008F BODY MASS INDEX DOCD: CPT | Performed by: NURSE PRACTITIONER

## 2021-03-19 NOTE — PROGRESS NOTES
Assessment:  1  Cervical radiculopathy    2  Mid back pain    3  Myofascial pain syndrome        Plan:  1  I will schedule the patient for a cervical epidural steroid injection to address the inflammatory component of the patient's pain  Complete risks and benefits including bleeding, infection, tissue reaction, nerve injury and allergic reaction were discussed  The patient was agreeable and verbalized an understanding  2  The patient will continue with her home exercise program   3  Patient may continue Tylenol p r n  and should not exceed more than 3000 mg in 24 hours   4  I will follow up with the patient 4 weeks after the procedure or sooner if needed in     [employer] software was used to dictate this note  It may contain errors with dictating incorrect words or incorrect spelling  Please contact the provider directly with any questions  History of Present Illness: The patient is a 40 y o  female  With a history of fibromyalgia last seen on 2/5/21 who presents for a follow up office visit in regards to chronic neck pain that radiates into the bilateral upper extremities with associated numbness and paresthesias and chronic mid back pain centered in between her scapula  The patient denies any radiating pain into the thorax or abdomen, bowel or bladder incontinence or balance issues  CT of the thoracic spine reveals a small disc bulge at T3-4 without any significant central or foraminal stenosis  CT of the cervical spine reveals some minimal disc bulges and arthritis with mild left foraminal stenosis at C3-4, mild central stenosis at C5-6, and mild left foraminal stenosis at C7-T1  The patient rates her pain a 5/10 on the numeric pain rating scale  She states her pain is constant nature and bothersome in the evening  She characterizes the pain as burning, dull aching, sharp, cramping and pressure like       the patient is not currently taking any medication for pain,  Other than Tylenol which she finds minimal relief  She is unable to tolerate NSAIDs secondary to GI upset and has previously tried and failed gabapentin, Lyrica and Cymbalta  She states that her cardiologist had told her to discontinue tizanidine secondary to cardiac side effects  I have personally reviewed and/or updated the patient's past medical history, past surgical history, family history, social history, current medications, allergies, and vital signs today  Review of Systems:    Review of Systems   Respiratory: Negative for shortness of breath  Cardiovascular: Negative for chest pain  Gastrointestinal: Negative for constipation, diarrhea, nausea and vomiting  Musculoskeletal: Negative for arthralgias, gait problem, joint swelling and myalgias  Skin: Negative for rash  Neurological: Negative for dizziness, seizures and weakness  All other systems reviewed and are negative          Past Medical History:   Diagnosis Date    Abdominal pain     Anemia     Anxiety     Back pain     Bilateral fibrocystic breast changes     resolved: 02/21/2017    Breast disorder     Chest pain     Chronic pelvic pain in female     last assessed: 09/07/2016    Cluster headaches     Constipation     Cough     Depression     Difficulty concentrating     Disease of thyroid gland     Dizziness     Dyspareunia in female     last assessed: 08/18/2016    Easy bruising     Fainting episodes     Fatigue     Fever due to infection     Fibromyalgia     Frequent urination at night     Gallbladder disease     Herniated intervertebral disc of lumbar spine     Hypotension     Loss of bladder control     Memory loss     last assessed: 06/15/2015    Mixed incontinence     last assessed: 01/11/2016    Myofascial pain     last assessed: 06/01/2016    Numbness and tingling     Painful swelling of joint     Palpitations     Panic attack     Sciatica     last assessed: 06/15/2015    Situational anxiety     last assessed: 02/21/2017    SOB (shortness of breath)     Thyroid disease     Uterus, adenomyosis     last assessed: 09/07/2016    Weakness     Weight disorder        Past Surgical History:   Procedure Laterality Date    CHOLECYSTECTOMY      CHOLECYSTECTOMY LAPAROSCOPIC N/A 10/22/2017    Procedure: CHOLECYSTECTOMY LAPAROSCOPIC;  Surgeon: Akua Jarvis MD;  [facility];  Service: General    ENDOMETRIAL BIOPSY      resolved: 02/21/2017    HYSTERECTOMY  2016    MO VAGINAL HYSTERECTOMY,UTERUS 250 GMS/< N/A 4/20/2018    Procedure: HYSTERECTOMY VAGINAL TOTAL (TVH), BILATERAL SALPINGECTOMY;  Surgeon: Betito aM MD;  [facility];  Service: Gynecology    TUBAL LIGATION      last assessed: 10/20/2014       Family History   Problem Relation Age of Onset    Hypertension Mother    Marylin Slipper Arthritis Mother     Diabetes Maternal Grandmother     Breast cancer Family     Cancer Family     Diabetes Family     Heart disease Family     Hyperlipidemia Family         reported cholesterol level was high    Hypertension Other     Hyperlipidemia Father     No Known Problems Sister     No Known Problems Daughter     Breast cancer Paternal Grandmother 28    Leukemia Paternal Aunt     No Known Problems Sister     No Known Problems Sister     No Known Problems Sister     No Known Problems Sister     No Known Problems Daughter     No Known Problems Paternal Aunt        Social History     Occupational History    Occupation: Full-time   Tobacco Use    Smoking status: Never Smoker    Smokeless tobacco: Never Used   Substance and Sexual Activity    Alcohol use: Yes     Frequency: Monthly or less     Drinks per session: 1 or 2     Binge frequency: Never    Drug use: Never    Sexual activity: Yes     Partners: Male     Birth control/protection: Female Sterilization, Surgical         Current Outpatient Medications:     levothyroxine 88 mcg tablet, TAKE 1 TABLET BY MOUTH EVERY DAY, Disp: 90 tablet, Rfl: 1   chlorhexidine (PERIDEX) 0 12 % solution, , Disp: , Rfl:     Lidocaine Viscous HCl (XYLOCAINE) 2 % mucosal solution, , Disp: , Rfl:     omeprazole (PriLOSEC) 20 mg delayed release capsule, Take 20 mg by mouth daily, Disp: , Rfl:     Allergies   Allergen Reactions    Morphine Chest Pain    Iv Contrast [Iodinated Diagnostic Agents] Hives       Physical Exam:    /80   Pulse 80   Temp 98 5 °F (36 9 °C)   Ht 5' 6" (1 676 m)   Wt 87 1 kg (192 lb)   LMP 02/28/2018 (Exact Date)   BMI 30 99 kg/m²     Constitutional:normal, well developed, well nourished, alert, in no distress and non-toxic and no overt pain behavior  Eyes:anicteric  HEENT:grossly intact  Neck:supple, symmetric, trachea midline and no masses   Pulmonary:even and unlabored  Cardiovascular:No edema or pitting edema present  Skin:Normal without rashes or lesions and well hydrated  Psychiatric:Mood and affect appropriate  Neurologic:Cranial Nerves II-XII grossly intact  Musculoskeletal:normal  Gait  Bilateral cervical paraspinal musculature tender to palpation  Full range of motion in all planes of the cervical spine  Negative Spurling's bilaterally      Imaging  FL spine and pain procedure    (Results Pending)   CT CERVICAL SPINE - WITHOUT CONTRAST     INDICATION:   M54 12: Radiculopathy, cervical region      COMPARISON:  X-ray cervical spine 12/3/2020     TECHNIQUE:  CT examination of the cervical spine was performed without intravenous contrast   Contiguous axial images were obtained  Sagittal and coronal reconstructions were performed        Radiation dose length product (DLP) for this visit:  399 32 mGy-cm     This examination, like all CT scans performed in the [facility], was performed utilizing techniques to minimize radiation dose exposure, including the use of iterative   reconstruction and automated exposure control        IMAGE QUALITY:  Diagnostic      FINDINGS:     ALIGNMENT:  There is straightening of normal cervical lordosis  No spondylolisthesis      VERTEBRAL BODIES:  No fracture      DEGENERATIVE CHANGES:     C2-3: Minimal disc bulge  No canal or foraminal stenosis      C3-4: Minimal disc bulge  There is right greater than left mild uncovertebral spurring  No significant canal stenosis  Mild left foraminal narrowing      C4-5: Minimal disc bulge  No canal or foraminal stenosis      C5-6: Mild disc bulge  Minimal left uncovertebral spurring  Mild canal narrowing  Minimal left foraminal narrowing      C6-7: No significant disc bulge  No canal or foraminal stenosis      C7-T1: No significant disc bulge  Mild left uncovertebral spurring  No canal stenosis  Mild left foraminal narrowing      PREVERTEBRAL AND PARASPINAL SOFT TISSUES:  Unremarkable      THORACIC INLET:  Normal      IMPRESSION:     Mild degenerative changes without significant canal or foraminal stenosis         CT THORACIC SPINE     INDICATION:   M54 9: Dorsalgia, unspecified      COMPARISON:  X-ray thoracic spine 12/3/2020     TECHNIQUE:  Contiguous axial images were obtained  Sagittal and coronal reconstructions were performed        Radiation dose length product (DLP) for this visit:  501 27 mGy-cm   This examination, like all CT scans performed in the [facility], was performed utilizing techniques to minimize radiation dose exposure, including the use of iterative   reconstruction and automated exposure control         IMAGE QUALITY:  Diagnostic      FINDINGS:     ALIGNMENT:  Mild dextroscoliosis  There is preserved normal thoracic kyphosis     VERTEBRAL BODIES: No fracture      DEGENERATIVE CHANGES:     Small disc bulge at level T3-4    Scattered minimal disc bulge/herniation in other levels without significant canal or foraminal stenosis      PARASPINAL SOFT TISSUES:  Normal      Cholecystectomy clips      IMPRESSION:     Unremarkable CT of the thoracic spine          Orders Placed This Encounter   Procedures    FL spine and pain procedure

## 2021-03-23 DIAGNOSIS — R09.81 NASAL CONGESTION: ICD-10-CM

## 2021-03-23 RX ORDER — IPRATROPIUM BROMIDE 21 UG/1
2 SPRAY, METERED NASAL EVERY 12 HOURS
Qty: 30 ML | Refills: 2 | Status: SHIPPED | OUTPATIENT
Start: 2021-03-23 | End: 2021-06-14

## 2021-04-02 ENCOUNTER — TELEPHONE (OUTPATIENT)
Dept: RADIOLOGY | Facility: CLINIC | Age: 46
End: 2021-04-02

## 2021-04-05 NOTE — TELEPHONE ENCOUNTER
Follow up with Patient  Patient states her current pain level is a 7/8 out of 10 and she continues with HEP as directed from Physical Therapy from 1/2021 to current date

## 2021-04-21 ENCOUNTER — OFFICE VISIT (OUTPATIENT)
Dept: INTERNAL MEDICINE CLINIC | Facility: CLINIC | Age: 46
End: 2021-04-21

## 2021-04-21 VITALS
OXYGEN SATURATION: 97 % | SYSTOLIC BLOOD PRESSURE: 98 MMHG | DIASTOLIC BLOOD PRESSURE: 68 MMHG | TEMPERATURE: 97.8 F | WEIGHT: 196 LBS | BODY MASS INDEX: 31.5 KG/M2 | HEIGHT: 66 IN | HEART RATE: 91 BPM

## 2021-04-21 DIAGNOSIS — E03.8 HYPOTHYROIDISM DUE TO HASHIMOTO'S THYROIDITIS: ICD-10-CM

## 2021-04-21 DIAGNOSIS — L50.9 HIVES OF UNKNOWN ORIGIN: Primary | ICD-10-CM

## 2021-04-21 DIAGNOSIS — E06.3 HYPOTHYROIDISM DUE TO HASHIMOTO'S THYROIDITIS: ICD-10-CM

## 2021-04-21 DIAGNOSIS — R10.13 EPIGASTRIC ABDOMINAL PAIN: ICD-10-CM

## 2021-04-21 PROCEDURE — 3008F BODY MASS INDEX DOCD: CPT | Performed by: PHYSICIAN ASSISTANT

## 2021-04-21 PROCEDURE — 99213 OFFICE O/P EST LOW 20 MIN: CPT | Performed by: PHYSICIAN ASSISTANT

## 2021-04-21 PROCEDURE — 1036F TOBACCO NON-USER: CPT | Performed by: PHYSICIAN ASSISTANT

## 2021-04-21 PROCEDURE — 3008F BODY MASS INDEX DOCD: CPT | Performed by: NURSE PRACTITIONER

## 2021-04-21 RX ORDER — OMEPRAZOLE 20 MG/1
20 CAPSULE, DELAYED RELEASE ORAL DAILY
Qty: 30 CAPSULE | Refills: 0 | Status: SHIPPED | OUTPATIENT
Start: 2021-04-21 | End: 2021-05-12 | Stop reason: SDUPTHER

## 2021-04-21 RX ORDER — LORATADINE 10 MG/1
10 TABLET ORAL DAILY
Qty: 90 TABLET | Refills: 1 | Status: SHIPPED | OUTPATIENT
Start: 2021-04-21 | End: 2021-10-18

## 2021-04-21 NOTE — PROGRESS NOTES
Assessment/Plan: On your visit today we discussed that based on her photos and description it does appear that you are developing hives in different locations for as yet undetermined reason  Referral provided to Dermatology  I have sent in a prescription for loratadine  Please take 1 tablet every day  Also discussed the next time you get an episode to please take a Benadryl right away to see if this resolves it in minutes or hours verses self resolve overnight  I have sent in a refill of your omeprazole which you may resume taking 1 tablet daily 15 minutes before breakfast and you are scheduled for follow-up with GI next month  Also reviewed you are scheduled with rheumatologist in July for further evaluation and discussion of possible lupus based on your sister's diagnosis  You have been evaluated by rheumatology in the past testing was negative at that time  Script also provided today for your follow-up labs for your thyroid due as dated in August     No problem-specific Assessment & Plan notes found for this encounter  Diagnoses and all orders for this visit:    Hives of unknown origin  -     loratadine (CLARITIN) 10 mg tablet; Take 1 tablet (10 mg total) by mouth daily  -     Ambulatory referral to Dermatology; Future    Epigastric abdominal pain  -     omeprazole (PriLOSEC) 20 mg delayed release capsule; Take 1 capsule (20 mg total) by mouth daily    Hypothyroidism due to Hashimoto's thyroiditis  -     T4, free; Future  -     TSH, 3rd generation; Future          Subjective:      Patient ID: Marlin Steve is a 40 y o  female  Pt here for [facility] with c/o random lumps that appear and will resolve on their own overnight  States will happen in all different locations  States not always itchy  On arms smaller and itchy but the one on buttock was not itchy and much larger  States the ones on arms were very itchy and woke her  Put cortisone on arms    States appear upper torso and arms not legs   Sometimes face will be red but no lumps Palms and feet more itchy but again no lumps or hives  Not associated with joints    States will appear every 3-4 days  None today last was last night  No new meds, nothing OTC  Has been taking husbands prilosec for heartburn but has had in the past and not daily  Patient confirms no new soaps detergents clothing no new animals or plants in the home  On inspection patient did not have any lumps or rash in the office today but did review the photos on her phone  Especially on her arms appearance is consistent with hives  The lumps on her buttock were much larger than the ones on her arms but does not rule out a hive    Also has appointment with Rheumatology in July as sister has Lupus but skin type  Based on description sounds like discoid lupus erythematous   Patient was seen by rheumatology in the past autoimmune testing was negative at that time and diagnosed with fibromyalgia  Patient also continues to follow with pain management for neck and mid back pain  The following portions of the patient's history were reviewed and updated as appropriate: allergies, current medications, past family history, past medical history, past social history, past surgical history and problem list     Review of Systems   Constitutional: Negative  Negative for chills and fever  Respiratory: Negative  Negative for cough and shortness of breath  Cardiovascular: Negative  Gastrointestinal: Positive for abdominal pain  Musculoskeletal: Positive for arthralgias and myalgias  Skin: Positive for color change and rash  Neurological: Negative  Psychiatric/Behavioral: Negative            Objective:      BP 98/68 (BP Location: Left arm, Patient Position: Sitting, Cuff Size: Large)   Pulse 91   Temp 97 8 °F (36 6 °C) (Temporal)   Ht 5' 6" (1 676 m)   Wt 88 9 kg (196 lb)   LMP 02/28/2018 (Exact Date)   SpO2 97%   BMI 31 64 kg/m²          Physical Exam  Vitals signs and nursing note reviewed  Constitutional:       General: She is not in acute distress  Appearance: She is not ill-appearing  HENT:      Head: Normocephalic  Eyes:      Conjunctiva/sclera: Conjunctivae normal    Cardiovascular:      Rate and Rhythm: Normal rate and regular rhythm  Heart sounds: Normal heart sounds  Pulmonary:      Effort: Pulmonary effort is normal       Breath sounds: Normal breath sounds  Abdominal:      General: Bowel sounds are normal    Skin:     Comments: As noted on inspection currently patient does not have any hives or bumps  She does have the bumpy skin posterior arms consistent with keratosis pilaris   Neurological:      Mental Status: She is alert  Mental status is at baseline     Psychiatric:         Mood and Affect: Mood normal

## 2021-04-21 NOTE — PATIENT INSTRUCTIONS
On your visit today we discussed that based on her photos and description it does appear that you are developing hives in different locations for as yet undetermined reason  Referral provided to Dermatology  I have sent in a prescription for loratadine  Please take 1 tablet every day  Also discussed the next time you get an episode to please take a Benadryl right away to see if this resolves it in minutes or hours verses self resolve overnight  I have sent in a refill of your omeprazole which you may resume taking 1 tablet daily 15 minutes before breakfast and you are scheduled for follow-up with GI next month  Also reviewed you are scheduled with rheumatologist in July for further evaluation and discussion of possible lupus based on your sister's diagnosis  You have been evaluated by rheumatology in the past testing was negative at that time      Script also provided today for your follow-up labs for your thyroid due as dated in August

## 2021-05-11 ENCOUNTER — TELEPHONE (OUTPATIENT)
Dept: INTERNAL MEDICINE CLINIC | Facility: CLINIC | Age: 46
End: 2021-05-11

## 2021-05-11 NOTE — TELEPHONE ENCOUNTER
Patient called office stating she is going to be getting blood work drawn for the thyroid and she is interested in having additional blood work, like her vitamins, vitamin B12 and estrogen levels  Patient states she has been feeling "run down" and thinks she might be going through menopause  Please advise if this is something you can order or if you would like patient to be seen for an appointment

## 2021-05-12 ENCOUNTER — OFFICE VISIT (OUTPATIENT)
Dept: GASTROENTEROLOGY | Facility: CLINIC | Age: 46
End: 2021-05-12
Payer: COMMERCIAL

## 2021-05-12 VITALS
HEIGHT: 65 IN | SYSTOLIC BLOOD PRESSURE: 98 MMHG | DIASTOLIC BLOOD PRESSURE: 64 MMHG | TEMPERATURE: 96 F | BODY MASS INDEX: 32.15 KG/M2 | HEART RATE: 80 BPM | WEIGHT: 193 LBS

## 2021-05-12 DIAGNOSIS — R10.13 EPIGASTRIC ABDOMINAL PAIN: Primary | ICD-10-CM

## 2021-05-12 DIAGNOSIS — Z12.11 COLON CANCER SCREENING: ICD-10-CM

## 2021-05-12 DIAGNOSIS — E03.8 HYPOTHYROIDISM DUE TO HASHIMOTO'S THYROIDITIS: ICD-10-CM

## 2021-05-12 DIAGNOSIS — E06.3 HYPOTHYROIDISM DUE TO HASHIMOTO'S THYROIDITIS: ICD-10-CM

## 2021-05-12 PROCEDURE — 1036F TOBACCO NON-USER: CPT | Performed by: INTERNAL MEDICINE

## 2021-05-12 PROCEDURE — 99214 OFFICE O/P EST MOD 30 MIN: CPT | Performed by: INTERNAL MEDICINE

## 2021-05-12 RX ORDER — POLYETHYLENE GLYCOL 3350 17 G/17G
289 POWDER, FOR SOLUTION ORAL ONCE
Qty: 289 G | Refills: 0 | Status: SHIPPED | OUTPATIENT
Start: 2021-05-12 | End: 2021-07-26 | Stop reason: ALTCHOICE

## 2021-05-12 RX ORDER — OMEPRAZOLE 40 MG/1
40 CAPSULE, DELAYED RELEASE ORAL 2 TIMES DAILY
Qty: 180 CAPSULE | Refills: 0 | Status: SHIPPED | OUTPATIENT
Start: 2021-05-12 | End: 2021-08-04

## 2021-05-12 NOTE — PATIENT INSTRUCTIONS
We will schedule you for colonoscopy  Please follow the instructions for the bowel prep  A new prescription for omeprazole 40 mg has been sent to pharmacy  Please take this 2 times daily  30 minutes before breakfast and 30 minutes before dinner  For your gas symptoms, he can take over-the-counter Gas-X and Beano  Take the Beano 3 times daily with meals  You can take the Gas-X for additional symptomatic relief      Colon on 6/3/21 with Dr Ramu Larios at [facility]  Miralax/dulcolax prep instructions given by Yuli Ziegler

## 2021-05-12 NOTE — ASSESSMENT & PLAN NOTE
No previous colonoscopy or colon cancer screening  Patient reports family history of polyps in her sister who is younger than her  No other family history of GI malignancy or colon cancer  No alarm symptoms  Not on any anti-platelet agents or anticoagulants    · Discussed options with patient and she is agreeable to screening colonoscopy; will schedule for colonoscopy   · Bowel prep instructions provided

## 2021-05-12 NOTE — ASSESSMENT & PLAN NOTE
Etiology for pain remains unclear  May be due to reflux  Recent EGD with only findings mild gastritis  Biopsies were negative  Patient has stopped taking NSAIDs  Recently restarted on omeprazole 20 mg daily per PCP  Patient reports taking antacids additionally for symptomatic relief  Does report some improvement in symptoms with PPI    · Increase omeprazole to 40 mg 2 times daily; advised patient to take 30 minutes before breakfast and 30 minutes before dinner  · If symptoms persist, can consider manometry with 24-hour pH monitoring for further evaluation

## 2021-05-19 ENCOUNTER — TELEPHONE (OUTPATIENT)
Dept: OBGYN CLINIC | Facility: CLINIC | Age: 46
End: 2021-05-19

## 2021-05-19 NOTE — TELEPHONE ENCOUNTER
Pt LM on nurse line requesting blood work per PCP  RN LM to call back [facility]- to schedule follow-up appt per Dr Prosper Stuart

## 2021-05-20 ENCOUNTER — ANESTHESIA EVENT (OUTPATIENT)
Dept: GASTROENTEROLOGY | Facility: AMBULARY SURGERY CENTER | Age: 46
End: 2021-05-20

## 2021-05-24 ENCOUNTER — OFFICE VISIT (OUTPATIENT)
Dept: OBGYN CLINIC | Facility: CLINIC | Age: 46
End: 2021-05-24

## 2021-05-24 VITALS
DIASTOLIC BLOOD PRESSURE: 59 MMHG | HEART RATE: 88 BPM | WEIGHT: 197 LBS | BODY MASS INDEX: 32.82 KG/M2 | HEIGHT: 65 IN | SYSTOLIC BLOOD PRESSURE: 104 MMHG

## 2021-05-24 DIAGNOSIS — N64.4 MASTALGIA IN FEMALE: Primary | ICD-10-CM

## 2021-05-24 PROCEDURE — 1036F TOBACCO NON-USER: CPT | Performed by: OBSTETRICS & GYNECOLOGY

## 2021-05-24 PROCEDURE — 3008F BODY MASS INDEX DOCD: CPT | Performed by: OBSTETRICS & GYNECOLOGY

## 2021-05-24 PROCEDURE — 99212 OFFICE O/P EST SF 10 MIN: CPT | Performed by: OBSTETRICS & GYNECOLOGY

## 2021-05-24 PROCEDURE — 3008F BODY MASS INDEX DOCD: CPT | Performed by: INTERNAL MEDICINE

## 2021-05-24 NOTE — PROGRESS NOTES
OB/GYN VISIT  Ebony Delcid  5/24/2021  3:16 PM    Subjective:     Ebony Delcid is a 40 y o  [id number] female who presents for evaluation of breast pain and tenderness  Patient was seen in February of this year for breast tenderness  She is s/p hysterectomy, but ovaries are still in place  At that visit she was referred for a mammogram to rule out pathology causing breast pain  Since that visit patient states the pain has worsened  Her breasts are very tender with areola sensitivity  Her pain is not cyclical  She states "I've always had lumpy breasts"  There have been no changes from the baseline  She does self breast examinations  She denies any discharge, skin changes, erythema  She also notes that she has hot flashes more frequently  She saw her PCP who plans to check her thyroid function  Patient does not drink, has cut back on caffeine and does not smoke  She declines a breast examination today  Objective:    Vitals: Blood pressure 104/59, pulse 88, height 5' 5" (1 651 m), weight 89 4 kg (197 lb), last menstrual period 02/28/2018, not currently breastfeeding  Body mass index is 32 78 kg/m²      Past Medical History:   Diagnosis Date    Abdominal pain     Anemia     Anxiety     Back pain     Bilateral fibrocystic breast changes     resolved: 02/21/2017    Breast disorder     Chest pain     Chronic pelvic pain in female     last assessed: 09/07/2016    Cluster headaches     Constipation     Cough     Depression     Difficulty concentrating     Disease of thyroid gland     Dizziness     Dyspareunia in female     last assessed: 08/18/2016    Easy bruising     Fainting episodes     Fatigue     Fever due to infection     Fibromyalgia     Frequent urination at night     Gallbladder disease     Herniated intervertebral disc of lumbar spine     Hypotension     Loss of bladder control     Memory loss     last assessed: 06/15/2015    Mixed incontinence     last assessed: 01/11/2016    Myofascial pain     last assessed: 06/01/2016    Numbness and tingling     Painful swelling of joint     Palpitations     Panic attack     Sciatica     last assessed: 06/15/2015    Situational anxiety     last assessed: 02/21/2017    SOB (shortness of breath)     Thyroid disease     Uterus, adenomyosis     last assessed: 09/07/2016    Weakness     Weight disorder      Past Surgical History:   Procedure Laterality Date    CHOLECYSTECTOMY      CHOLECYSTECTOMY LAPAROSCOPIC N/A 10/22/2017    Procedure: CHOLECYSTECTOMY LAPAROSCOPIC;  Surgeon: Jamshid Arora MD;  [facility];  Service: General    ENDOMETRIAL BIOPSY      resolved: 02/21/2017    HYSTERECTOMY  2016    AR VAGINAL HYSTERECTOMY,UTERUS 250 GMS/< N/A 4/20/2018    Procedure: HYSTERECTOMY VAGINAL TOTAL (TVH), BILATERAL SALPINGECTOMY;  Surgeon: Angelica Mccullough MD;  [facility];  Service: Gynecology    TUBAL LIGATION      last assessed: 10/20/2014       Physical Exam  Constitutional:       Appearance: Normal appearance  Neck:      Musculoskeletal: Normal range of motion  Cardiovascular:      Rate and Rhythm: Normal rate and regular rhythm  Pulmonary:      Breath sounds: Normal breath sounds  Musculoskeletal: Normal range of motion  Neurological:      General: No focal deficit present  Mental Status: She is alert  Psychiatric:         Mood and Affect: Mood normal            Assessment/Plan:    Vianney Villa is a 40 y o   [id number] female with mastalgia 2/2 to perimenopause    Problem List Items Addressed This Visit        Other    Mastalgia in female - Primary     2/2 to perimenopause   Mammogram ordered and scheduled- patient to complete in June  Unable to assess cyclic nature due to patient's amenorrheic status- 2/2 to hysterectomy   Discussed with patient that she should wear a supportive bra, can use NSAIDs, avoid stimulation of the breast    We briefly discussed the possibility of HRT, however with patient's history of headaches and fibromyalgia this may not be the best option for her first line  Patient will try the above methods to help with breast pain     F/u 3 months                  Pat Bahena MD  5/24/2021  3:16 PM

## 2021-05-24 NOTE — ASSESSMENT & PLAN NOTE
2/2 to perimenopause   Mammogram ordered and scheduled- patient to complete in June  Unable to assess cyclic nature due to patient's amenorrheic status- 2/2 to hysterectomy   Discussed with patient that she should wear a supportive bra, can use NSAIDs, avoid stimulation of the breast    We briefly discussed the possibility of HRT, however with patient's history of headaches and fibromyalgia this may not be the best option for her first line  Patient will try the above methods to help with breast pain     F/u 3 months

## 2021-06-01 ENCOUNTER — APPOINTMENT (OUTPATIENT)
Dept: LAB | Facility: HOSPITAL | Age: 46
End: 2021-06-01
Payer: COMMERCIAL

## 2021-06-01 LAB
T4 FREE SERPL-MCNC: 0.99 NG/DL (ref 0.76–1.46)
TSH SERPL DL<=0.05 MIU/L-ACNC: 3.48 UIU/ML (ref 0.36–3.74)

## 2021-06-01 PROCEDURE — 84439 ASSAY OF FREE THYROXINE: CPT | Performed by: PHYSICIAN ASSISTANT

## 2021-06-01 PROCEDURE — 36415 COLL VENOUS BLD VENIPUNCTURE: CPT | Performed by: PHYSICIAN ASSISTANT

## 2021-06-01 PROCEDURE — 84443 ASSAY THYROID STIM HORMONE: CPT | Performed by: PHYSICIAN ASSISTANT

## 2021-06-03 ENCOUNTER — ANESTHESIA (OUTPATIENT)
Dept: GASTROENTEROLOGY | Facility: AMBULARY SURGERY CENTER | Age: 46
End: 2021-06-03

## 2021-06-03 ENCOUNTER — HOSPITAL ENCOUNTER (OUTPATIENT)
Dept: GASTROENTEROLOGY | Facility: AMBULARY SURGERY CENTER | Age: 46
Setting detail: OUTPATIENT SURGERY
Discharge: HOME | End: 2021-06-03
Admitting: STUDENT IN AN ORGANIZED HEALTH CARE EDUCATION/TRAINING PROGRAM
Payer: COMMERCIAL

## 2021-06-03 VITALS
HEART RATE: 73 BPM | WEIGHT: 197 LBS | SYSTOLIC BLOOD PRESSURE: 96 MMHG | TEMPERATURE: 97.6 F | HEIGHT: 65 IN | RESPIRATION RATE: 17 BRPM | DIASTOLIC BLOOD PRESSURE: 46 MMHG | BODY MASS INDEX: 32.82 KG/M2 | OXYGEN SATURATION: 100 %

## 2021-06-03 DIAGNOSIS — Z12.11 COLON CANCER SCREENING: ICD-10-CM

## 2021-06-03 PROCEDURE — 45378 DIAGNOSTIC COLONOSCOPY: CPT | Performed by: INTERNAL MEDICINE

## 2021-06-03 RX ORDER — SODIUM CHLORIDE, SODIUM LACTATE, POTASSIUM CHLORIDE, CALCIUM CHLORIDE 600; 310; 30; 20 MG/100ML; MG/100ML; MG/100ML; MG/100ML
75 INJECTION, SOLUTION INTRAVENOUS CONTINUOUS
Status: DISCONTINUED | OUTPATIENT
Start: 2021-06-03 | End: 2021-06-07 | Stop reason: HOSPADM

## 2021-06-03 RX ORDER — PROPOFOL 10 MG/ML
INJECTION, EMULSION INTRAVENOUS AS NEEDED
Status: DISCONTINUED | OUTPATIENT
Start: 2021-06-03 | End: 2021-06-03

## 2021-06-03 RX ORDER — SODIUM CHLORIDE, SODIUM LACTATE, POTASSIUM CHLORIDE, CALCIUM CHLORIDE 600; 310; 30; 20 MG/100ML; MG/100ML; MG/100ML; MG/100ML
INJECTION, SOLUTION INTRAVENOUS CONTINUOUS PRN
Status: DISCONTINUED | OUTPATIENT
Start: 2021-06-03 | End: 2021-06-03

## 2021-06-03 RX ORDER — LIDOCAINE HYDROCHLORIDE 10 MG/ML
INJECTION, SOLUTION EPIDURAL; INFILTRATION; INTRACAUDAL; PERINEURAL AS NEEDED
Status: DISCONTINUED | OUTPATIENT
Start: 2021-06-03 | End: 2021-06-03

## 2021-06-03 RX ADMIN — PROPOFOL 50 MG: 10 INJECTION, EMULSION INTRAVENOUS at 11:49

## 2021-06-03 RX ADMIN — LIDOCAINE HYDROCHLORIDE 50 MG: 10 INJECTION, SOLUTION EPIDURAL; INFILTRATION; INTRACAUDAL at 11:45

## 2021-06-03 RX ADMIN — PROPOFOL 100 MG: 10 INJECTION, EMULSION INTRAVENOUS at 11:45

## 2021-06-03 RX ADMIN — PROPOFOL 50 MG: 10 INJECTION, EMULSION INTRAVENOUS at 11:52

## 2021-06-03 RX ADMIN — SODIUM CHLORIDE, SODIUM LACTATE, POTASSIUM CHLORIDE, AND CALCIUM CHLORIDE: .6; .31; .03; .02 INJECTION, SOLUTION INTRAVENOUS at 11:06

## 2021-06-03 RX ADMIN — PROPOFOL 50 MG: 10 INJECTION, EMULSION INTRAVENOUS at 11:56

## 2021-06-03 RX ADMIN — PROPOFOL 50 MG: 10 INJECTION, EMULSION INTRAVENOUS at 11:47

## 2021-06-03 NOTE — ANESTHESIA PREPROCEDURE EVALUATION
Procedure:  COLONOSCOPY    Relevant Problems   CARDIO   (+) Mastalgia in female      ENDO   (+) Hypothyroidism due to Hashimoto's thyroiditis      MUSCULOSKELETAL   (+) Fibromyalgia   (+) Mid back pain   (+) Myofascial pain syndrome      NEURO/PSYCH   (+) Fibromyalgia   (+) Generalized anxiety disorder   (+) Myofascial pain syndrome        Physical Exam    Airway    Mallampati score: II  TM Distance: >3 FB  Neck ROM: full     Dental   No notable dental hx     Cardiovascular  Rhythm: regular, Rate: normal,     Pulmonary  Breath sounds clear to auscultation,     Other Findings        Anesthesia Plan  ASA Score- 2     Anesthesia Type- IV sedation with anesthesia with ASA Monitors  Additional Monitors:   Airway Plan:           Plan Factors-Exercise tolerance (METS): >4 METS  Chart reviewed  EKG reviewed  Imaging results reviewed  Existing labs reviewed  Patient summary reviewed  Patient is not a current smoker  Induction- intravenous  Postoperative Plan-     Informed Consent- Anesthetic plan and risks discussed with patient  I personally reviewed this patient with the CRNA  Discussed and agreed on the Anesthesia Plan with the CRNA  Aime Mike

## 2021-06-03 NOTE — DISCHARGE INSTRUCTIONS
Colonoscopy   AMBULATORY CARE:   What you need to know about a colonoscopy:  A colonoscopy is a procedure to examine the inside of your colon (intestine) with a scope  A scope is a flexible tube with a small light and camera on the end  Polyps or tissue growths may be removed during your colonoscopy  What you need to do the week before your colonoscopy: You will need to stop taking medicines that contain aspirin or iron for 7 days before your colonoscopy  If you take a blood thinner, such as warfarin, ask when you should stop taking it  Make plans for someone to drive you home after your procedure  How to prepare for your colonoscopy: Your healthcare provider will have you prepare your bowels before your procedure  Your bowels will need to be empty before your procedure to allow him or her to see your colon clearly  You will need to do the following:  · Have only clear liquids for the entire day before your colonoscopy  Clear liquids include plain gelatin, unsweetened fruit juices, clear soup, and broth  Do not drink any liquid that is blue, red, or purple  · Follow your bowel prep as directed  There are many different preparations that can be given before a colonoscopy  Some are given over 2 hours and others over 6 hours  Some are given earlier in the afternoon the day before the colonoscopy  Others are given the day before and then the morning of the colonoscopy  With any bowel prep, stay close to the bathroom  This liquid will cause your bowels to move often  · Use an enema if directed  Your healthcare provider may tell you to use an enema to help clean out your bowels  · Do not eat or drink anything after midnight  This will help prevent problems that can happen if you vomit while under anesthesia  What will happen during your colonoscopy:   · You will be given medicine to help you relax   You will lie on your left side and raise one or both knees toward your chest  Your healthcare provider will examine your anus and use a finger to check your rectum  You may need another enema if your bowel is not empty  The scope will be lubricated and gently placed into your anus  It will then be passed through your rectum and into your colon  Water or air will be put into your colon to help clean or expand it  This is done so your healthcare provider can see your colon clearly  · Tissue samples may be taken from the walls of your bowel and sent to a lab for tests  If you have a polyp, your healthcare provider will pass a wire loop through the scope and use it to hold the polyp  The polyp is then removed from the wall of your colon  The polyps are sent to a lab for tests  Pictures of your colon may be taken during the procedure  What will happen after your colonoscopy: You may feel bloated or have some gas and abdominal discomfort  You may need to lie on your left side with a heating pad on your abdomen  Risks of a colonoscopy: You may have pain or bleeding after the scope or polyps are removed  You may also have a slow heartbeat, decreased blood pressure, or increased sweating  Your colon may tear due to the increased pressure from the scope and other instruments  This may cause bowel contents to leak out of your colon and into your abdomen  If this happens, you will need to have surgery on your colon  Call your doctor if:   · You have a large amount of bright red blood in your bowel movements  · Your abdomen is hard and firm and you have severe pain  · You have sudden trouble breathing  · You develop a rash or hives  · You have a fever within 24 hours of your procedure  · You have not had a bowel movement for 3 days after your procedure  · You have questions or concerns about your condition or care  After your colonoscopy:   · Do not lift, strain, or run  for 3 days  · Rest as much as possible  You have been given medicine to relax you   Do not  drive or make important decisions for at least 24 hours  Return to your normal activity as directed  · Relieve gas and discomfort from bloating  by lying on your left side with a heating pad on your abdomen  You may need to take short walks to help the gas move out  Eat small meals until bloating is relieved  If you had polyps removed: For 7 days after your procedure:  · Do not  take aspirin  · Do not  go on long car rides  Help prevent constipation:   · Eat a variety of healthy foods  Healthy foods include fruit, vegetables, whole-grain breads, low-fat dairy products, beans, lean meat, and fish  Ask if you need to be on a special diet  Your healthcare provider may recommend that you eat high-fiber foods such as cooked beans  Fiber helps you have regular bowel movements  · Drink liquids as directed  Adults should drink between 9 and 13 eight-ounce cups of liquid every day  Ask what amount is best for you  For most people, good liquids to drink are water, juice, and milk  · Exercise as directed  Talk to your healthcare provider about the best exercise plan for you  Exercise can help prevent constipation, decrease your blood pressure and improve your health  Follow up with your healthcare provider as directed:  Write down your questions so you remember to ask them during your visits  © Copyright [address] [place] [address] Drive Information is for End User's use only and may not be sold, redistributed or otherwise used for commercial purposes  All illustrations and images included in CareNotes® are the copyrighted property of A D A M , Inc  or [facility] Simone Diamond   The above information is an  only  It is not intended as medical advice for individual conditions or treatments  Talk to your doctor, nurse or pharmacist before following any medical regimen to see if it is safe and effective for you

## 2021-06-03 NOTE — H&P
History and Physical - [facility]  Victor Hugo León 40 y o  female MRN: [id number]                  HPI: Victor Hugo León is a 40y o  year old female who presents for colonoscopy      REVIEW OF SYSTEMS: Per the HPI, and otherwise unremarkable      Historical Information   Past Medical History:   Diagnosis Date    Abdominal pain     Anemia     Anxiety     Back pain     Bilateral fibrocystic breast changes     resolved: 02/21/2017    Breast disorder     Chest pain     Chronic pelvic pain in female     last assessed: 09/07/2016    Cluster headaches     Constipation     Cough     Depression     Difficulty concentrating     Disease of thyroid gland     Dizziness     Dyspareunia in female     last assessed: 08/18/2016    Easy bruising     Fainting episodes     Fatigue     Fever due to infection     Fibromyalgia     Frequent urination at night     Gallbladder disease     Herniated intervertebral disc of lumbar spine     Hypotension     Loss of bladder control     Memory loss     last assessed: 06/15/2015    Mixed incontinence     last assessed: 01/11/2016    Myofascial pain     last assessed: 06/01/2016    Numbness and tingling     Painful swelling of joint     Palpitations     Panic attack     Sciatica     last assessed: 06/15/2015    Situational anxiety     last assessed: 02/21/2017    SOB (shortness of breath)     Thyroid disease     Uterus, adenomyosis     last assessed: 09/07/2016    Weakness     Weight disorder      Past Surgical History:   Procedure Laterality Date    CHOLECYSTECTOMY      CHOLECYSTECTOMY LAPAROSCOPIC N/A 10/22/2017    Procedure: CHOLECYSTECTOMY LAPAROSCOPIC;  Surgeon: Eamon Boykin MD;  [facility];  Service: General    ENDOMETRIAL BIOPSY      resolved: 02/21/2017    HYSTERECTOMY  2016    ID VAGINAL HYSTERECTOMY,UTERUS 250 GMS/< N/A 4/20/2018    Procedure: HYSTERECTOMY VAGINAL TOTAL (TVH), BILATERAL SALPINGECTOMY; Surgeon: Rebekah Flanagan MD;  [facility];  Service: Gynecology    TUBAL LIGATION      last assessed: 10/20/2014     Social History   Social History     Substance and Sexual Activity   Alcohol Use Yes    Frequency: Monthly or less    Drinks per session: 1 or 2    Binge frequency: Never     Social History     Substance and Sexual Activity   Drug Use Never     Social History     Tobacco Use   Smoking Status Never Smoker   Smokeless Tobacco Never Used     Family History   Problem Relation Age of Onset    Hypertension Mother     Arthritis Mother     Diabetes Maternal Grandmother     Breast cancer Family     Cancer Family     Diabetes Family     Heart disease Family     Hyperlipidemia Family         reported cholesterol level was high    Hypertension Other     Hyperlipidemia Father     No Known Problems Sister     No Known Problems Daughter     Breast cancer Paternal Grandmother 28    Leukemia Paternal Aunt     No Known Problems Sister     No Known Problems Sister     No Known Problems Sister     No Known Problems Sister     No Known Problems Daughter     No Known Problems Paternal Aunt        Meds/Allergies       Current Outpatient Medications:     ipratropium (ATROVENT) 0 03 % nasal spray    levothyroxine 88 mcg tablet    loratadine (CLARITIN) 10 mg tablet    omeprazole (PriLOSEC) 40 MG capsule    bisacodyl (DULCOLAX) 5 mg EC tablet    chlorhexidine (PERIDEX) 0 12 % solution    Lidocaine Viscous HCl (XYLOCAINE) 2 % mucosal solution    polyethylene glycol (MiraLax) 17 GM/SCOOP powder    Current Facility-Administered Medications:     lactated ringers infusion, 75 mL/hr, Intravenous, Continuous    Facility-Administered Medications Ordered in Other Encounters:     lactated ringers infusion, , , Continuous PRN, New Bag at 06/03/21 1106    Allergies   Allergen Reactions    Morphine Chest Pain    Iv Contrast [Iodinated Diagnostic Agents] Hives       Objective     /58   Pulse 72   Temp (!) 97 2 °F (36 2 °C) (Temporal)   Resp 16   Ht 5' 5" (1 651 m)   Wt 89 4 kg (197 lb)   LMP 02/28/2018 (Exact Date)   SpO2 99%   BMI 32 78 kg/m²       PHYSICAL EXAM    Gen: NAD  Head: NCAT  CV: RRR  CHEST: Clear  ABD: soft, NT/ND  EXT: no edema      ASSESSMENT/PLAN:  This is a 40y o  year old female here for colonoscopy, and she is stable and optimized for her procedure

## 2021-06-09 ENCOUNTER — HOSPITAL ENCOUNTER (OUTPATIENT)
Dept: RADIOLOGY | Age: 46
Discharge: HOME | End: 2021-06-09

## 2021-06-09 ENCOUNTER — TELEPHONE (OUTPATIENT)
Dept: INTERNAL MEDICINE CLINIC | Facility: CLINIC | Age: 46
End: 2021-06-09

## 2021-06-09 ENCOUNTER — TRANSCRIBE ORDERS (OUTPATIENT)
Dept: ADMINISTRATIVE | Facility: HOSPITAL | Age: 46
End: 2021-06-09

## 2021-06-09 VITALS — BODY MASS INDEX: 31.02 KG/M2 | HEIGHT: 66 IN | WEIGHT: 193 LBS

## 2021-06-09 DIAGNOSIS — Z12.31 BREAST CANCER SCREENING BY MAMMOGRAM: ICD-10-CM

## 2021-06-09 DIAGNOSIS — N64.4 BREAST PAIN, LEFT: Primary | ICD-10-CM

## 2021-06-09 NOTE — TELEPHONE ENCOUNTER
Patient presented today for her mammogram complaining of breast pain  They need a new order for b/l diagnostic mammogram with possible ultrasound  Patient will have to reschedule her appointment as they do not do diagnostic mammograms at that office

## 2021-06-10 NOTE — TELEPHONE ENCOUNTER
Called patient and has been informed  No questions at this moment  Patient verbalized understanding

## 2021-06-13 DIAGNOSIS — R09.81 NASAL CONGESTION: ICD-10-CM

## 2021-06-14 RX ORDER — IPRATROPIUM BROMIDE 21 UG/1
2 SPRAY, METERED NASAL EVERY 12 HOURS
Qty: 30 ML | Refills: 2 | Status: SHIPPED | OUTPATIENT
Start: 2021-06-14 | End: 2021-12-06 | Stop reason: SDUPTHER

## 2021-06-29 ENCOUNTER — HOSPITAL ENCOUNTER (OUTPATIENT)
Dept: ULTRASOUND IMAGING | Facility: CLINIC | Age: 46
Discharge: HOME | End: 2021-06-29
Payer: COMMERCIAL

## 2021-06-29 ENCOUNTER — HOSPITAL ENCOUNTER (OUTPATIENT)
Dept: MAMMOGRAPHY | Facility: CLINIC | Age: 46
Discharge: HOME | End: 2021-06-29
Payer: COMMERCIAL

## 2021-06-29 VITALS — HEIGHT: 66 IN | WEIGHT: 193 LBS | BODY MASS INDEX: 31.02 KG/M2

## 2021-06-29 DIAGNOSIS — N64.4 BREAST PAIN, LEFT: ICD-10-CM

## 2021-06-29 PROCEDURE — 77066 DX MAMMO INCL CAD BI: CPT

## 2021-06-29 PROCEDURE — 76642 ULTRASOUND BREAST LIMITED: CPT

## 2021-06-29 PROCEDURE — G0279 TOMOSYNTHESIS, MAMMO: HCPCS

## 2021-07-16 ENCOUNTER — TELEPHONE (OUTPATIENT)
Dept: MULTI SPECIALTY CLINIC | Facility: CLINIC | Age: 46
End: 2021-07-16

## 2021-07-16 ENCOUNTER — HOSPITAL ENCOUNTER (EMERGENCY)
Facility: HOSPITAL | Age: 46
Discharge: HOME | End: 2021-07-16
Attending: EMERGENCY MEDICINE | Admitting: EMERGENCY MEDICINE
Payer: COMMERCIAL

## 2021-07-16 VITALS
SYSTOLIC BLOOD PRESSURE: 119 MMHG | RESPIRATION RATE: 18 BRPM | DIASTOLIC BLOOD PRESSURE: 79 MMHG | OXYGEN SATURATION: 97 % | HEART RATE: 84 BPM | TEMPERATURE: 98.6 F

## 2021-07-16 DIAGNOSIS — M54.12 CERVICAL RADICULOPATHY: Primary | ICD-10-CM

## 2021-07-16 LAB
ATRIAL RATE: 76 BPM
P AXIS: 50 DEGREES
PR INTERVAL: 154 MS
QRS AXIS: 71 DEGREES
QRSD INTERVAL: 76 MS
QT INTERVAL: 392 MS
QTC INTERVAL: 441 MS
T WAVE AXIS: 42 DEGREES
VENTRICULAR RATE: 76 BPM

## 2021-07-16 PROCEDURE — 93010 ELECTROCARDIOGRAM REPORT: CPT | Performed by: INTERNAL MEDICINE

## 2021-07-16 PROCEDURE — 93005 ELECTROCARDIOGRAM TRACING: CPT

## 2021-07-16 PROCEDURE — 99284 EMERGENCY DEPT VISIT MOD MDM: CPT | Performed by: EMERGENCY MEDICINE

## 2021-07-16 PROCEDURE — 99284 EMERGENCY DEPT VISIT MOD MDM: CPT

## 2021-07-16 RX ORDER — CYCLOBENZAPRINE HCL 10 MG
10 TABLET ORAL 2 TIMES DAILY PRN
Qty: 20 TABLET | Refills: 0 | Status: SHIPPED | OUTPATIENT
Start: 2021-07-16 | End: 2021-09-01

## 2021-07-16 NOTE — TELEPHONE ENCOUNTER
Patient called and left message on machine stating she is having right hand tingling for 3 weeks  Patient unsure which doctor she should see  I triaged the call and patient stated it feels like her hand has been smashed by a hammer and it is in extreme pain  She has had tingling up her arm from her elbow down  Sometimes it will go away only for about 30 minutes and will come right back  I asked patient if she was having any further symptoms  Patient states she is having chest pain and shortness of breath as of 3 weeks ago as well  I advised patient that if she is having these symptoms she needs to be evaluated in the ED  Patient is agreeable to being evaluated in ED

## 2021-07-16 NOTE — DISCHARGE INSTRUCTIONS
Thank you for coming to the Emergency Department today  Please follow up with your primary care doctor in 1-2 days if symptoms worsen  We have also provided a referral to the [facility] for further management  I have provided Rx Flexeril, which is a muscle relaxant  Please take this medication as prescribed and do not drive while on this medication as it can cause drowsiness  Please return to the Emergency Department if you experience focal weakness, difficulty speaking, chest pain, shortness of breath, or any other concerning symptoms

## 2021-07-16 NOTE — ED PROVIDER NOTES
History  Chief Complaint   Patient presents with    Tingling     pt presents ambulatory with c/o R arm tingling intermittent x 3 weeks  states sometimes the pain radiates to her chest causing pain and shortness of breath  +fatigue, denies injury     Patient is a 40year old female who presents to the ED for evaluation of R hand tingling for the past 3 weeks  Patient states that there is no pattern or trigger for this numbness, but states that it involves her whole hand, occasionally radiating all the way up to the elbow and even the shoulder  Patient states sometimes the tingling is associated with electric-like pain that lasts a few seconds, then dissipates  Sometimes, the tingling sensation radiates across her chest and she feels short of breath  This feeling only lasts for several seconds and then resolves  Patient called her primary care office and was referred to the ED for further evaluation  Currently, patient is not having any chest pain or shortness of breath  Of note, patient states that she has had imaging of her C-spine which reportedly showed some cervical narrowing  She also states that her brother has Lupus and she has an appointment scheduled with a rheumatologist to r/o autoimmune disease  Patient states that she was tentatively diagnosed with Fibromyalgia at some point in her medical history, but could not tolerate Lyrica  Secondarily, patient states that over the last 2 months she has been having occasional appearance of urticarial rash  She does not have this rash on my evaluation  Prior to Admission Medications   Prescriptions Last Dose Informant Patient Reported? Taking?    Lidocaine Viscous HCl (XYLOCAINE) 2 % mucosal solution  Self Yes No   bisacodyl (DULCOLAX) 5 mg EC tablet   No No   Sig: Take 4 tablets (20 mg total) by mouth once for 1 dose Take on day prior to colonoscopy   chlorhexidine (PERIDEX) 0 12 % solution  Self Yes No   ipratropium (ATROVENT) 0 03 % nasal spray No No   Si SPRAYS INTO EACH NOSTRIL EVERY 12 (TWELVE) HOURS   levothyroxine 88 mcg tablet  Self No No   Sig: TAKE 1 TABLET BY MOUTH EVERY DAY   loratadine (CLARITIN) 10 mg tablet  Self No No   Sig: Take 1 tablet (10 mg total) by mouth daily   omeprazole (PriLOSEC) 40 MG capsule   No No   Sig: Take 1 capsule (40 mg total) by mouth 2 (two) times a day   polyethylene glycol (MiraLax) 17 GM/SCOOP powder   No No   Sig: Take 289 g by mouth once for 1 dose      Facility-Administered Medications: None       Past Medical History:   Diagnosis Date    Abdominal pain     Anemia     Anxiety     Back pain     Bilateral fibrocystic breast changes     resolved: 2017    Breast disorder     Chest pain     Chronic pelvic pain in female     last assessed: 2016    Cluster headaches     Constipation     Cough     Depression     Difficulty concentrating     Disease of thyroid gland     Dizziness     Dyspareunia in female     last assessed: 2016    Easy bruising     Fainting episodes     Fatigue     Fever due to infection     Fibromyalgia     Frequent urination at night     Gallbladder disease     Herniated intervertebral disc of lumbar spine     Hypotension     Loss of bladder control     Memory loss     last assessed: 06/15/2015    Mixed incontinence     last assessed: 2016    Myofascial pain     last assessed: 2016    Numbness and tingling     Painful swelling of joint     Palpitations     Panic attack     Sciatica     last assessed: 06/15/2015    Situational anxiety     last assessed: 2017    SOB (shortness of breath)     Thyroid disease     Uterus, adenomyosis     last assessed: 2016    Weakness     Weight disorder        Past Surgical History:   Procedure Laterality Date    CHOLECYSTECTOMY      CHOLECYSTECTOMY LAPAROSCOPIC N/A 10/22/2017    Procedure: CHOLECYSTECTOMY LAPAROSCOPIC;  Surgeon: Merlyn Plascencia MD;  [facility];  Service: General    ENDOMETRIAL BIOPSY      resolved: 02/21/2017    HYSTERECTOMY  2016    MS VAGINAL HYSTERECTOMY,UTERUS 250 GMS/< N/A 4/20/2018    Procedure: HYSTERECTOMY VAGINAL TOTAL (TVH), BILATERAL SALPINGECTOMY;  Surgeon: Dalila Melton MD;  [facility];  Service: Gynecology    TUBAL LIGATION      last assessed: 10/20/2014       Family History   Problem Relation Age of Onset    Hypertension Mother    Priyanka Varela Arthritis Mother     Diabetes Maternal Grandmother     Breast cancer Family     Cancer Family     Diabetes Family     Heart disease Family     Hyperlipidemia Family         reported cholesterol level was high    Hypertension Other     Hyperlipidemia Father     No Known Problems Maternal Grandfather     No Known Problems Sister     No Known Problems Daughter     Breast cancer Paternal Grandmother 28    No Known Problems Paternal Grandfather     Leukemia Paternal Aunt     No Known Problems Sister     No Known Problems Sister     No Known Problems Sister     No Known Problems Sister     No Known Problems Daughter     No Known Problems Paternal Aunt     No Known Problems Son     No Known Problems Son     No Known Problems Brother     No Known Problems Brother      I have reviewed and agree with the history as documented  E-Cigarette/Vaping    E-Cigarette Use Never User      E-Cigarette/Vaping Substances    Nicotine No     THC No     CBD No     Flavoring No     Other No     Unknown No      Social History     Tobacco Use    Smoking status: Never Smoker    Smokeless tobacco: Never Used   Vaping Use    Vaping Use: Never used   Substance Use Topics    Alcohol use: Yes    Drug use: Never        Review of Systems   Constitutional: Positive for fatigue  Negative for chills and fever  HENT: Negative for congestion, ear pain, sinus pressure, sore throat, tinnitus and trouble swallowing  Eyes: Negative for photophobia and pain     Respiratory: Negative for cough, chest tightness and shortness of breath  Cardiovascular: Negative for chest pain and palpitations  Gastrointestinal: Negative for abdominal pain, diarrhea, nausea and vomiting  Genitourinary: Negative for difficulty urinating, dysuria and hematuria  Musculoskeletal: Positive for arthralgias and back pain (chronic)  Negative for joint swelling, neck pain and neck stiffness  Neurological: Positive for numbness (paresthesias right upper extremity)  Negative for facial asymmetry and headaches  Physical Exam  ED Triage Vitals [07/16/21 1733]   Temperature Pulse Respirations Blood Pressure SpO2   98 6 °F (37 °C) 84 18 119/79 97 %      Temp Source Heart Rate Source Patient Position - Orthostatic VS BP Location FiO2 (%)   Tympanic Monitor -- -- --      Pain Score       --             Orthostatic Vital Signs  Vitals:    07/16/21 1733   BP: 119/79   Pulse: 84       Physical Exam  Vitals and nursing note reviewed  Constitutional:       General: She is not in acute distress  Appearance: She is well-developed  HENT:      Head: Normocephalic and atraumatic  Right Ear: External ear normal       Left Ear: External ear normal       Nose: Nose normal       Mouth/Throat:      Mouth: Mucous membranes are moist    Eyes:      Extraocular Movements: Extraocular movements intact  Conjunctiva/sclera: Conjunctivae normal    Cardiovascular:      Rate and Rhythm: Normal rate and regular rhythm  Heart sounds: No murmur heard  Pulmonary:      Effort: Pulmonary effort is normal  No respiratory distress  Breath sounds: Normal breath sounds  Abdominal:      Palpations: Abdomen is soft  Tenderness: There is no abdominal tenderness  Musculoskeletal:      Right shoulder: No tenderness or bony tenderness  Normal range of motion  Normal strength  Left shoulder: Normal       Right upper arm: Normal       Left upper arm: Normal       Right elbow: No swelling  Normal range of motion  No tenderness        Left elbow: Normal       Right forearm: No swelling, tenderness or bony tenderness  Left forearm: Normal       Right wrist: No swelling, tenderness, bony tenderness, snuff box tenderness or crepitus  Normal range of motion  Normal pulse  Left wrist: Normal       Right hand: Normal range of motion  Normal strength  Normal sensation  Normal capillary refill  Normal pulse  Left hand: Normal       Cervical back: Neck supple  No rigidity or tenderness  Comments: Patient has full ROM of the fingers and hand  FDS and FDP are intact  Patient has full oppositional movement of the thumb  Skin:     General: Skin is warm and dry  Neurological:      General: No focal deficit present  Mental Status: She is alert and oriented to person, place, and time  Cranial Nerves: No cranial nerve deficit  Comments: Strength: 5/5 in bilateral upper extremities, including the biceps, triceps, wrist extensors, and finger flexors  Patient has 5/5 strength in bilateral lower extremities including knee extension, hip flexion, dorsiflexion, and pronation  Psychiatric:         Mood and Affect: Mood normal          ED Medications  Medications - No data to display    Diagnostic Studies  Results Reviewed     None                 No orders to display         Procedures  Procedures      ED Course  ED Course as of Jul 16 1953 Fri Jul 16, 2021 1919 I discussed with patient that her symptoms are most likely due to cervical radiculopathy, caused by the narrowing that was found on patient's cervical imaging  Because patient was having a tingling in the chest at some point, an EKG will be ordered  I will speak to patient about taking a muscle relaxant in the ED, as long as she has a ride home  I recommended that patient should keep her scheduled appointment with the rheumatologist, but also speak to a neurologist about her current symptoms        1927 Patient will be given Rx Flexeril until she can see comprehensive [facility]  1952 EKG interpretation: SR at 76 bpm, normal intervals, normal axis, no acute ST-T changes as read by me  No change when compared to EKG in 2/2021  MDM    Disposition  Final diagnoses:   Cervical radiculopathy     Time reflects when diagnosis was documented in both MDM as applicable and the Disposition within this note     Time User Action Codes Description Comment    7/16/2021  7:43 PM Esther Light Add [P84 [id number]] Cervical radiculopathy       ED Disposition     ED Disposition Condition Date/Time Comment    Discharge Stable Fri Jul 16, 2021  7:50 PM Tera Sharma discharge to home/self care  Follow-up Information     Follow up With Specialties Details Why Contact Info    Jordan Barba PA-C Internal Medicine, Physician Assistant Call in 1 day If symptoms worsen [phone] [facility]  [phone]            Patient's Medications   Discharge Prescriptions    CYCLOBENZAPRINE (FLEXERIL) 10 MG TABLET    Take 1 tablet (10 mg total) by mouth 2 (two) times a day as needed for muscle spasms       Start Date: 7/16/2021 End Date: --       Order Dose: 10 mg       Quantity: 20 tablet    Refills: 0         PDMP Review     None           ED Provider  Attending physically available and evaluated Tera Sharma I managed the patient along with the ED Attending      Electronically Signed by         Rene Inman DO  07/16/21 1959

## 2021-07-18 NOTE — ED ATTENDING ATTESTATION
7/16/2021  IDeidra MD, saw and evaluated the patient  I have discussed the patient with the resident/non-physician practitioner and agree with the resident's/non-physician practitioner's findings, Plan of Care, and MDM as documented in the resident's/non-physician practitioner's note, except where noted  All available labs and Radiology studies were reviewed  I was present for key portions of any procedure(s) performed by the resident/non-physician practitioner and I was immediately available to provide assistance  At this point I agree with the current assessment done in the Emergency Department  I have conducted an independent evaluation of this patient a history and physical is as follows:    ED Course     21-year-old female presents with right upper extremity numbness and tingling patient states symptoms are lancinating at times worse with head neck movement patient states symptoms radiate down from the occiput down to her shoulder and down the ulnar aspect of her right arm  Turning her neck to the right worsened symptoms  Patient did have brief episode lasting chest pain was referred in by PCP for evaluation  Patient denies any shortness of breath diaphoresis fevers chills or cough  Vital signs reviewed  Heart regular rate rhythm without murmurs  Lungs clear to auscultation bilaterally  Abdomen soft nontender nondistended  Extremities limb warm well perfused neurovascular intact strength 5/5  Symptoms reproduced with bending head to right with axial load  Impression:  Cervical radiculopathy will treat patient's pain on muscle relaxants patient reports adverse event to tinazadine in past   Will check ECG  Will give trial of Flexeril    Anticipate discharge ambulatory referral to comprehensive Spine      Critical Care Time  Procedures

## 2021-07-20 NOTE — PROGRESS NOTES
Assessment and Plan:   Patient is a 28-year-old female with history of fibromyalgia who presents for rheumatology consult  Reports she was diagnosed with fibromyalgia a few years ago by Dr Kaushal Dexter   She has had longstanding chronic pain issues for over 15 years  She had rheumatologic workup when she last saw Dr Kaushal Dexter in 2017 which was grossly negative for systemic autoimmune disease  She does have known Hashimoto hypothyroidism  She describes widespread pain issues and does have some features on exam of fibromyalgia  She does not have any obvious findings of joint swelling or synovitis  We discussed we will reassess some rheumatologic labs to make sure there been no changes  She reports a sister with cutaneous lupus, but no systemic disease or other known autoimmune disease in the family  She does have a rash with pictures on her phone which appear like raised large red welts and we discussed that ultimately she needs a skin biopsy to determine what this is  She is on waiting list with Dermatology and currently does not have a rash  We will reassess her labs and then have a follow-up to make sure there been no changes  We discussed if her diagnosis remains fibromyalgia she will not require long-term Rheumatology follow-up      Plan:  Diagnoses and all orders for this visit:    Polyarthralgia  -     NIKOLAI Screen w/ Reflex to Titer/Pattern  -     Anti-DNA antibody, double-stranded  -     Nuclear antigen antibody  -     Sjogren's Antibodies  -     RF Screen w/ Reflex to Titer  -     Cyclic citrul peptide antibody, IgG  -     C-reactive protein  -     Sedimentation rate, automated    Family history of cutaneous lupus  -     NIKOLAI Screen w/ Reflex to Titer/Pattern  -     Anti-DNA antibody, double-stranded  -     Nuclear antigen antibody  -     Sjogren's Antibodies  -     RF Screen w/ Reflex to Titer  -     Cyclic citrul peptide antibody, IgG  -     C-reactive protein  -     Sedimentation rate, automated    Fibromyalgia  -     NIKOLAI Screen w/ Reflex to Titer/Pattern  -     Anti-DNA antibody, double-stranded  -     Nuclear antigen antibody  -     Sjogren's Antibodies  -     RF Screen w/ Reflex to Titer  -     Cyclic citrul peptide antibody, IgG  -     C-reactive protein  -     Sedimentation rate, automated        Follow-up plan:  2 months      HPI  Andres Rice is a 40 y o   female with GERD, hashimoto's hypothyroidism, lumbar and cervical DDD, fibromyalgia, anxiety, who presents for rheumatology consult by request of [place] PA-C for fibromyalgia, family h/o cutaneous lupus  Saw Dr Cecile Ramirez a few years ago, and was diagnosed with fibromyalgia  She previously took lyrica, gabapentin, others she cannot recall  One of those meds gave her "severe depression" and then she wanted to stay off meds  Also reports a family history of skin lupus  She does not personal history of this  Her pain issues started 15 years ago, also had issues with anxiety and depression  Pain was "everywhere", also had significant fatigue  Majority of her pain is in her lower back and chest  Feels like someone is "squeezing the life out of me " has had cardiac workup which was negative  Morning is the worst time for her  She feels "pins and needles" in her feet when she puts them down  Fatigue "in my body is the worst "   Morning pain and stiffness is generalize and lasts for hours, tends to improved through the day  No joint swelling anywhere  Rashes come and go  Has pictures on her phone  Appear like red raised welts, thinks they have been provoked by heat and sun exposure  Unsure  She did try to make an appointment with Dermatology but she is on a waiting list   Dry eyes and dry mouth  Denies psoriasis, sicca symptoms, oral or nasal ulcers, alopecia, Raynaud's, h/o pericarditis or pleurisy, h/o blood clots  Had 10 miscarriages, had 4 healthy pregnancies     She was taking naproxen for pain for years, but now has reflux issues and is on prilosec  She is now taking Tylenol as needed  Review of Systems  Review of Systems   Constitutional: Positive for fatigue  Negative for chills, fever and unexpected weight change  HENT: Negative for mouth sores and trouble swallowing  Eyes: Negative for pain and visual disturbance  Respiratory: Negative for cough and shortness of breath  Cardiovascular: Negative for chest pain and leg swelling  Gastrointestinal: Negative for abdominal pain, blood in stool, constipation, diarrhea and nausea  Musculoskeletal: Positive for arthralgias, back pain and myalgias  Negative for joint swelling  Skin: Positive for rash  Negative for color change  Neurological: Positive for weakness (generalized )  Negative for numbness  Hematological: Negative for adenopathy  Psychiatric/Behavioral: Negative for sleep disturbance         Allergies  Allergies   Allergen Reactions    Morphine Chest Pain    Iv Contrast [Iodinated Diagnostic Agents] Hives       Home Medications    Current Outpatient Medications:     levothyroxine 88 mcg tablet, TAKE 1 TABLET BY MOUTH EVERY DAY, Disp: 90 tablet, Rfl: 1    omeprazole (PriLOSEC) 40 MG capsule, Take 1 capsule (40 mg total) by mouth 2 (two) times a day, Disp: 180 capsule, Rfl: 0    chlorhexidine (PERIDEX) 0 12 % solution, , Disp: , Rfl:     cyclobenzaprine (FLEXERIL) 10 mg tablet, Take 1 tablet (10 mg total) by mouth 2 (two) times a day as needed for muscle spasms (Patient not taking: Reported on 7/26/2021), Disp: 20 tablet, Rfl: 0    ipratropium (ATROVENT) 0 03 % nasal spray, 2 SPRAYS INTO EACH NOSTRIL EVERY 12 (TWELVE) HOURS (Patient not taking: Reported on 7/26/2021), Disp: 30 mL, Rfl: 2    Lidocaine Viscous HCl (XYLOCAINE) 2 % mucosal solution, , Disp: , Rfl:     loratadine (CLARITIN) 10 mg tablet, Take 1 tablet (10 mg total) by mouth daily (Patient not taking: Reported on 7/26/2021), Disp: 90 tablet, Rfl: 1    Past Medical History  Past Medical History:   Diagnosis Date    Abdominal pain     Anemia     Anxiety     Back pain     Bilateral fibrocystic breast changes     resolved: 02/21/2017    Breast disorder     Chest pain     Chronic pelvic pain in female     last assessed: 09/07/2016    Cluster headaches     Constipation     Cough     Depression     Difficulty concentrating     Disease of thyroid gland     Dizziness     Dyspareunia in female     last assessed: 08/18/2016    Easy bruising     Fainting episodes     Fatigue     Fever due to infection     Fibromyalgia     Frequent urination at night     Gallbladder disease     Herniated intervertebral disc of lumbar spine     Hypotension     Loss of bladder control     Memory loss     last assessed: 06/15/2015    Mixed incontinence     last assessed: 01/11/2016    Myofascial pain     last assessed: 06/01/2016    Numbness and tingling     Painful swelling of joint     Palpitations     Panic attack     Sciatica     last assessed: 06/15/2015    Situational anxiety     last assessed: 02/21/2017    SOB (shortness of breath)     Thyroid disease     Uterus, adenomyosis     last assessed: 09/07/2016    Weakness     Weight disorder        Past Surgical History   Past Surgical History:   Procedure Laterality Date    CHOLECYSTECTOMY      CHOLECYSTECTOMY LAPAROSCOPIC N/A 10/22/2017    Procedure: CHOLECYSTECTOMY LAPAROSCOPIC;  Surgeon: Addy Peacock MD;  Location: [facility];  Service: General    ENDOMETRIAL BIOPSY      resolved: 02/21/2017    HYSTERECTOMY  2016    TX VAGINAL HYSTERECTOMY,UTERUS 250 GMS/< N/A 4/20/2018    Procedure: HYSTERECTOMY VAGINAL TOTAL (TVH), BILATERAL SALPINGECTOMY;  Surgeon: Dawn Sheppard MD;  Location: [facility];  Service: Gynecology    TUBAL LIGATION      last assessed: 10/20/2014       Family History  Sister with cutaneous lupus     Family History   Problem Relation Age of Onset    Hypertension Mother    [facility] Arthritis Mother     Diabetes Maternal Grandmother     Breast cancer Family     Cancer Family     Diabetes Family     Heart disease Family     Hyperlipidemia Family         reported cholesterol level was high    Hypertension Other     Hyperlipidemia Father     No Known Problems Maternal Grandfather     No Known Problems Sister     No Known Problems Daughter     Breast cancer Paternal Grandmother 28    No Known Problems Paternal Grandfather     Leukemia Paternal Aunt     No Known Problems Sister     No Known Problems Sister     No Known Problems Sister     No Known Problems Sister     No Known Problems Daughter     No Known Problems Paternal Aunt     No Known Problems Son     No Known Problems Son     No Known Problems Brother     No Known Problems Brother        Social History  Occupation: not working   Social History     Substance and Sexual Activity   Alcohol Use Yes     Social History     Substance and Sexual Activity   Drug Use Never     Social History     Tobacco Use   Smoking Status Never Smoker   Smokeless Tobacco Never Used       Objective:    Vitals:    07/26/21 0807   BP: 110/74   Pulse: 72   Weight: 87 5 kg (193 lb)   Height: 5' 6" (1 676 m)       Physical Exam  Constitutional:       General: She is not in acute distress  Appearance: She is well-developed  HENT:      Head: Normocephalic and atraumatic  Eyes:      General: Lids are normal  No scleral icterus  Conjunctiva/sclera: Conjunctivae normal    Pulmonary:      Effort: Pulmonary effort is normal  No tachypnea, accessory muscle usage or respiratory distress  Musculoskeletal:      Cervical back: Neck supple  Comments: No joint swelling or synovitis anywhere  Some reproducible soft tissue tenderness in the upper body and extremities  Skin:     General: Skin is dry  Findings: No rash  Neurological:      Mental Status: She is alert     Psychiatric:         Behavior: Behavior normal  Behavior is cooperative           Imaging:   XRs knees/left ankle 2018:  Images personally reviewed in PACS and my impression is:  Grossly normal, no significant degenerative changes    Labs:   Component      Latest Ref Rng & Units 6/1/2021   TSH 3RD GENERATON      0 358 - 3 740 uIU/mL 3 480   Free T4      0 76 - 1 46 ng/dL 0 99     Component      Latest Ref Rng & Units 2/13/2021   WBC      4 31 - 10 16 Thousand/uL 7 89   Red Blood Cell Count      3 81 - 5 12 Million/uL 4 70   Hemoglobin      11 5 - 15 4 g/dL 13 3   HCT      34 8 - 46 1 % 41 5   MCV      82 - 98 fL 88   MCH      26 8 - 34 3 pg 28 3   MCHC      31 4 - 37 4 g/dL 32 0   RDW      11 6 - 15 1 % 13 0   MPV      8 9 - 12 7 fL 11 4   Platelet Count      977 - 390 Thousands/uL 245   nRBC      /100 WBCs 0   Neutrophils %      43 - 75 % 62   Immat GRANS %      0 - 2 % 1   Lymphocytes Relative      14 - 44 % 27   Monocytes Relative      4 - 12 % 7   Eosinophils      0 - 6 % 2   Basophils Relative      0 - 1 % 1   Absolute Neutrophils      1 85 - 7 62 Thousands/µL 4 98   Immature Grans Absolute      0 00 - 0 20 Thousand/uL 0 04   Lymphocytes Absolute      0 60 - 4 47 Thousands/µL 2 15   Absolute Monocytes      0 17 - 1 22 Thousand/µL 0 55   Absolute Eosinophils      0 00 - 0 61 Thousand/µL 0 13   Basophils Absolute      0 00 - 0 10 Thousands/µL 0 04   Sodium      136 - 145 mmol/L 140   Potassium      3 5 - 5 3 mmol/L 3 7   Chloride      100 - 108 mmol/L 107   CO2      21 - 32 mmol/L 27   Anion Gap      4 - 13 mmol/L 6   BUN      5 - 25 mg/dL 12   Creatinine      0 60 - 1 30 mg/dL 0 69   Glucose, Random      65 - 140 mg/dL 116   Calcium      8 3 - 10 1 mg/dL 9 0   AST      5 - 45 U/L 13   ALT      12 - 78 U/L 21   Alkaline Phosphatase      46 - 116 U/L 77   Total Protein      6 4 - 8 2 g/dL 8 1   Albumin      3 5 - 5 0 g/dL 3 5   TOTAL BILIRUBIN      0 20 - 1 00 mg/dL 0 23   eGFR      ml/min/1 73sq m 105     Component      Latest Ref Rng & Units 3/30/2017   THYROID MICROSOMAL ANTIBODY      0 - 34 IU/mL >600 (H)   THYROGLOBULIN AB      0 0 - 0 9 IU/mL 76 1 (H)     Component      Latest Ref Rng & Units 3/30/2017   ANTICARDIOLIPIN IGA ANTIBODY      0 - 11 APL U/mL <9   ANTICARDIOLIPIN IGG ANTIBODY      0 - 14 GPL U/mL <9   ANTICARDIOLIPIN IGM ANTIBODY      0 - 12 MPL U/mL 14 (H)   BETA-2 GLYCOPROTEIN 1 IGG ANTIBODY      0 - 20 GPI IgG units <9   BETA-2 GLYCOPROTEIN 1 IGA ANTIBODY      0 - 25 GPI IgA units <9   BETA-2 GLYCOPROTEIN 1 IGM ANTIBODY      0 - 32 GPI IgM units <9   JESSICA TO VILLEGAS (SM) ANTIBODY      0 0 - 0 9 AI <0 2   JESSICA TO RNP ANTIBODY      0 0 - 0 9 AI <0 2   SSA (RO) ANTIBODY      0 0 - 0 9 AI <0 2   SSB (LA) ANTIBODY      0 0 - 0 9 AI <0 2   Sed Rate      0 - 20 mm/hour 27 (H)   C-REACTIVE PROTEIN      <3 0 mg/L 5 4 (H)   RHEUMATOID FACTOR      Negative Negative   CYCLIC CITRULLINATED PEPTIDE ANTIBODY      0 - 19 units 8   ANTI-NUCLEAR ANTIBODY (NIKOLAI)      Negative Negative   ANTI DNA DOUBLE STRANDED      0 - 9 IU/mL 4   C3 Complement      90 0 - 180 0 mg/dL 121 0   C4, COMPLEMENT      10 0 - 40 0 mg/dL 24 0   Total CK      26 - 192 U/L 69

## 2021-07-21 ENCOUNTER — NURSE TRIAGE (OUTPATIENT)
Dept: PHYSICAL THERAPY | Facility: OTHER | Age: 46
End: 2021-07-21

## 2021-07-21 DIAGNOSIS — M54.2 NECK PAIN, ACUTE: Primary | ICD-10-CM

## 2021-07-21 NOTE — TELEPHONE ENCOUNTER
Additional Information   Negative: Is this related to a work injury?  Negative: Is this related to an MVA?  Negative: Are you currently recieving homecare services?  Negative: Does the patient have a fever?  Negative: Has the patient had unexplained weight loss?  Negative: Is the patient experiencing blood in sputum?  Negative: Has the patient experienced major trauma? (fall from height, high speed collision, direct blow to spine) and is also experiencing nausea, light-headedness, or loss of consciousness?  Negative: Is the patient experiencing urine retention?  Negative: Is the patient experiencing acute drop foot or paralysis?  Negative: Is this a chronic condition? Background - Initial Assessment  Clinical complaint: Right sided neck pain that radiates to Right arm and down to R hand/fingers  Date of onset: Almost 4 weeks- NKI- Warranted ED Visit  Frequency of pain: intermittent  Quality of pain: Tingling in arm/hand, sharp    Protocols used: [facility] COMPREHENSIVE SPINE PROGRAM PROTOCOL    Patient seen in ED 7/16/21 -PLEASE SEE NOTES  Patient also LM for [facility]  Nurse returned call and reviewed the program with her  Triaged and NO RF s/s present  Referral entered for [facility] and contact info given to her as well  Nurse also informed the patient a call will be received from the behavioral office d/t score  Agreed and understood  Referral placed in EMR for [facility]  Patient appreciative of CB and triage  Nurse wished her well and referral closed

## 2021-07-26 ENCOUNTER — OFFICE VISIT (OUTPATIENT)
Dept: RHEUMATOLOGY | Facility: CLINIC | Age: 46
End: 2021-07-26
Payer: COMMERCIAL

## 2021-07-26 VITALS
HEART RATE: 72 BPM | SYSTOLIC BLOOD PRESSURE: 110 MMHG | WEIGHT: 193 LBS | DIASTOLIC BLOOD PRESSURE: 74 MMHG | BODY MASS INDEX: 31.02 KG/M2 | HEIGHT: 66 IN

## 2021-07-26 DIAGNOSIS — Z84.0 FAMILY HISTORY OF CUTANEOUS LUPUS: ICD-10-CM

## 2021-07-26 DIAGNOSIS — M25.50 POLYARTHRALGIA: Primary | ICD-10-CM

## 2021-07-26 DIAGNOSIS — M79.7 FIBROMYALGIA: ICD-10-CM

## 2021-07-26 PROCEDURE — 3008F BODY MASS INDEX DOCD: CPT | Performed by: INTERNAL MEDICINE

## 2021-07-26 PROCEDURE — 99245 OFF/OP CONSLTJ NEW/EST HI 55: CPT | Performed by: INTERNAL MEDICINE

## 2021-07-26 PROCEDURE — 1036F TOBACCO NON-USER: CPT | Performed by: INTERNAL MEDICINE

## 2021-07-28 ENCOUNTER — APPOINTMENT (OUTPATIENT)
Dept: LAB | Facility: HOSPITAL | Age: 46
End: 2021-07-28
Payer: COMMERCIAL

## 2021-07-28 LAB
CCP AB SER IA-ACNC: 2.1
CRP SERPL QL: 3.3 MG/L
ERYTHROCYTE [SEDIMENTATION RATE] IN BLOOD: 37 MM/HOUR (ref 0–19)

## 2021-07-28 PROCEDURE — 86200 CCP ANTIBODY: CPT | Performed by: INTERNAL MEDICINE

## 2021-07-28 PROCEDURE — 86140 C-REACTIVE PROTEIN: CPT | Performed by: INTERNAL MEDICINE

## 2021-07-28 PROCEDURE — 36415 COLL VENOUS BLD VENIPUNCTURE: CPT | Performed by: INTERNAL MEDICINE

## 2021-07-28 PROCEDURE — 86038 ANTINUCLEAR ANTIBODIES: CPT | Performed by: INTERNAL MEDICINE

## 2021-07-28 PROCEDURE — 86225 DNA ANTIBODY NATIVE: CPT | Performed by: INTERNAL MEDICINE

## 2021-07-28 PROCEDURE — 86430 RHEUMATOID FACTOR TEST QUAL: CPT | Performed by: INTERNAL MEDICINE

## 2021-07-28 PROCEDURE — 86235 NUCLEAR ANTIGEN ANTIBODY: CPT | Performed by: INTERNAL MEDICINE

## 2021-07-28 PROCEDURE — 85652 RBC SED RATE AUTOMATED: CPT | Performed by: INTERNAL MEDICINE

## 2021-07-29 LAB
DSDNA AB SER-ACNC: 5 IU/ML (ref 0–9)
ENA RNP AB SER-ACNC: <0.2 AI (ref 0–0.9)
ENA SM AB SER-ACNC: <0.2 AI (ref 0–0.9)
ENA SS-A AB SER-ACNC: <0.2 AI (ref 0–0.9)
ENA SS-B AB SER-ACNC: <0.2 AI (ref 0–0.9)
RHEUMATOID FACT SER QL LA: NEGATIVE

## 2021-07-30 LAB — RYE IGE QN: NEGATIVE

## 2021-08-02 DIAGNOSIS — R79.82 ELEVATED C-REACTIVE PROTEIN (CRP): Primary | ICD-10-CM

## 2021-08-02 DIAGNOSIS — R70.0 ELEVATED SED RATE: ICD-10-CM

## 2021-08-03 DIAGNOSIS — R10.13 EPIGASTRIC ABDOMINAL PAIN: ICD-10-CM

## 2021-08-04 RX ORDER — OMEPRAZOLE 40 MG/1
CAPSULE, DELAYED RELEASE ORAL
Qty: 180 CAPSULE | Refills: 0 | Status: SHIPPED | OUTPATIENT
Start: 2021-08-04 | End: 2021-09-15

## 2021-08-05 DIAGNOSIS — E03.8 HYPOTHYROIDISM DUE TO HASHIMOTO'S THYROIDITIS: ICD-10-CM

## 2021-08-05 DIAGNOSIS — E06.3 HYPOTHYROIDISM DUE TO HASHIMOTO'S THYROIDITIS: ICD-10-CM

## 2021-08-05 RX ORDER — LEVOTHYROXINE SODIUM 88 UG/1
TABLET ORAL
Qty: 90 TABLET | Refills: 1 | Status: SHIPPED | OUTPATIENT
Start: 2021-08-05 | End: 2022-01-31

## 2021-08-23 ENCOUNTER — TELEMEDICINE (OUTPATIENT)
Dept: INTERNAL MEDICINE CLINIC | Facility: CLINIC | Age: 46
End: 2021-08-23

## 2021-08-23 VITALS — TEMPERATURE: 98.8 F

## 2021-08-23 DIAGNOSIS — R19.7 DIARRHEA IN ADULT PATIENT: ICD-10-CM

## 2021-08-23 DIAGNOSIS — J02.9 SORE THROAT: Primary | ICD-10-CM

## 2021-08-23 DIAGNOSIS — R05.9 COUGH IN ADULT: Primary | ICD-10-CM

## 2021-08-23 DIAGNOSIS — J02.9 SORE THROAT: ICD-10-CM

## 2021-08-23 DIAGNOSIS — R53.83 TIRED: ICD-10-CM

## 2021-08-23 PROCEDURE — 99213 OFFICE O/P EST LOW 20 MIN: CPT | Performed by: PHYSICIAN ASSISTANT

## 2021-08-23 PROCEDURE — U0005 INFEC AGEN DETEC AMPLI PROBE: HCPCS | Performed by: PHYSICIAN ASSISTANT

## 2021-08-23 PROCEDURE — U0003 INFECTIOUS AGENT DETECTION BY NUCLEIC ACID (DNA OR RNA); SEVERE ACUTE RESPIRATORY SYNDROME CORONAVIRUS 2 (SARS-COV-2) (CORONAVIRUS DISEASE [COVID-19]), AMPLIFIED PROBE TECHNIQUE, MAKING USE OF HIGH THROUGHPUT TECHNOLOGIES AS DESCRIBED BY CMS-2020-01-R: HCPCS | Performed by: PHYSICIAN ASSISTANT

## 2021-08-23 NOTE — PROGRESS NOTES
COVID-19 Outpatient Progress Note    Assessment/Plan:    Problem List Items Addressed This Visit     None      Visit Diagnoses     Cough in adult    -  Primary    Diarrhea in adult patient        Tired        Sore throat             Disposition:     I referred patient to one of our centralized sites for a COVID-19 swab  On your virtual visit today we discussed your symptoms that started 1 week ago  We also reviewed that your children had also develop symptoms although you do believe your daughter started 1st     You had already spoken with their pediatrician and they were being tested for COVID and therefore our office had placed your order for COVID testing to be completed prior to our conversation so you did not have to make multiple trips  We confirm that you did not have any known contact with someone positive for COVID but as we did review your symptoms along with her children could be from [place] but could be from other viruses  As per our discussion you will all remain under isolation and quarantine in the home until we get all of your results back  Your symptoms are not severe do not require urgent care or emergency room for further evaluation at this time  You may continue over-the-counter medications and drink plenty of liquids and rest as needed  We will contact you once your results are available and will also need to review all other family members results that were tested as that will help was provide the appropriate guidance  We did discuss however if you do develop any sudden onset fever, severe cough with shortness of breath or difficulty breathing to contact our office or go to the emergency room if necessary  I have spent 13 minutes directly with the patient  Greater than 50% of this time was spent in counseling/coordination of care regarding: prognosis, instructions for management, patient and family education, risk factor reductions and impressions  Verification of patient location:    Patient is located in the following state in which I hold an active license [place]    Encounter provider Jessica Aguilar PA-C    Provider located at [facility] [address]  [address]  [address] [facility] [address] [phone]    Recent Visits  No visits were found meeting these conditions  Showing recent visits within past 7 days and meeting all other requirements  Today's Visits  Date Type Provider Dept   08/23/21 Telemedicine Jessica Aguilar, [address] [facility] today's visits and meeting all other requirements  Future Appointments  No visits were found meeting these conditions  Showing future appointments within next 150 days and meeting all other requirements     This virtual check-in was done via [employer] and patient was informed that this is a secure, HIPAA-compliant platform  She agrees to proceed  Patient agrees to participate in a virtual check in via telephone or video visit instead of presenting to the office to address urgent/immediate medical needs  Patient is aware this is a billable service  After connecting through [facility], the patient was identified by name and date of birth  Deni Diana was informed that this was a telemedicine visit and that the exam was being conducted confidentially over secure lines  My office door was closed  No one else was in the room  Deni Diana acknowledged consent and understanding of privacy and security of the telemedicine visit  I informed the patient that I have reviewed her record in Epic and presented the opportunity for her to ask any questions regarding the visit today  The patient agreed to participate  Subjective:   Deni Diana is a 40 y o  female who is concerned about COVID-19  Patient's symptoms include fatigue, sore throat, cough, diarrhea (X 1 week) and headache   Patient denies fever, chills, anosmia, loss of taste, family history of autoimmune conditions and therefore was referred back to rheumatologist   Additional labs were ordered and is scheduled for an appointment in October to follow-up      Lab Results   Component Value Date    SARSCOV2 Not Detected 07/27/2020    [address] [facility] [facility] [address] Not Detected 01/22/2021     Past Medical History:   Diagnosis Date    Abdominal pain     Anemia     Anxiety     Back pain     Bilateral fibrocystic breast changes     resolved: 02/21/2017    Breast disorder     Chest pain     Chronic pelvic pain in female     last assessed: 09/07/2016    Cluster headaches     Constipation     Cough     Depression     Difficulty concentrating     Disease of thyroid gland     Dizziness     Dyspareunia in female     last assessed: 08/18/2016    Easy bruising     Fainting episodes     Fatigue     Fever due to infection     Fibromyalgia     Frequent urination at night     Gallbladder disease     Herniated intervertebral disc of lumbar spine     Hypotension     Loss of bladder control     Memory loss     last assessed: 06/15/2015    Mixed incontinence     last assessed: 01/11/2016    Myofascial pain     last assessed: 06/01/2016    Numbness and tingling     Painful swelling of joint     Palpitations     Panic attack     Sciatica     last assessed: 06/15/2015    Situational anxiety     last assessed: 02/21/2017    SOB (shortness of breath)     Thyroid disease     Uterus, adenomyosis     last assessed: 09/07/2016    Weakness     Weight disorder      Past Surgical History:   Procedure Laterality Date    CHOLECYSTECTOMY      CHOLECYSTECTOMY LAPAROSCOPIC N/A 10/22/2017    Procedure: CHOLECYSTECTOMY LAPAROSCOPIC;  Surgeon: Esthela Sapp MD;  [facility];  Service: General    ENDOMETRIAL BIOPSY      resolved: 02/21/2017    HYSTERECTOMY  2016    IN VAGINAL HYSTERECTOMY,UTERUS 250 GMS/< N/A 4/20/2018    Procedure: HYSTERECTOMY VAGINAL TOTAL (TVH), BILATERAL SALPINGECTOMY; Surgeon: Daren Leone MD;  [facility];  Service: Gynecology    TUBAL LIGATION      last assessed: 10/20/2014     Current Outpatient Medications   Medication Sig Dispense Refill    ipratropium (ATROVENT) 0 03 % nasal spray 2 SPRAYS INTO EACH NOSTRIL EVERY 12 (TWELVE) HOURS 30 mL 2    levothyroxine 88 mcg tablet TAKE 1 TABLET BY MOUTH EVERY DAY 90 tablet 1    loratadine (CLARITIN) 10 mg tablet Take 1 tablet (10 mg total) by mouth daily 90 tablet 1    omeprazole (PriLOSEC) 40 MG capsule TAKE 1 CAPSULE BY MOUTH TWICE A  capsule 0    chlorhexidine (PERIDEX) 0 12 % solution  (Patient not taking: Reported on 7/26/2021)      cyclobenzaprine (FLEXERIL) 10 mg tablet Take 1 tablet (10 mg total) by mouth 2 (two) times a day as needed for muscle spasms (Patient not taking: Reported on 7/26/2021) 20 tablet 0    Lidocaine Viscous HCl (XYLOCAINE) 2 % mucosal solution  (Patient not taking: Reported on 8/23/2021)       No current facility-administered medications for this visit  Allergies   Allergen Reactions    Morphine Chest Pain    Iv Contrast [Iodinated Diagnostic Agents] Hives       Review of Systems   Constitutional: Positive for fatigue  Negative for chills and fever  HENT: Positive for sore throat  Respiratory: Positive for cough  Negative for shortness of breath and wheezing  Cardiovascular: Negative for chest pain  Gastrointestinal: Positive for diarrhea (X 1 week)  Negative for nausea and vomiting  Neurological: Positive for headaches  Objective:    Vitals:    08/23/21 1401   Temp: 98 8 °F (37 1 °C)   TempSrc: Tympanic       Physical Exam  Nursing note reviewed  Constitutional:       General: She is not in acute distress  HENT:      Head: Normocephalic  Eyes:      Conjunctiva/sclera: Conjunctivae normal    Pulmonary:      Effort: Pulmonary effort is normal  No respiratory distress        Comments: Patient was able to speak in full clear sentences throughout video visit  Patient did not have any difficulty breathing  Abdominal:      Tenderness: There is abdominal tenderness  Comments: Patient reports abdominal discomfort with episodes of diarrhea  Musculoskeletal:      Cervical back: Neck supple  Neurological:      Mental Status: She is alert  Mental status is at baseline  Psychiatric:         Mood and Affect: Mood normal          VIRTUAL VISIT DISCLAIMER    Stuart Sims verbally agrees to participate in [employer]  Pt is aware that [employer] could be limited without vital signs or the ability to perform a full hands-on physical Harsh Davis understands she or the provider may request at any time to terminate the video visit and request the patient to seek care or treatment in person

## 2021-08-24 ENCOUNTER — APPOINTMENT (EMERGENCY)
Dept: RADIOLOGY | Facility: HOSPITAL | Age: 46
End: 2021-08-24
Payer: COMMERCIAL

## 2021-08-24 ENCOUNTER — HOSPITAL ENCOUNTER (EMERGENCY)
Facility: HOSPITAL | Age: 46
Discharge: HOME | End: 2021-08-24
Attending: EMERGENCY MEDICINE | Admitting: EMERGENCY MEDICINE
Payer: COMMERCIAL

## 2021-08-24 VITALS
BODY MASS INDEX: 30.86 KG/M2 | HEIGHT: 66 IN | RESPIRATION RATE: 19 BRPM | OXYGEN SATURATION: 97 % | WEIGHT: 192 LBS | SYSTOLIC BLOOD PRESSURE: 98 MMHG | HEART RATE: 78 BPM | DIASTOLIC BLOOD PRESSURE: 55 MMHG | TEMPERATURE: 98.4 F

## 2021-08-24 DIAGNOSIS — R07.9 CHEST PAIN: Primary | ICD-10-CM

## 2021-08-24 LAB
ALBUMIN SERPL BCP-MCNC: 3.4 G/DL (ref 3.5–5)
ALP SERPL-CCNC: 74 U/L (ref 46–116)
ALT SERPL W P-5'-P-CCNC: 24 U/L (ref 12–78)
ANION GAP SERPL CALCULATED.3IONS-SCNC: 5 MMOL/L (ref 4–13)
AST SERPL W P-5'-P-CCNC: 14 U/L (ref 5–45)
ATRIAL RATE: 97 BPM
BASOPHILS # BLD AUTO: 0.03 THOUSANDS/ΜL (ref 0–0.1)
BASOPHILS NFR BLD AUTO: 0 % (ref 0–1)
BILIRUB SERPL-MCNC: 0.39 MG/DL (ref 0.2–1)
BUN SERPL-MCNC: 13 MG/DL (ref 5–25)
CALCIUM ALBUM COR SERPL-MCNC: 8.9 MG/DL (ref 8.3–10.1)
CALCIUM SERPL-MCNC: 8.4 MG/DL (ref 8.3–10.1)
CHLORIDE SERPL-SCNC: 105 MMOL/L (ref 100–108)
CO2 SERPL-SCNC: 23 MMOL/L (ref 21–32)
CREAT SERPL-MCNC: 0.78 MG/DL (ref 0.6–1.3)
D DIMER PPP FEU-MCNC: <0.27 UG/ML FEU
EOSINOPHIL # BLD AUTO: 0.07 THOUSAND/ΜL (ref 0–0.61)
EOSINOPHIL NFR BLD AUTO: 1 % (ref 0–6)
ERYTHROCYTE [DISTWIDTH] IN BLOOD BY AUTOMATED COUNT: 13.3 % (ref 11.6–15.1)
GFR SERPL CREATININE-BSD FRML MDRD: 92 ML/MIN/1.73SQ M
GLUCOSE SERPL-MCNC: 138 MG/DL (ref 65–140)
HCT VFR BLD AUTO: 42.4 % (ref 34.8–46.1)
HGB BLD-MCNC: 13.8 G/DL (ref 11.5–15.4)
IMM GRANULOCYTES # BLD AUTO: 0.04 THOUSAND/UL (ref 0–0.2)
IMM GRANULOCYTES NFR BLD AUTO: 1 % (ref 0–2)
LYMPHOCYTES # BLD AUTO: 1.62 THOUSANDS/ΜL (ref 0.6–4.47)
LYMPHOCYTES NFR BLD AUTO: 24 % (ref 14–44)
MCH RBC QN AUTO: 28.7 PG (ref 26.8–34.3)
MCHC RBC AUTO-ENTMCNC: 32.5 G/DL (ref 31.4–37.4)
MCV RBC AUTO: 88 FL (ref 82–98)
MONOCYTES # BLD AUTO: 0.46 THOUSAND/ΜL (ref 0.17–1.22)
MONOCYTES NFR BLD AUTO: 7 % (ref 4–12)
NEUTROPHILS # BLD AUTO: 4.58 THOUSANDS/ΜL (ref 1.85–7.62)
NEUTS SEG NFR BLD AUTO: 67 % (ref 43–75)
NRBC BLD AUTO-RTO: 0 /100 WBCS
P AXIS: 51 DEGREES
PLATELET # BLD AUTO: 230 THOUSANDS/UL (ref 149–390)
PMV BLD AUTO: 10.9 FL (ref 8.9–12.7)
POTASSIUM SERPL-SCNC: 3.5 MMOL/L (ref 3.5–5.3)
PR INTERVAL: 134 MS
PROT SERPL-MCNC: 8.5 G/DL (ref 6.4–8.2)
QRS AXIS: 67 DEGREES
QRSD INTERVAL: 74 MS
QT INTERVAL: 318 MS
QTC INTERVAL: 403 MS
RBC # BLD AUTO: 4.81 MILLION/UL (ref 3.81–5.12)
SODIUM SERPL-SCNC: 133 MMOL/L (ref 136–145)
T WAVE AXIS: 17 DEGREES
TROPONIN I SERPL-MCNC: <0.02 NG/ML
TROPONIN I SERPL-MCNC: <0.02 NG/ML
VENTRICULAR RATE: 97 BPM
WBC # BLD AUTO: 6.8 THOUSAND/UL (ref 4.31–10.16)

## 2021-08-24 PROCEDURE — 85025 COMPLETE CBC W/AUTO DIFF WBC: CPT | Performed by: EMERGENCY MEDICINE

## 2021-08-24 PROCEDURE — 80053 COMPREHEN METABOLIC PANEL: CPT | Performed by: EMERGENCY MEDICINE

## 2021-08-24 PROCEDURE — 36415 COLL VENOUS BLD VENIPUNCTURE: CPT | Performed by: EMERGENCY MEDICINE

## 2021-08-24 PROCEDURE — 84484 ASSAY OF TROPONIN QUANT: CPT | Performed by: EMERGENCY MEDICINE

## 2021-08-24 PROCEDURE — 85379 FIBRIN DEGRADATION QUANT: CPT | Performed by: EMERGENCY MEDICINE

## 2021-08-24 PROCEDURE — 99285 EMERGENCY DEPT VISIT HI MDM: CPT

## 2021-08-24 PROCEDURE — 93010 ELECTROCARDIOGRAM REPORT: CPT | Performed by: INTERNAL MEDICINE

## 2021-08-24 PROCEDURE — 71045 X-RAY EXAM CHEST 1 VIEW: CPT

## 2021-08-24 PROCEDURE — 93005 ELECTROCARDIOGRAM TRACING: CPT

## 2021-08-24 PROCEDURE — 99285 EMERGENCY DEPT VISIT HI MDM: CPT | Performed by: EMERGENCY MEDICINE

## 2021-08-24 RX ORDER — ACETAMINOPHEN 325 MG/1
975 TABLET ORAL ONCE
Status: COMPLETED | OUTPATIENT
Start: 2021-08-24 | End: 2021-08-24

## 2021-08-24 RX ADMIN — ACETAMINOPHEN 975 MG: 325 TABLET, FILM COATED ORAL at 11:50

## 2021-08-24 NOTE — ED PROVIDER NOTES
History  Chief Complaint   Patient presents with    Chest Pain     pt reports sudden chest pain at rest starting 10 minutes ago  71-year-old female who presents for sudden chest pain  Patient describes she was watching TV with her daughter when she suddenly had excruciating chest pain  She describes that is in the center of her chest, does not radiate to the arms the back  Not associated with leg pain or leg swelling, no prior history of PE or DVT, no smoking, no estrogen containing products, no recent travel  Patient describes the pain is pleuritic, she is unsure if it is exertional, not positional   Patient states she has never had pain like this in the past   She describes that at onset she felt lightheaded  Since she has felt short of breath, and palpitations  No nausea, no vomiting  Patient is not report diaphoresis  Patient denies fever, cough, chills, diarrhea, however was tested for COVID-19 yesterday and per chart review reports the symptoms occurring in the past week  COVID-19 test was negative yesterday  ROS otherwise negative  Prior to Admission Medications   Prescriptions Last Dose Informant Patient Reported? Taking?    Lidocaine Viscous HCl (XYLOCAINE) 2 % mucosal solution  Self Yes No   Patient not taking: Reported on 2021   chlorhexidine (PERIDEX) 0 12 % solution  Self Yes No   Patient not taking: Reported on 2021   cyclobenzaprine (FLEXERIL) 10 mg tablet   No No   Sig: Take 1 tablet (10 mg total) by mouth 2 (two) times a day as needed for muscle spasms   Patient not taking: Reported on 2021   ipratropium (ATROVENT) 0 03 % nasal spray   No No   Si SPRAYS INTO EACH NOSTRIL EVERY 12 (TWELVE) HOURS   levothyroxine 88 mcg tablet   No No   Sig: TAKE 1 TABLET BY MOUTH EVERY DAY   loratadine (CLARITIN) 10 mg tablet  Self No No   Sig: Take 1 tablet (10 mg total) by mouth daily   omeprazole (PriLOSEC) 40 MG capsule   No No   Sig: TAKE 1 CAPSULE BY MOUTH TWICE A DAY Facility-Administered Medications: None       Past Medical History:   Diagnosis Date    Abdominal pain     Anemia     Anxiety     Back pain     Bilateral fibrocystic breast changes     resolved: 02/21/2017    Breast disorder     Chest pain     Chronic pelvic pain in female     last assessed: 09/07/2016    Cluster headaches     Constipation     Cough     Depression     Difficulty concentrating     Disease of thyroid gland     Dizziness     Dyspareunia in female     last assessed: 08/18/2016    Easy bruising     Fainting episodes     Fatigue     Fever due to infection     Fibromyalgia     Frequent urination at night     Gallbladder disease     Herniated intervertebral disc of lumbar spine     Hypotension     Loss of bladder control     Memory loss     last assessed: 06/15/2015    Mixed incontinence     last assessed: 01/11/2016    Myofascial pain     last assessed: 06/01/2016    Numbness and tingling     Painful swelling of joint     Palpitations     Panic attack     Sciatica     last assessed: 06/15/2015    Situational anxiety     last assessed: 02/21/2017    SOB (shortness of breath)     Thyroid disease     Uterus, adenomyosis     last assessed: 09/07/2016    Weakness     Weight disorder        Past Surgical History:   Procedure Laterality Date    CHOLECYSTECTOMY      CHOLECYSTECTOMY LAPAROSCOPIC N/A 10/22/2017    Procedure: CHOLECYSTECTOMY LAPAROSCOPIC;  Surgeon: Kyler Ornelas MD;  [facility];  Service: General    ENDOMETRIAL BIOPSY      resolved: 02/21/2017    HYSTERECTOMY  2016    CO VAGINAL HYSTERECTOMY,UTERUS 250 GMS/< N/A 4/20/2018    Procedure: HYSTERECTOMY VAGINAL TOTAL (TVH), BILATERAL SALPINGECTOMY;  Surgeon: Charla Conner MD;  [facility];  Service: Gynecology    TUBAL LIGATION      last assessed: 10/20/2014       Family History   Problem Relation Age of Onset    Hypertension Mother     Arthritis Mother     Diabetes Maternal Grandmother     Breast cancer Family     Cancer Family     Diabetes Family     Heart disease Family     Hyperlipidemia Family         reported cholesterol level was high    Hypertension Other     Hyperlipidemia Father     No Known Problems Maternal Grandfather     No Known Problems Sister     No Known Problems Daughter     Breast cancer Paternal Grandmother 28    No Known Problems Paternal Grandfather     Leukemia Paternal Aunt     No Known Problems Sister     No Known Problems Sister     No Known Problems Sister     No Known Problems Sister     No Known Problems Daughter     No Known Problems Paternal Aunt     No Known Problems Son     No Known Problems Son     No Known Problems Brother     No Known Problems Brother      I have reviewed and agree with the history as documented  E-Cigarette/Vaping    E-Cigarette Use Never User      E-Cigarette/Vaping Substances    Nicotine No     THC No     CBD No     Flavoring No     Other No     Unknown No      Social History     Tobacco Use    Smoking status: Never Smoker    Smokeless tobacco: Never Used   Vaping Use    Vaping Use: Never used   Substance Use Topics    Alcohol use: Never    Drug use: Never        Review of Systems   Constitutional: Negative for chills and fever  HENT: Negative for congestion, rhinorrhea and sore throat  Respiratory: Positive for shortness of breath  Negative for cough  Cardiovascular: Positive for chest pain and palpitations  Gastrointestinal: Negative for abdominal pain, constipation, diarrhea, nausea and vomiting  Genitourinary: Negative for difficulty urinating and flank pain  Musculoskeletal: Negative for arthralgias  Neurological: Positive for light-headedness  Negative for dizziness, weakness and headaches  Psychiatric/Behavioral: Negative for agitation, behavioral problems and confusion  All other systems reviewed and are negative        Physical Exam  ED Triage Vitals [08/24/21 1105] Temperature Pulse Respirations Blood Pressure SpO2   98 4 °F (36 9 °C) (!) 108 18 118/58 98 %      Temp Source Heart Rate Source Patient Position - Orthostatic VS BP Location FiO2 (%)   Oral Monitor Lying Right arm --      Pain Score       8             Orthostatic Vital Signs  Vitals:    08/24/21 1105 08/24/21 1411   BP: 118/58 98/55   Pulse: (!) 108 78   Patient Position - Orthostatic VS: Lying Lying       Physical Exam  Constitutional:       Appearance: She is well-developed  HENT:      Head: Normocephalic and atraumatic  Cardiovascular:      Rate and Rhythm: Regular rhythm  Tachycardia present  Heart sounds: Normal heart sounds  No murmur heard  No friction rub  Pulmonary:      Effort: Pulmonary effort is normal  No respiratory distress  Breath sounds: Normal breath sounds  No decreased breath sounds, wheezing, rhonchi or rales  Abdominal:      General: Bowel sounds are normal  There is no distension  Palpations: Abdomen is soft  Tenderness: There is no abdominal tenderness  Musculoskeletal:         General: Normal range of motion  Cervical back: Normal range of motion and neck supple  Skin:     General: Skin is warm  Neurological:      Mental Status: She is alert and oriented to person, place, and time  Coordination: Coordination normal    Psychiatric:         Behavior: Behavior normal          Thought Content:  Thought content normal          Judgment: Judgment normal          ED Medications  Medications   acetaminophen (TYLENOL) tablet 975 mg (975 mg Oral Given 8/24/21 1150)       Diagnostic Studies  Results Reviewed     Procedure Component Value Units Date/Time    Troponin I [[id number]]  (Normal) Collected: 08/24/21 1409    Lab Status: Final result Specimen: Blood from Arm, Left Updated: 08/24/21 1443     Troponin I <0 02 ng/mL     D-dimer, quantitative [[id number]]  (Normal) Collected: 08/24/21 1124    Lab Status: Final result Specimen: Blood from Arm, Left Updated: 08/24/21 1205     D-Dimer, Quant <0 27 ug/ml FEU     Comprehensive metabolic panel [[id number]]  (Abnormal) Collected: 08/24/21 1124    Lab Status: Final result Specimen: Blood from Arm, Left Updated: 08/24/21 1202     Sodium 133 mmol/L      Potassium 3 5 mmol/L      Chloride 105 mmol/L      CO2 23 mmol/L      ANION GAP 5 mmol/L      BUN 13 mg/dL      Creatinine 0 78 mg/dL      Glucose 138 mg/dL      Calcium 8 4 mg/dL      Corrected Calcium 8 9 mg/dL      AST 14 U/L      ALT 24 U/L      Alkaline Phosphatase 74 U/L      Total Protein 8 5 g/dL      Albumin 3 4 g/dL      Total Bilirubin 0 39 mg/dL      eGFR 92 ml/min/1 73sq m     Narrative:      National Kidney Disease Foundation guidelines for Chronic Kidney Disease (CKD):     Stage 1 with normal or high GFR (GFR > 90 mL/min/1 73 square meters)    Stage 2 Mild CKD (GFR = 60-89 mL/min/1 73 square meters)    Stage 3A Moderate CKD (GFR = 45-59 mL/min/1 73 square meters)    Stage 3B Moderate CKD (GFR = 30-44 mL/min/1 73 square meters)    Stage 4 Severe CKD (GFR = 15-29 mL/min/1 73 square meters)    Stage 5 End Stage CKD (GFR <15 mL/min/1 73 square meters)  Note: GFR calculation is accurate only with a steady state creatinine    Troponin I [[id number]]  (Normal) Collected: 08/24/21 1124    Lab Status: Final result Specimen: Blood from Arm, Left Updated: 08/24/21 1202     Troponin I <0 02 ng/mL     CBC and differential [[id number]] Collected: 08/24/21 1124    Lab Status: Final result Specimen: Blood from Arm, Left Updated: 08/24/21 1145     WBC 6 80 Thousand/uL      RBC 4 81 Million/uL      Hemoglobin 13 8 g/dL      Hematocrit 42 4 %      MCV 88 fL      MCH 28 7 pg      MCHC 32 5 g/dL      RDW 13 3 %      MPV 10 9 fL      Platelets 724 Thousands/uL      nRBC 0 /100 WBCs      Neutrophils Relative 67 %      Immat GRANS % 1 %      Lymphocytes Relative 24 %      Monocytes Relative 7 %      Eosinophils Relative 1 %      Basophils Relative 0 %      Neutrophils Absolute 4 58 Thousands/µL      Immature Grans Absolute 0 04 Thousand/uL      Lymphocytes Absolute 1 62 Thousands/µL      Monocytes Absolute 0 46 Thousand/µL      Eosinophils Absolute 0 07 Thousand/µL      Basophils Absolute 0 03 Thousands/µL                  XR chest 1 view portable   ED Interpretation by Veronica Guardado MD (08/24 9589)   No acute osseous abnormality  Procedures  ECG 12 Lead Documentation Only    Date/Time: 8/24/2021 11:21 AM  Performed by: Veronica Guardado MD  Authorized by: Veronica Guardado MD     Indications / Diagnosis:  Cp  ECG reviewed by me, the ED Provider: yes    Patient location:  ED  Previous ECG:     Previous ECG:  Compared to current    Similarity:  Changes noted  Interpretation:     Interpretation: abnormal    Rate:     ECG rate:  97    ECG rate assessment: normal    Rhythm:     Rhythm: sinus rhythm    Ectopy:     Ectopy: none    QRS:     QRS axis:  Normal    QRS intervals:  Normal  Conduction:     Conduction: normal    ST segments:     ST segments:  Non-specific  T waves:     T waves: inverted      Inverted:  V3 and V4          ED Course             HEART Risk Score      Most Recent Value   Heart Score Risk Calculator   History  0 Filed at: 08/24/2021 1510   ECG  1 Filed at: 08/24/2021 1510   Age  0 Filed at: 08/24/2021 1510   Risk Factors  1 Filed at: 08/24/2021 1510   Troponin  0 Filed at: 08/24/2021 1510   HEART Score  2 Filed at: 08/24/2021 1510                      SBIRT 22yo+      Most Recent Value   SBIRT (23 yo +)   In order to provide better care to our patients, we are screening all of our patients for alcohol and drug use  Would it be okay to ask you these screening questions? No Filed at: 08/24/2021 1143                MDM  Number of Diagnoses or Management Options  Chest pain  Diagnosis management comments: Patient's presentation is concerning for ACS versus PE  Will order cardiac workup and D-dimer  Cardiac workup unremarkable    Gerardo Guardado troponin was also unremarkable  Patient has a heart score of 2  Patient felt much better on re-evaluation, was discharged with strict return precautions  Disposition  Final diagnoses:   Chest pain     Time reflects when diagnosis was documented in both MDM as applicable and the Disposition within this note     Time User Action Codes Description Comment    8/24/2021  3:09 PM Hossein Marrero Add [R07 9] Chest pain       ED Disposition     ED Disposition Condition Date/Time Comment    Discharge Stable Tue Aug 24, 2021  3:09 PM Aria Jones discharge to home/self care  Follow-up Information     Follow up With Specialties Details Why Contact Info    Roger Canales PA-C Internal Medicine, Physician Assistant Call  For re-evaluation [phone] [address] [facility]  [phone]            Discharge Medication List as of 8/24/2021  3:11 PM      CONTINUE these medications which have NOT CHANGED    Details   chlorhexidine (PERIDEX) 0 12 % solution Starting Tue 7/28/2020, Historical Med      cyclobenzaprine (FLEXERIL) 10 mg tablet Take 1 tablet (10 mg total) by mouth 2 (two) times a day as needed for muscle spasms, Starting Fri 7/16/2021, Normal      ipratropium (ATROVENT) 0 03 % nasal spray 2 SPRAYS INTO EACH NOSTRIL EVERY 12 (TWELVE) HOURS, Starting Mon 6/14/2021, Until Sun 9/12/2021, Normal      levothyroxine 88 mcg tablet TAKE 1 TABLET BY MOUTH EVERY DAY, Normal      Lidocaine Viscous HCl (XYLOCAINE) 2 % mucosal solution Starting Wed 7/22/2020, Historical Med      loratadine (CLARITIN) 10 mg tablet Take 1 tablet (10 mg total) by mouth daily, Starting Wed 4/21/2021, Until Mon 10/18/2021, Normal      omeprazole (PriLOSEC) 40 MG capsule TAKE 1 CAPSULE BY MOUTH TWICE A DAY, Normal           No discharge procedures on file  PDMP Review     None           ED Provider  Attending physically available and evaluated Aria Jones   RAMON managed the patient along with the ED Attending      Electronically Signed by         Tab Ceja MD  08/24/21 0662

## 2021-08-24 NOTE — ED ATTENDING ATTESTATION
8/24/2021  I, Bruce Porter MD, saw and evaluated the patient  I have discussed the patient with the resident/non-physician practitioner and agree with the resident's/non-physician practitioner's findings, Plan of Care, and MDM as documented in the resident's/non-physician practitioner's note, except where noted  All available labs and Radiology studies were reviewed  I was present for key portions of any procedure(s) performed by the resident/non-physician practitioner and I was immediately available to provide assistance  At this point I agree with the current assessment done in the Emergency Department  I have conducted an independent evaluation of this patient a history and physical is as follows:   The patient presents for evaluation of chest pain left-sided states that was acute in onset no radiation of the pain started about an hour prior to presentation no complaints of nausea vomiting diaphoresis fever chills no cough no loss of taste or smell no leg pain or leg swelling no history DVT or pulmonary embolism no cardiac risk factors specifically no hypertension no diabetes no smoking no family history of early cardiac disease and no hypercholesterolemia no stimulant use  Patient has history of fibromyalgia  EXAM:   Const:   well appearing   NAD     HEENT:  NCAT    sclera anicteric conjunctiva pink   throat clear, MMM    Neck:   supple  no meningismus  no jvd   no bruits  no  midline tenderness   Lungs:   clear  CW non-tender   No creiptation  Heart:   RRR no m/g/r  Normal pulses  Abd:   soft nt nd pos bs   Ext:    normal nontender  No edema  Neruo:   CN 2 -12 intact  motor intact 5/5 sensory intact cerebellar intact       Gait normal    IMPRESSION:  Chest pain  PLAN:  Cardiac workup D-dimer chest x-ray pain control re-evaluation    ED Course         Critical Care Time  Procedures

## 2021-09-01 ENCOUNTER — OFFICE VISIT (OUTPATIENT)
Dept: CARDIOLOGY CLINIC | Facility: CLINIC | Age: 46
End: 2021-09-01
Payer: COMMERCIAL

## 2021-09-01 VITALS
DIASTOLIC BLOOD PRESSURE: 60 MMHG | SYSTOLIC BLOOD PRESSURE: 88 MMHG | WEIGHT: 195.4 LBS | HEIGHT: 66 IN | HEART RATE: 78 BPM | OXYGEN SATURATION: 96 % | BODY MASS INDEX: 31.4 KG/M2

## 2021-09-01 DIAGNOSIS — R07.9 CHEST PAIN, UNSPECIFIED TYPE: Primary | ICD-10-CM

## 2021-09-01 PROCEDURE — 99213 OFFICE O/P EST LOW 20 MIN: CPT | Performed by: INTERNAL MEDICINE

## 2021-09-01 PROCEDURE — 1036F TOBACCO NON-USER: CPT | Performed by: INTERNAL MEDICINE

## 2021-09-01 RX ORDER — ACETAMINOPHEN 325 MG/1
650 TABLET ORAL EVERY 6 HOURS PRN
COMMUNITY

## 2021-09-01 NOTE — PROGRESS NOTES
Follow-up - Cardiology   Kylah Sosa 40 y o  female MRN: [id number]        Discussion  Patient seen September 1, 2000 21  This is a follow-up after emergency room visit  Patient was in the emergency room on 08/24/2021  Reason for admission to the emergency room was a sudden very fleeting episode of chest discomfort followed by anxiety and tachycardia  She is evaluated in the emergency room  Troponins were negative  The electrocardiogram showed no acute changes  D-dimer negative   Routine blood work all normal     She will be seeing a gastroenterologist   She has had several previous episodes of shooting discomfort in her chest     She does take Prilosec  Blood pressure generally runs about 90 systolic but she is asymptomatic  We will see her on a p r n  basis  Likelihood of cardiac origin very low              HPI: Kylah Sosa is a 40y o  year old female this is a follow-up from emergency room  Patient had brief shooting chest pain    Was evaluated discharged        Review of Systems      Past Medical History:   Diagnosis Date    Abdominal pain     Anemia     Anxiety     Back pain     Bilateral fibrocystic breast changes     resolved: 02/21/2017    Breast disorder     Chest pain     Chronic pelvic pain in female     last assessed: 09/07/2016    Cluster headaches     Constipation     Cough     Depression     Difficulty concentrating     Disease of thyroid gland     Dizziness     Dyspareunia in female     last assessed: 08/18/2016    Easy bruising     Fainting episodes     Fatigue     Fever due to infection     Fibromyalgia     Frequent urination at night     Gallbladder disease     Herniated intervertebral disc of lumbar spine     Hypotension     Loss of bladder control     Memory loss     last assessed: 06/15/2015    Mixed incontinence     last assessed: 01/11/2016    Myofascial pain     last assessed: 06/01/2016    Numbness and tingling     Painful swelling of joint     Palpitations     Panic attack     Sciatica     last assessed: 06/15/2015    Situational anxiety     last assessed: 02/21/2017    SOB (shortness of breath)     Thyroid disease     Uterus, adenomyosis     last assessed: 09/07/2016    Weakness     Weight disorder      Social History     Substance and Sexual Activity   Alcohol Use Never     Social History     Substance and Sexual Activity   Drug Use Never     Social History     Tobacco Use   Smoking Status Never Smoker   Smokeless Tobacco Never Used       Allergies: Allergies   Allergen Reactions    Morphine Chest Pain    Iv Contrast [Iodinated Diagnostic Agents] Hives       Medications:     Current Outpatient Medications:     acetaminophen (TYLENOL) 325 mg tablet, Take 650 mg by mouth every 6 (six) hours as needed for mild pain, Disp: , Rfl:     ipratropium (ATROVENT) 0 03 % nasal spray, 2 SPRAYS INTO EACH NOSTRIL EVERY 12 (TWELVE) HOURS, Disp: 30 mL, Rfl: 2    levothyroxine 88 mcg tablet, TAKE 1 TABLET BY MOUTH EVERY DAY, Disp: 90 tablet, Rfl: 1    loratadine (CLARITIN) 10 mg tablet, Take 1 tablet (10 mg total) by mouth daily (Patient taking differently: Take 10 mg by mouth as needed ), Disp: 90 tablet, Rfl: 1    omeprazole (PriLOSEC) 40 MG capsule, TAKE 1 CAPSULE BY MOUTH TWICE A DAY, Disp: 180 capsule, Rfl: 0      Physical Exam      Laboratory Studies:  CMP:      Invalid input(s): ALBUMIN  NT-proBNP: No results found for: NTBNP   Coags:    Lipid Profile:   Lab Results   Component Value Date    CHOL 152 09/25/2015     Lab Results   Component Value Date    HDL 66 01/10/2020     Lab Results   Component Value Date    LDLCALC 106 (H) 01/10/2020     Lab Results   Component Value Date    TRIG 73 01/10/2020       Cardiac testing:     EKG reviewed personally:    No results found for this or any previous visit  No results found for this or any previous visit  No results found for this or any previous visit      No results found for this or any previous visit  Ronit Omalley MD    Portions of the record may have been created with voice recognition software   Occasional wrong word or "sound a like" substitutions may have occurred due to the inherent limitations of voice recognition software   Read the chart carefully and recognize, using context, where substitutions have occurred

## 2021-09-15 ENCOUNTER — OFFICE VISIT (OUTPATIENT)
Dept: GASTROENTEROLOGY | Facility: CLINIC | Age: 46
End: 2021-09-15
Payer: COMMERCIAL

## 2021-09-15 VITALS
BODY MASS INDEX: 30.98 KG/M2 | DIASTOLIC BLOOD PRESSURE: 67 MMHG | HEIGHT: 66 IN | TEMPERATURE: 96 F | HEART RATE: 78 BPM | SYSTOLIC BLOOD PRESSURE: 100 MMHG | WEIGHT: 192.8 LBS

## 2021-09-15 DIAGNOSIS — K21.9 GASTROESOPHAGEAL REFLUX DISEASE WITHOUT ESOPHAGITIS: ICD-10-CM

## 2021-09-15 DIAGNOSIS — T78.40XA ALLERGIC REACTION TO DRUG, INITIAL ENCOUNTER: ICD-10-CM

## 2021-09-15 DIAGNOSIS — R10.13 EPIGASTRIC ABDOMINAL PAIN: Primary | ICD-10-CM

## 2021-09-15 PROCEDURE — 3008F BODY MASS INDEX DOCD: CPT | Performed by: INTERNAL MEDICINE

## 2021-09-15 PROCEDURE — 99214 OFFICE O/P EST MOD 30 MIN: CPT | Performed by: INTERNAL MEDICINE

## 2021-09-15 PROCEDURE — 1036F TOBACCO NON-USER: CPT | Performed by: INTERNAL MEDICINE

## 2021-09-15 RX ORDER — FAMOTIDINE 20 MG/1
20 TABLET, FILM COATED ORAL 2 TIMES DAILY
Qty: 180 TABLET | Refills: 2 | Status: SHIPPED | OUTPATIENT
Start: 2021-09-15 | End: 2022-07-25

## 2021-09-15 NOTE — PATIENT INSTRUCTIONS
Stop omeprazole   Start Pepcid 20 mg 2 times daily   We will place order for upper endoscopy with Bravo study  Please stop Pepcid 1 week prior to getting the Bravo study done  We will also place referral to the allergy specialist to see if he have a true allergic reaction to Prilosec    EGD Bravo scheduled for Wednesday, 10/6/21, at [facility] with Dr Ramonita guallpa per Chet Baron in GI lab  Prep instructions provided to patient in office

## 2021-09-15 NOTE — ASSESSMENT & PLAN NOTE
Continues to have symptoms  Was responding somewhat to PPI but now with possible allergic reaction to omeprazole  Previous EGD with only mild gastritis noted  Biopsies were negative  Also with mild intermittent globus sensation which may be due to reflux versus goiter/thyromegaly and thyroid nodule    · Stop omeprazole   · Start Pepcid 20 mg twice daily   · Will schedule for EGD with Bravo study  · Instructed patient to stop Pepcid for 1 week prior to EGD with Bravo  · Depending on findings, will consider manometry study as well for further evaluation but patient would like to defer at this time  · Referral to Allergy for evaluation for possible allergic reaction to PPI  · Counseled on lifestyle and dietary modifications for anti-reflux measures

## 2021-09-15 NOTE — ASSESSMENT & PLAN NOTE
Reports hives since starting omeprazole  However, does not have breakouts every time that she takes the medication  Unclear if this is truly allergic reaction to PPI    · Will place referral order to allergist for evaluation to see if patient truly has allergic reaction to PPI  · For now, will stop omeprazole   · Start Pepcid twice daily as noted above

## 2021-09-15 NOTE — PROGRESS NOTES
[facility] - Outpatient Follow-up  Pérez Hernandez 40 y o  female MRN: [id number]  Encounter: [id number]      ASSESSMENT AND PLAN:      Problem List Items Addressed This Visit        Digestive    Gastroesophageal reflux disease without esophagitis     Continues to have symptoms  Was responding somewhat to PPI but now with possible allergic reaction to omeprazole  Previous EGD with only mild gastritis noted  Biopsies were negative  Also with mild intermittent globus sensation which may be due to reflux versus goiter/thyromegaly and thyroid nodule  · Stop omeprazole   · Start Pepcid 20 mg twice daily   · Will schedule for EGD with Bravo study  · Instructed patient to stop Pepcid for 1 week prior to EGD with Bravo  · Depending on findings, will consider manometry study as well for further evaluation but patient would like to defer at this time  · Referral to Allergy for evaluation for possible allergic reaction to PPI  · Counseled on lifestyle and dietary modifications for anti-reflux measures         Relevant Medications    famotidine (PEPCID) 20 mg tablet    Other Relevant Orders    EGD Bravo    Ambulatory referral to Allergy       Other    Epigastric abdominal pain - Primary     Continues to have intermittent episodes of epigastric pain  Suspect this likely is due to reflux  Patient is status post cholecystectomy  EGD in 08/2020 with only findings of mild gastritis  Biopsies were negative  Patient no longer taking NSAIDs  Omeprazole was increased to 40 mg twice daily but possible allergic reaction with skin hives noted    · Reviewed dietary and lifestyle modifications for anti-reflux measures   · Stop omeprazole given possible allergic reaction   · Start Pepcid 20 mg twice daily   · Will order referral to allergy specialist for evaluation of possible allergic reaction to PPI given her ongoing upper GI symptoms and need for acid suppression therapy  · Plan for EGD with Bravo as noted above         Relevant Medications    famotidine (PEPCID) 20 mg tablet    Other Relevant Orders    EGD Bravo    Allergic drug reaction     Reports hives since starting omeprazole  However, does not have breakouts every time that she takes the medication  Unclear if this is truly allergic reaction to PPI  · Will place referral order to allergist for evaluation to see if patient truly has allergic reaction to PPI  · For now, will stop omeprazole   · Start Pepcid twice daily as noted above         Relevant Medications    famotidine (PEPCID) 20 mg tablet    Other Relevant Orders    Ambulatory referral to Allergy        ______________________________________________________________________    HPI:  59-year-old female presents to GI office for follow-up  She continues to have intermittent episodes of epigastric abdominal pain and reflux symptoms  Symptoms are typically worse at night  She does follow some lifestyle changes including elevating head of bed during sleep as well as avoiding meals 2-3 hours prior to bedtime  She was previously having some response to omeprazole and this was increased at last office visit to 40 mg twice daily  However, patient did note possible allergic reaction to omeprazole causing skin hives  Denied any respiratory distress or issues with this  The allergic reaction does not occur every single time she takes the medication but she does state that her 1st episode of hives did start after omeprazole was started  She also reports mild globus sensation which is high in her throat  This happens intermittently  She denies ever having sensation of food getting stuck but does state that she has these episodes that she will need to drink large amounts of water to help to try to relieve  Denies cough with swallowing  No other alarm symptoms  Last EGD in 08/2020 with mild gastritis noted  Biopsies were negative  No other remarkable findings    Recent colonoscopy with diverticulosis but no other significant findings      REVIEW OF SYSTEMS:    CONSTITUTIONAL: Denies any fever, chills, rigors, and weight loss  HEENT: No earache or tinnitus, denies hearing loss or visual disturbances  CARDIOVASCULAR: No palpitations   RESPIRATORY: Denies any cough, hemoptysis, shortness of breath or dyspnea on exertion  GASTROINTESTINAL: As noted in the History of Present Illness   GENITOURINARY: No problems with urination, denies any hematuria or dysuria  NEUROLOGIC: No dizziness or vertigo, denies headaches   MUSCULOSKELETAL: Denies any recent injury   SKIN: +hives/rash intermittently   ENDOCRINE: Denies excessive thirst, denies intolerance to heat or cold  PSYCHOSOCIAL: Denies any recent memory loss     Historical Information   Past Medical History:   Diagnosis Date    Abdominal pain     Anemia     Anxiety     Back pain     Bilateral fibrocystic breast changes     resolved: 02/21/2017    Breast disorder     Chest pain     Chronic pelvic pain in female     last assessed: 09/07/2016    Cluster headaches     Constipation     Cough     Depression     Difficulty concentrating     Disease of thyroid gland     Dizziness     Dyspareunia in female     last assessed: 08/18/2016    Easy bruising     Fainting episodes     Fatigue     Fever due to infection     Fibromyalgia     Frequent urination at night     Gallbladder disease     Herniated intervertebral disc of lumbar spine     Hypotension     Loss of bladder control     Memory loss     last assessed: 06/15/2015    Mixed incontinence     last assessed: 01/11/2016    Myofascial pain     last assessed: 06/01/2016    Numbness and tingling     Painful swelling of joint     Palpitations     Panic attack     Sciatica     last assessed: 06/15/2015    Situational anxiety     last assessed: 02/21/2017    SOB (shortness of breath)     Thyroid disease     Uterus, adenomyosis     last assessed: 09/07/2016    Weakness     Weight disorder      Past Surgical History:   Procedure Laterality Date    CHOLECYSTECTOMY      CHOLECYSTECTOMY LAPAROSCOPIC N/A 10/22/2017    Procedure: CHOLECYSTECTOMY LAPAROSCOPIC;  Surgeon: Tona Gonsalves MD;  Location: [facility];  Service: General    COLONOSCOPY  06/03/2021    ENDOMETRIAL BIOPSY      resolved: 02/21/2017    HYSTERECTOMY  2016    FL VAGINAL HYSTERECTOMY,UTERUS 250 GMS/< N/A 4/20/2018    Procedure: HYSTERECTOMY VAGINAL TOTAL (TVH), BILATERAL SALPINGECTOMY;  Surgeon: Elizabeth Klein MD;  Location: [facility];  Service: Gynecology    TUBAL LIGATION      last assessed: 10/20/2014    UPPER GASTROINTESTINAL ENDOSCOPY  08/03/2020     Social History   Social History     Substance and Sexual Activity   Alcohol Use Never     Social History     Substance and Sexual Activity   Drug Use Never     Social History     Tobacco Use   Smoking Status Never Smoker   Smokeless Tobacco Never Used     Family History   Problem Relation Age of Onset    Hypertension Mother     Arthritis Mother     Diabetes Maternal Grandmother     Breast cancer Family     Cancer Family     Diabetes Family     Heart disease Family     Hyperlipidemia Family         reported cholesterol level was high    Hypertension Other     Hyperlipidemia Father     No Known Problems Maternal Grandfather     No Known Problems Sister     No Known Problems Daughter     Breast cancer Paternal Grandmother 28    No Known Problems Paternal Grandfather     Leukemia Paternal Aunt     No Known Problems Sister     No Known Problems Sister     No Known Problems Sister     No Known Problems Sister     No Known Problems Daughter     No Known Problems Paternal Aunt     No Known Problems Son     No Known Problems Son     No Known Problems Brother     No Known Problems Brother        Meds/Allergies   (Not in a hospital admission)    No current facility-administered medications for this visit       Allergies   Allergen Reactions    Morphine Chest Pain    Iv Contrast [Iodinated Diagnostic Agents] Hives         PHYSICAL EXAM:      Objective   Blood pressure 100/67, pulse 78, temperature (!) 96 °F (35 6 °C), temperature source Tympanic, height 5' 6" (1 676 m), weight 87 5 kg (192 lb 12 8 oz), last menstrual period 02/28/2018, not currently breastfeeding  Body mass index is 31 12 kg/m²  General Appearance:   Alert, cooperative, no distress   HEENT:   Normocephalic, atraumatic, anicteric     Neck:   Supple, symmetrical, trachea midline   Lungs:   Respirations unlabored    Heart:   Regular rate and rhythm   Abdomen:   Soft, non-tender, non-distended; normal bowel sounds; no masses, no organomegaly    Genitalia:   Deferred    Rectal:   Deferred    Extremities:   No cyanosis, clubbing or edema    Pulses:   2+ and symmetric all extremities    Skin:   No jaundice, rashes, or lesions        LAB RESULTS:     No visits with results within 1 Day(s) from this visit     Latest known visit with results is:   Admission on 08/24/2021, Discharged on 08/24/2021   Component Date Value    Sodium 08/24/2021 133*    Potassium 08/24/2021 3 5     Chloride 08/24/2021 105     CO2 08/24/2021 23     ANION GAP 08/24/2021 5     BUN 08/24/2021 13     Creatinine 08/24/2021 0 78     Glucose 08/24/2021 138     Calcium 08/24/2021 8 4     Corrected Calcium 08/24/2021 8 9     AST 08/24/2021 14     ALT 08/24/2021 24     Alkaline Phosphatase 08/24/2021 74     Total Protein 08/24/2021 8 5*    Albumin 08/24/2021 3 4*    Total Bilirubin 08/24/2021 0 39     eGFR 08/24/2021 92     WBC 08/24/2021 6 80     RBC 08/24/2021 4 81     Hemoglobin 08/24/2021 13 8     Hematocrit 08/24/2021 42 4     MCV 08/24/2021 88     MCH 08/24/2021 28 7     MCHC 08/24/2021 32 5     RDW 08/24/2021 13 3     MPV 08/24/2021 10 9     Platelets 79/19/7069 230     nRBC 08/24/2021 0     Neutrophils Relative 08/24/2021 67     Immat GRANS % 08/24/2021 1     Lymphocytes Relative 08/24/2021 24     Monocytes Relative 08/24/2021 7     Eosinophils Relative 08/24/2021 1     Basophils Relative 08/24/2021 0     Neutrophils Absolute 08/24/2021 4 58     Immature Grans Absolute 08/24/2021 0 04     Lymphocytes Absolute 08/24/2021 1 62     Monocytes Absolute 08/24/2021 0 46     Eosinophils Absolute 08/24/2021 0 07     Basophils Absolute 08/24/2021 0 03     Troponin I 08/24/2021 <0 02     D-Dimer, Quant 08/24/2021 <0 27     Troponin I 08/24/2021 <0 02     Ventricular Rate 08/24/2021 97     Atrial Rate 08/24/2021 97     NH Interval 08/24/2021 134     QRSD Interval 08/24/2021 74     QT Interval 08/24/2021 318     QTC Interval 08/24/2021 403     P Axis 08/24/2021 51     QRS Axis 08/24/2021 67     T Wave Axis 08/24/2021 17        RADIOLOGY RESULTS: I have personally reviewed pertinent imaging studies  АННА Rodriguez  Gastroenterology Fellow  [facility]  Available on [email or web address]

## 2021-09-15 NOTE — ASSESSMENT & PLAN NOTE
Continues to have intermittent episodes of epigastric pain  Suspect this likely is due to reflux  Patient is status post cholecystectomy  EGD in 08/2020 with only findings of mild gastritis  Biopsies were negative  Patient no longer taking NSAIDs  Omeprazole was increased to 40 mg twice daily but possible allergic reaction with skin hives noted    · Reviewed dietary and lifestyle modifications for anti-reflux measures   · Stop omeprazole given possible allergic reaction   · Start Pepcid 20 mg twice daily   · Will order referral to allergy specialist for evaluation of possible allergic reaction to PPI given her ongoing upper GI symptoms and need for acid suppression therapy  · Plan for EGD with Bravo as noted above

## 2021-09-29 ENCOUNTER — APPOINTMENT (OUTPATIENT)
Dept: LAB | Facility: HOSPITAL | Age: 46
End: 2021-09-29
Payer: COMMERCIAL

## 2021-09-29 LAB
CRP SERPL QL: 3.4 MG/L
ERYTHROCYTE [SEDIMENTATION RATE] IN BLOOD: 27 MM/HOUR (ref 0–19)

## 2021-09-29 PROCEDURE — 85652 RBC SED RATE AUTOMATED: CPT | Performed by: INTERNAL MEDICINE

## 2021-09-29 PROCEDURE — 86140 C-REACTIVE PROTEIN: CPT | Performed by: INTERNAL MEDICINE

## 2021-09-29 PROCEDURE — 36415 COLL VENOUS BLD VENIPUNCTURE: CPT | Performed by: INTERNAL MEDICINE

## 2021-10-04 ENCOUNTER — TELEMEDICINE (OUTPATIENT)
Dept: RHEUMATOLOGY | Facility: CLINIC | Age: 46
End: 2021-10-04
Payer: COMMERCIAL

## 2021-10-04 ENCOUNTER — TELEPHONE (OUTPATIENT)
Dept: OBGYN CLINIC | Facility: OTHER | Age: 46
End: 2021-10-04

## 2021-10-04 ENCOUNTER — NURSE TRIAGE (OUTPATIENT)
Dept: OTHER | Facility: OTHER | Age: 46
End: 2021-10-04

## 2021-10-04 ENCOUNTER — PREP FOR PROCEDURE (OUTPATIENT)
Dept: GASTROENTEROLOGY | Facility: CLINIC | Age: 46
End: 2021-10-04

## 2021-10-04 DIAGNOSIS — R21 RASH AND NONSPECIFIC SKIN ERUPTION: ICD-10-CM

## 2021-10-04 DIAGNOSIS — M79.7 FIBROMYALGIA: ICD-10-CM

## 2021-10-04 DIAGNOSIS — K21.9 GASTROESOPHAGEAL REFLUX DISEASE WITHOUT ESOPHAGITIS: Primary | ICD-10-CM

## 2021-10-04 DIAGNOSIS — R70.0 ELEVATED SED RATE: Primary | ICD-10-CM

## 2021-10-04 DIAGNOSIS — Z84.0 FAMILY HISTORY OF CUTANEOUS LUPUS: ICD-10-CM

## 2021-10-04 DIAGNOSIS — Z20.828 SARS-ASSOCIATED CORONAVIRUS EXPOSURE: Primary | ICD-10-CM

## 2021-10-04 PROCEDURE — U0003 INFECTIOUS AGENT DETECTION BY NUCLEIC ACID (DNA OR RNA); SEVERE ACUTE RESPIRATORY SYNDROME CORONAVIRUS 2 (SARS-COV-2) (CORONAVIRUS DISEASE [COVID-19]), AMPLIFIED PROBE TECHNIQUE, MAKING USE OF HIGH THROUGHPUT TECHNOLOGIES AS DESCRIBED BY CMS-2020-01-R: HCPCS | Performed by: FAMILY MEDICINE

## 2021-10-04 PROCEDURE — 1036F TOBACCO NON-USER: CPT | Performed by: INTERNAL MEDICINE

## 2021-10-04 PROCEDURE — U0005 INFEC AGEN DETEC AMPLI PROBE: HCPCS | Performed by: FAMILY MEDICINE

## 2021-10-04 PROCEDURE — 99214 OFFICE O/P EST MOD 30 MIN: CPT | Performed by: INTERNAL MEDICINE

## 2021-10-05 ENCOUNTER — TELEPHONE (OUTPATIENT)
Dept: GASTROENTEROLOGY | Facility: HOSPITAL | Age: 46
End: 2021-10-05

## 2021-10-06 ENCOUNTER — ANESTHESIA EVENT (OUTPATIENT)
Dept: GASTROENTEROLOGY | Facility: HOSPITAL | Age: 46
End: 2021-10-06

## 2021-10-06 ENCOUNTER — ANESTHESIA (OUTPATIENT)
Dept: GASTROENTEROLOGY | Facility: HOSPITAL | Age: 46
End: 2021-10-06

## 2021-10-06 ENCOUNTER — HOSPITAL ENCOUNTER (OUTPATIENT)
Dept: GASTROENTEROLOGY | Facility: HOSPITAL | Age: 46
Setting detail: OUTPATIENT SURGERY
Discharge: HOME | End: 2021-10-06
Attending: INTERNAL MEDICINE
Payer: COMMERCIAL

## 2021-10-06 VITALS
SYSTOLIC BLOOD PRESSURE: 110 MMHG | HEIGHT: 66 IN | RESPIRATION RATE: 16 BRPM | BODY MASS INDEX: 30.86 KG/M2 | WEIGHT: 192 LBS | TEMPERATURE: 99 F | OXYGEN SATURATION: 99 % | DIASTOLIC BLOOD PRESSURE: 65 MMHG | HEART RATE: 67 BPM

## 2021-10-06 DIAGNOSIS — K21.9 GASTROESOPHAGEAL REFLUX DISEASE WITHOUT ESOPHAGITIS: ICD-10-CM

## 2021-10-06 DIAGNOSIS — R10.13 EPIGASTRIC ABDOMINAL PAIN: ICD-10-CM

## 2021-10-06 PROCEDURE — 43235 EGD DIAGNOSTIC BRUSH WASH: CPT | Performed by: INTERNAL MEDICINE

## 2021-10-06 RX ORDER — LIDOCAINE HYDROCHLORIDE 10 MG/ML
INJECTION, SOLUTION EPIDURAL; INFILTRATION; INTRACAUDAL; PERINEURAL AS NEEDED
Status: DISCONTINUED | OUTPATIENT
Start: 2021-10-06 | End: 2021-10-06

## 2021-10-06 RX ORDER — SODIUM CHLORIDE, SODIUM LACTATE, POTASSIUM CHLORIDE, CALCIUM CHLORIDE 600; 310; 30; 20 MG/100ML; MG/100ML; MG/100ML; MG/100ML
INJECTION, SOLUTION INTRAVENOUS CONTINUOUS PRN
Status: DISCONTINUED | OUTPATIENT
Start: 2021-10-06 | End: 2021-10-06

## 2021-10-06 RX ORDER — PROPOFOL 10 MG/ML
INJECTION, EMULSION INTRAVENOUS AS NEEDED
Status: DISCONTINUED | OUTPATIENT
Start: 2021-10-06 | End: 2021-10-06

## 2021-10-06 RX ORDER — ALBUTEROL SULFATE 90 UG/1
AEROSOL, METERED RESPIRATORY (INHALATION) AS NEEDED
Status: DISCONTINUED | OUTPATIENT
Start: 2021-10-06 | End: 2021-10-06

## 2021-10-06 RX ADMIN — PROPOFOL 100 MG: 10 INJECTION, EMULSION INTRAVENOUS at 11:05

## 2021-10-06 RX ADMIN — ALBUTEROL SULFATE 4 PUFF: 90 AEROSOL, METERED RESPIRATORY (INHALATION) at 11:08

## 2021-10-06 RX ADMIN — LIDOCAINE HYDROCHLORIDE 100 MG: 10 INJECTION, SOLUTION EPIDURAL; INFILTRATION; INTRACAUDAL; PERINEURAL at 11:05

## 2021-10-06 RX ADMIN — SODIUM CHLORIDE, SODIUM LACTATE, POTASSIUM CHLORIDE, AND CALCIUM CHLORIDE: .6; .31; .03; .02 INJECTION, SOLUTION INTRAVENOUS at 11:01

## 2021-10-06 RX ADMIN — PROPOFOL 40 MG: 10 INJECTION, EMULSION INTRAVENOUS at 11:09

## 2021-10-06 RX ADMIN — PROPOFOL 20 MG: 10 INJECTION, EMULSION INTRAVENOUS at 11:07

## 2021-10-17 DIAGNOSIS — L50.9 HIVES OF UNKNOWN ORIGIN: ICD-10-CM

## 2021-10-18 RX ORDER — LORATADINE 10 MG/1
TABLET ORAL
Qty: 90 TABLET | Refills: 1 | Status: SHIPPED | OUTPATIENT
Start: 2021-10-18 | End: 2022-04-21

## 2021-10-21 ENCOUNTER — OFFICE VISIT (OUTPATIENT)
Dept: INTERNAL MEDICINE CLINIC | Facility: CLINIC | Age: 46
End: 2021-10-21

## 2021-10-21 VITALS
TEMPERATURE: 97.3 F | DIASTOLIC BLOOD PRESSURE: 67 MMHG | SYSTOLIC BLOOD PRESSURE: 102 MMHG | HEART RATE: 85 BPM | HEIGHT: 66 IN | BODY MASS INDEX: 31.82 KG/M2 | WEIGHT: 198 LBS

## 2021-10-21 DIAGNOSIS — E03.8 HYPOTHYROIDISM DUE TO HASHIMOTO'S THYROIDITIS: ICD-10-CM

## 2021-10-21 DIAGNOSIS — H91.93 BILATERAL HEARING LOSS, UNSPECIFIED HEARING LOSS TYPE: Primary | ICD-10-CM

## 2021-10-21 DIAGNOSIS — E06.3 HYPOTHYROIDISM DUE TO HASHIMOTO'S THYROIDITIS: ICD-10-CM

## 2021-10-21 DIAGNOSIS — E78.00 ELEVATED LDL CHOLESTEROL LEVEL: ICD-10-CM

## 2021-10-21 DIAGNOSIS — Z28.81 VACCINATION HESITANCY BY PATIENT: ICD-10-CM

## 2021-10-21 PROBLEM — R10.13 EPIGASTRIC ABDOMINAL PAIN: Status: RESOLVED | Noted: 2020-07-22 | Resolved: 2021-10-21

## 2021-10-21 PROBLEM — R19.7 DIARRHEA: Status: RESOLVED | Noted: 2020-07-22 | Resolved: 2021-10-21

## 2021-10-21 PROBLEM — M54.9 MID BACK PAIN: Status: RESOLVED | Noted: 2021-03-19 | Resolved: 2021-10-21

## 2021-10-21 PROBLEM — Z12.11 COLON CANCER SCREENING: Status: RESOLVED | Noted: 2021-05-12 | Resolved: 2021-10-21

## 2021-10-21 PROCEDURE — 3008F BODY MASS INDEX DOCD: CPT | Performed by: INTERNAL MEDICINE

## 2021-10-21 PROCEDURE — 99213 OFFICE O/P EST LOW 20 MIN: CPT | Performed by: PHYSICIAN ASSISTANT

## 2021-11-10 ENCOUNTER — OFFICE VISIT (OUTPATIENT)
Dept: GASTROENTEROLOGY | Facility: CLINIC | Age: 46
End: 2021-11-10
Payer: COMMERCIAL

## 2021-11-10 VITALS
DIASTOLIC BLOOD PRESSURE: 68 MMHG | SYSTOLIC BLOOD PRESSURE: 97 MMHG | BODY MASS INDEX: 31.02 KG/M2 | TEMPERATURE: 97 F | HEART RATE: 92 BPM | WEIGHT: 192.2 LBS

## 2021-11-10 DIAGNOSIS — M79.7 FIBROMYALGIA: ICD-10-CM

## 2021-11-10 DIAGNOSIS — K21.9 GASTROESOPHAGEAL REFLUX DISEASE WITHOUT ESOPHAGITIS: Primary | ICD-10-CM

## 2021-11-10 PROCEDURE — 99214 OFFICE O/P EST MOD 30 MIN: CPT | Performed by: INTERNAL MEDICINE

## 2021-11-10 PROCEDURE — 1036F TOBACCO NON-USER: CPT | Performed by: INTERNAL MEDICINE

## 2021-11-12 ENCOUNTER — OFFICE VISIT (OUTPATIENT)
Dept: AUDIOLOGY | Age: 46
End: 2021-11-12
Payer: COMMERCIAL

## 2021-11-12 DIAGNOSIS — H90.3 SENSORINEURAL HEARING LOSS, BILATERAL: Primary | ICD-10-CM

## 2021-11-12 DIAGNOSIS — H91.93 BILATERAL HEARING LOSS, UNSPECIFIED HEARING LOSS TYPE: ICD-10-CM

## 2021-11-12 PROCEDURE — 92567 TYMPANOMETRY: CPT | Performed by: AUDIOLOGIST

## 2021-11-12 PROCEDURE — 92557 COMPREHENSIVE HEARING TEST: CPT | Performed by: AUDIOLOGIST

## 2021-11-18 ENCOUNTER — APPOINTMENT (OUTPATIENT)
Dept: LAB | Facility: CLINIC | Age: 46
End: 2021-11-18
Payer: COMMERCIAL

## 2021-11-18 ENCOUNTER — OFFICE VISIT (OUTPATIENT)
Dept: INTERNAL MEDICINE CLINIC | Facility: CLINIC | Age: 46
End: 2021-11-18

## 2021-11-18 VITALS
TEMPERATURE: 99.9 F | SYSTOLIC BLOOD PRESSURE: 100 MMHG | HEART RATE: 89 BPM | OXYGEN SATURATION: 98 % | DIASTOLIC BLOOD PRESSURE: 66 MMHG | HEIGHT: 66 IN | BODY MASS INDEX: 31.27 KG/M2 | WEIGHT: 194.6 LBS

## 2021-11-18 DIAGNOSIS — R79.82 ELEVATED C-REACTIVE PROTEIN: ICD-10-CM

## 2021-11-18 DIAGNOSIS — R21 MACULOPAPULAR RASH, GENERALIZED: Primary | ICD-10-CM

## 2021-11-18 DIAGNOSIS — R70.0 ELEVATED SED RATE: ICD-10-CM

## 2021-11-18 LAB
ALBUMIN SERPL BCP-MCNC: 3.5 G/DL (ref 3.5–5)
ALP SERPL-CCNC: 78 U/L (ref 46–116)
ALT SERPL W P-5'-P-CCNC: 31 U/L (ref 12–78)
ANION GAP SERPL CALCULATED.3IONS-SCNC: 6 MMOL/L (ref 4–13)
AST SERPL W P-5'-P-CCNC: 22 U/L (ref 5–45)
BASOPHILS # BLD AUTO: 0.02 THOUSANDS/ΜL (ref 0–0.1)
BASOPHILS NFR BLD AUTO: 1 % (ref 0–1)
BILIRUB SERPL-MCNC: 0.62 MG/DL (ref 0.2–1)
BUN SERPL-MCNC: 9 MG/DL (ref 5–25)
CALCIUM SERPL-MCNC: 8.6 MG/DL (ref 8.3–10.1)
CHLORIDE SERPL-SCNC: 103 MMOL/L (ref 100–108)
CO2 SERPL-SCNC: 26 MMOL/L (ref 21–32)
CREAT SERPL-MCNC: 0.74 MG/DL (ref 0.6–1.3)
CRP SERPL QL: 5.5 MG/L
EOSINOPHIL # BLD AUTO: 0.01 THOUSAND/ΜL (ref 0–0.61)
EOSINOPHIL NFR BLD AUTO: 0 % (ref 0–6)
ERYTHROCYTE [DISTWIDTH] IN BLOOD BY AUTOMATED COUNT: 13.1 % (ref 11.6–15.1)
ERYTHROCYTE [SEDIMENTATION RATE] IN BLOOD: 26 MM/HOUR (ref 0–19)
FERRITIN SERPL-MCNC: 44 NG/ML (ref 8–388)
GFR SERPL CREATININE-BSD FRML MDRD: 98 ML/MIN/1.73SQ M
GLUCOSE P FAST SERPL-MCNC: 85 MG/DL (ref 65–99)
HCT VFR BLD AUTO: 43.7 % (ref 34.8–46.1)
HGB BLD-MCNC: 14.2 G/DL (ref 11.5–15.4)
IMM GRANULOCYTES # BLD AUTO: 0.01 THOUSAND/UL (ref 0–0.2)
IMM GRANULOCYTES NFR BLD AUTO: 0 % (ref 0–2)
LYMPHOCYTES # BLD AUTO: 1.06 THOUSANDS/ΜL (ref 0.6–4.47)
LYMPHOCYTES NFR BLD AUTO: 27 % (ref 14–44)
MCH RBC QN AUTO: 29.1 PG (ref 26.8–34.3)
MCHC RBC AUTO-ENTMCNC: 32.5 G/DL (ref 31.4–37.4)
MCV RBC AUTO: 90 FL (ref 82–98)
MONOCYTES # BLD AUTO: 0.59 THOUSAND/ΜL (ref 0.17–1.22)
MONOCYTES NFR BLD AUTO: 15 % (ref 4–12)
NEUTROPHILS # BLD AUTO: 2.2 THOUSANDS/ΜL (ref 1.85–7.62)
NEUTS SEG NFR BLD AUTO: 57 % (ref 43–75)
NRBC BLD AUTO-RTO: 0 /100 WBCS
PLATELET # BLD AUTO: 158 THOUSANDS/UL (ref 149–390)
PMV BLD AUTO: 12 FL (ref 8.9–12.7)
POTASSIUM SERPL-SCNC: 3.7 MMOL/L (ref 3.5–5.3)
PROT SERPL-MCNC: 8.5 G/DL (ref 6.4–8.2)
RBC # BLD AUTO: 4.88 MILLION/UL (ref 3.81–5.12)
SODIUM SERPL-SCNC: 135 MMOL/L (ref 136–145)
WBC # BLD AUTO: 3.89 THOUSAND/UL (ref 4.31–10.16)

## 2021-11-18 PROCEDURE — 3008F BODY MASS INDEX DOCD: CPT | Performed by: INTERNAL MEDICINE

## 2021-11-18 PROCEDURE — 82728 ASSAY OF FERRITIN: CPT | Performed by: PHYSICIAN ASSISTANT

## 2021-11-18 PROCEDURE — 85652 RBC SED RATE AUTOMATED: CPT

## 2021-11-18 PROCEDURE — 99213 OFFICE O/P EST LOW 20 MIN: CPT | Performed by: PHYSICIAN ASSISTANT

## 2021-11-18 PROCEDURE — 80053 COMPREHEN METABOLIC PANEL: CPT

## 2021-11-18 PROCEDURE — 36415 COLL VENOUS BLD VENIPUNCTURE: CPT | Performed by: PHYSICIAN ASSISTANT

## 2021-11-18 PROCEDURE — 86140 C-REACTIVE PROTEIN: CPT

## 2021-11-18 PROCEDURE — 85025 COMPLETE CBC W/AUTO DIFF WBC: CPT | Performed by: PHYSICIAN ASSISTANT

## 2021-11-22 ENCOUNTER — TELEMEDICINE (OUTPATIENT)
Dept: INTERNAL MEDICINE CLINIC | Facility: CLINIC | Age: 46
End: 2021-11-22

## 2021-11-22 VITALS — TEMPERATURE: 97.3 F

## 2021-11-22 DIAGNOSIS — Z20.822 SUSPECTED COVID-19 VIRUS INFECTION: Primary | ICD-10-CM

## 2021-11-22 PROCEDURE — U0003 INFECTIOUS AGENT DETECTION BY NUCLEIC ACID (DNA OR RNA); SEVERE ACUTE RESPIRATORY SYNDROME CORONAVIRUS 2 (SARS-COV-2) (CORONAVIRUS DISEASE [COVID-19]), AMPLIFIED PROBE TECHNIQUE, MAKING USE OF HIGH THROUGHPUT TECHNOLOGIES AS DESCRIBED BY CMS-2020-01-R: HCPCS | Performed by: STUDENT IN AN ORGANIZED HEALTH CARE EDUCATION/TRAINING PROGRAM

## 2021-11-22 PROCEDURE — U0005 INFEC AGEN DETEC AMPLI PROBE: HCPCS | Performed by: STUDENT IN AN ORGANIZED HEALTH CARE EDUCATION/TRAINING PROGRAM

## 2021-11-22 PROCEDURE — 99213 OFFICE O/P EST LOW 20 MIN: CPT | Performed by: HOSPITALIST

## 2021-11-26 ENCOUNTER — TELEMEDICINE (OUTPATIENT)
Dept: INTERNAL MEDICINE CLINIC | Facility: CLINIC | Age: 46
End: 2021-11-26

## 2021-11-26 DIAGNOSIS — U07.1 COVID-19 VIRUS INFECTION: Primary | ICD-10-CM

## 2021-11-26 PROCEDURE — 99212 OFFICE O/P EST SF 10 MIN: CPT | Performed by: PHYSICIAN ASSISTANT

## 2021-12-01 ENCOUNTER — HOSPITAL ENCOUNTER (EMERGENCY)
Facility: HOSPITAL | Age: 46
Discharge: HOME | End: 2021-12-01
Attending: EMERGENCY MEDICINE | Admitting: EMERGENCY MEDICINE
Payer: COMMERCIAL

## 2021-12-01 ENCOUNTER — APPOINTMENT (EMERGENCY)
Dept: RADIOLOGY | Facility: HOSPITAL | Age: 46
End: 2021-12-01
Payer: COMMERCIAL

## 2021-12-01 VITALS
SYSTOLIC BLOOD PRESSURE: 120 MMHG | DIASTOLIC BLOOD PRESSURE: 85 MMHG | HEART RATE: 83 BPM | OXYGEN SATURATION: 97 % | TEMPERATURE: 98.1 F | RESPIRATION RATE: 18 BRPM

## 2021-12-01 DIAGNOSIS — U07.1 COVID-19 VIRUS INFECTION: ICD-10-CM

## 2021-12-01 DIAGNOSIS — R07.9 CHEST PAIN: Primary | ICD-10-CM

## 2021-12-01 LAB
ANION GAP SERPL CALCULATED.3IONS-SCNC: 2 MMOL/L (ref 4–13)
BASOPHILS # BLD AUTO: 0.05 THOUSANDS/ΜL (ref 0–0.1)
BASOPHILS NFR BLD AUTO: 1 % (ref 0–1)
BUN SERPL-MCNC: 10 MG/DL (ref 5–25)
CALCIUM SERPL-MCNC: 9.2 MG/DL (ref 8.3–10.1)
CARDIAC TROPONIN I PNL SERPL HS: <2 NG/L
CHLORIDE SERPL-SCNC: 106 MMOL/L (ref 100–108)
CO2 SERPL-SCNC: 29 MMOL/L (ref 21–32)
CREAT SERPL-MCNC: 0.7 MG/DL (ref 0.6–1.3)
EOSINOPHIL # BLD AUTO: 0.12 THOUSAND/ΜL (ref 0–0.61)
EOSINOPHIL NFR BLD AUTO: 1 % (ref 0–6)
ERYTHROCYTE [DISTWIDTH] IN BLOOD BY AUTOMATED COUNT: 13.2 % (ref 11.6–15.1)
GFR SERPL CREATININE-BSD FRML MDRD: 105 ML/MIN/1.73SQ M
GLUCOSE SERPL-MCNC: 84 MG/DL (ref 65–140)
HCT VFR BLD AUTO: 41.6 % (ref 34.8–46.1)
HGB BLD-MCNC: 13.3 G/DL (ref 11.5–15.4)
IMM GRANULOCYTES # BLD AUTO: 0.04 THOUSAND/UL (ref 0–0.2)
IMM GRANULOCYTES NFR BLD AUTO: 1 % (ref 0–2)
LYMPHOCYTES # BLD AUTO: 2.16 THOUSANDS/ΜL (ref 0.6–4.47)
LYMPHOCYTES NFR BLD AUTO: 25 % (ref 14–44)
MCH RBC QN AUTO: 28.9 PG (ref 26.8–34.3)
MCHC RBC AUTO-ENTMCNC: 32 G/DL (ref 31.4–37.4)
MCV RBC AUTO: 90 FL (ref 82–98)
MONOCYTES # BLD AUTO: 0.7 THOUSAND/ΜL (ref 0.17–1.22)
MONOCYTES NFR BLD AUTO: 8 % (ref 4–12)
NEUTROPHILS # BLD AUTO: 5.44 THOUSANDS/ΜL (ref 1.85–7.62)
NEUTS SEG NFR BLD AUTO: 64 % (ref 43–75)
NRBC BLD AUTO-RTO: 0 /100 WBCS
PLATELET # BLD AUTO: 261 THOUSANDS/UL (ref 149–390)
PMV BLD AUTO: 11.1 FL (ref 8.9–12.7)
POTASSIUM SERPL-SCNC: 3.6 MMOL/L (ref 3.5–5.3)
RBC # BLD AUTO: 4.6 MILLION/UL (ref 3.81–5.12)
SODIUM SERPL-SCNC: 137 MMOL/L (ref 136–145)
WBC # BLD AUTO: 8.51 THOUSAND/UL (ref 4.31–10.16)

## 2021-12-01 PROCEDURE — 96374 THER/PROPH/DIAG INJ IV PUSH: CPT

## 2021-12-01 PROCEDURE — 71045 X-RAY EXAM CHEST 1 VIEW: CPT

## 2021-12-01 PROCEDURE — 80048 BASIC METABOLIC PNL TOTAL CA: CPT | Performed by: EMERGENCY MEDICINE

## 2021-12-01 PROCEDURE — 93005 ELECTROCARDIOGRAM TRACING: CPT

## 2021-12-01 PROCEDURE — 84484 ASSAY OF TROPONIN QUANT: CPT | Performed by: EMERGENCY MEDICINE

## 2021-12-01 PROCEDURE — 99285 EMERGENCY DEPT VISIT HI MDM: CPT

## 2021-12-01 PROCEDURE — 99285 EMERGENCY DEPT VISIT HI MDM: CPT | Performed by: EMERGENCY MEDICINE

## 2021-12-01 PROCEDURE — 36415 COLL VENOUS BLD VENIPUNCTURE: CPT | Performed by: EMERGENCY MEDICINE

## 2021-12-01 PROCEDURE — 85025 COMPLETE CBC W/AUTO DIFF WBC: CPT | Performed by: EMERGENCY MEDICINE

## 2021-12-01 RX ORDER — SODIUM CHLORIDE 9 MG/ML
3 INJECTION INTRAVENOUS
Status: DISCONTINUED | OUTPATIENT
Start: 2021-12-01 | End: 2021-12-01 | Stop reason: HOSPADM

## 2021-12-01 RX ORDER — KETOROLAC TROMETHAMINE 30 MG/ML
15 INJECTION, SOLUTION INTRAMUSCULAR; INTRAVENOUS ONCE
Status: COMPLETED | OUTPATIENT
Start: 2021-12-01 | End: 2021-12-01

## 2021-12-01 RX ADMIN — KETOROLAC TROMETHAMINE 15 MG: 30 INJECTION, SOLUTION INTRAMUSCULAR at 20:07

## 2021-12-02 LAB
ATRIAL RATE: 76 BPM
P AXIS: 54 DEGREES
PR INTERVAL: 142 MS
QRS AXIS: 59 DEGREES
QRSD INTERVAL: 72 MS
QT INTERVAL: 370 MS
QTC INTERVAL: 416 MS
T WAVE AXIS: 1 DEGREES
VENTRICULAR RATE: 76 BPM

## 2021-12-02 PROCEDURE — 93010 ELECTROCARDIOGRAM REPORT: CPT | Performed by: INTERNAL MEDICINE

## 2021-12-06 ENCOUNTER — TELEPHONE (OUTPATIENT)
Dept: INTERNAL MEDICINE CLINIC | Facility: CLINIC | Age: 46
End: 2021-12-06

## 2021-12-06 DIAGNOSIS — R09.81 NASAL CONGESTION: ICD-10-CM

## 2021-12-06 PROBLEM — J45.20 MILD INTERMITTENT ASTHMA WITHOUT COMPLICATION: Status: ACTIVE | Noted: 2021-12-06

## 2021-12-06 RX ORDER — IPRATROPIUM BROMIDE 21 UG/1
2 SPRAY, METERED NASAL EVERY 12 HOURS
Qty: 30 ML | Refills: 2 | Status: SHIPPED | OUTPATIENT
Start: 2021-12-06 | End: 2022-07-28

## 2021-12-15 ENCOUNTER — TELEPHONE (OUTPATIENT)
Dept: INTERNAL MEDICINE CLINIC | Facility: CLINIC | Age: 46
End: 2021-12-15

## 2021-12-17 ENCOUNTER — TELEPHONE (OUTPATIENT)
Dept: INTERNAL MEDICINE CLINIC | Facility: CLINIC | Age: 46
End: 2021-12-17

## 2022-01-08 PROCEDURE — 91035 G-ESOPH REFLX TST W/ELECTROD: CPT | Performed by: INTERNAL MEDICINE

## 2022-01-10 ENCOUNTER — TELEPHONE (OUTPATIENT)
Dept: GASTROENTEROLOGY | Facility: CLINIC | Age: 47
End: 2022-01-10

## 2022-01-10 NOTE — TELEPHONE ENCOUNTER
----- Message from Nora Christianson MD sent at 1/8/2022  5:23 PM EST -----  Regarding: Bravo test  Please let her know that her Bravo capsule test is negative for acid reflux      Thank you,    Seth Hung

## 2022-01-12 ENCOUNTER — CONSULT (OUTPATIENT)
Dept: PULMONOLOGY | Facility: CLINIC | Age: 47
End: 2022-01-12
Payer: MEDICARE

## 2022-01-12 VITALS
SYSTOLIC BLOOD PRESSURE: 108 MMHG | TEMPERATURE: 97.2 F | BODY MASS INDEX: 31.18 KG/M2 | HEIGHT: 66 IN | WEIGHT: 194 LBS | HEART RATE: 92 BPM | DIASTOLIC BLOOD PRESSURE: 60 MMHG | OXYGEN SATURATION: 99 %

## 2022-01-12 DIAGNOSIS — J45.20 MILD INTERMITTENT ASTHMA WITHOUT COMPLICATION: Primary | ICD-10-CM

## 2022-01-12 DIAGNOSIS — R06.02 SHORTNESS OF BREATH: ICD-10-CM

## 2022-01-12 DIAGNOSIS — R06.02 SOB (SHORTNESS OF BREATH): ICD-10-CM

## 2022-01-12 PROCEDURE — 99244 OFF/OP CNSLTJ NEW/EST MOD 40: CPT | Performed by: INTERNAL MEDICINE

## 2022-01-12 NOTE — ASSESSMENT & PLAN NOTE
Symptomatolgy consistent with reactive airways disease  Will send for methacholine challenge testing to evaluate for underlying asthma  Instructed on proper use of Symbicort inhaler and recommended trial of this  Instructed on use of rescue inhaler as well

## 2022-01-12 NOTE — ASSESSMENT & PLAN NOTE
Present on EMR documentation  Patient denies history of this  Possible  Will be evaluated with methacholine challenge testing

## 2022-01-12 NOTE — PROGRESS NOTES
Pulmonary Consultation   Liz Ontiveros 40 y o  female MRN: [id number]  1/12/2022      Reason for Consultation:  Shortness of breath, WEST    Referring: [facility]  No address on file    Assessment:    Mild intermittent asthma without complication  Present on EMR documentation  Patient denies history of this  Possible  Will be evaluated with methacholine challenge testing  SOB (shortness of breath)  Symptomatolgy consistent with reactive airways disease  Will send for methacholine challenge testing to evaluate for underlying asthma  Instructed on proper use of Symbicort inhaler and recommended trial of this  Instructed on use of rescue inhaler as well  Plan:    Diagnoses and all orders for this visit:    Mild intermittent asthma without complication    SOB (shortness of breath)  -     Ambulatory referral to Pulmonology  -     Methacholine challenge; Future    Shortness of breath        History of Present Illness   HPI:  Liz Ontiveros is a 40 y o  female who was referred to [facility] for shortness of breath since 5/2020  She states she has been SOB since that time intermittently  She was evaluated by her PCP who prescribed antihistamines without significant improvement  She was prescribed an albuterol inhaler which she used frequently at times due to her shortness of breath  She underwent and EGD in October of 2021  Reportedly during induction she had what sounds like a bronchospastic event during the procedure which required nebulizations to relieve her symptoms  She was then prescribed symbicort inhaler but did not use it because she was uncertain of how to properly use it  Additionally, she contracted COVID19 in December of 2021 and symptoms have worsened since then  She states she is unable to do some of the activities she previously had no issues with such as cleaning the house and taking care of the children due to breathlessness       Review of Systems   Constitutional: Positive for activity change and fatigue  Negative for chills and fever  HENT: Positive for congestion and postnasal drip  Negative for ear pain, rhinorrhea, sinus pressure and sore throat  Eyes: Negative for pain and visual disturbance  Respiratory: Positive for cough, chest tightness and shortness of breath  Negative for wheezing  Cardiovascular: Negative for chest pain and palpitations  Gastrointestinal: Negative for abdominal pain, constipation, diarrhea, nausea and vomiting  Genitourinary: Negative for dysuria, flank pain, hematuria and urgency  Musculoskeletal: Negative for arthralgias and back pain  Skin: Negative for color change and rash  Neurological: Negative for dizziness, seizures, syncope, weakness, numbness and headaches  All other systems reviewed and are negative        Historical Information   Past Medical History:   Diagnosis Date    Abdominal pain     Anemia     Anxiety     Asthma     Back pain     Bilateral fibrocystic breast changes     resolved: 02/21/2017    Breast disorder     Chest pain     Chronic pelvic pain in female     last assessed: 09/07/2016    Cluster headaches     Colon cancer screening 5/12/2021    Constipation     Cough     Depression     Difficulty concentrating     Disease of thyroid gland     Dizziness     Dyspareunia in female     last assessed: 08/18/2016    Easy bruising     Epigastric abdominal pain 7/22/2020    Fainting episodes     Fatigue     Fever due to infection     Fibromyalgia     Frequent urination at night     Gallbladder disease     Herniated intervertebral disc of lumbar spine     Hypotension     Loss of bladder control     Memory loss     last assessed: 06/15/2015    Mid back pain 3/19/2021    Mixed incontinence     last assessed: 01/11/2016    Myofascial pain     last assessed: 06/01/2016    Numbness and tingling     Painful swelling of joint     Palpitations     Panic attack     Sciatica     last assessed: 06/15/2015    Situational anxiety     last assessed: 02/21/2017    SOB (shortness of breath)     Thyroid disease     Uterus, adenomyosis     last assessed: 09/07/2016    Weakness     Weight disorder      Past Surgical History:   Procedure Laterality Date    CHOLECYSTECTOMY      CHOLECYSTECTOMY LAPAROSCOPIC N/A 10/22/2017    Procedure: CHOLECYSTECTOMY LAPAROSCOPIC;  Surgeon: Joshua Valdes MD;  [facility];  Service: General    COLONOSCOPY  06/03/2021    ENDOMETRIAL BIOPSY      resolved: 02/21/2017    HYSTERECTOMY  2016    NJ VAGINAL HYSTERECTOMY,UTERUS 250 GMS/< N/A 4/20/2018    Procedure: HYSTERECTOMY VAGINAL TOTAL (TVH), BILATERAL SALPINGECTOMY;  Surgeon: Sarah Ellington MD;  [facility];  Service: Gynecology    TUBAL LIGATION      last assessed: 10/20/2014    UPPER GASTROINTESTINAL ENDOSCOPY  08/03/2020     Family History   Problem Relation Age of Onset    Hypertension Mother     Arthritis Mother     Diabetes Maternal Grandmother     Breast cancer Family     Cancer Family     Diabetes Family     Heart disease Family     Hyperlipidemia Family         reported cholesterol level was high    Hypertension Other     Hyperlipidemia Father     No Known Problems Maternal Grandfather     No Known Problems Sister     No Known Problems Daughter     Breast cancer Paternal Grandmother 28    No Known Problems Paternal Grandfather     Leukemia Paternal Aunt     No Known Problems Sister     No Known Problems Sister     No Known Problems Sister     No Known Problems Sister     No Known Problems Daughter     No Known Problems Paternal Aunt     No Known Problems Son     No Known Problems Son     No Known Problems Brother     No Known Problems Brother        Occupational History: , 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, sealing plastic    Social History:   Social History     Socioeconomic History    Marital status: /Civil Union     Spouse name: Not on file    Number of children: 3    Years of education: Not on file    Highest education level: Not on file   Occupational History    Occupation: Full-time   Tobacco Use    Smoking status: Former Smoker     Packs/day: 0 50     Years: 2 00     Pack years: 1 00     Types: Cigarettes     Start date: 2005     Quit date: 2007     Years since quitting: 15 0    Smokeless tobacco: Never Used   Vaping Use    Vaping Use: Never used   Substance and Sexual Activity    Alcohol use: Yes     Comment: occasional    Drug use: Never    Sexual activity: Yes     Partners: Male     Birth control/protection: Female Sterilization, Surgical   Other Topics Concern    Not on file   Social History Narrative    Daily caffeine consumption    Domestic partner    Parent     Social Determinants of Health     Financial Resource Strain: Low Risk     Difficulty of Paying Living Expenses: Not hard at all   Food Insecurity: Food Insecurity Present    Worried About [address] in the Last Year: Sometimes true    Sharita of Food in the Last Year: Sometimes true   Transportation Needs: No Transportation Needs    Lack of Transportation (Medical): No    Lack of Transportation (Non-Medical): No   Physical Activity: Sufficiently Active    Days of Exercise per Week: 5 days    Minutes of Exercise per Session: 60 min   Stress: Not on file   Social Connections:  Moderately Isolated    Frequency of Communication with Friends and Family: More than three times a week    Frequency of Social Gatherings with Friends and Family: Once a week    Attends Mosque Services: Never    Active Member of Clubs or Organizations: No    Attends Club or Organization Meetings: Never    Marital Status:    Intimate Partner Violence: Not At Risk    Fear of Current or Ex-Partner: No    Emotionally Abused: No    Physically Abused: No    Sexually Abused: No   Housing Stability: High Risk    Unable to Pay for Housing in the Last Year: Yes    Number of Places Lived in the Last Year: 1    Unstable Housing in the Last Year: No         Meds/Allergies     Current Outpatient Medications:     acetaminophen (TYLENOL) 325 mg tablet, Take 650 mg by mouth every 6 (six) hours as needed for mild pain, Disp: , Rfl:     albuterol (PROVENTIL HFA,VENTOLIN HFA) 90 mcg/act inhaler, TAKE 2 PUFFS BY MOUTH EVERY 6 HOURS AS NEEDED FOR WHEEZE, Disp: 18 g, Rfl: 3    budesonide-formoterol (Symbicort) 80-4 5 MCG/ACT inhaler, Inhale 2 puffs 2 (two) times a day Rinse mouth after use , Disp: 10 2 g, Rfl: 1    famotidine (PEPCID) 20 mg tablet, Take 1 tablet (20 mg total) by mouth 2 (two) times a day, Disp: 180 tablet, Rfl: 2    ipratropium (ATROVENT) 0 03 % nasal spray, 2 sprays into each nostril every 12 (twelve) hours, Disp: 30 mL, Rfl: 2    levothyroxine 88 mcg tablet, TAKE 1 TABLET BY MOUTH EVERY DAY, Disp: 90 tablet, Rfl: 1    loratadine (CLARITIN) 10 mg tablet, TAKE 1 TABLET BY MOUTH EVERY DAY, Disp: 90 tablet, Rfl: 1  Allergies   Allergen Reactions    Morphine Chest Pain    Iv Contrast [Iodinated Diagnostic Agents] Hives       Vitals: Blood pressure 108/60, pulse 92, temperature (!) 97 2 °F (36 2 °C), height 5' 6" (1 676 m), weight 88 kg (194 lb), last menstrual period 02/28/2018, SpO2 99 %, not currently breastfeeding , Body mass index is 31 31 kg/m²  Oxygen Therapy  SpO2: 99 %  Oxygen Therapy: None (Room air)    Physical Exam  Physical Exam  Vitals and nursing note reviewed  Constitutional:       General: She is not in acute distress  Appearance: Normal appearance  She is well-developed  She is obese  She is not ill-appearing or toxic-appearing  Interventions: She is not intubated  HENT:      Head: Normocephalic and atraumatic  Right Ear: External ear normal       Left Ear: External ear normal       Nose: Nose normal       Mouth/Throat:      Mouth: Mucous membranes are moist       Pharynx: Oropharynx is clear  Eyes:      General: No scleral icterus  Conjunctiva/sclera: Conjunctivae normal    Cardiovascular:      Rate and Rhythm: Normal rate and regular rhythm  Pulses: Normal pulses  Heart sounds: Normal heart sounds  No murmur heard  No friction rub  No gallop  Pulmonary:      Effort: Pulmonary effort is normal  No tachypnea, bradypnea, accessory muscle usage or respiratory distress  She is not intubated  Breath sounds: Normal breath sounds and air entry  No decreased air movement  No decreased breath sounds, wheezing, rhonchi or rales  Abdominal:      General: Abdomen is flat  Bowel sounds are normal       Palpations: Abdomen is soft  Musculoskeletal:         General: No swelling or tenderness  Cervical back: Neck supple  No tenderness  Skin:     General: Skin is warm and dry  Neurological:      General: No focal deficit present  Mental Status: She is alert and oriented to person, place, and time  Mental status is at baseline  Labs: I have personally reviewed pertinent lab results  Lab Results   Component Value Date    WBC 8 51 12/01/2021    HGB 13 3 12/01/2021    HCT 41 6 12/01/2021    MCV 90 12/01/2021     12/01/2021     Lab Results   Component Value Date    GLUCOSE 106 04/06/2015    CALCIUM 9 2 12/01/2021     04/06/2015    K 3 6 12/01/2021    CO2 29 12/01/2021     12/01/2021    BUN 10 12/01/2021    CREATININE 0 70 12/01/2021     No results found for: IGE  Lab Results   Component Value Date    ALT 31 11/18/2021    AST 22 11/18/2021    ALKPHOS 78 11/18/2021    BILITOT 0 40 04/06/2015         Imaging and other studies: I have personally reviewed pertinent reports  and I have personally reviewed pertinent films in PACS  12/1/21 CXR: normal lungs  8/4/21 CXR: normal lungs  2/3/21 CXR: normal lungs  10/9/2020 CXR: normal lungs  5/7/2018 CTA chest PE: lungs clear    Pulmonary function testing: none    EKG, Pathology, and Other Studies: I have personally reviewed pertinent reports  Robert Fernandez MS  Pulmonary & Critical Care Medicine Fellow, PGY-4  [facility]

## 2022-01-13 ENCOUNTER — TELEPHONE (OUTPATIENT)
Dept: INTERNAL MEDICINE CLINIC | Facility: CLINIC | Age: 47
End: 2022-01-13

## 2022-01-13 NOTE — TELEPHONE ENCOUNTER
Please let patient know this is very minor and more for overactive thyroid and she has underactive  She does need the symbicort and I would advise her to take this

## 2022-01-13 NOTE — TELEPHONE ENCOUNTER
faisal a voicemail from patient stating she was prescribed Symbicort and read that it should not be taken by someone who has a thyroid condition  She said she does and wanted to see if she should stop taking this   Please advise

## 2022-01-14 NOTE — TELEPHONE ENCOUNTER
Called and spoke with the patient- she is aware of the recommendation below  No further questions or concerns at this time

## 2022-01-18 ENCOUNTER — OFFICE VISIT (OUTPATIENT)
Dept: INTERNAL MEDICINE CLINIC | Facility: CLINIC | Age: 47
End: 2022-01-18

## 2022-01-18 VITALS
SYSTOLIC BLOOD PRESSURE: 99 MMHG | HEART RATE: 90 BPM | OXYGEN SATURATION: 96 % | TEMPERATURE: 97.3 F | DIASTOLIC BLOOD PRESSURE: 67 MMHG | WEIGHT: 196.3 LBS | BODY MASS INDEX: 31.68 KG/M2

## 2022-01-18 DIAGNOSIS — E01.0 THYROMEGALY: ICD-10-CM

## 2022-01-18 DIAGNOSIS — E06.3 HYPOTHYROIDISM DUE TO HASHIMOTO'S THYROIDITIS: Primary | ICD-10-CM

## 2022-01-18 DIAGNOSIS — E03.8 HYPOTHYROIDISM DUE TO HASHIMOTO'S THYROIDITIS: Primary | ICD-10-CM

## 2022-01-18 DIAGNOSIS — R22.1 SENSATION OF LUMP IN THROAT: ICD-10-CM

## 2022-01-18 DIAGNOSIS — R13.10 DYSPHAGIA, UNSPECIFIED TYPE: ICD-10-CM

## 2022-01-18 DIAGNOSIS — E04.1 THYROID NODULE: ICD-10-CM

## 2022-01-18 DIAGNOSIS — J03.90 TONSILLITIS: ICD-10-CM

## 2022-01-18 PROCEDURE — 99214 OFFICE O/P EST MOD 30 MIN: CPT | Performed by: PHYSICIAN ASSISTANT

## 2022-01-18 RX ORDER — AMOXICILLIN AND CLAVULANATE POTASSIUM 875; 125 MG/1; MG/1
1 TABLET, FILM COATED ORAL EVERY 12 HOURS SCHEDULED
Qty: 20 TABLET | Refills: 0 | Status: SHIPPED | OUTPATIENT
Start: 2022-01-18 | End: 2022-01-28

## 2022-01-18 RX ORDER — PREDNISONE 10 MG/1
TABLET ORAL
Qty: 20 TABLET | Refills: 0 | Status: SHIPPED | OUTPATIENT
Start: 2022-01-18 | End: 2022-03-09

## 2022-01-18 NOTE — PROGRESS NOTES
Assessment/Plan:      Diagnoses and all orders for this visit:    Hypothyroidism due to Hashimoto's thyroiditis  -     TSH, 3rd generation; Future  -     T4, free; Future  -     Lipid panel; Future    Thyroid nodule  -     TSH, 3rd generation; Future  -     T4, free; Future  -     Lipid panel; Future  -     US thyroid; Future    Dysphagia, unspecified type  -     US thyroid; Future    Thyromegaly  -     US thyroid; Future    Sensation of lump in throat  -     US thyroid; Future    Tonsillitis  -     amoxicillin-clavulanate (Augmentin) 875-125 mg per tablet; Take 1 tablet by mouth every 12 (twelve) hours for 10 days  -     predniSONE 10 mg tablet; Take 4 tabs PO daily x 2 days, then 3 tabs PO daily x 2, then 2,2,1,1      patient with unusual presentation of to months of slow progressive fullness/discomfort sensation in anterior neck initially thought to be thyroid however when I palpate the thyroid itself it does not feel significantly enlarged or asymmetrical enough that I would feel it would be compressing on her throat  She does sound to have a slightly muffled voice and when I looked in her posterior pharynx she does have significantly enlarged tonsils though they do not appears significantly red or swollen, no exudate and I cannot appreciate any significant abscess  Patient did have thyroid ultrasounds last completed in 2019 with mild thyromegaly noted and a small nodule on the left side of the thyroid not requiring any biopsy or follow-up  She does also have some throat symptoms with swallowing and sensation of something in her throat, as well as sometimes day hers or stabbing sensation in the back of her tongue in her throat when she swallows as described in HPI  At this time I suspect more so of a tonsillar or posterior pharynx abnormality or problem rather than thyroid    However she is overdue for thyroid blood work that has not yet been completed as ordered by her previous PCP so I will have her complete TSH, free T4 and fasting lipid panel ASAP  Prior blood work canceled and I reordered so the results come to me  I also will order thyroid ultrasound since 1 has not been completed in over 2 years  I do question possible tonsillitis however patient states her tonsils have always been large and have always given her problems  I will have her complete an 8 day course of prednisone starting at 40 mg for 2 days, 30 mg for 2 days, 20 mg for 2 days and 10 mg for 2 days  She states that she has had naproxen in the past though it is upset her stomach so I will avoid NSAIDs at this time  She can certainly use Tylenol if needed in addition as well as salt water gargles  It is unclear if there is truly an infection and I did not collect a swab to send out for culture  There is no obvious exudate, redness or swelling to suggest a true strep infection  I cannot appreciate any lymphadenopathy and she is afebrile and denies any chills or fever type symptoms  She does complain of some occasional left ear pain with swallowing and pressure so I do question eustachian tube dysfunction for which prednisone may help  I will cover for bacterial cause as well with Augmentin 1 pill twice a day for 10 days  Depending on workup with her thyroid and response to treatment I will consider referral to ENT to further evaluate  Obviously if there is something of concern on her thyroid ultrasound will need to consider surgical oncology verses ENT versus endo  Patient agrees to call the office with any sooner concerns otherwise we will call her in follow-up once we start receiving test results to determine further treatment plan  Chief Complaint   Patient presents with    Neck Pain     started about 2 months ago       Subjective:     Patient ID: Garrick Grissom is a 40 y o  female     45y/o female here today for  Anterior neck pain/pressure  states started noticed about 2 months ago and getting worse  states starts in anterior neck around thyroid and extends up into throat area under chin and feels pain when swallowing  States sometimes stabbing pain right anterior neck adjacent to thyroid  Also can feel at times like knives back of tongue in throat   states she feels more so fullness in her anterior neck, especially with talking  States feels like something in throat when swallowing  Also sometimes choking even with spit  Also her voice sounds  More muffled  No cough or throat swelling/difficulty breathing  No fever or chills  No nasal sxs though she does not at times ear pain/pressure on left  Review of Systems   Constitutional: Negative  HENT:        As in HPI   Respiratory: Negative  Cardiovascular: Negative  Gastrointestinal: Negative  Genitourinary: Negative  Neurological: Negative  The following portions of the patient's history were reviewed and updated as appropriate: allergies, current medications, past family history, past medical history, past social history, past surgical history and problem list       Objective:     Physical Exam  Vitals reviewed  Constitutional:       General: She is not in acute distress  Appearance: She is obese  She is not ill-appearing or toxic-appearing  HENT:      Head: Normocephalic and atraumatic  Right Ear: Tympanic membrane, ear canal and external ear normal       Left Ear: Tympanic membrane, ear canal and external ear normal       Nose: Nose normal       Mouth/Throat:      Tongue: No lesions  Pharynx: Uvula midline  No pharyngeal swelling, oropharyngeal exudate, posterior oropharyngeal erythema or uvula swelling  Tonsils: No tonsillar exudate  3+ on the right  3+ on the left  Comments: Tonsils very enlarged, though no abscess or airway obstruction  Pt has somewhat of a muffled voice  Neck:      Vascular: Normal carotid pulses  No carotid bruit        Comments: Pt notes mild fullness/tenderness around thyroid gland especially on left side though I cannot appreciate any significant asymmetry or significant enlargement  Cardiovascular:      Rate and Rhythm: Normal rate and regular rhythm  Heart sounds: Normal heart sounds  Pulmonary:      Effort: Pulmonary effort is normal  No respiratory distress  Breath sounds: Normal breath sounds  Musculoskeletal:      Cervical back: No pain with movement  Right lower leg: No edema  Left lower leg: No edema  Lymphadenopathy:      Head:      Right side of head: No submental, submandibular, tonsillar, preauricular or posterior auricular adenopathy  Left side of head: No submental, submandibular, tonsillar, preauricular or posterior auricular adenopathy  Neurological:      Mental Status: She is alert and oriented to person, place, and time  Psychiatric:         Mood and Affect: Mood normal          Speech: Speech normal          Behavior: Behavior normal  Behavior is cooperative           Vitals:    01/18/22 1439   BP: 99/67   BP Location: Left arm   Patient Position: Sitting   Cuff Size: Large   Pulse: 90   Temp: (!) 97 3 °F (36 3 °C)   TempSrc: Temporal   SpO2: 96%   Weight: 89 kg (196 lb 4 8 oz)

## 2022-01-24 ENCOUNTER — APPOINTMENT (OUTPATIENT)
Dept: LAB | Facility: HOSPITAL | Age: 47
End: 2022-01-24
Payer: MEDICARE

## 2022-01-24 DIAGNOSIS — E04.1 THYROID NODULE: ICD-10-CM

## 2022-01-24 DIAGNOSIS — E06.3 HYPOTHYROIDISM DUE TO HASHIMOTO'S THYROIDITIS: ICD-10-CM

## 2022-01-24 DIAGNOSIS — E03.8 HYPOTHYROIDISM DUE TO HASHIMOTO'S THYROIDITIS: ICD-10-CM

## 2022-01-24 LAB
CHOLEST SERPL-MCNC: 216 MG/DL
HDLC SERPL-MCNC: 69 MG/DL
LDLC SERPL CALC-MCNC: 133 MG/DL (ref 0–100)
NONHDLC SERPL-MCNC: 147 MG/DL
T4 FREE SERPL-MCNC: 1.02 NG/DL (ref 0.76–1.46)
TRIGL SERPL-MCNC: 68 MG/DL
TSH SERPL DL<=0.05 MIU/L-ACNC: 2.64 UIU/ML (ref 0.36–3.74)

## 2022-01-24 PROCEDURE — 84443 ASSAY THYROID STIM HORMONE: CPT

## 2022-01-24 PROCEDURE — 36415 COLL VENOUS BLD VENIPUNCTURE: CPT

## 2022-01-24 PROCEDURE — 80061 LIPID PANEL: CPT

## 2022-01-24 PROCEDURE — 84439 ASSAY OF FREE THYROXINE: CPT

## 2022-01-25 ENCOUNTER — OFFICE VISIT (OUTPATIENT)
Dept: OBGYN CLINIC | Facility: CLINIC | Age: 47
End: 2022-01-25

## 2022-01-25 VITALS
WEIGHT: 196 LBS | BODY MASS INDEX: 31.5 KG/M2 | DIASTOLIC BLOOD PRESSURE: 67 MMHG | SYSTOLIC BLOOD PRESSURE: 110 MMHG | HEIGHT: 66 IN | HEART RATE: 83 BPM

## 2022-01-25 DIAGNOSIS — N64.4 MASTODYNIA OF LEFT BREAST: Primary | ICD-10-CM

## 2022-01-25 PROCEDURE — 99213 OFFICE O/P EST LOW 20 MIN: CPT | Performed by: OBSTETRICS & GYNECOLOGY

## 2022-01-26 PROBLEM — N64.4 BREAST PAIN, LEFT: Chronic | Status: ACTIVE | Noted: 2022-01-26

## 2022-01-26 NOTE — ASSESSMENT & PLAN NOTE
- Patient is complaining of left sided breast pain that start a couple weeks ago  She decline any trauma, bite, nipple discharge, color and temperature changes  She is reporting tonsillitis recently diagnosed and steroid and antibiotics treatment recommended  - She decline any vaccination to left arm and decline axillary area pain and mass feeling   -  Mammogram in 2021 was reported as benign, mo mass and lesion appreciated  - Physical exam, Bilateral axillary area normal, no Lymphadenopathies appreciated, right breast normal, no mass and tenderness appreciated  Left breast tender at medially and inferior quadrant, no temperature and color changes appreciated  - Plan: Mammogram left, diagnostic reflex US if indicated    Empiric antibiotic recommended  Patient will be using Augmentin 875/125 for tonsillitis, this antibiotic will be covering empiric   NSAID may help also after completion of steroid treeatment

## 2022-01-26 NOTE — PROGRESS NOTES
[address] Cecily Nayak 40 y o  SUBJECTIVE    CC:  "Left breast pain"    HPI: Marina Pruitt is a 40 y o  [id number] female presenting today for acute office visit left breast pain, decline trauma, bite  Patient is Up to date for breast screening  She decline vaccination on left upper extremity  She has recent history of throat infection, she is treating with steroid and antibiotics  Decline history of breast pathology, family history of cancer         The following portions of the patient's history were reviewed and updated as appropriate: allergies, current medications, past family history, past medical history, past social history, past surgical history and problem list         ROS  General: negative for chills or fever   Respiratory: no cough, shortness of breath, or wheezing  Cardiovascular: no chest pain or dyspnea on exertion  Gastrointestinal: no abdominal pain, change in bowel habits, or black or bloody stools  Urinary: no urinary symptoms  Genitourinary: Negative  Neurological: no TIA or stroke symptoms      OBJECTIVE  Vitals:    01/25/22 1405   BP: 110/67   Pulse: 83   Weight: 88 9 kg (196 lb)   Height: 5' 6" (1 676 m)       General appearance: alert and oriented, in no acute distress  Genitourinary exam: not done  General Appearance: Alert, appropriate appearance for age  No acute distress  Normal, no discharge  Breast Exam: B/L axillay normal, no LAP, right breat exam WNL, dense breast tissue centrally, no lesion and mass palpated, Left breast WNL, no lesion and mass palpated, no signs of infection, temperature changes    Lab Results:   No visits with results within 1 Day(s) from this visit     Latest known visit with results is:   Appointment on 01/24/2022   Component Date Value    TSH 3RD GENERATON 01/24/2022 2 640     Free T4 01/24/2022 1 02     Cholesterol 01/24/2022 216*    Triglycerides 01/24/2022 68     HDL, Direct 01/24/2022 69     LDL Calculated 01/24/2022 133*    Non-HDL-Chol (CHOL-HDL) 01/24/2022 147               ASSESSMENT&PLAN  Breast pain, left  - Patient is complaining of left sided breast pain that start a couple weeks ago  She decline any trauma, bite, nipple discharge, color and temperature changes  She is reporting tonsillitis recently diagnosed and steroid and antibiotics treatment recommended  - She decline any vaccination to left arm and decline axillary area pain and mass feeling   -  Mammogram in 2021 was reported as benign, mo mass and lesion appreciated  - Physical exam, Bilateral axillary area normal, no Lymphadenopathies appreciated, right breast normal, no mass and tenderness appreciated  Left breast tender at medially and inferior quadrant, no temperature and color changes appreciated  - Plan: Mammogram left, diagnostic reflex US if indicated    Empiric antibiotic recommended  Patient will be using Augmentin 875/125 for tonsillitis, this antibiotic will be covering empiric  NSAID may help also after completion of steroid treeatment         Greater than 50% of total time was spent with the patient and / or family counseling and / or coordination of care  All questions were answered &  expressed understanding      Patient was seen and discussed with Dr Connell Kayser, MD  OBGYN PGY-4  1/26/2022

## 2022-01-28 ENCOUNTER — HOSPITAL ENCOUNTER (OUTPATIENT)
Dept: RADIOLOGY | Age: 47
Discharge: HOME | End: 2022-01-28
Payer: MEDICARE

## 2022-01-28 DIAGNOSIS — E04.1 THYROID NODULE: ICD-10-CM

## 2022-01-28 DIAGNOSIS — R13.10 DYSPHAGIA, UNSPECIFIED TYPE: ICD-10-CM

## 2022-01-28 DIAGNOSIS — E01.0 THYROMEGALY: ICD-10-CM

## 2022-01-28 DIAGNOSIS — R22.1 SENSATION OF LUMP IN THROAT: ICD-10-CM

## 2022-01-28 PROCEDURE — 76536 US EXAM OF HEAD AND NECK: CPT

## 2022-02-14 ENCOUNTER — HOSPITAL ENCOUNTER (OUTPATIENT)
Dept: PULMONOLOGY | Facility: HOSPITAL | Age: 47
Discharge: HOME | End: 2022-02-14
Payer: MEDICARE

## 2022-02-14 DIAGNOSIS — R06.02 SOB (SHORTNESS OF BREATH): ICD-10-CM

## 2022-02-14 PROCEDURE — 94760 N-INVAS EAR/PLS OXIMETRY 1: CPT

## 2022-02-14 PROCEDURE — 94070 EVALUATION OF WHEEZING: CPT

## 2022-02-14 PROCEDURE — 94070 EVALUATION OF WHEEZING: CPT | Performed by: INTERNAL MEDICINE

## 2022-02-14 PROCEDURE — 94726 PLETHYSMOGRAPHY LUNG VOLUMES: CPT | Performed by: INTERNAL MEDICINE

## 2022-02-14 RX ORDER — ALBUTEROL SULFATE 2.5 MG/3ML
2.5 SOLUTION RESPIRATORY (INHALATION) ONCE AS NEEDED
Status: COMPLETED | OUTPATIENT
Start: 2022-02-14 | End: 2022-02-14

## 2022-02-14 RX ADMIN — ALBUTEROL SULFATE 2.5 MG: 2.5 SOLUTION RESPIRATORY (INHALATION) at 14:29

## 2022-02-14 RX ADMIN — Medication 0.06 MG: at 14:30

## 2022-03-01 ENCOUNTER — HOSPITAL ENCOUNTER (OUTPATIENT)
Dept: ULTRASOUND IMAGING | Facility: CLINIC | Age: 47
Discharge: HOME | End: 2022-03-01
Payer: MEDICARE

## 2022-03-01 ENCOUNTER — HOSPITAL ENCOUNTER (OUTPATIENT)
Dept: MAMMOGRAPHY | Facility: CLINIC | Age: 47
Discharge: HOME | End: 2022-03-01
Payer: MEDICARE

## 2022-03-01 DIAGNOSIS — N64.4 MASTODYNIA OF LEFT BREAST: ICD-10-CM

## 2022-03-01 DIAGNOSIS — N64.4 MASTODYNIA: ICD-10-CM

## 2022-03-01 PROCEDURE — 76642 ULTRASOUND BREAST LIMITED: CPT

## 2022-03-01 PROCEDURE — G0279 TOMOSYNTHESIS, MAMMO: HCPCS

## 2022-03-01 PROCEDURE — 77065 DX MAMMO INCL CAD UNI: CPT

## 2022-03-09 ENCOUNTER — OFFICE VISIT (OUTPATIENT)
Dept: PULMONOLOGY | Facility: CLINIC | Age: 47
End: 2022-03-09
Payer: MEDICARE

## 2022-03-09 VITALS
WEIGHT: 197 LBS | OXYGEN SATURATION: 98 % | HEIGHT: 66 IN | DIASTOLIC BLOOD PRESSURE: 80 MMHG | SYSTOLIC BLOOD PRESSURE: 122 MMHG | BODY MASS INDEX: 31.66 KG/M2 | HEART RATE: 80 BPM | TEMPERATURE: 97.8 F

## 2022-03-09 DIAGNOSIS — R06.02 SOB (SHORTNESS OF BREATH): ICD-10-CM

## 2022-03-09 DIAGNOSIS — Z86.16 HISTORY OF COVID-19: Primary | ICD-10-CM

## 2022-03-09 DIAGNOSIS — G47.19 EXCESSIVE DAYTIME SLEEPINESS: ICD-10-CM

## 2022-03-09 PROBLEM — N64.4 BREAST PAIN, LEFT: Chronic | Status: RESOLVED | Noted: 2022-01-26 | Resolved: 2022-03-09

## 2022-03-09 PROBLEM — N64.4 MASTALGIA IN FEMALE: Status: RESOLVED | Noted: 2021-05-24 | Resolved: 2022-03-09

## 2022-03-09 PROBLEM — R79.82 ELEVATED C-REACTIVE PROTEIN: Status: RESOLVED | Noted: 2021-11-18 | Resolved: 2022-03-09

## 2022-03-09 PROBLEM — T78.40XA ALLERGIC DRUG REACTION: Status: RESOLVED | Noted: 2021-09-15 | Resolved: 2022-03-09

## 2022-03-09 PROBLEM — R70.0 ELEVATED SED RATE: Status: RESOLVED | Noted: 2021-11-18 | Resolved: 2022-03-09

## 2022-03-09 PROCEDURE — 99213 OFFICE O/P EST LOW 20 MIN: CPT | Performed by: INTERNAL MEDICINE

## 2022-03-09 NOTE — ASSESSMENT & PLAN NOTE
Methacholine challenge did not demonstrate asthma  I doubt patient has underlying asthma  Especially since she denied this previously  Recommend discontinuation of Symbicort due to lack of her asthmatic diagnostics  Shortness of breath most likely related to post acute quality of COVID-19  Will need continue supportive care and anticipate a slow recovery

## 2022-03-09 NOTE — PROGRESS NOTES
Pulmonary Follow Up Note   Yara Garcia 56 y o  female MRN: [id number]  3/9/2022      Assessment & Plan:    1  History of COVID-19  Assessment & Plan:  Likely her symptoms represent post acute sequela of COVID-19   Would recommend continue supportive care  Her symptoms persist we can further assess with cardiopulmonary exercise testing  In the interim we will refer her to pulmonary rehabilitation to see if this can improve her symptoms  Orders:  -     Ambulatory Referral to Pulmonary Rehabilitation; Future    2  SOB (shortness of breath)  Assessment & Plan:  Methacholine challenge did not demonstrate asthma  I doubt patient has underlying asthma  Especially since she denied this previously  Recommend discontinuation of Symbicort due to lack of her asthmatic diagnostics  Shortness of breath most likely related to post acute quality of COVID-19  Will need continue supportive care and anticipate a slow recovery  3  Excessive daytime sleepiness  Assessment & Plan:  STOP BANG positive  High risk for LUCY  Suspect a lot of her symptoms are related to undiagnosed LUCY  Will send for sleep study  Orders:  -     Diagnostic Sleep Study; Future; Expected date: 03/10/2022        Return in about 6 months (around 9/9/2022)  Has past medical history of  History of Present Illness   HPI:  Yara Garcia is a 56 y o  female who has a past medical history of GERD, hypothyroidism due to Hashimoto's, lumbar cervical radiculopathy, myofascial pain, generalized anxiety disorder, fibromyalgia, stress incontinence, class 1 obesity, tension headaches, and recent COVID-19 infection presents to [facility] [address] for follow-up regarding her dyspnea  He was seen by me in consultation on 01/12/2022 for persistent shortness of breath since May 2020  She stated that time the shortness breath was intermittent    She had been evaluated by you PCP would prescribe her antihistamines without significant improvement  She was prescribed nebulized hilar which she uses frequently due to shortness of breath  She underwent EGD October 2021 reportedly during induction she had a presumed bronchospastic event which required nebulizations  Nebulizations improved symptoms  She was then prescribed Symbicort but denies use of it due to being unaware how to use it  She subsequently contracted COVID-19 is December 2021 and her shortness of breath worsened following this  She is unable do some activity she was previously able to do was able to do activities such as cleaning house and taking care of children  On examination today the patient states she is unchanged  She still feels short of breath with activity going up and down stairs  She feels short of breath when lying down  She wakes up feeling tired  She wakes up 5-6 times per night often to use the rest room  She occasionally snores  No witnessed apneas  Occasionally wakes up with AM headaches and she grinds her teeth  Tosses and turns at night  She is always tired and drowsy throughout the day  She is still using the rescue inhaler occasionally maybe once every 2-3 weeks  Review of Systems   Constitutional: Positive for fatigue  Negative for chills and fever  HENT: Negative for ear pain, postnasal drip, rhinorrhea, sinus pressure, sinus pain and sore throat  Eyes: Negative for pain and visual disturbance  Respiratory: Positive for shortness of breath  Negative for cough, chest tightness and wheezing  Cardiovascular: Negative for chest pain and palpitations  Gastrointestinal: Negative for abdominal pain, constipation, diarrhea, nausea and vomiting  Genitourinary: Negative for dysuria, flank pain, hematuria and urgency  Musculoskeletal: Negative for arthralgias and back pain  Skin: Negative for color change and rash  Neurological: Negative for dizziness, seizures, syncope, weakness, light-headedness, numbness and headaches     All other systems reviewed and are negative        Historical Information   Past Medical History:   Diagnosis Date    Abdominal pain     Allergic drug reaction 9/15/2021    Anemia     Anxiety     Asthma     Back pain     Bilateral fibrocystic breast changes     resolved: 02/21/2017    Breast disorder     Breast pain, left 1/26/2022    Chest pain     Chronic pelvic pain in female     last assessed: 09/07/2016    Cluster headaches     Colon cancer screening 5/12/2021    Constipation     Cough     Depression     Difficulty concentrating     Disease of thyroid gland     Dizziness     Dyspareunia in female     last assessed: 08/18/2016    Easy bruising     Epigastric abdominal pain 7/22/2020    Fainting episodes     Fatigue     Fever due to infection     Fibromyalgia     Frequent urination at night     Gallbladder disease     Herniated intervertebral disc of lumbar spine     Hypotension     Loss of bladder control     Mastalgia in female 5/24/2021    Memory loss     last assessed: 06/15/2015    Mid back pain 3/19/2021    Mixed incontinence     last assessed: 01/11/2016    Myofascial pain     last assessed: 06/01/2016    Numbness and tingling     Painful swelling of joint     Palpitations     Panic attack     Sciatica     last assessed: 06/15/2015    Situational anxiety     last assessed: 02/21/2017    SOB (shortness of breath)     Thyroid disease     Uterus, adenomyosis     last assessed: 09/07/2016    Weakness     Weight disorder      Past Surgical History:   Procedure Laterality Date    CHOLECYSTECTOMY      CHOLECYSTECTOMY LAPAROSCOPIC N/A 10/22/2017    Procedure: CHOLECYSTECTOMY LAPAROSCOPIC;  Surgeon: Billie Gillette MD;  [facility]: [facility];  Service: General    COLONOSCOPY  06/03/2021    ENDOMETRIAL BIOPSY      resolved: 02/21/2017    HYSTERECTOMY  2016    [place] VAGINAL HYSTERECTOMY,UTERUS 250 GMS/< N/A 4/20/2018    Procedure: HYSTERECTOMY VAGINAL TOTAL (TVH), BILATERAL SALPINGECTOMY;  Surgeon: Jaguar Butler MD;  [facility];  Service: Gynecology    TUBAL LIGATION      last assessed: 10/20/2014    UPPER GASTROINTESTINAL ENDOSCOPY  08/03/2020     Family History   Problem Relation Age of Onset    Hypertension Mother     Arthritis Mother     Diabetes Maternal Grandmother     Breast cancer Family     Cancer Family     Diabetes Family     Heart disease Family     Hyperlipidemia Family         reported cholesterol level was high    Hypertension Other     Hyperlipidemia Father     No Known Problems Maternal Grandfather     No Known Problems Sister     No Known Problems Daughter     Breast cancer Paternal Grandmother 28    No Known Problems Paternal Grandfather     Leukemia Paternal Aunt     No Known Problems Sister     No Known Problems Sister     No Known Problems Sister     No Known Problems Sister     No Known Problems Daughter     No Known Problems Paternal Aunt     No Known Problems Son     No Known Problems Son     No Known Problems Brother     No Known Problems Brother          Meds/Allergies     Current Outpatient Medications:     acetaminophen (TYLENOL) 325 mg tablet, Take 650 mg by mouth every 6 (six) hours as needed for mild pain, Disp: , Rfl:     albuterol (PROVENTIL HFA,VENTOLIN HFA) 90 mcg/act inhaler, TAKE 2 PUFFS BY MOUTH EVERY 6 HOURS AS NEEDED FOR WHEEZE, Disp: 18 g, Rfl: 3    famotidine (PEPCID) 20 mg tablet, Take 1 tablet (20 mg total) by mouth 2 (two) times a day, Disp: 180 tablet, Rfl: 2    levothyroxine 88 mcg tablet, TAKE 1 TABLET BY MOUTH EVERY DAY, Disp: 90 tablet, Rfl: 3    loratadine (CLARITIN) 10 mg tablet, TAKE 1 TABLET BY MOUTH EVERY DAY, Disp: 90 tablet, Rfl: 1    ipratropium (ATROVENT) 0 03 % nasal spray, 2 sprays into each nostril every 12 (twelve) hours, Disp: 30 mL, Rfl: 2  Allergies   Allergen Reactions    Morphine Chest Pain    Iv Contrast [Iodinated Diagnostic Agents] Hives       Vitals: Blood pressure 122/80, pulse 80, temperature 97 8 °F (36 6 °C), height 5' 6" (1 676 m), weight 89 4 kg (197 lb), last menstrual period 02/28/2018, SpO2 98 %, not currently breastfeeding  Body mass index is 31 8 kg/m²  Oxygen Therapy  SpO2: 98 %  Oxygen Therapy: None (Room air)    Physical Exam  Physical Exam  Vitals and nursing note reviewed  Constitutional:       General: She is not in acute distress  Appearance: Normal appearance  She is well-developed and normal weight  She is not ill-appearing or toxic-appearing  Interventions: She is not intubated  HENT:      Head: Normocephalic and atraumatic  Right Ear: External ear normal       Left Ear: External ear normal       Nose: Nose normal       Mouth/Throat:      Mouth: Mucous membranes are moist       Pharynx: Oropharynx is clear  Eyes:      General: No scleral icterus  Conjunctiva/sclera: Conjunctivae normal    Cardiovascular:      Rate and Rhythm: Normal rate and regular rhythm  Pulses: Normal pulses  Heart sounds: Normal heart sounds  No murmur heard  No friction rub  No gallop  Pulmonary:      Effort: Pulmonary effort is normal  No tachypnea, bradypnea, accessory muscle usage or respiratory distress  She is not intubated  Breath sounds: Normal breath sounds and air entry  No decreased air movement  No decreased breath sounds, wheezing, rhonchi or rales  Abdominal:      General: Abdomen is flat  Bowel sounds are normal       Palpations: Abdomen is soft  Musculoskeletal:         General: No swelling or tenderness  Cervical back: Neck supple  No tenderness  Skin:     General: Skin is warm and dry  Neurological:      General: No focal deficit present  Mental Status: She is alert and oriented to person, place, and time  Mental status is at baseline  Labs: I have personally reviewed pertinent lab results    Lab Results   Component Value Date    WBC 8 51 12/01/2021    HGB 13 3 12/01/2021    HCT 41 6 12/01/2021    MCV 90 2021     2021     Lab Results   Component Value Date    GLUCOSE 106 2015    CALCIUM 9 2 2021     2015    K 3 6 2021    CO2 29 2021     2021    BUN 10 2021    CREATININE 0 70 2021     No results found for: IGE  Lab Results   Component Value Date    ALT 31 2021    AST 22 2021    ALKPHOS 78 2021    BILITOT 0 40 2015       Imaging and other studies: I have personally reviewed pertinent reports  and I have personally reviewed pertinent films in PACS  21 CXR: normal lungs  21 CXR: normal lungs  2/3/21 CXR: normal lungs  10/9/2020 CXR: normal lungs  2018 CTA chest PE: lungs clear    Pulmonary function testin2022 Methacholine challenge  FEV1/FVC Ratio: 83 %  Forced Vital Capacity: 3 06 L    80 % predicted  FEV1: 2 55 L     83 % predicted  After administration of Methacholine:  FVC: 2 79 L, -8 % change  FEV1: 2 36L, -7 % change  After administration of bronchodilator:  FVC: 3 01 L, -1 % change  FEV1: 2 67 L, +4 % change  Lung volumes by body plethysmography:   Total Lung Capacity 85 % predicted   Residual volume 93 % predicted  DLCO corrected for patients hemoglobin level:  Not performed    Normal siria, no BD response, Normal lung vol, normal resistance    EKG, Pathology, and Other Studies: I have personally reviewed pertinent reports  Garland Townsend, MS  Pulmonary & Critical Care Medicine Fellow, PGY-IV  [facility]

## 2022-03-09 NOTE — ASSESSMENT & PLAN NOTE
Likely her symptoms represent post acute sequela of COVID-19   Would recommend continue supportive care  Her symptoms persist we can further assess with cardiopulmonary exercise testing  In the interim we will refer her to pulmonary rehabilitation to see if this can improve her symptoms

## 2022-03-09 NOTE — ASSESSMENT & PLAN NOTE
STOP BANG positive  High risk for LUCY  Suspect a lot of her symptoms are related to undiagnosed LUCY  Will send for sleep study

## 2022-03-22 ENCOUNTER — TELEPHONE (OUTPATIENT)
Dept: NEUROLOGY | Facility: CLINIC | Age: 47
End: 2022-03-22

## 2022-03-22 NOTE — TELEPHONE ENCOUNTER
----- Message from Aparna Dign MD sent at 3/21/2022  2:32 PM EDT -----  approved  ----- Message -----  From: Viki Cooper  Sent: 3/66/3040  10:26 AM EDT  To: Sleep Medicine Martín Provider    This sleep study needs approval      If approved please sign and return to clerical pool  If denied please include reasons why  Also provide alternative testing if warranted  Please sign and return to clerical pool

## 2022-03-31 ENCOUNTER — CLINICAL SUPPORT (OUTPATIENT)
Dept: PULMONOLOGY | Age: 47
End: 2022-03-31

## 2022-03-31 DIAGNOSIS — U09.9 POST-COVID SYNDROME: Primary | ICD-10-CM

## 2022-03-31 DIAGNOSIS — Z86.16 HISTORY OF COVID-19: ICD-10-CM

## 2022-03-31 NOTE — PROGRESS NOTES
PULMONARY REHAB ASSESSMENT    Today's date: 2022  Patient name: El Gilbert     : 1975       MRN: [id number]  PCP: Christopher Sesay DO  Referring Physician: Giovanni Rodarte, *  Pulmonologist: Lottie Vargas DO    Dx: Post-COVID syndrome [U09 9 (ICD-10-CM)]      Date of onset: 3/31/2022  Cultural needs: None    Weight:    Wt Readings from Last 1 Encounters:   22 89 4 kg (197 lb)      Height:   Ht Readings from Last 1 Encounters:   22 5' 6" (1 676 m)     Medical History:   Past Medical History:   Diagnosis Date    Abdominal pain     Allergic drug reaction 9/15/2021    Anemia     Anxiety     Asthma     Back pain     Bilateral fibrocystic breast changes     resolved: 2017    Breast disorder     Breast pain, left 2022    Chest pain     Chronic pelvic pain in female     last assessed: 2016    Cluster headaches     Colon cancer screening 2021    Constipation     Cough     Depression     Difficulty concentrating     Disease of thyroid gland     Dizziness     Dyspareunia in female     last assessed: 2016    Easy bruising     Epigastric abdominal pain 2020    Fainting episodes     Fatigue     Fever due to infection     Fibromyalgia     Frequent urination at night     Gallbladder disease     Herniated intervertebral disc of lumbar spine     Hypotension     Loss of bladder control     Mastalgia in female 2021    Memory loss     last assessed: 06/15/2015    Mid back pain 3/19/2021    Mixed incontinence     last assessed: 2016    Myofascial pain     last assessed: 2016    Numbness and tingling     Painful swelling of joint     Palpitations     Panic attack     Sciatica     last assessed: 06/15/2015    Situational anxiety     last assessed: 2017    SOB (shortness of breath)     Thyroid disease     Uterus, adenomyosis     last assessed: 2016    Weakness     Weight disorder Physical Limitations: fibromyalgia flare ups in back and neck; sciatica     Oxygen needs: RA    History of Toxic Exposure:  None    Risk Factors   Cholesterol: No  Smoking: Former user  Quit about 20 years; socially smoked  HTN: No  DM: No  Obesity: Yes   Inactivity: Yes  Stress:  perceived  stress:    6/10   Stressors: kids, dogs and    Goals for Stress Management: exercise and take time for herself    Family History:  Family History   Problem Relation Age of Onset    Hypertension Mother     Arthritis Mother     Diabetes Maternal Grandmother     Breast cancer Family     Cancer Family     Diabetes Family     Heart disease Family     Hyperlipidemia Family         reported cholesterol level was high    Hypertension Other     Hyperlipidemia Father     No Known Problems Maternal Grandfather     No Known Problems Sister     No Known Problems Daughter     Breast cancer Paternal Grandmother 28    No Known Problems Paternal Grandfather     Leukemia Paternal Aunt     No Known Problems Sister     No Known Problems Sister     No Known Problems Sister     No Known Problems Sister     No Known Problems Daughter     No Known Problems Paternal Aunt     No Known Problems Son     No Known Problems Son     No Known Problems Brother     No Known Problems Brother        Allergies: Morphine and Iv contrast [iodinated diagnostic agents]  ETOH:   Social History     Substance and Sexual Activity   Alcohol Use Yes    Comment: occasional         Current Medications:   Current Outpatient Medications   Medication Sig Dispense Refill    acetaminophen (TYLENOL) 325 mg tablet Take 650 mg by mouth every 6 (six) hours as needed for mild pain      albuterol (PROVENTIL HFA,VENTOLIN HFA) 90 mcg/act inhaler TAKE 2 PUFFS BY MOUTH EVERY 6 HOURS AS NEEDED FOR WHEEZE 18 g 3    famotidine (PEPCID) 20 mg tablet Take 1 tablet (20 mg total) by mouth 2 (two) times a day 180 tablet 2    ipratropium (ATROVENT) 0 03 % nasal spray 2 sprays into each nostril every 12 (twelve) hours 30 mL 2    levothyroxine 88 mcg tablet TAKE 1 TABLET BY MOUTH EVERY DAY 90 tablet 3    loratadine (CLARITIN) 10 mg tablet TAKE 1 TABLET BY MOUTH EVERY DAY 90 tablet 1     No current facility-administered medications for this visit  Functional Status Prior to Diagnosis for Treatment   Occupation: part time job [employer] store  Recreation: taking kids to sporting events  ADLs: No limitations  [place]rosby: No limitations  Exercise: None  Other: None    Current Functional Status  Occupation: unemployed- would like to return to work part time  Recreation: taking kids to sporting events  ADLs:Capable of performing light to moderate ADLs limited by Dyspnea  fatigue able to perform self-care resumed driving  [place]: Capable of performing light to moderate ADLs limited by Dyspnea  fatigue able to perform self-care resumed driving  Exercise: none  Other: None    Patient Specific Goals:  Walk dog; decrease HR with exercise    Short Term Program Goals: dietary modifications increased strength improved energy/stamina with ADLs exercise 120-150 mins/wk wt loss 1-2 ppw return to work    Long Term Goals: increased maximal walking duration  increased intial training workload  Improved functional capacity  Improved Quality of Life - [facility] score reduced  Reduced stress  improved Rate Your Plate Score    Oxygen Goals: Maintain SpO2>90% titrating supplemental oxygen as needed     Ability to reach goals/rehabilitation potential:  Very Good     Projected return to function: 12 weeks  Objective tests: 6 MWT      Nutritional   Reviewed details of Rate your Plate  Discussed key elements of heart healthy eating  Reviewed patient goals for dietary modifications and their clinical implications  Reviewed most recent lipid profile       Goals for dietary modification: choose lean cuts of meat  eliminate processed meats  reduce portions of meat to 3 oz  increase fish intake  low fat dairy   reduced fat cheese  increase fruits and vegetables  eliminate butter  low sodium  improved snack choices  more nuts/seeds  heathier choices while dining out      Emotional/Social  Patient reports feelings of anxiety  Reports sufficient emotional support    SOCIAL SUPPORT NETWORK    Marital status:       Domestic Violence Screening: No    Comments: no prior pulm or cardiac issues  Nov 2021 had COVID, chronic fatigue and SOB since  Has chest tightness  Has fibromyalgia  Never hospitalized  No O2 at home  Chest xray looks clear; main concern is tachycardia

## 2022-03-31 NOTE — PROGRESS NOTES
Pulmonary Rehabilitation Plan of Care   Initial Care Plan      Today's date: 3/31/2022   # of Exercise Sessions Completed: Evaluation  Patient name: Edith Garcia      : 1975  Age: 56 y o  MRN: [id number]  Referring Physician: Berenice Lazaro, *  Pulmonologist: Ernst Hernandez DO  Provider: Quintin Calhoun  Clinician: Rosa Chilel MS, CEP    Dx:   Encounter Diagnoses   Name Primary?  History of COVID-19     Post-COVID syndrome Yes     Date of onset: 2022      SUMMARY OF PROGRESS:  Today is Randa's initial evaluation to begin Pulmonary Rehab for the diagnosis of Post COVID Syndrome  Micheal Bernard has hx of fibromyalgia, GERD and sciatica  She is obese, inactive and struggles with daily stress and anxiety  Since having COVID, she has been experiencing increased dyspnea, increased sitting time, decreased physical activity, increased fatigue and weakness  They report dyspnea, fatigue and chest tightness/ elevated HR when completing ADLs   The patient currently does not follow a formal exercise program at home, because she is unaware of what her body can handle  Depression screening using the PHQ-9 interprets the patient's score 10-14 = Moderate Depression  ELIAS-7 screening tool for anxiety suggests 0-4  = Not anxious  When addressed, the patient denies having depression but reports feeling of anxiety due to her recent health  Patient reports excellent social/emotional support  Information to begin using [facility] was provided as well as contact information for counseling through [facility]  PHQ-9 score will be reassessed in 30 days  The patient is a former social smoker but quit 20 years ago  Patient admits to 100% medication compliance  At rest, the patient rated dyspnea 1/10 with SpO2 96% on room air  They completed an initial 6MWT, walking 1410 ft on room air  The patients rating of perceived dyspnea during the 6MWT was 5/10 with SpO2 87-91%  Patient took no rest breaks  Telemetry revealed NSR  Resting  /70 with appropriate hemodynamic response to exercise reaching 140/72  Patient will exercise on room air  Education on smoking breathing techniques, pulmonary anatomy, exercise for the pulmonary patient, healthy eating, stress, and relaxation will be provided  Patient goals include: weight loss, be able to walk dog again  Jevon Linares will attend 24 exercise sessions, 2x/wk for 12-18 weeks beginning TBD after her insurance approves  Will progress patient as tolerated over the next 30 days        Medication compliance: Yes   Comments: Pt reports to be compliant with medications  Fall Risk: Low   Comments: Ambulates with a steady gait with no assist device and Denies a fall in the past 6 months    Smoking: Former user  Quit 20 years ago- was social smoker    RPD at Rest: 1/10  RPD with Exercise:  5/10    Assessment of progression of lung disease and functional status:  CAT:   Shortness of breath questionnaire: 83/120      EXERCISE ASSESSMENT and PLAN    Current Exercise Program in Rehab:       Frequency: 2 days/week        Minutes: 25-30         METS: 2 5-3 0              SpO2: >90%              RPD: 4-6                      HR: RHR+30bpm   RPE: 4-5         Modalities: Treadmill, UBE, NuStep and Recumbent bike      Exercise Progression 30 Day Goals :    Frequency: 2 days/week        Minutes: 30-35         METS: 2 8-3 5              SpO2: >90%              RPD: 4-6                      HR: RHR+30bpm   RPE: 4-5        Modalities: Treadmill, UBE, Lifecycle, NuStep and Recumbent bike     Strength trainin-3 days / week  12-15 repetitions  1-2 sets per modality   Will be added following at least 8 weeks post surgery and 8-10 monitored sessions   Modalities: Leg Press, Chest Press, Lateral Raise, Arm Curl, Seated Row, Front Raises and Shoulder Shrugs    Oxygen Needs: on room air at rest and on room air with exercise  Oxygen Goal: Maintain SpO2>90% during exercise    Home Exercise: none  Education: pursed lipped breathing, diaphragmatic breathing, home exercise, benefits of exercise for pulmonary disease, RPE scale, RPD scale, O2 saturation monitoring and appropriate O2 response to exercise    Goals: reduced score on  USCD Shortness of Breath Questionnaire, Improved 6MW distance by 10%, reduced dyspnea during exercise (0-3/10), improved exercise tolerance (max METs tolerated in pulmonary rehab), SpO2 >90% during exercise, reduced score on CAT, attend pulmonary rehab regularly, decrease sitting time at home and start a walking program  Progressing:  Reviewed Pt goals and determined plan of care    Plan: learn to conserve energy with ADLs , practice breathing techniques 3x/day and reduce time sitting at home    Readiness to change: Preparation:  (Getting ready to change)       NUTRITION ASSESSMENT AND PLAN    Weight control:    Starting weight: 196 4   Current weight:       Diabetes: N/A    Goals:2 5-5%  wt loss, choose lean meat (93-95%), eliminate processed meats, reduce portion sizes of meat to 3oz or less, increase intake of fish, shellfish, use low fat dairy, reduce cheese intake or use reduced-fat, Eat 4-5 cups of fruits and vegetables daily, choose low sodium processed foods, eliminate butter, choose healthy snacks: light popcorn, plain pretzels, Increase intake of nuts and seeds, daily saturated fat intake <7%/13g and seldom eat out or choose lower fat menu items  Education: heart healthy eating  low sodium diet  hydration  wt  loss   portion control  Progressing:Reviewed Pt goals and determined plan of care  Plan: Education class: Reading Food Labels, Education Class: Heart Healthy Eating, replace unhealthy snacks with fruits & vegs, reduce portion sizes, switch to skim or 1% milk, avoid processed foods, learn how to read food labels and keep added daily sugar <25g/day  Readiness to change: Preparation:  (Getting ready to change)       PSYCHOSOCIAL ASSESSMENT AND PLAN    Emotional:  Depression assessment:  PHQ-9 = 10-14 = Moderate Depression            Anxiety measure:  ELIAS-7 = 0-4  = Not anxious  Self-reported stress level: 6   Social support: Patient reports excellent emotional/social support from family    Goals:  Reduce perceived stress to 1-3/10, improved [facility] QOL < 27, PHQ-9 - reduced severity by one level, Physical Fitness in [facility] Score < 3, Daily Activity in [facility] Score < 3, Social Activities in [place] Score < 3, Pain in [place] Score < 3, Overall Health in [facility] Score < 3, Quality of Life in [facility] Score < 3 , Change in Health in [facility] Score < 3 , Increased interest in doing things, increased energy, stop worrying, take time to relax and Feel less anxious  Education: signs/sxs of depression, benefits of a positive support system, stress management techniques and depression and CAD    Progressing:Reviewed Pt goals and determined plan of care  Plan: Class: Stress and Your Health, Class: Relaxation, PHQ-9 >5 will refer to MD, Refer to [facility], Practice relaxation techniques, Exercise, Enjoy a hobby, Enjoy family, Return to previous social activity and Repeat PHQ-9 every 30 days if score >5  Readiness to change: Preparation:  (Getting ready to change)       OTHER CORE COMPONENTS     Tobacco:   Social History     Tobacco Use   Smoking Status Former Smoker    Packs/day: 0 50    Years: 2 00    Pack years: 1 00    Types: Cigarettes    Start date:     Quit date:     Years since quitting: 15 2   Smokeless Tobacco Never Used       Tobacco Use Intervention: Referral to tobacco expert:   Pt was social smoker but quit 20 years ago    Blood pressure:    Restin/70   Exercise: 140/72    Goals: consistent BP < 130/80, reduced dietary sodium <2300mg, moderate intensity exercise >150 mins/wk and medication compliance  Education:  pathophysiology of pulmonary disease, control coughing, inspiratory muscle training and environmental triggers  Progressing:Reviewed Pt goals and determined plan of care  Plan: Avoid Processed foods, engage in regular exercise, eliminate salt shaker at the table, use salt substitutes, check labels for sodium content and monitor home BP  Readiness to change: Preparation:  (Getting ready to change)

## 2022-03-31 NOTE — PROGRESS NOTES
Si Sandifer        Dear Dr Mila Hansen,    Emotional well-being and depression is addressed in the pulmonary rehab evaluation  To assess the severity of depression, patients are given the PHQ-9 Depression Questionnaire  This is to inform you that your patient Jean Claude Briscoe scored a 11 which is interpreted as 10-14 = Moderate Depression  Your patient was provided contact information for [facility] and has been encouraged to enroll in [facility]  A repeat PHQ -9 will be administered in 30 days to assess improvement  Thank you for your support of cardiopulmonary rehab      Sincerely,      Harmeet Herrera, MS, CEP

## 2022-04-07 ENCOUNTER — APPOINTMENT (OUTPATIENT)
Dept: PULMONOLOGY | Age: 47
End: 2022-04-07
Payer: MEDICARE

## 2022-04-12 ENCOUNTER — APPOINTMENT (OUTPATIENT)
Dept: PULMONOLOGY | Age: 47
End: 2022-04-12
Payer: MEDICARE

## 2022-04-13 ENCOUNTER — APPOINTMENT (OUTPATIENT)
Dept: PULMONOLOGY | Age: 47
End: 2022-04-13
Payer: MEDICARE

## 2022-04-14 ENCOUNTER — APPOINTMENT (OUTPATIENT)
Dept: PULMONOLOGY | Age: 47
End: 2022-04-14
Payer: MEDICARE

## 2022-04-21 ENCOUNTER — CLINICAL SUPPORT (OUTPATIENT)
Dept: PULMONOLOGY | Age: 47
End: 2022-04-21
Payer: MEDICARE

## 2022-04-21 DIAGNOSIS — U09.9 POST-COVID SYNDROME: ICD-10-CM

## 2022-04-21 PROCEDURE — 94625 PHY/QHP OP PULM RHB W/O MNTR: CPT

## 2022-04-26 ENCOUNTER — CLINICAL SUPPORT (OUTPATIENT)
Dept: PULMONOLOGY | Age: 47
End: 2022-04-26
Payer: MEDICARE

## 2022-04-26 DIAGNOSIS — U09.9 POST-COVID SYNDROME: ICD-10-CM

## 2022-04-26 PROCEDURE — 94625 PHY/QHP OP PULM RHB W/O MNTR: CPT

## 2022-04-28 ENCOUNTER — CLINICAL SUPPORT (OUTPATIENT)
Dept: PULMONOLOGY | Age: 47
End: 2022-04-28
Payer: MEDICARE

## 2022-04-28 DIAGNOSIS — J45.20 MILD INTERMITTENT ASTHMA WITHOUT COMPLICATION: ICD-10-CM

## 2022-04-28 DIAGNOSIS — U09.9 POST-COVID SYNDROME: ICD-10-CM

## 2022-04-28 PROCEDURE — 94625 PHY/QHP OP PULM RHB W/O MNTR: CPT

## 2022-04-28 RX ORDER — ALBUTEROL SULFATE 90 UG/1
AEROSOL, METERED RESPIRATORY (INHALATION)
Qty: 6.7 G | Refills: 3 | Status: SHIPPED | OUTPATIENT
Start: 2022-04-28

## 2022-05-03 ENCOUNTER — APPOINTMENT (OUTPATIENT)
Dept: PULMONOLOGY | Age: 47
End: 2022-05-03
Payer: MEDICARE

## 2022-05-05 ENCOUNTER — APPOINTMENT (OUTPATIENT)
Dept: PULMONOLOGY | Age: 47
End: 2022-05-05
Payer: MEDICARE

## 2022-05-10 ENCOUNTER — CLINICAL SUPPORT (OUTPATIENT)
Dept: PULMONOLOGY | Age: 47
End: 2022-05-10
Payer: MEDICARE

## 2022-05-10 DIAGNOSIS — U09.9 POST-COVID SYNDROME: ICD-10-CM

## 2022-05-10 PROCEDURE — 94625 PHY/QHP OP PULM RHB W/O MNTR: CPT

## 2022-05-11 ENCOUNTER — OFFICE VISIT (OUTPATIENT)
Dept: INTERNAL MEDICINE CLINIC | Facility: CLINIC | Age: 47
End: 2022-05-11

## 2022-05-11 VITALS
SYSTOLIC BLOOD PRESSURE: 97 MMHG | BODY MASS INDEX: 31.83 KG/M2 | WEIGHT: 197.2 LBS | OXYGEN SATURATION: 98 % | HEART RATE: 81 BPM | TEMPERATURE: 97.6 F | DIASTOLIC BLOOD PRESSURE: 64 MMHG

## 2022-05-11 DIAGNOSIS — Z11.4 SCREENING FOR HIV (HUMAN IMMUNODEFICIENCY VIRUS): ICD-10-CM

## 2022-05-11 DIAGNOSIS — Z11.59 NEED FOR HEPATITIS C SCREENING TEST: ICD-10-CM

## 2022-05-11 DIAGNOSIS — E03.8 HYPOTHYROIDISM DUE TO HASHIMOTO'S THYROIDITIS: ICD-10-CM

## 2022-05-11 DIAGNOSIS — M79.7 FIBROMYALGIA: Primary | ICD-10-CM

## 2022-05-11 DIAGNOSIS — E06.3 HYPOTHYROIDISM DUE TO HASHIMOTO'S THYROIDITIS: ICD-10-CM

## 2022-05-11 PROCEDURE — 99213 OFFICE O/P EST LOW 20 MIN: CPT | Performed by: INTERNAL MEDICINE

## 2022-05-11 RX ORDER — AMITRIPTYLINE HYDROCHLORIDE 10 MG/1
5 TABLET, FILM COATED ORAL
Qty: 30 TABLET | Refills: 0 | Status: SHIPPED | OUTPATIENT
Start: 2022-05-11 | End: 2022-06-27

## 2022-05-11 NOTE — PROGRESS NOTES
[facility]  INTERNAL MEDICINE ACUTE VISIT     PATIENT INFORMATION     Elvia Ledesma   56 y o  female   MRN: [id number]    ASSESSMENT/PLAN     Diagnoses and all orders for this visit:    Hypothyroidism due to Hashimoto's thyroiditis  Most recent TSH within normal limits  Continue with levothyroxine 88 mcg  · Will order repeat TSH    Fibromyalgia  Longstanding history of fibromyalgia untreated unfortunately due to side effects  Did not tolerate Cymbalta, Lyrica, or gabapentin in past   Three week history of worsening diffuse pain, worse in back and shoulder blades  · Has had an extensive autoimmune workup in the past with Rheumatology, all negative  Last visit from Rheumatology did not recommend further workup or follow-up  · Lengthy discussion about dietary changes, importance of exercise for management of fibromyalgia  Unfortunately exercise limited due to ongoing shortness of breath and fatigue from COVID-19 infection  Currently undergoing cardiac/respiratory rehab  Did discuss aerobic pool exercises  · Cannot tolerate NSAIDs secondary to underlying gastritis and dyspepsia  · Patient agreeable to trial amitriptyline  Will initiate on very low dose of 5 mg q h s , if patient is able to tolerate her advised to increase to 10 mg q h s  Discussed side effects  · Continue supportive care with Tylenol p r n , topical lidocaine/BenGay  · Will follow-up in 6 weeks, to monitor fibromyalgia and up titrate medication as needed    CHIEF COMPLAINT     Chief Complaint   Patient presents with    Fibromyalgia     Fibromyalgia "flare up"      BMI Counseling: Body mass index is 31 83 kg/m²  The BMI is above normal  Exercise recommendations include moderate aerobic physical activity for 150 minutes/week    HISTORY OF PRESENT ILLNESS   55-year-old female past medical history of fibromyalgia, stress incontinence, hypothyroidism due to Hashimoto's, GERD, history of COVID-19 with residual chronic shortness of breath who presents to clinic today as she feels she is undergoing a fibromyalgia flare  Last 3 weeks had noted worsening diffuse muscle body aches, especially worse in bilateral upper extremities and shoulder blades  Denies any recent trauma  Denies arthralgias, rashes, or joint swelling  Denies any recent illnesses or sicknesses or surgeries  No relief with Tylenol p r n   Unable to tolerate NSAID due to underlying gastritis  Patient has tried many medications for fibromyalgia including gabapentin, Lyrica, Cymbalta and is hesitant to try another SNRIs such as venlafaxine  Reviewed current medication less and refilled appropriate medications  REVIEW OF SYSTEMS     Review of Systems   Constitutional: Negative for activity change, appetite change, chills, fatigue and fever  HENT: Negative for congestion, facial swelling and trouble swallowing  Eyes: Negative for photophobia, pain, discharge and visual disturbance  Respiratory: Negative for apnea, cough, chest tightness, shortness of breath and wheezing  Cardiovascular: Negative for chest pain and leg swelling  Gastrointestinal: Negative for abdominal distention, abdominal pain, blood in stool, constipation, diarrhea, nausea and vomiting  Endocrine: Negative for polyuria  Genitourinary: Negative for difficulty urinating, dysuria, flank pain and hematuria  Musculoskeletal: Positive for arthralgias and myalgias  Negative for back pain and joint swelling  Skin: Negative for pallor and rash  Neurological: Negative for dizziness, tremors and weakness  Psychiatric/Behavioral: Negative for agitation, behavioral problems and suicidal ideas  The patient is not nervous/anxious        OBJECTIVE     Vitals:    05/11/22 1007   BP: 97/64   BP Location: Left arm   Patient Position: Sitting   Cuff Size: Large   Pulse: 81   Temp: 97 6 °F (36 4 °C)   TempSrc: Temporal   SpO2: 98%   Weight: 89 4 kg (197 lb 3 2 oz) Physical Exam  Vitals reviewed  Constitutional:       General: She is not in acute distress  Appearance: She is well-developed  She is not diaphoretic  HENT:      Head: Normocephalic and atraumatic  Nose: Nose normal       Mouth/Throat:      Pharynx: No oropharyngeal exudate  Eyes:      General: No scleral icterus  Right eye: No discharge  Left eye: No discharge  Conjunctiva/sclera: Conjunctivae normal    Cardiovascular:      Rate and Rhythm: Normal rate and regular rhythm  Heart sounds: Normal heart sounds  No murmur heard  No friction rub  No gallop  Pulmonary:      Effort: Pulmonary effort is normal  No respiratory distress  Breath sounds: Normal breath sounds  No wheezing or rales  Abdominal:      General: Bowel sounds are normal  There is no distension  Palpations: Abdomen is soft  Tenderness: There is no abdominal tenderness  There is no guarding  Musculoskeletal:         General: Tenderness present  Normal range of motion  Cervical back: Normal range of motion and neck supple  Comments: Generalized tenderness b/l UE and LE   Skin:     General: Skin is warm and dry  Findings: No erythema  Neurological:      Mental Status: She is alert and oriented to person, place, and time     Psychiatric:         Behavior: Behavior normal        CURRENT MEDICATIONS     Current Outpatient Medications:     acetaminophen (TYLENOL) 325 mg tablet, Take 650 mg by mouth every 6 (six) hours as needed for mild pain, Disp: , Rfl:     albuterol (PROVENTIL HFA,VENTOLIN HFA) 90 mcg/act inhaler, INHALE 2 PUFFS BY MOUTH EVERY 6 HOURS AS NEEDED FOR WHEEZE, Disp: 6 7 g, Rfl: 3    famotidine (PEPCID) 20 mg tablet, Take 1 tablet (20 mg total) by mouth 2 (two) times a day, Disp: 180 tablet, Rfl: 2    ipratropium (ATROVENT) 0 03 % nasal spray, 2 sprays into each nostril every 12 (twelve) hours, Disp: 30 mL, Rfl: 2    levothyroxine 88 mcg tablet, TAKE 1 TABLET BY MOUTH EVERY DAY, Disp: 90 tablet, Rfl: 3    loratadine (CLARITIN) 10 mg tablet, TAKE 1 TABLET BY MOUTH EVERY DAY, Disp: 90 tablet, Rfl: 3    PAST MEDICAL & SURGICAL HISTORY     Past Medical History:   Diagnosis Date    Abdominal pain     Allergic drug reaction 9/15/2021    Anemia     Anxiety     Asthma     Back pain     Bilateral fibrocystic breast changes     resolved: 02/21/2017    Breast disorder     Breast pain, left 1/26/2022    Chest pain     Chronic pelvic pain in female     last assessed: 09/07/2016    Cluster headaches     Colon cancer screening 5/12/2021    Constipation     Cough     Depression     Difficulty concentrating     Disease of thyroid gland     Dizziness     Dyspareunia in female     last assessed: 08/18/2016    Easy bruising     Epigastric abdominal pain 7/22/2020    Fainting episodes     Fatigue     Fever due to infection     Fibromyalgia     Frequent urination at night     Gallbladder disease     Herniated intervertebral disc of lumbar spine     Hypotension     Loss of bladder control     Mastalgia in female 5/24/2021    Memory loss     last assessed: 06/15/2015    Mid back pain 3/19/2021    Mixed incontinence     last assessed: 01/11/2016    Myofascial pain     last assessed: 06/01/2016    Numbness and tingling     Painful swelling of joint     Palpitations     Panic attack     Sciatica     last assessed: 06/15/2015    Situational anxiety     last assessed: 02/21/2017    SOB (shortness of breath)     Thyroid disease     Uterus, adenomyosis     last assessed: 09/07/2016    Weakness     Weight disorder      Past Surgical History:   Procedure Laterality Date    CHOLECYSTECTOMY      CHOLECYSTECTOMY LAPAROSCOPIC N/A 10/22/2017    Procedure: CHOLECYSTECTOMY LAPAROSCOPIC;  Surgeon: Crystal Chan MD;  [facility];  Service: General    COLONOSCOPY  06/03/2021    ENDOMETRIAL BIOPSY      resolved: 02/21/2017    HYSTERECTOMY  2016  W[place] VAGINAL HYSTERECTOMY,UTERUS 250 GMS/< N/A 4/20/2018    Procedure: HYSTERECTOMY VAGINAL TOTAL (TVH), BILATERAL SALPINGECTOMY;  Surgeon: Ana Astudillo MD;  [facility];  Service: Gynecology    TUBAL LIGATION      last assessed: 10/20/2014    UPPER GASTROINTESTINAL ENDOSCOPY  08/03/2020     SOCIAL & FAMILY HISTORY     Social History     Socioeconomic History    Marital status: /Civil Union     Spouse name: Not on file    Number of children: 3    Years of education: Not on file    Highest education level: Not on file   Occupational History    Occupation: Full-time   Tobacco Use    Smoking status: Former Smoker     Packs/day: 0 50     Years: 2 00     Pack years: 1 00     Types: Cigarettes     Start date: 2005     Quit date: 2007     Years since quitting: 15 3    Smokeless tobacco: Never Used   Vaping Use    Vaping Use: Never used   Substance and Sexual Activity    Alcohol use: Yes     Comment: occasional    Drug use: Never    Sexual activity: Yes     Partners: Male     Birth control/protection: Female Sterilization, Surgical   Other Topics Concern    Not on file   Social History Narrative    Daily caffeine consumption    Domestic partner    Parent     Social Determinants of Health     Financial Resource Strain: Low Risk     Difficulty of Paying Living Expenses: Not hard at all   Food Insecurity: No Food Insecurity    Worried About Running Out of Food in the Last Year: Never true    Sharita of Food in the Last Year: Never true   Transportation Needs: No Transportation Needs    Lack of Transportation (Medical): No    Lack of Transportation (Non-Medical): No   Physical Activity: Sufficiently Active    Days of Exercise per Week: 5 days    Minutes of Exercise per Session: 60 min   Stress: Not on file   Social Connections:  Moderately Isolated    Frequency of Communication with Friends and Family: More than three times a week    Frequency of Social Gatherings with Friends and Family: Once a week    Attends Druze Services: Never    Active Member of Clubs or Organizations: No    Attends Club or Organization Meetings: Never    Marital Status:    Intimate Partner Violence: Not At Risk    Fear of Current or Ex-Partner: No    Emotionally Abused: No    Physically Abused: No    Sexually Abused: No   Housing Stability: Low Risk     Unable to Pay for Housing in the Last Year: No    Number of Places Lived in the Last Year: 1    Unstable Housing in the Last Year: No     Social History     Substance and Sexual Activity   Alcohol Use Yes    Comment: occasional     Social History     Substance and Sexual Activity   Drug Use Never     Social History     Tobacco Use   Smoking Status Former Smoker    Packs/day: 0 50    Years: 2 00    Pack years: 1 00    Types: Cigarettes    Start date: 2005    Quit date: 2007    Years since quitting: 15 3   Smokeless Tobacco Never Used     Family History   Problem Relation Age of Onset    Hypertension Mother     Arthritis Mother     Diabetes Maternal Grandmother     Breast cancer Family     Cancer Family     Diabetes Family     Heart disease Family     Hyperlipidemia Family         reported cholesterol level was high    Hypertension Other     Hyperlipidemia Father     No Known Problems Maternal Grandfather     No Known Problems Sister     No Known Problems Daughter     Breast cancer Paternal Grandmother 28    No Known Problems Paternal Grandfather     Leukemia Paternal Aunt     No Known Problems Sister     No Known Problems Sister     No Known Problems Sister     No Known Problems Sister     No Known Problems Daughter     No Known Problems Paternal Aunt     No Known Problems Son     No Known Problems Son     No Known Problems Brother     No Known Problems Brother      ==  Pasquale Luna DO  Chief Resident, PGY-3  [address] [facility] , [address] [address] [facility] [facility]  Office: [phone]  Fax: [phone]

## 2022-05-12 ENCOUNTER — CLINICAL SUPPORT (OUTPATIENT)
Dept: PULMONOLOGY | Age: 47
End: 2022-05-12
Payer: MEDICARE

## 2022-05-12 DIAGNOSIS — U09.9 POST-COVID SYNDROME: Primary | ICD-10-CM

## 2022-05-12 PROCEDURE — 94625 PHY/QHP OP PULM RHB W/O MNTR: CPT

## 2022-05-13 ENCOUNTER — APPOINTMENT (OUTPATIENT)
Dept: LAB | Facility: HOSPITAL | Age: 47
End: 2022-05-13
Payer: MEDICARE

## 2022-05-13 DIAGNOSIS — E03.8 HYPOTHYROIDISM DUE TO HASHIMOTO'S THYROIDITIS: ICD-10-CM

## 2022-05-13 DIAGNOSIS — E06.3 HYPOTHYROIDISM DUE TO HASHIMOTO'S THYROIDITIS: ICD-10-CM

## 2022-05-13 DIAGNOSIS — Z11.4 SCREENING FOR HIV (HUMAN IMMUNODEFICIENCY VIRUS): ICD-10-CM

## 2022-05-13 DIAGNOSIS — Z11.59 NEED FOR HEPATITIS C SCREENING TEST: ICD-10-CM

## 2022-05-13 LAB
CORTIS AM PEAK SERPL-MCNC: 14.3 UG/DL (ref 4.2–22.4)
HCV AB SER QL: NORMAL
TSH SERPL DL<=0.05 MIU/L-ACNC: 3.65 UIU/ML (ref 0.45–4.5)

## 2022-05-13 PROCEDURE — 36415 COLL VENOUS BLD VENIPUNCTURE: CPT

## 2022-05-13 PROCEDURE — 84443 ASSAY THYROID STIM HORMONE: CPT

## 2022-05-13 PROCEDURE — 86803 HEPATITIS C AB TEST: CPT

## 2022-05-13 PROCEDURE — 87389 HIV-1 AG W/HIV-1&-2 AB AG IA: CPT

## 2022-05-13 PROCEDURE — 82533 TOTAL CORTISOL: CPT

## 2022-05-15 LAB — HIV 1+2 AB+HIV1 P24 AG SERPL QL IA: NORMAL

## 2022-05-20 ENCOUNTER — HOSPITAL ENCOUNTER (EMERGENCY)
Facility: HOSPITAL | Age: 47
Discharge: LEFT BEFORE DISCHARGE | End: 2022-05-20
Payer: MEDICARE

## 2022-05-20 VITALS
DIASTOLIC BLOOD PRESSURE: 82 MMHG | TEMPERATURE: 98 F | OXYGEN SATURATION: 99 % | RESPIRATION RATE: 18 BRPM | WEIGHT: 197 LBS | BODY MASS INDEX: 31.8 KG/M2 | SYSTOLIC BLOOD PRESSURE: 124 MMHG | HEART RATE: 88 BPM

## 2022-05-20 LAB
ATRIAL RATE: 80 BPM
P AXIS: 46 DEGREES
PR INTERVAL: 150 MS
QRS AXIS: 66 DEGREES
QRSD INTERVAL: 80 MS
QT INTERVAL: 408 MS
QTC INTERVAL: 470 MS
T WAVE AXIS: 41 DEGREES
VENTRICULAR RATE: 80 BPM

## 2022-05-20 PROCEDURE — 93010 ELECTROCARDIOGRAM REPORT: CPT | Performed by: INTERNAL MEDICINE

## 2022-05-20 PROCEDURE — 99284 EMERGENCY DEPT VISIT MOD MDM: CPT

## 2022-05-20 PROCEDURE — 93005 ELECTROCARDIOGRAM TRACING: CPT

## 2022-05-26 ENCOUNTER — APPOINTMENT (OUTPATIENT)
Dept: PULMONOLOGY | Age: 47
End: 2022-05-26
Payer: MEDICARE

## 2022-05-31 ENCOUNTER — CLINICAL SUPPORT (OUTPATIENT)
Dept: PULMONOLOGY | Age: 47
End: 2022-05-31
Payer: MEDICARE

## 2022-05-31 DIAGNOSIS — U09.9 POST-COVID SYNDROME: Primary | ICD-10-CM

## 2022-05-31 PROCEDURE — 94625 PHY/QHP OP PULM RHB W/O MNTR: CPT

## 2022-06-02 ENCOUNTER — OFFICE VISIT (OUTPATIENT)
Dept: INTERNAL MEDICINE CLINIC | Facility: CLINIC | Age: 47
End: 2022-06-02

## 2022-06-02 ENCOUNTER — CLINICAL SUPPORT (OUTPATIENT)
Dept: PULMONOLOGY | Age: 47
End: 2022-06-02
Payer: MEDICARE

## 2022-06-02 VITALS
TEMPERATURE: 97.2 F | WEIGHT: 198 LBS | DIASTOLIC BLOOD PRESSURE: 63 MMHG | HEART RATE: 87 BPM | BODY MASS INDEX: 31.82 KG/M2 | HEIGHT: 66 IN | SYSTOLIC BLOOD PRESSURE: 97 MMHG

## 2022-06-02 DIAGNOSIS — E06.3 HYPOTHYROIDISM DUE TO HASHIMOTO'S THYROIDITIS: Primary | ICD-10-CM

## 2022-06-02 DIAGNOSIS — M79.7 FIBROMYALGIA: ICD-10-CM

## 2022-06-02 DIAGNOSIS — E03.8 HYPOTHYROIDISM DUE TO HASHIMOTO'S THYROIDITIS: Primary | ICD-10-CM

## 2022-06-02 DIAGNOSIS — R06.02 SOB (SHORTNESS OF BREATH): ICD-10-CM

## 2022-06-02 DIAGNOSIS — E04.1 THYROID NODULE: ICD-10-CM

## 2022-06-02 DIAGNOSIS — U09.9 POST-COVID SYNDROME: Primary | ICD-10-CM

## 2022-06-02 PROCEDURE — 99214 OFFICE O/P EST MOD 30 MIN: CPT | Performed by: INTERNAL MEDICINE

## 2022-06-02 PROCEDURE — 94625 PHY/QHP OP PULM RHB W/O MNTR: CPT

## 2022-06-02 NOTE — PROGRESS NOTES
[facility]  INTERNAL MEDICINE OFFICE VISIT     PATIENT INFORMATION     [place]   56 y o  female   MRN: [id number]    ASSESSMENT/PLAN     1  Hypothyroidism due to Hashimoto's thyroiditis  Assessment & Plan:  Compliant with 88mcg daily on empty stomach one hour before eating  Not taking any over the counter supplements with pill, taking separately    Orders:  -     TSH + Free T4; Future    2  Fibromyalgia  Assessment & Plan:  Patient not taking elavil as given last visit; avoids 2/2 fear of recurring SI considering hx of +Si on other antidepressants  Continue mindfulness practice, PT, and tylenol with other supportive measures      3  SOB (shortness of breath)  Assessment & Plan:  Following pulmonology rehab, improving  Continue albuterol and exercise as tolerated      4  Thyroid nodule  Assessment & Plan:  Discovered 2019  S/p ultrasound which stated benign in size and characteristics no longer needing follow up          Schedule a follow-up appointment in 3 months for annual physical     HEALTH MAINTENANCE     Immunization History   Administered Date(s) Administered    Tdap 03/05/2019     Immunizations:  · Needs PNA  Screening:  · Needs depression screen and annual physical in future       CHIEF COMPLAINT     Chief Complaint   Patient presents with    Follow-up     Thyroid        HISTORY OF PRESENT ILLNESS     45-year-old female past medical history of fibromyalgia, stress incontinence, hypothyroidism due to Hashimoto's, GERD, history of COVID-19 with residual chronic shortness of breath who presents to clinic today as follow up for thyroid issues  Previous visit started on TCA low dose   Today, patient states often has costochondiritis   Has not taken elavil because scared of SI since + SI in past unknown which medications  Taking one a day woman, starting taking collagen powder supplements    REVIEW OF SYSTEMS     Review of Systems   Constitutional: Positive for fatigue (baseline)  Negative for fever  Respiratory: Positive for chest tightness and shortness of breath (improving)  Cardiovascular: Negative for chest pain and palpitations  Gastrointestinal: Negative for constipation, diarrhea, nausea and vomiting  Endocrine: Negative for cold intolerance and heat intolerance  OBJECTIVE     Vitals:    06/02/22 1254   BP: 97/63   BP Location: Left arm   Patient Position: Sitting   Cuff Size: Large   Pulse: 87   Temp: (!) 97 2 °F (36 2 °C)   TempSrc: Temporal   Weight: 89 8 kg (198 lb)   Height: 5' 6" (1 676 m)     Physical Exam  Constitutional:       Appearance: She is obese  She is not ill-appearing  Eyes:      Conjunctiva/sclera: Conjunctivae normal    Neck:      Comments: Mildly diffusely enlarged thyroid consistent with previous exams  Cardiovascular:      Rate and Rhythm: Normal rate and regular rhythm  Heart sounds: No murmur heard  No gallop  Pulmonary:      Effort: Pulmonary effort is normal       Breath sounds: Normal breath sounds  Abdominal:      General: Bowel sounds are normal       Palpations: Abdomen is soft  Tenderness: There is no abdominal tenderness  There is no guarding  Musculoskeletal:      Cervical back: Neck supple  Right lower leg: No edema  Left lower leg: No edema  Skin:     General: Skin is warm and dry  Findings: No erythema  Neurological:      Mental Status: She is alert     Psychiatric:         Mood and Affect: Mood normal          Behavior: Behavior normal        CURRENT MEDICATIONS     Current Outpatient Medications:     acetaminophen (TYLENOL) 325 mg tablet, Take 650 mg by mouth every 6 (six) hours as needed for mild pain, Disp: , Rfl:     albuterol (PROVENTIL HFA,VENTOLIN HFA) 90 mcg/act inhaler, INHALE 2 PUFFS BY MOUTH EVERY 6 HOURS AS NEEDED FOR WHEEZE, Disp: 6 7 g, Rfl: 3    amitriptyline (ELAVIL) 10 mg tablet, Take 0 5 tablets (5 mg total) by mouth daily at bedtime, Disp: 30 tablet, Rfl: 0    famotidine (PEPCID) 20 mg tablet, Take 1 tablet (20 mg total) by mouth 2 (two) times a day, Disp: 180 tablet, Rfl: 2    ipratropium (ATROVENT) 0 03 % nasal spray, 2 sprays into each nostril every 12 (twelve) hours, Disp: 30 mL, Rfl: 2    levothyroxine 88 mcg tablet, TAKE 1 TABLET BY MOUTH EVERY DAY, Disp: 90 tablet, Rfl: 3    loratadine (CLARITIN) 10 mg tablet, TAKE 1 TABLET BY MOUTH EVERY DAY, Disp: 90 tablet, Rfl: 3    PAST MEDICAL & SURGICAL HISTORY     Past Medical History:   Diagnosis Date    Abdominal pain     Allergic drug reaction 9/15/2021    Anemia     Anxiety     Asthma     Back pain     Bilateral fibrocystic breast changes     resolved: 02/21/2017    Breast disorder     Breast pain, left 1/26/2022    Chest pain     Chronic pelvic pain in female     last assessed: 09/07/2016    Cluster headaches     Colon cancer screening 5/12/2021    Constipation     Cough     Depression     Difficulty concentrating     Disease of thyroid gland     Dizziness     Dyspareunia in female     last assessed: 08/18/2016    Easy bruising     Epigastric abdominal pain 7/22/2020    Fainting episodes     Fatigue     Fever due to infection     Fibromyalgia     Frequent urination at night     Gallbladder disease     Herniated intervertebral disc of lumbar spine     Hypotension     Loss of bladder control     Mastalgia in female 5/24/2021    Memory loss     last assessed: 06/15/2015    Mid back pain 3/19/2021    Mixed incontinence     last assessed: 01/11/2016    Myofascial pain     last assessed: 06/01/2016    Numbness and tingling     Painful swelling of joint     Palpitations     Panic attack     Sciatica     last assessed: 06/15/2015    Situational anxiety     last assessed: 02/21/2017    SOB (shortness of breath)     Thyroid disease     Uterus, adenomyosis     last assessed: 09/07/2016    Weakness     Weight disorder      Past Surgical History:   Procedure Laterality Date  CHOLECYSTECTOMY      CHOLECYSTECTOMY LAPAROSCOPIC N/A 10/22/2017    Procedure: CHOLECYSTECTOMY LAPAROSCOPIC;  Surgeon: Davida Cortez MD;  [facility]: [facility];  Service: General    COLONOSCOPY  06/03/2021    ENDOMETRIAL BIOPSY      resolved: 02/21/2017    HYSTERECTOMY  2016    NH VAGINAL HYSTERECTOMY,UTERUS 250 GMS/< N/A 4/20/2018    Procedure: HYSTERECTOMY VAGINAL TOTAL (TVH), BILATERAL SALPINGECTOMY;  Surgeon: Darci Mike MD;  Location: [facility];  Service: Gynecology    TUBAL LIGATION      last assessed: 10/20/2014    UPPER GASTROINTESTINAL ENDOSCOPY  08/03/2020     SOCIAL & FAMILY HISTORY     Social History     Socioeconomic History    Marital status: /Civil Union     Spouse name: Not on file    Number of children: 4    Years of education: Not on file    Highest education level: Not on file   Occupational History    Occupation: Full-time   Tobacco Use    Smoking status: Former Smoker     Packs/day: 0 50     Years: 2 00     Pack years: 1 00     Types: Cigarettes     Start date: 2005     Quit date: 2007     Years since quitting: 15 4    Smokeless tobacco: Never Used   Vaping Use    Vaping Use: Never used   Substance and Sexual Activity    Alcohol use: Yes     Comment: occasional    Drug use: Never    Sexual activity: Yes     Partners: Male     Birth control/protection: Female Sterilization, Surgical   Other Topics Concern    Not on file   Social History Narrative    Daily caffeine consumption    Domestic partner    Parent     Social Determinants of Health     Financial Resource Strain: Low Risk     Difficulty of Paying Living Expenses: Not hard at all   Food Insecurity: No Food Insecurity    Worried About Running Out of Food in the Last Year: Never true    Sharita of Food in the Last Year: Never true   Transportation Needs: No Transportation Needs    Lack of Transportation (Medical): No    Lack of Transportation (Non-Medical):  No   Physical Activity: Sufficiently Active    Days of Exercise per Week: 5 days    Minutes of Exercise per Session: 60 min   Stress: Not on file   Social Connections:  Moderately Isolated    Frequency of Communication with Friends and Family: More than three times a week    Frequency of Social Gatherings with Friends and Family: Once a week    Attends Confucianism Services: Never    Active Member of Clubs or Organizations: No    Attends Club or Organization Meetings: Never    Marital Status:    Intimate Partner Violence: Not At Risk    Fear of Current or Ex-Partner: No    Emotionally Abused: No    Physically Abused: No    Sexually Abused: No   Housing Stability: [address] Unable to Pay for Housing in the Last Year: No    Number of [place] in the Last Year: 1    Unstable Housing in the Last Year: No     Social History     Substance and Sexual Activity   Alcohol Use Yes    Comment: occasional       Social History     Substance and Sexual Activity   Drug Use Never     Social History     Tobacco Use   Smoking Status Former Smoker    Packs/day: 0 50    Years: 2 00    Pack years: 1 00    Types: Cigarettes    Start date: 2005    Quit date: 2007    Years since quitting: 15 4   Smokeless Tobacco Never Used     Family History   Problem Relation Age of Onset    Hypertension Mother     Arthritis Mother     Diabetes Maternal Grandmother     Breast cancer Family     Cancer Family     Diabetes Family     Heart disease Family     Hyperlipidemia Family         reported cholesterol level was high    Hypertension Other     Hyperlipidemia Father     No Known Problems Maternal Grandfather     No Known Problems Sister     No Known Problems Daughter     Breast cancer Paternal Grandmother 28    No Known Problems Paternal Grandfather     Leukemia Paternal Aunt     No Known Problems Sister     No Known Problems Sister     No Known Problems Sister     No Known Problems Sister     No Known Problems Daughter     No Known Problems Paternal Aunt     No Known Problems Son     No Known Problems Son     No Known Problems Brother     No Known Problems Brother      ==  Sheryl Hoff DO    [facility]'s Internal Medicine Residency    [address] - [place] , [address] [address] [facility] [facility]  Office: [phone]  Fax: [phone]

## 2022-06-02 NOTE — ASSESSMENT & PLAN NOTE
Discovered 2019  S/p ultrasound which stated benign in size and characteristics no longer needing follow up

## 2022-06-02 NOTE — ASSESSMENT & PLAN NOTE
Compliant with 88mcg daily on empty stomach one hour before eating  Not taking any over the counter supplements with pill, taking separately

## 2022-06-02 NOTE — ASSESSMENT & PLAN NOTE
Patient not taking elavil as given last visit; avoids 2/2 fear of recurring SI considering hx of +Si on other antidepressants  Continue mindfulness practice, PT, and tylenol with other supportive measures

## 2022-06-07 ENCOUNTER — APPOINTMENT (OUTPATIENT)
Dept: PULMONOLOGY | Age: 47
End: 2022-06-07
Payer: MEDICARE

## 2022-06-07 NOTE — PROGRESS NOTES
Pulmonary Rehabilitation Plan of Care   Discharge      Today's date: 2022   # of Exercise Sessions Completed: 8  Patient name: Tomi Marcos      : 1975  Age: 56 y o  MRN: [id number]  Referring Physician: Silvano Rivera, *  Pulmonologist: Juan Carreno DO  Provider: Dalila  Clinician: Ofelia Bales MS, CEP     Dx:   Encounter Diagnosis   Name Primary?  Post-COVID syndrome Yes     Date of onset: 2022      SUMMARY OF PROGRESS:  22 This is a discharge note for Randa from the Pulmonary Rehab with the diagnosis of Post-COVID syndrome  Received a call from Grace Miles today stating that she would like to be discharged from the Pulmonary rehab program  Her children have recently ended school, she is taking care of her children and nieces and nephews  There is no one else that can look after them when she would like to come to rehab  She reports exercising has improved her shortness of breath and that she will continue to exercise  Her fibromyalgia greatly affects how she feels day to day  When she gets a flare up, she feels she cannot move the whole day  [address] phone number was given to her if she has any questions  22 Grace Miles has completed 8 exercise sessions at her 60 day progress note  She has not been attending regularly due to migraines and flare ups of her Fibromyalgia  The patient tolerates 32-36 mins at 2 40 - 2 92 METs on room air  Resting SpO2 98 - 96% with maintained O2 saturation during exercise 98 - 94%  Will titrate supplemental O2 to maintain SpO2 >90% Resting BP  100-128/60-78 with appropriate response to exercise reaching 126-146/74/82  RHR 74 - 89,  ExHR 56 - 105  The patient reports dyspnea, weakness and fatigue during exercise  Rating of perceived dyspnea with exercise 2-4/10 at max METs  She has not begun a regular exercise program at home  The patient is a former smoker (quit 20 years ago)   Depression screening was not able to be reassessed today via PHQ-9 and ELIAS-7 due to patient not in rehab today when her progress note was due  Will be assessed next session  When addressed, the patient reports  feelings of depression/anxiety/stress  Patient reports excellent social/emotional support  Patient attends group educational classes on pulmonary disease  Pursed lipped breathing and Diaphragmatic breathing continues to be demonstrated, practiced, and reinforced with the patient  Her exercise program will be progressed as tolerated  The patient has the following personal goals she hopes to achieve by discharge: continue to improve her dyspnea and exercise tolerance, start a home exercise program    Pt will continue to be educated on lifestyle modifications and encouraged to supplement with a home exercise program     Today is Randa's initial evaluation to begin Pulmonary Rehab for the diagnosis of Post COVID Syndrome  Lawanda Damico has hx of fibromyalgia, GERD and sciatica  She is obese, inactive and struggles with daily stress and anxiety  Since having COVID, she has been experiencing increased dyspnea, increased sitting time, decreased physical activity, increased fatigue and weakness  They report dyspnea, fatigue and chest tightness/ elevated HR when completing ADLs   The patient currently does not follow a formal exercise program at home, because she is unaware of what her body can handle  Depression screening using the PHQ-9 interprets the patient's score 10-14 = Moderate Depression  ELIAS-7 screening tool for anxiety suggests 0-4  = Not anxious  When addressed, the patient denies having depression but reports feeling of anxiety due to her recent health  Patient reports excellent social/emotional support  Information to begin using [employer] was provided as well as contact information for counseling through [facility]  PHQ-9 score will be reassessed in 30 days  The patient is a former social smoker but quit 20 years ago  Patient admits to 100% medication compliance  At rest, the patient rated dyspnea 1/10 with SpO2 96% on room air  They completed an initial 6MWT, walking 1410 ft on room air  The patients rating of perceived dyspnea during the 6MWT was 5/10 with SpO2 87-91%  Patient took no rest breaks  Telemetry revealed NSR  Resting  /70 with appropriate hemodynamic response to exercise reaching 140/72  Patient will exercise on room air  Education on smoking breathing techniques, pulmonary anatomy, exercise for the pulmonary patient, healthy eating, stress, and relaxation will be provided  Patient goals include: weight loss, be able to walk dog again  Lauren Marley will attend 24 exercise sessions, 2x/wk for 12-18 weeks beginning TBD after her insurance approves  Will progress patient as tolerated over the next 30 days        Medication compliance: Yes   Comments: Pt reports to be compliant with medications  Fall Risk: Low   Comments: Ambulates with a steady gait with no assist device and Denies a fall in the past 6 months    Smoking: Former user  Quit 20 years ago- was social smoker    RPD at Rest: 0-210  RPD with Exercise:  2-410    Assessment of progression of lung disease and functional status:  CAT: 2440  Shortness of breath questionnaire: 83/120      EXERCISE ASSESSMENT and PLAN    Current Exercise Program in Rehab:       Frequency: 2 days/week        Minutes: 32-36         METS: 2 4-2 92              SpO2: 98-94%              RPD: 2-4                      HR:    RPE: 3-5         Modalities: Treadmill, UBE, NuStep and Recumbent bike      Exercise Progression 30 Day Goals :    Frequency: 2 days/week        Minutes: 34-40        METS: 2 8-3 5              SpO2: >90%              RPD: 4-6                      HR: RHR+30bpm   RPE: 4-5        Modalities: Treadmill, UBE, Lifecycle, NuStep and Recumbent bike     Strength trainin-3 days / week  12-15 repetitions  1-2 sets per modality   Will be added following at least 8 weeks post surgery and 8-10 monitored sessions   Modalities: Leg Press, Chest Press, Lateral Raise, Arm Curl, Seated Row, Front Raises and Shoulder Shrugs    Oxygen Needs: on room air at rest and on room air with exercise  Oxygen Goal: Maintain SpO2>90% during exercise    Home Exercise: none  Education: pursed lipped breathing, diaphragmatic breathing, home exercise, benefits of exercise for pulmonary disease, RPE scale, RPD scale, O2 saturation monitoring, appropriate O2 response to exercise and education class: Exercise For The Pulmonary Patient    Goals: reduced score on  [facility] Shortness of Breath Questionnaire, Improved 6MW distance by 10%, reduced dyspnea during exercise (0-3/10), improved exercise tolerance (max METs tolerated in pulmonary rehab), SpO2 >90% during exercise, reduced score on CAT, attend pulmonary rehab regularly, decrease sitting time at home and start a walking program  Progressing:  Reviewed Pt goals and determined plan of care, Pt is progressing and showing improvement  toward the following goals:  SpO2 >90%, reports she feels a slight improvement in her shortness of breath and exercise tolerance   , Pt has not made progress toward the following goals: has not been attending pulmonary rehab regularly due to fibromyalgia, has not started home exercise program  , Will continue to educate and progress as tolerated      Plan: learn to conserve energy with ADLs , practice breathing techniques 3x/day and reduce time sitting at home    Readiness to change: Preparation:  (Getting ready to change)       NUTRITION ASSESSMENT AND PLAN    Weight control:    Starting weight: 196 4   Current weight:   197    Diabetes: N/A    Goals:2 5-5%  wt loss, choose lean meat (93-95%), eliminate processed meats, reduce portion sizes of meat to 3oz or less, increase intake of fish, shellfish, use low fat dairy, reduce cheese intake or use reduced-fat, Eat 4-5 cups of fruits and vegetables daily, choose low sodium processed foods, eliminate butter, choose healthy snacks: light popcorn, plain pretzels, Increase intake of nuts and seeds, daily saturated fat intake <7%/13g and seldom eat out or choose lower fat menu items  Education: heart healthy eating  low sodium diet  hydration  wt  loss   portion control  education class: Heart Healthy Eating  Progressing:Reviewed Pt goals and determined plan of care, Pt is progressing and showing improvement  toward the following goals:  eating smaller portion sizes, eating more fruits and vegetables  , Pt has not made progress toward the following goals: continues to eat high sodium foods, not drinking enough water  , Will continue to educate and progress as tolerated    Plan: Education class: Reading Food Labels, Education Class: Heart Healthy Eating, replace unhealthy snacks with fruits & vegs, reduce portion sizes, switch to skim or 1% milk, avoid processed foods, learn how to read food labels and keep added daily sugar <25g/day  Readiness to change: Preparation:  (Getting ready to change)       PSYCHOSOCIAL ASSESSMENT AND PLAN    Emotional:  Depression assessment:  PHQ-9 = 10-14 = Moderate Depression            Anxiety measure:  ELIAS-7 = 0-4  = Not anxious  Self-reported stress level: 6   Social support: Patient reports excellent emotional/social support from family    Goals:  Reduce perceived stress to 1-3/10, improved [facility] QOL < 27, PHQ-9 - reduced severity by one level, Physical Fitness in [facility] Score < 3, Daily Activity in [facility] Score < 3, Social Activities in [facility] Score < 3, Pain in [facility] Score < 3, Overall Health in [facility] Score < 3, Quality of Life in [facility] Score < 3 , Change in Health in [facility] Score < 3 , Increased interest in doing things, increased energy, stop worrying, take time to relax and Feel less anxious  Education: signs/sxs of depression, benefits of a positive support system, stress management techniques and depression and CAD    Progressing:Reviewed Pt goals and determined plan of care, Pt has not made progress toward the following goals: she reports she does not handle her stress and anxiety well  Her medical condition has been adding to her stress  , Patient will complete PHQ-9 form at her next session in the next 30 days, Will continue to educate and progress as tolerated  , encouraged pt to look into [place]   Plan: Class: Stress and Your Health, Class: Relaxation, PHQ-9 >5 will refer to MD, Refer to Simran Ulrich, Practice relaxation techniques, Exercise, Enjoy a hobby, Enjoy family, Return to previous social activity and Repeat PHQ-9 every 30 days if score >5  Readiness to change: Preparation:  (Getting ready to change)       OTHER CORE COMPONENTS     Tobacco:   Social History     Tobacco Use   Smoking Status Former Smoker    Packs/day: 0 50    Years: 2 00    Pack years: 1 00    Types: Cigarettes    Start date:     Quit date:     Years since quitting: 15 4   Smokeless Tobacco Never Used       Tobacco Use Intervention: Referral to tobacco expert:   Pt was social smoker but quit 20 years ago    Blood pressure:    Restin-128/60-78   Exercise: 126-146/74/82    Goals: consistent BP < 130/80, reduced dietary sodium <2300mg, moderate intensity exercise >150 mins/wk and medication compliance  Education:  pathophysiology of pulmonary disease, control coughing, inspiratory muscle training and environmental triggers  Progressing:Reviewed Pt goals and determined plan of care, Pt is progressing and showing improvement  toward the following goals:  BP readings < 130/80, medication compliant    , Pt has not made progress toward the following goals: eating foods high in sodium, not engaging in > 150 mins/week of moderate intensity exercise  , Patient will start home exercise program in the next 30 days, Will continue to educate and progress as tolerated    Plan: Avoid Processed foods, engage in regular exercise, eliminate salt shaker at the table, use salt substitutes, check labels for sodium content and monitor home BP  Readiness to change: Preparation:  (Getting ready to change)

## 2022-06-07 NOTE — PROGRESS NOTES
Pulmonary Rehabilitation Plan of Care   Initial Care Plan      Today's date: 2022   # of Exercise Sessions Completed: Evaluation  Patient name: Andreina Nava      : 1975  Age: 56 y o  MRN: [id number]  Referring Physician: Stephanie Wright, *  Pulmonologist: Maycol Cutler DO  Provider: [facility]  Clinician: Renetta Patel, MS, CEP    Dx:   Encounter Diagnosis   Name Primary?  Post-COVID syndrome Yes     Date of onset: 2022      SUMMARY OF PROGRESS:  Today is Randa's initial evaluation to begin Pulmonary Rehab for the diagnosis of Post COVID Syndrome  Asher Calzada has hx of fibromyalgia, GERD and sciatica  She is obese, inactive and struggles with daily stress and anxiety  Since having COVID, she has been experiencing increased dyspnea, increased sitting time, decreased physical activity, increased fatigue and weakness  They report dyspnea, fatigue and chest tightness/ elevated HR when completing ADLs   The patient currently does not follow a formal exercise program at home, because she is unaware of what her body can handle  Depression screening using the PHQ-9 interprets the patient's score 10-14 = Moderate Depression  ELIAS-7 screening tool for anxiety suggests 0-4  = Not anxious  When addressed, the patient denies having depression but reports feeling of anxiety due to her recent health  Patient reports excellent social/emotional support  Information to begin using [employer] was provided as well as contact information for counseling through [employer]  PHQ-9 score will be reassessed in 30 days  The patient is a former social smoker but quit 20 years ago  Patient admits to 100% medication compliance  At rest, the patient rated dyspnea 1/10 with SpO2 96% on room air  They completed an initial 6MWT, walking 1410 ft on room air  The patients rating of perceived dyspnea during the 6MWT was 5/10 with SpO2 87-91%  Patient took no rest breaks  Telemetry revealed NSR  Resting  /70 with appropriate hemodynamic response to exercise reaching 140/72  Patient will exercise on room air  Education on smoking breathing techniques, pulmonary anatomy, exercise for the pulmonary patient, healthy eating, stress, and relaxation will be provided  Patient goals include: weight loss, be able to walk dog again  Hattie Cartagena will attend 24 exercise sessions, 2x/wk for 12-18 weeks beginning TBD after her insurance approves  Will progress patient as tolerated over the next 30 days        Medication compliance: Yes   Comments: Pt reports to be compliant with medications  Fall Risk: Low   Comments: Ambulates with a steady gait with no assist device and Denies a fall in the past 6 months    Smoking: Former user  Quit 20 years ago- was social smoker    RPD at Rest: 1/10  RPD with Exercise:  5/10    Assessment of progression of lung disease and functional status:  CAT:   Shortness of breath questionnaire: 83/120      EXERCISE ASSESSMENT and PLAN    Current Exercise Program in Rehab:       Frequency: 2 days/week        Minutes: 25-30         METS: 2 5-3 0              SpO2: >90%              RPD: 4-6                      HR: RHR+30bpm   RPE: 4-5         Modalities: Treadmill, UBE, NuStep and Recumbent bike      Exercise Progression 30 Day Goals :    Frequency: 2 days/week        Minutes: 30-35         METS: 2 8-3 5              SpO2: >90%              RPD: 4-6                      HR: RHR+30bpm   RPE: 4-5        Modalities: Treadmill, UBE, Lifecycle, NuStep and Recumbent bike     Strength trainin-3 days / week  12-15 repetitions  1-2 sets per modality   Will be added following at least 8 weeks post surgery and 8-10 monitored sessions   Modalities: Leg Press, Chest Press, Lateral Raise, Arm Curl, Seated Row, Front Raises and Shoulder Shrugs    Oxygen Needs: on room air at rest and on room air with exercise  Oxygen Goal: Maintain SpO2>90% during exercise    Home Exercise: none  Education: pursed lipped breathing, diaphragmatic breathing, home exercise, benefits of exercise for pulmonary disease, RPE scale, RPD scale, O2 saturation monitoring and appropriate O2 response to exercise    Goals: reduced score on  USCD Shortness of Breath Questionnaire, Improved 6MW distance by 10%, reduced dyspnea during exercise (0-3/10), improved exercise tolerance (max METs tolerated in pulmonary rehab), SpO2 >90% during exercise, reduced score on CAT, attend pulmonary rehab regularly, decrease sitting time at home and start a walking program  Progressing:  Reviewed Pt goals and determined plan of care    Plan: learn to conserve energy with ADLs , practice breathing techniques 3x/day and reduce time sitting at home    Readiness to change: Preparation:  (Getting ready to change)       NUTRITION ASSESSMENT AND PLAN    Weight control:    Starting weight: 196 4   Current weight:       Diabetes: N/A    Goals:2 5-5%  wt loss, choose lean meat (93-95%), eliminate processed meats, reduce portion sizes of meat to 3oz or less, increase intake of fish, shellfish, use low fat dairy, reduce cheese intake or use reduced-fat, Eat 4-5 cups of fruits and vegetables daily, choose low sodium processed foods, eliminate butter, choose healthy snacks: light popcorn, plain pretzels, Increase intake of nuts and seeds, daily saturated fat intake <7%/13g and seldom eat out or choose lower fat menu items  Education: heart healthy eating  low sodium diet  hydration  wt  loss   portion control  Progressing:Reviewed Pt goals and determined plan of care  Plan: Education class: Reading Food Labels, Education Class: Heart Healthy Eating, replace unhealthy snacks with fruits & vegs, reduce portion sizes, switch to skim or 1% milk, avoid processed foods, learn how to read food labels and keep added daily sugar <25g/day  Readiness to change: Preparation:  (Getting ready to change)       PSYCHOSOCIAL ASSESSMENT AND PLAN    Emotional:  Depression assessment:  PHQ-9 = 10-14 = Moderate Depression            Anxiety measure:  ELIAS-7 = 0-4  = Not anxious  Self-reported stress level: 6   Social support: Patient reports excellent emotional/social support from family    Goals:  Reduce perceived stress to 1-3/10, improved [facility] QOL < 27, PHQ-9 - reduced severity by one level, Physical Fitness in [facility] Score < 3, Daily Activity in [facility] Score < 3, Social Activities in [facility] Score < 3, Pain in [facility] Score < 3, Overall Health in [facility] Score < 3, Quality of Life in [facility] Score < 3 , Change in Health in [facility] Score < 3 , Increased interest in doing things, increased energy, stop worrying, take time to relax and Feel less anxious  Education: signs/sxs of depression, benefits of a positive support system, stress management techniques and depression and CAD    Progressing:Reviewed Pt goals and determined plan of care  Plan: Class: Stress and Your Health, Class: Relaxation, PHQ-9 >5 will refer to MD, Refer to [employer], Practice relaxation techniques, Exercise, Enjoy a hobby, Enjoy family, Return to previous social activity and Repeat PHQ-9 every 30 days if score >5  Readiness to change: Preparation:  (Getting ready to change)       OTHER CORE COMPONENTS     Tobacco:   Social History     Tobacco Use   Smoking Status Former Smoker    Packs/day: 0 50    Years: 2 00    Pack years: 1 00    Types: Cigarettes    Start date:     Quit date:     Years since quitting: 15 4   Smokeless Tobacco Never Used       Tobacco Use Intervention: Referral to tobacco expert:   Pt was social smoker but quit 20 years ago    Blood pressure:    Restin/70   Exercise: 140/72    Goals: consistent BP < 130/80, reduced dietary sodium <2300mg, moderate intensity exercise >150 mins/wk and medication compliance  Education:  pathophysiology of pulmonary disease, control coughing, inspiratory muscle training and environmental triggers  Progressing:Reviewed Pt goals and determined plan of care  Plan: Avoid Processed foods, engage in regular exercise, eliminate salt shaker at the table, use salt substitutes, check labels for sodium content and monitor home BP  Readiness to change: Preparation:  (Getting ready to change)

## 2022-06-09 ENCOUNTER — APPOINTMENT (OUTPATIENT)
Dept: PULMONOLOGY | Age: 47
End: 2022-06-09
Payer: MEDICARE

## 2022-06-14 ENCOUNTER — APPOINTMENT (OUTPATIENT)
Dept: PULMONOLOGY | Age: 47
End: 2022-06-14
Payer: MEDICARE

## 2022-06-16 ENCOUNTER — APPOINTMENT (OUTPATIENT)
Dept: PULMONOLOGY | Age: 47
End: 2022-06-16
Payer: MEDICARE

## 2022-06-20 ENCOUNTER — HOSPITAL ENCOUNTER (OUTPATIENT)
Dept: SLEEP CENTER | Facility: CLINIC | Age: 47
Discharge: HOME | End: 2022-06-20

## 2022-06-20 DIAGNOSIS — G47.19 EXCESSIVE DAYTIME SLEEPINESS: ICD-10-CM

## 2022-06-21 ENCOUNTER — APPOINTMENT (OUTPATIENT)
Dept: PULMONOLOGY | Age: 47
End: 2022-06-21
Payer: MEDICARE

## 2022-06-23 ENCOUNTER — APPOINTMENT (OUTPATIENT)
Dept: PULMONOLOGY | Age: 47
End: 2022-06-23
Payer: MEDICARE

## 2022-06-28 ENCOUNTER — APPOINTMENT (OUTPATIENT)
Dept: PULMONOLOGY | Age: 47
End: 2022-06-28
Payer: MEDICARE

## 2022-06-30 ENCOUNTER — APPOINTMENT (OUTPATIENT)
Dept: PULMONOLOGY | Age: 47
End: 2022-06-30
Payer: MEDICARE

## 2022-07-25 DIAGNOSIS — R10.13 EPIGASTRIC ABDOMINAL PAIN: ICD-10-CM

## 2022-07-25 DIAGNOSIS — K21.9 GASTROESOPHAGEAL REFLUX DISEASE WITHOUT ESOPHAGITIS: ICD-10-CM

## 2022-07-25 DIAGNOSIS — T78.40XA ALLERGIC REACTION TO DRUG, INITIAL ENCOUNTER: ICD-10-CM

## 2022-07-25 RX ORDER — FAMOTIDINE 20 MG/1
TABLET, FILM COATED ORAL
Qty: 180 TABLET | Refills: 2 | Status: SHIPPED | OUTPATIENT
Start: 2022-07-25

## 2022-07-28 ENCOUNTER — TELEMEDICINE (OUTPATIENT)
Dept: INTERNAL MEDICINE CLINIC | Facility: CLINIC | Age: 47
End: 2022-07-28

## 2022-07-28 DIAGNOSIS — U07.1 COVID-19 VIRUS INFECTION: Primary | ICD-10-CM

## 2022-07-28 PROCEDURE — 99212 OFFICE O/P EST SF 10 MIN: CPT | Performed by: INTERNAL MEDICINE

## 2022-07-28 NOTE — PROGRESS NOTES
COVID-19 Outpatient Progress Note    Assessment/Plan:    # COVID-19 Virus Infection: 56year-old unvaccinated female with a PMHx of prior COVID infection (11/2021)  fibromyalgia, hypothyroidism, seasonal allergies, and GERD who presents for virtual visit with upper respiratory symptoms  COVID + at home x2  Symptoms developed on Monday - denies SOB, chest pain, or fever  Give unvaccinated status, patient offered treatment with Paxlovid - discussed benefits and risks associated with the medication  Patient denied at this time and would like to continue with symptomatic management  Given symptoms and recent camping trip, also advise use of Claritin and Flonase for symptom reflief  Plan:  · Advised use of OTC Flonase and Claritin   · Encouraged to increase PO oral hydration   · Instructed to remain in quarantine for 10 days following symptom onset given vaccination status (Through - 8/3/2022)  · Educated on the importance of continuing to social distance, wearing masks, and maintaining proper hand hygiene   · Advised to call the office if symptoms persist and report to the ED if they acutely worsen    Problem List Items Addressed This Visit    None     Visit Diagnoses     COVID-19 virus infection    -  Primary         Disposition:     I recommended self-quarantine for 10 days and to watch for symptoms until 14 days after exposure  If patient were to develop symptoms, they should self isolate and call our office for further guidance  Discussed symptom directed medication options with patient  Discussed vitamin D, vitamin C, and/or zinc supplementation with patient  I have spent 10 minutes directly with the patient  Greater than 50% of this time was spent in counseling/coordination of care regarding: diagnostic results, risks and benefits of treatment options, instructions for management, patient and family education and impressions        Encounter provider Garo Ward DO    Provider located at [facility] [address] [facility]  [address]  [address]  [facility] [id number] [phone]    Recent Visits  No visits were found meeting these conditions  Showing recent visits within past 7 days and meeting all other requirements  Today's Visits  Date Type Provider Dept   07/28/22 Telemedicine Mary Velasquez, [address] [facility] today's visits and meeting all other requirements  Future Appointments  No visits were found meeting these conditions  Showing future appointments within next 150 days and meeting all other requirements     This virtual check-in was done via [facility] and patient was informed that this is a secure, HIPAA-compliant platform  She agrees to proceed  Patient agrees to participate in a virtual check in via telephone or video visit instead of presenting to the office to address urgent/immediate medical needs  Patient is aware this is a billable service  After connecting through [facility], the patient was identified by name and date of birth  Elías Griffith was informed that this was a telemedicine visit and that the exam was being conducted confidentially over secure lines  My office door was closed  No one else was in the room  Elías Griffith acknowledged consent and understanding of privacy and security of the telemedicine visit  I informed the patient that I have reviewed her record in Epic and presented the opportunity for her to ask any questions regarding the visit today  The patient agreed to participate  Verification of patient location:  Patient is located in the following state in which I hold an active license: [place]    Subjective:   Elías Griffith is a 56 y o  female who is concerned about COVID-19  Patient's symptoms include chills, fatigue, malaise, nasal congestion, sore throat, anosmia, loss of taste, cough, diarrhea, myalgias and headache   Patient denies fever, rhinorrhea, shortness of breath, chest tightness, abdominal pain, nausea and vomiting      - Date of symptom onset: 7/25/2022      COVID-19 vaccination status: Not vaccinated    Exposure:   Contact with a person who is under investigation (PUI) for or who is positive for COVID-19 within the last 14 days?: No    Hospitalized recently for fever and/or lower respiratory symptoms?: No      Currently a healthcare worker that is involved in direct patient care?: No      Works in a special setting where the risk of COVID-19 transmission may be high? (this may include long-term care, correctional and longterm facilities; homeless shelters; assisted-living facilities and group homes ): No      Resident in a special setting where the risk of COVID-19 transmission may be high? (this may include long-term care, correctional and longterm facilities; homeless shelters; assisted-living facilities and group homes ): No      Ms Chandler Dagoberto a 31-year-old female with a past medical history of fibromyalgia, hypothyroidism, seasonal, and GERD who presents today, 07/28/2022, for a virtual visit due to viral like symptoms  Patient states that she was camping over the weekend and on Monday developed nasal congestion, headaches, chills, fatigue, and loss of sense of taste/smell  Patient denies SOB, chest pain, chest tightness, or fevers  Since her symptoms have developed, patient does feel slightly worse  Patient has no confirmed COVID exposures but did take 2 at home COVID test since symptom onset and did test positive  Patient is unvaccinated  She did have COVID infection in 11/2021 which was treated with symptom management and did not require hospitalization      Lab Results   Component Value Date    SARSCOV2 Positive (A) 11/22/2021    SARSCOV2 Not Detected 07/27/2020    Asad Garsia Not Detected 01/22/2021     Past Medical History:   Diagnosis Date    Abdominal pain     Allergic drug reaction 9/15/2021    Anemia     Anxiety     Asthma     Back pain     Bilateral fibrocystic breast changes     resolved: 02/21/2017    Breast disorder     Breast pain, left 1/26/2022    Chest pain     Chronic pelvic pain in female     last assessed: 09/07/2016    Cluster headaches     Colon cancer screening 5/12/2021    Constipation     Cough     Depression     Difficulty concentrating     Disease of thyroid gland     Dizziness     Dyspareunia in female     last assessed: 08/18/2016    Easy bruising     Epigastric abdominal pain 7/22/2020    Fainting episodes     Fatigue     Fever due to infection     Fibromyalgia     Frequent urination at night     Gallbladder disease     Herniated intervertebral disc of lumbar spine     Hypotension     Loss of bladder control     Mastalgia in female 5/24/2021    Memory loss     last assessed: 06/15/2015    Mid back pain 3/19/2021    Mixed incontinence     last assessed: 01/11/2016    Myofascial pain     last assessed: 06/01/2016    Numbness and tingling     Painful swelling of joint     Palpitations     Panic attack     Sciatica     last assessed: 06/15/2015    Situational anxiety     last assessed: 02/21/2017    SOB (shortness of breath)     Thyroid disease     Uterus, adenomyosis     last assessed: 09/07/2016    Weakness     Weight disorder      Past Surgical History:   Procedure Laterality Date    CHOLECYSTECTOMY      CHOLECYSTECTOMY LAPAROSCOPIC N/A 10/22/2017    Procedure: CHOLECYSTECTOMY LAPAROSCOPIC;  Surgeon: Riky Castillo MD;  [facility]: [facility];  Service: General    COLONOSCOPY  06/03/2021    ENDOMETRIAL BIOPSY      resolved: 02/21/2017    HYSTERECTOMY  2016    ND VAGINAL HYSTERECTOMY,UTERUS 250 GMS/< N/A 4/20/2018    Procedure: HYSTERECTOMY VAGINAL TOTAL (TVH), BILATERAL SALPINGECTOMY;  Surgeon: Onesimo Rivers MD;  Location[facility];  Service: Gynecology    TUBAL LIGATION      last assessed: 10/20/2014    UPPER GASTROINTESTINAL ENDOSCOPY  08/03/2020     Current Outpatient Medications Medication Sig Dispense Refill    acetaminophen (TYLENOL) 325 mg tablet Take 650 mg by mouth every 6 (six) hours as needed for mild pain      albuterol (PROVENTIL HFA,VENTOLIN HFA) 90 mcg/act inhaler INHALE 2 PUFFS BY MOUTH EVERY 6 HOURS AS NEEDED FOR WHEEZE 6 7 g 3    amitriptyline (ELAVIL) 10 mg tablet TAKE 1/2 TABLETS (5 MG TOTAL) BY MOUTH DAILY AT BEDTIME 30 tablet 2    famotidine (PEPCID) 20 mg tablet TAKE 1 TABLET BY MOUTH TWICE A  tablet 2    ipratropium (ATROVENT) 0 03 % nasal spray 2 sprays into each nostril every 12 (twelve) hours 30 mL 2    levothyroxine 88 mcg tablet TAKE 1 TABLET BY MOUTH EVERY DAY 90 tablet 3    loratadine (CLARITIN) 10 mg tablet TAKE 1 TABLET BY MOUTH EVERY DAY 90 tablet 3     No current facility-administered medications for this visit  Allergies   Allergen Reactions    Morphine Chest Pain    Iv Contrast [Iodinated Diagnostic Agents] Hives       Review of Systems   Constitutional: Positive for chills and fatigue  Negative for fever  HENT: Positive for congestion and sore throat  Negative for rhinorrhea  Respiratory: Positive for cough  Negative for chest tightness and shortness of breath  Gastrointestinal: Positive for diarrhea  Negative for abdominal pain, nausea and vomiting  Musculoskeletal: Positive for myalgias  Neurological: Positive for headaches  Objective: There were no vitals filed for this visit  Physical Exam  Constitutional:       Appearance: She is obese  She is not ill-appearing or toxic-appearing  HENT:      Head: Normocephalic  Nose: Nose normal  No congestion  Mouth/Throat:      Pharynx: Oropharynx is clear  No oropharyngeal exudate  Eyes:      General: No scleral icterus  Pulmonary:      Effort: Pulmonary effort is normal       Comments: No conversational dyspnea or coughing during virtual visit  Musculoskeletal:         General: Normal range of motion  Cervical back: Normal range of motion     Skin: Coloration: Skin is not jaundiced or pale  Neurological:      Mental Status: She is alert and oriented to person, place, and time  Psychiatric:         Mood and Affect: Mood normal          Behavior: Behavior normal          VIRTUAL VISIT DISCLAIMER    Ebony Bhavin verbally agrees to participate in [employer]  Pt is aware that [employer] could be limited without vital signs or the ability to perform a full hands-on physical Nichole Punch understands she or the provider may request at any time to terminate the video visit and request the patient to seek care or treatment in person

## 2022-08-19 ENCOUNTER — APPOINTMENT (OUTPATIENT)
Dept: LAB | Facility: CLINIC | Age: 47
End: 2022-08-19
Payer: MEDICARE

## 2022-08-19 ENCOUNTER — OFFICE VISIT (OUTPATIENT)
Dept: INTERNAL MEDICINE CLINIC | Facility: CLINIC | Age: 47
End: 2022-08-19

## 2022-08-19 VITALS
HEIGHT: 66 IN | WEIGHT: 200.8 LBS | SYSTOLIC BLOOD PRESSURE: 96 MMHG | HEART RATE: 80 BPM | DIASTOLIC BLOOD PRESSURE: 62 MMHG | TEMPERATURE: 97.7 F | OXYGEN SATURATION: 97 % | BODY MASS INDEX: 32.27 KG/M2

## 2022-08-19 DIAGNOSIS — E03.8 HYPOTHYROIDISM DUE TO HASHIMOTO'S THYROIDITIS: ICD-10-CM

## 2022-08-19 DIAGNOSIS — Z13.220 SCREENING FOR LIPID DISORDERS: ICD-10-CM

## 2022-08-19 DIAGNOSIS — Z11.59 NEED FOR HEPATITIS B SCREENING TEST: ICD-10-CM

## 2022-08-19 DIAGNOSIS — M79.604 PAIN IN BOTH LOWER EXTREMITIES: ICD-10-CM

## 2022-08-19 DIAGNOSIS — F41.1 GENERALIZED ANXIETY DISORDER: Chronic | ICD-10-CM

## 2022-08-19 DIAGNOSIS — R68.81 EARLY SATIETY: ICD-10-CM

## 2022-08-19 DIAGNOSIS — R10.13 EPIGASTRIC PAIN: ICD-10-CM

## 2022-08-19 DIAGNOSIS — Z02.89 ENCOUNTER FOR PHYSICAL EXAMINATION RELATED TO EMPLOYMENT: ICD-10-CM

## 2022-08-19 DIAGNOSIS — E06.3 HYPOTHYROIDISM DUE TO HASHIMOTO'S THYROIDITIS: ICD-10-CM

## 2022-08-19 DIAGNOSIS — E55.9 VITAMIN D DEFICIENCY: ICD-10-CM

## 2022-08-19 DIAGNOSIS — R14.0 ABDOMINAL BLOATING: ICD-10-CM

## 2022-08-19 DIAGNOSIS — M54.16 LUMBAR RADICULOPATHY: ICD-10-CM

## 2022-08-19 DIAGNOSIS — M79.7 FIBROMYALGIA: ICD-10-CM

## 2022-08-19 DIAGNOSIS — M79.605 PAIN IN BOTH LOWER EXTREMITIES: ICD-10-CM

## 2022-08-19 DIAGNOSIS — Z00.00 ANNUAL PHYSICAL EXAM: ICD-10-CM

## 2022-08-19 DIAGNOSIS — K21.9 GASTROESOPHAGEAL REFLUX DISEASE WITHOUT ESOPHAGITIS: ICD-10-CM

## 2022-08-19 DIAGNOSIS — Z13.1 DIABETES MELLITUS SCREENING: ICD-10-CM

## 2022-08-19 DIAGNOSIS — Z11.1 SCREENING FOR TUBERCULOSIS: ICD-10-CM

## 2022-08-19 DIAGNOSIS — Z11.1 SCREENING FOR TUBERCULOSIS: Primary | ICD-10-CM

## 2022-08-19 LAB
25(OH)D3 SERPL-MCNC: 30.6 NG/ML (ref 30–100)
CHOLEST SERPL-MCNC: 197 MG/DL
EST. AVERAGE GLUCOSE BLD GHB EST-MCNC: 111 MG/DL
FERRITIN SERPL-MCNC: 14 NG/ML (ref 8–388)
HBA1C MFR BLD: 5.5 %
HBV CORE AB SER QL: NORMAL
HBV SURFACE AB SER-ACNC: <3.1 MIU/ML
HDLC SERPL-MCNC: 65 MG/DL
IRON SATN MFR SERPL: 19 % (ref 15–50)
IRON SERPL-MCNC: 68 UG/DL (ref 50–170)
LDLC SERPL CALC-MCNC: 118 MG/DL (ref 0–100)
LIPASE SERPL-CCNC: 180 U/L (ref 73–393)
MAGNESIUM SERPL-MCNC: 2.4 MG/DL (ref 1.6–2.6)
RUBV IGG SERPL IA-ACNC: 34.5 IU/ML
T4 FREE SERPL-MCNC: 1.08 NG/DL (ref 0.76–1.46)
TIBC SERPL-MCNC: 351 UG/DL (ref 250–450)
TRIGL SERPL-MCNC: 68 MG/DL
TSH SERPL DL<=0.05 MIU/L-ACNC: 6.17 UIU/ML (ref 0.45–4.5)
VIT B12 SERPL-MCNC: 400 PG/ML (ref 100–900)

## 2022-08-19 PROCEDURE — 84439 ASSAY OF FREE THYROXINE: CPT

## 2022-08-19 PROCEDURE — 99396 PREV VISIT EST AGE 40-64: CPT | Performed by: INTERNAL MEDICINE

## 2022-08-19 PROCEDURE — 83735 ASSAY OF MAGNESIUM: CPT

## 2022-08-19 PROCEDURE — 86704 HEP B CORE ANTIBODY TOTAL: CPT

## 2022-08-19 PROCEDURE — 86765 RUBEOLA ANTIBODY: CPT

## 2022-08-19 PROCEDURE — 83540 ASSAY OF IRON: CPT

## 2022-08-19 PROCEDURE — 82607 VITAMIN B-12: CPT

## 2022-08-19 PROCEDURE — 83690 ASSAY OF LIPASE: CPT

## 2022-08-19 PROCEDURE — 82728 ASSAY OF FERRITIN: CPT

## 2022-08-19 PROCEDURE — 80061 LIPID PANEL: CPT

## 2022-08-19 PROCEDURE — 83036 HEMOGLOBIN GLYCOSYLATED A1C: CPT

## 2022-08-19 PROCEDURE — 86735 MUMPS ANTIBODY: CPT

## 2022-08-19 PROCEDURE — 86706 HEP B SURFACE ANTIBODY: CPT

## 2022-08-19 PROCEDURE — 82306 VITAMIN D 25 HYDROXY: CPT

## 2022-08-19 PROCEDURE — 84443 ASSAY THYROID STIM HORMONE: CPT

## 2022-08-19 PROCEDURE — 86762 RUBELLA ANTIBODY: CPT

## 2022-08-19 PROCEDURE — 83550 IRON BINDING TEST: CPT

## 2022-08-19 PROCEDURE — 86480 TB TEST CELL IMMUN MEASURE: CPT

## 2022-08-19 PROCEDURE — 36415 COLL VENOUS BLD VENIPUNCTURE: CPT

## 2022-08-19 NOTE — PATIENT INSTRUCTIONS

## 2022-08-19 NOTE — PROGRESS NOTES
[facility]  INTERNAL MEDICINE OFFICE VISIT     PATIENT INFORMATION     Elías Griffith   56 y o  female   MRN: [id number]    ASSESSMENT/PLAN     Diagnoses and all orders for this visit:    Annual physical exam  · Completed annual physical exam   Screening as detailed below  · Quant gold and titers ordered for work physical form  · Form completed with exception of above - given to clinical staff to hold until can be completed  Fibromyalgia  Patient with history of fibromyalgia  On exam today significant soft tissue tenderness noted throughout extremities and trunk  Patient reports she is not interested in starting medication at this time she has had issues with multiple of them in the past and they have never improved her symptoms  · Will check iron panel and magnesium to potentially improve her current symptoms  · Patient may need evaluation for small fiber neuropathy in the future if no improvement or symptoms should worsen  -     Iron Panel (Includes Ferritin, Iron Sat%, Iron, and TIBC); Future  -     Magnesium; Future  -     Vitamin B12; Future    Epigastric pain  Early satiety  Abdominal bloating  Patient with complaints of chronic epigastric pain  Pain is worse with eating, associated with significant nausea/bloating/early satiety  Has undergone EGD and Bravo pH monitoring without significant finding  She also has had her gallbladder removed, but no further evaluation for potential retained stones since that time  · Will check right upper quadrant ultrasound to evaluate for choledocholithiasis  · If negative, may need to proceed with MR of abdomen  · Patient would benefit from referral back to GI should either study showed retained stone for potential elective ERCP  · Will also check gastric emptying study for evaluation for gastroparesis  -     US right upper quadrant; Future  -     Lipase; Future  -     NM gastric emptying;  Future    Gastroesophageal reflux disease  Continue Pepcid  Patient with prior negative EGD and Bravo pH monitoring  Evaluate ongoing epigastric pain as detailed above  Hypothyroidism  Patient concerned that her hypothyroidism is not currently controlled as she has been gaining weight without eating very much and is noticing significant hair loss  · Will recheck TSH with reflex to T4  · Continue current dose of levothyroxine    Screening for tuberculosis  -     Quantiferon TB Gold Plus; Future    Vitamin D deficiency  -     Vitamin D 25 hydroxy; Future    Screening for lipid disorders  -     Lipid Panel with Direct LDL reflex; Future    Diabetes mellitus screening  -     HEMOGLOBIN A1C W/ EAG ESTIMATION; Future    Schedule a follow-up appointment in 3 months  HEALTH MAINTENANCE     Immunization History   Administered Date(s) Administered    Tdap 03/05/2019     CHIEF COMPLAINT     Chief Complaint   Patient presents with    Employment Physical     Need TB      HISTORY OF PRESENT ILLNESS     Ms Pinkey Canavan is a 42-year-old female with past medical history significant for fibromyalgia, hypothyroidism, GERD presenting to clinic today for annual physical and work physical along with TB testing  Patient reports 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 [employer]  She needs work form completed  Patient continues to endorse ongoing body pain secondary to fibromyalgia  But she reports, she is currently just living with them  She also has complaint of chronic epigastric pain  Reports that he EGD and pH monitoring the past without significant finding  Has had gallbladder removed in the past   Reports pain is worse after eating and lasts for few hours  Increased bloating and early satiety noted  REVIEW OF SYSTEMS     Review of Systems   Constitutional: Positive for fatigue  Negative for chills and fever     HENT: Negative for ear pain, hearing loss, rhinorrhea, sore throat and trouble swallowing  Eyes: Negative for pain and visual disturbance  Respiratory: Negative for cough and shortness of breath  Cardiovascular: Negative for chest pain, palpitations and leg swelling  Gastrointestinal: Positive for abdominal pain and nausea  Negative for constipation, diarrhea and vomiting  Endocrine: Negative for polydipsia and polyuria  Genitourinary: Negative for difficulty urinating and dysuria  Musculoskeletal: Positive for arthralgias and myalgias  Negative for back pain  Skin: Negative for color change and rash  Neurological: Negative for dizziness, weakness, light-headedness and headaches  Psychiatric/Behavioral: Negative for confusion  The patient is nervous/anxious  OBJECTIVE     Vitals:    08/19/22 0846   BP: 96/62   BP Location: Left arm   Patient Position: Sitting   Cuff Size: Large   Pulse: 80   Temp: 97 7 °F (36 5 °C)   TempSrc: Temporal   SpO2: 97%   Weight: 91 1 kg (200 lb 12 8 oz)   Height: 5' 6" (1 676 m)     Physical Exam  Vitals reviewed  Constitutional:       General: She is not in acute distress  Appearance: Normal appearance  She is well-developed  She is not ill-appearing, toxic-appearing or diaphoretic  HENT:      Head: Normocephalic and atraumatic  Right Ear: External ear normal       Left Ear: External ear normal       Nose: Nose normal       Mouth/Throat:      Pharynx: Oropharynx is clear  Eyes:      General:         Right eye: No discharge  Left eye: No discharge  Conjunctiva/sclera: Conjunctivae normal    Cardiovascular:      Rate and Rhythm: Normal rate and regular rhythm  Pulmonary:      Effort: Pulmonary effort is normal  No respiratory distress  Breath sounds: Normal breath sounds  Abdominal:      General: Bowel sounds are normal  There is no distension  Palpations: Abdomen is soft  There is no hepatomegaly, splenomegaly or mass  Tenderness:  There is abdominal tenderness in the right upper quadrant and epigastric area  There is no guarding or rebound  Hernia: No hernia is present  Musculoskeletal:         General: Tenderness present  No swelling  Normal range of motion  Cervical back: Normal range of motion and neck supple  Right lower leg: No edema  Left lower leg: No edema  Skin:     General: Skin is warm and dry  Neurological:      General: No focal deficit present  Mental Status: She is alert and oriented to person, place, and time  Motor: No weakness  Psychiatric:         Mood and Affect: Mood normal          Behavior: Behavior normal          Thought Content:  Thought content normal        CURRENT MEDICATIONS     Current Outpatient Medications:     acetaminophen (TYLENOL) 325 mg tablet, Take 650 mg by mouth every 6 (six) hours as needed for mild pain, Disp: , Rfl:     albuterol (PROVENTIL HFA,VENTOLIN HFA) 90 mcg/act inhaler, INHALE 2 PUFFS BY MOUTH EVERY 6 HOURS AS NEEDED FOR WHEEZE, Disp: 6 7 g, Rfl: 3    famotidine (PEPCID) 20 mg tablet, TAKE 1 TABLET BY MOUTH TWICE A DAY, Disp: 180 tablet, Rfl: 2    ipratropium (ATROVENT) 0 03 % nasal spray, 2 sprays into each nostril every 12 (twelve) hours, Disp: 30 mL, Rfl: 2    levothyroxine 88 mcg tablet, TAKE 1 TABLET BY MOUTH EVERY DAY, Disp: 90 tablet, Rfl: 3    loratadine (CLARITIN) 10 mg tablet, TAKE 1 TABLET BY MOUTH EVERY DAY, Disp: 90 tablet, Rfl: 3    PAST MEDICAL & SURGICAL HISTORY     Past Medical History:   Diagnosis Date    Abdominal pain     Allergic drug reaction 9/15/2021    Anemia     Anxiety     Asthma     Back pain     Bilateral fibrocystic breast changes     resolved: 02/21/2017    Breast disorder     Breast pain, left 1/26/2022    Chest pain     Chronic pelvic pain in female     last assessed: 09/07/2016    Cluster headaches     Colon cancer screening 5/12/2021    Constipation     Cough     Depression     Difficulty concentrating     Disease of thyroid gland     Dizziness     Dyspareunia in female     last assessed: 08/18/2016    Easy bruising     Epigastric abdominal pain 7/22/2020    Fainting episodes     Fatigue     Fever due to infection     Fibromyalgia     Frequent urination at night     Gallbladder disease     Herniated intervertebral disc of lumbar spine     Hypotension     Loss of bladder control     Mastalgia in female 5/24/2021    Memory loss     last assessed: 06/15/2015    Mid back pain 3/19/2021    Mixed incontinence     last assessed: 01/11/2016    Myofascial pain     last assessed: 06/01/2016    Numbness and tingling     Painful swelling of joint     Palpitations     Panic attack     Sciatica     last assessed: 06/15/2015    Situational anxiety     last assessed: 02/21/2017    SOB (shortness of breath)     Thyroid disease     Uterus, adenomyosis     last assessed: 09/07/2016    Weakness     Weight disorder      Past Surgical History:   Procedure Laterality Date    CHOLECYSTECTOMY      CHOLECYSTECTOMY LAPAROSCOPIC N/A 10/22/2017    Procedure: CHOLECYSTECTOMY LAPAROSCOPIC;  Surgeon: Mariya Bond MD;  [facility];  Service: General    COLONOSCOPY  06/03/2021    ENDOMETRIAL BIOPSY      resolved: 02/21/2017    HYSTERECTOMY  2016    TN VAGINAL HYSTERECTOMY,UTERUS 250 GMS/< N/A 4/20/2018    Procedure: HYSTERECTOMY VAGINAL TOTAL (TVH), BILATERAL SALPINGECTOMY;  Surgeon: Martinez Mac MD;  [facility];  Service: Gynecology    TUBAL LIGATION      last assessed: 10/20/2014    UPPER GASTROINTESTINAL ENDOSCOPY  08/03/2020     SOCIAL & FAMILY HISTORY     Social History     Socioeconomic History    Marital status: /Civil Union     Spouse name: Not on file    Number of children: 4    Years of education: Not on file    Highest education level: Not on file   Occupational History    Occupation: Full-time   Tobacco Use    Smoking status: Former Smoker     Packs/day: 0 50     Years: 2 00     Pack years: 1 00     Types: Cigarettes     Start date: 2005     Quit date: 2007     Years since quitting: 15 6    Smokeless tobacco: Never Used   Vaping Use    Vaping Use: Never used   Substance and Sexual Activity    Alcohol use: Yes     Comment: occasional    Drug use: Never    Sexual activity: Yes     Partners: Male     Birth control/protection: Female Sterilization, Surgical   Other Topics Concern    Not on file   Social History Narrative    Daily caffeine consumption    Domestic partner    Parent     Social Determinants of Health     Financial Resource Strain: Low Risk     Difficulty of Paying Living Expenses: Not hard at all   Food Insecurity: No Food Insecurity    Worried About Running Out of Food in the Last Year: Never true    Sharita of Food in the Last Year: Never true   Transportation Needs: No Transportation Needs    Lack of Transportation (Medical): No    Lack of Transportation (Non-Medical): No   Physical Activity: Sufficiently Active    Days of Exercise per Week: 5 days    Minutes of Exercise per Session: 60 min   Stress: Not on file   Social Connections:  Moderately Isolated    Frequency of Communication with Friends and Family: More than three times a week    Frequency of Social Gatherings with Friends and Family: Once a week    Attends Denominational Services: Never    Active Member of Clubs or Organizations: No    Attends Club or Organization Meetings: Never    Marital Status:    Intimate Partner Violence: Not At Risk    Fear of Current or Ex-Partner: No    Emotionally Abused: No    Physically Abused: No    Sexually Abused: No   Housing Stability: Low Risk     Unable to Pay for Housing in the Last Year: No    Number of [place] in the Last Year: 1    Unstable Housing in the Last Year: No     Social History     Substance and Sexual Activity   Alcohol Use Yes    Comment: occasional     Social History     Substance and Sexual Activity   Drug Use Never     Social History     Tobacco Use   Smoking Status Former Smoker    Packs/day: 0 50    Years: 2 00    Pack years: 1 00    Types: Cigarettes    Start date: 2005    Quit date: 2007    Years since quitting: 15 6   Smokeless Tobacco Never Used     Family History   Problem Relation Age of Onset    Hypertension Mother     Arthritis Mother     Diabetes Maternal Grandmother     Breast cancer Family     Cancer Family     Diabetes Family     Heart disease Family     Hyperlipidemia Family         reported cholesterol level was high    Hypertension Other     Hyperlipidemia Father     No Known Problems Maternal Grandfather     No Known Problems Sister     No Known Problems Daughter     Breast cancer Paternal Grandmother 28    No Known Problems Paternal Grandfather     Leukemia Paternal Aunt     No Known Problems Sister     No Known Problems Sister     No Known Problems Sister     No Known Problems Sister     No Known Problems Daughter     No Known Problems Paternal Aunt     No Known Problems Son     No Known Problems Son     No Known Problems Brother     No Known Problems Brother      ==  Relda Blind, DO  Internal Medicine Resident, PGY-3  [facility] [address] - [place] , [address] [address] [facility], [facility]  Office: [phone]  Fax: [phone]

## 2022-08-20 LAB
MEV IGG SER QL IA: NORMAL
MUV IGG SER QL IA: NORMAL

## 2022-08-22 ENCOUNTER — TELEPHONE (OUTPATIENT)
Dept: INTERNAL MEDICINE CLINIC | Facility: CLINIC | Age: 47
End: 2022-08-22

## 2022-08-22 LAB
GAMMA INTERFERON BACKGROUND BLD IA-ACNC: 0.05 IU/ML
M TB IFN-G BLD-IMP: NEGATIVE
M TB IFN-G CD4+ BCKGRND COR BLD-ACNC: 0.02 IU/ML
M TB IFN-G CD4+ BCKGRND COR BLD-ACNC: 0.02 IU/ML
MITOGEN IGNF BCKGRD COR BLD-ACNC: >10 IU/ML

## 2022-08-22 NOTE — TELEPHONE ENCOUNTER
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

## 2022-08-23 DIAGNOSIS — E03.8 HYPOTHYROIDISM DUE TO HASHIMOTO'S THYROIDITIS: ICD-10-CM

## 2022-08-23 DIAGNOSIS — E06.3 HYPOTHYROIDISM DUE TO HASHIMOTO'S THYROIDITIS: ICD-10-CM

## 2022-08-23 RX ORDER — LEVOTHYROXINE SODIUM 0.1 MG/1
100 TABLET ORAL
Qty: 90 TABLET | Refills: 3 | Status: SHIPPED | OUTPATIENT
Start: 2022-08-23 | End: 2023-08-14

## 2022-08-24 ENCOUNTER — CLINICAL SUPPORT (OUTPATIENT)
Dept: INTERNAL MEDICINE CLINIC | Facility: CLINIC | Age: 47
End: 2022-08-24

## 2022-08-24 ENCOUNTER — CONSULT (OUTPATIENT)
Dept: MULTI SPECIALTY CLINIC | Facility: CLINIC | Age: 47
End: 2022-08-24

## 2022-08-24 VITALS — BODY MASS INDEX: 32.4 KG/M2 | HEIGHT: 66 IN | WEIGHT: 201.6 LBS

## 2022-08-24 DIAGNOSIS — Z23 NEED FOR HEPATITIS B VACCINATION: Primary | ICD-10-CM

## 2022-08-24 DIAGNOSIS — R21 MACULOPAPULAR RASH, GENERALIZED: ICD-10-CM

## 2022-08-24 PROCEDURE — 99242 OFF/OP CONSLTJ NEW/EST SF 20: CPT | Performed by: STUDENT IN AN ORGANIZED HEALTH CARE EDUCATION/TRAINING PROGRAM

## 2022-08-24 NOTE — PROGRESS NOTES
Patient came into the office for Hep B#1, she had labs for lack of immunity and will need new series   Patient to return in 1-2 months for #2

## 2022-08-24 NOTE — PROGRESS NOTES
[facility] Note     Patient Name: Ramiro Coates  Encounter Date: 08/24/22       Have you been cared for by a [facility] Dermatologist in the last 3 years and, if so, which one? No    · Have you traveled outside of the [address] [place] [address] in the past 3 months or outside of the [facility] area in the last 2 weeks? No     May we call your Preferred Phone number to discuss your specific medical information? Yes     May we leave a detailed message that includes your specific medical information? Yes      Today's Chief Concerns:   Concern #1:  Rash; resolved     Past Medical History:  Have you personally ever had or currently have any of the following? · Skin cancer (such as Melanoma, Basal Cell Carcinoma, Squamous Cell Carcinoma? (If Yes, please provide more detail)- No  · Eczema: No  · Psoriasis: No  · HIV/AIDS: No  · Hepatitis B or C: No  · Tuberculosis: No  · Systemic Immunosuppression such as Diabetes, Biologic or Immunotherapy, Chemotherapy, Organ Transplantation, Bone Marrow Transplantation (If YES, please provide more detail): No  · Radiation Treatment (If YES, please provide more detail): No  · Any other major medical conditions/concerns? (If Yes, which types)- No    Social History:     What is/was your primary occupation? na     What are your hobbies/past-times? na    Family History:  Have any of your "first degree relatives" (parent, brother, sister, or child) had any of the following       · Skin cancer such as Melanoma or Merkel Cell Carcinoma or Pancreatic Cancer? No  · Eczema, Asthma, Hay Fever or Seasonal Allergies: No  · Psoriasis or Psoriatic Arthritis: No  · Do any other medical conditions seem to run in your family? If Yes, what condition and which relatives?   YES, sister- Lupus    Current Medications:   (please update all dermatological medications before printing patient's AVS!)      Current Outpatient Medications:     levothyroxine (Synthroid) 100 mcg tablet, Take 1 tablet (100 mcg total) by mouth daily in the early morning, Disp: 90 tablet, Rfl: 3    acetaminophen (TYLENOL) 325 mg tablet, Take 650 mg by mouth every 6 (six) hours as needed for mild pain, Disp: , Rfl:     albuterol (PROVENTIL HFA,VENTOLIN HFA) 90 mcg/act inhaler, INHALE 2 PUFFS BY MOUTH EVERY 6 HOURS AS NEEDED FOR WHEEZE, Disp: 6 7 g, Rfl: 3    famotidine (PEPCID) 20 mg tablet, TAKE 1 TABLET BY MOUTH TWICE A DAY, Disp: 180 tablet, Rfl: 2    ipratropium (ATROVENT) 0 03 % nasal spray, 2 sprays into each nostril every 12 (twelve) hours, Disp: 30 mL, Rfl: 2    loratadine (CLARITIN) 10 mg tablet, TAKE 1 TABLET BY MOUTH EVERY DAY, Disp: 90 tablet, Rfl: 3      Review of Systems:  Have you recently had or currently have any of the following? If YES, what are you doing for the problem? · Fever, chills or unintended weight loss: No  · Sudden loss or change in your vision: No  · Nausea, vomiting or blood in your stool: No  · Painful or swollen joints: No  · Wheezing or cough: No  · Changing mole or non-healing wound: No  · Nosebleeds: No  · Excessive sweating: No  · Easy or prolonged bleeding? No  · Over the last 2 weeks, how often have you been bothered by the following problems? · Taking little interest or pleasure in doing things: 1 - Not at All  · Feeling down, depressed, or hopeless: 1 - Not at All  · Rapid heartbeat with epinephrine:  No    · FEMALES ONLY:    · Are you pregnant or planning to become pregnant? No  · Are you currently or planning to be nursing or breast feeding? No    · Any known allergies?       Allergies   Allergen Reactions    Morphine Chest Pain    Iv Contrast [Iodinated Diagnostic Agents] Hives   ·       Physical Exam:     Was a chaperone (Derm Clinical Assistant) present throughout the entire Physical Exam? Yes     Did the Dermatology Team specifically  the patient on the importance of a Full Skin Exam to be sure that nothing is missed clinically? Yes  o Did the patient ultimately request or accept a Full Skin Exam?  NO    CONSTITUTIONAL:   Vitals:    08/24/22 0914   Weight: 91 4 kg (201 lb 9 6 oz)   Height: 5' 6" (1 676 m)       PSYCH: Normal mood and affect  EYES: Normal conjunctiva  ENT: Normal lips and oral mucosa  CARDIOVASCULAR: No edema  RESPIRATORY: Normal respirations      SKIN:  FULL ORGAN SYSTEM EXAM   Hair, Scalp, Ears, Face Normal except as noted below in Assessment   Neck, Cervical Chain Nodes Normal except as noted below in Assessment   Right Arm/Hand/Fingers Normal except as noted below in Assessment   Left Arm/Hand/Fingers Normal except as noted below in Assessment        Assessment and Plan by Diagnosis:    History of Present Condition:     Duration:  How long has this been an issue for you?    o  Months   Location Affected:  Where on the body is this affecting you?    o  Body   Quality:  Is there any bleeding, pain, itch, burning/irritation, or redness associated with the skin lesion?    o  n/a resolved   Severity:  Describe any bleeding, pain, itch, burning/irritation, or redness on a scale of 1 to 10 (with 10 being the worst)    o     Timing:  Does this condition seem to be there pretty constantly or do you notice it more at specific times throughout the day?    o  resolved   Context:  Have you ever noticed that this condition seems to be associated with specific activities you do?    o  unknown   Modifying Factors:    o Anything that seems to make the condition worse?    -  thinks it maybe Prilosec related  o What have you tried to do to make the condition better?    -  denies   Associated Signs and Symptoms:  Does this skin lesion seem to be associated with any of the following:  o  denies     RASH (DDX: Urticaria possibly due to Prilosec)  Physical Exam:   Anatomic Location Affected:  Arms, neck, legs, trunk   Morphological Description:  No rash present on exam     Additional History of Present Condition:  Patient states she started with a rash in April 2021  At that time, she had been started on prilosec  Rash was present for 2 months; lesions would come on at night, very itchy, resolve by morning; stopped the Prilosec and rash resolved in 1 month  Has not has any other rashes since June 2021  GI doctor wanted her to see a dermatologist if there was any way to determine if patient would also develop rashes to other medications in the same family: patient was started on Claritin a year ago by PCP for rashes and is currently on Pepcid  Assessment and Plan:  Based on a thorough discussion of this condition and the management approach to it (including a comprehensive discussion of the known risks, side effects and potential benefits of treatment), the patient (family) agrees to implement the following specific plan:     Recommend stopping claritin and see if hives comes back   If hives come back, can restart claritin   Don't take prilosec    The patient was seen and discussed with Dr Rasheed Melgar       RTC: As needed     Jaime Sic  Dermatology PGY-4 Resident Physician

## 2022-08-24 NOTE — TELEPHONE ENCOUNTER
Patient had Hep B #1 placed 8/24/22 - form filled out and attached all labs  Original given to patient and copy put in for scanning  Patient has provider appt in Nov and she will have second dose at that time

## 2022-08-24 NOTE — PATIENT INSTRUCTIONS
RASH (DDX: Urticaria possibly due to Prilosec)   Assessment and Plan:  Based on a thorough discussion of this condition and the management approach to it (including a comprehensive discussion of the known risks, side effects and potential benefits of treatment), the patient (family) agrees to implement the following specific plan:    Recommend stopping claritin and see if hives comes back  If hives come back, can restart claritin  Don't take prilosec

## 2022-08-26 PROCEDURE — 90746 HEPB VACCINE 3 DOSE ADULT IM: CPT | Performed by: INTERNAL MEDICINE

## 2022-08-26 PROCEDURE — 90471 IMMUNIZATION ADMIN: CPT | Performed by: INTERNAL MEDICINE

## 2022-08-29 ENCOUNTER — HOSPITAL ENCOUNTER (OUTPATIENT)
Dept: RADIOLOGY | Facility: HOSPITAL | Age: 47
Discharge: HOME | End: 2022-08-29
Payer: MEDICARE

## 2022-08-29 DIAGNOSIS — R10.13 EPIGASTRIC PAIN: ICD-10-CM

## 2022-08-29 PROCEDURE — 76705 ECHO EXAM OF ABDOMEN: CPT

## 2022-09-06 ENCOUNTER — OFFICE VISIT (OUTPATIENT)
Dept: INTERNAL MEDICINE CLINIC | Facility: CLINIC | Age: 47
End: 2022-09-06

## 2022-09-06 VITALS
TEMPERATURE: 98.1 F | HEIGHT: 66 IN | DIASTOLIC BLOOD PRESSURE: 75 MMHG | SYSTOLIC BLOOD PRESSURE: 116 MMHG | BODY MASS INDEX: 32.14 KG/M2 | WEIGHT: 200 LBS | HEART RATE: 84 BPM

## 2022-09-06 DIAGNOSIS — M54.2 NECK PAIN ON LEFT SIDE: Primary | ICD-10-CM

## 2022-09-06 PROCEDURE — 99213 OFFICE O/P EST LOW 20 MIN: CPT | Performed by: HOSPITALIST

## 2022-09-06 NOTE — PROGRESS NOTES
[facility] Visit Note  El Gilbert 56 y o  female   MRN: [id number]    Assessment and Plan      1  Neck pain on left side        - the patient presented to the clinic complaining of 2 days of left-sided neck pain  Her symptoms are likely secondary to a muscle strain/spasm  Will order Voltaren gel and refer patient to physical therapy  She was also instructed to continue taking Tylenol and to use heating compresses as needed  The patient was instructed to let us know if her symptoms worsen and/or do not improve in the upcoming weeks  -     Diclofenac Sodium (VOLTAREN) 1 %; Apply 2 g topically 4 (four) times a day  -     Ambulatory Referral to Physical Therapy; Future       Follow up in our clinic in 3 month(s) for Neck pain follow-up  Subjective   HISTORY OF PRESENT ILLNESS:  El Gilbert is a 56 y o  female with a past medical history of fibromyalgia, hypothyroidism, GERD and anxiety who presents to clinic today for left-sided neck pain  Patient has been experiencing her symptoms for the last 2 days  She describes it as a pain on her left neck/shoulder that radiates down her chest and through her neck  Her symptoms are unrelated to exertion  She denies any recent trauma or injury  There was mild tenderness on palpation  She is not experiencing any symptoms on the right side of her neck  She has experienced symptoms similar to this in the past but not in many years  She also describes the pain as different than her fibromyalgia pain  She has been taking Tylenol at home with mild relief  She denies any recent fevers, chills, chest pain, shortness a breath or palpitations  She has had diarrhea over the last 2 days as well  She denies any numbness, weakness or other neurological changes  Otherwise she had no other acute complaints    Of note a CT cervical spine in February of 2021 showed mild degenerative changes without significant canal or foraminal stenosis  Review of Systems   Constitutional: Negative for activity change, appetite change, chills, diaphoresis, fatigue and fever  HENT: Negative for congestion, ear discharge, ear pain, hearing loss, sinus pressure, sinus pain and sore throat  Eyes: Negative for pain, discharge, redness and itching  Respiratory: Negative for cough, chest tightness, shortness of breath and wheezing  Cardiovascular: Negative for chest pain, palpitations and leg swelling  Gastrointestinal: Negative for abdominal distention, abdominal pain, blood in stool, constipation, diarrhea, nausea and vomiting  Genitourinary: Negative for difficulty urinating, dysuria, flank pain and frequency  Musculoskeletal: Positive for neck pain  Negative for arthralgias, back pain and gait problem  Skin: Negative for color change, pallor and rash  Neurological: Negative for dizziness, weakness, light-headedness, numbness and headaches  Psychiatric/Behavioral: Negative for agitation, behavioral problems, confusion and decreased concentration  Objective     Vitals:    09/06/22 1020   BP: 116/75   BP Location: Right arm   Patient Position: Sitting   Cuff Size: Large   Pulse: 84   Temp: 98 1 °F (36 7 °C)   TempSrc: Temporal   Weight: 90 7 kg (200 lb)   Height: 5' 6" (1 676 m)     Physical Exam  Constitutional:       Appearance: She is well-developed  HENT:      Head: Normocephalic and atraumatic  Nose: Nose normal    Eyes:      Conjunctiva/sclera: Conjunctivae normal       Pupils: Pupils are equal, round, and reactive to light  Neck:      Vascular: No JVD  Cardiovascular:      Rate and Rhythm: Normal rate and regular rhythm  Heart sounds: Normal heart sounds  No murmur heard  No friction rub  No gallop  Pulmonary:      Effort: Pulmonary effort is normal  No respiratory distress  Breath sounds: Normal breath sounds  No stridor  No wheezing or rales     Chest:      Chest wall: No tenderness  Abdominal:      General: Bowel sounds are normal  There is no distension  Palpations: Abdomen is soft  There is no mass  Tenderness: There is no abdominal tenderness  There is no guarding or rebound  Hernia: No hernia is present  Musculoskeletal:         General: Tenderness (Mild tenderness to palpation of left neck/superior trapezius muscle  Pain is worse with abduction of left shoulder past 90° ) present  No deformity  Right lower leg: No edema  Left lower leg: No edema  Skin:     General: Skin is warm and dry  Neurological:      Mental Status: She is alert and oriented to person, place, and time  Sensory: No sensory deficit  Motor: No weakness  Psychiatric:         Mood and Affect: Mood normal          Behavior: Behavior normal          Thought Content:  Thought content normal          Judgment: Judgment normal          History     Current Outpatient Medications:     acetaminophen (TYLENOL) 325 mg tablet, Take 650 mg by mouth every 6 (six) hours as needed for mild pain, Disp: , Rfl:     albuterol (PROVENTIL HFA,VENTOLIN HFA) 90 mcg/act inhaler, INHALE 2 PUFFS BY MOUTH EVERY 6 HOURS AS NEEDED FOR WHEEZE, Disp: 6 7 g, Rfl: 3    Diclofenac Sodium (VOLTAREN) 1 %, Apply 2 g topically 4 (four) times a day, Disp: 150 g, Rfl: 1    famotidine (PEPCID) 20 mg tablet, TAKE 1 TABLET BY MOUTH TWICE A DAY, Disp: 180 tablet, Rfl: 2    ipratropium (ATROVENT) 0 03 % nasal spray, 2 sprays into each nostril every 12 (twelve) hours, Disp: 30 mL, Rfl: 2    levothyroxine (Synthroid) 100 mcg tablet, Take 1 tablet (100 mcg total) by mouth daily in the early morning, Disp: 90 tablet, Rfl: 3    loratadine (CLARITIN) 10 mg tablet, TAKE 1 TABLET BY MOUTH EVERY DAY, Disp: 90 tablet, Rfl: 3  Allergies   Allergen Reactions    Morphine Chest Pain    Iv Contrast [Iodinated Diagnostic Agents] Hives      Past Medical History:   Diagnosis Date    Abdominal pain     Allergic drug reaction 9/15/2021    Anemia     Anxiety     Asthma     Back pain     Bilateral fibrocystic breast changes     resolved: 02/21/2017    Breast disorder     Breast pain, left 1/26/2022    Chest pain     Chronic pelvic pain in female     last assessed: 09/07/2016    Cluster headaches     Colon cancer screening 5/12/2021    Constipation     Cough     Depression     Difficulty concentrating     Disease of thyroid gland     Dizziness     Dyspareunia in female     last assessed: 08/18/2016    Easy bruising     Epigastric abdominal pain 7/22/2020    Fainting episodes     Fatigue     Fever due to infection     Fibromyalgia     Frequent urination at night     Gallbladder disease     Herniated intervertebral disc of lumbar spine     Hypotension     Loss of bladder control     Mastalgia in female 5/24/2021    Memory loss     last assessed: 06/15/2015    Mid back pain 3/19/2021    Mixed incontinence     last assessed: 01/11/2016    Myofascial pain     last assessed: 06/01/2016    Numbness and tingling     Painful swelling of joint     Palpitations     Panic attack     Sciatica     last assessed: 06/15/2015    Situational anxiety     last assessed: 02/21/2017    SOB (shortness of breath)     Thyroid disease     Uterus, adenomyosis     last assessed: 09/07/2016    Weakness     Weight disorder      Past Surgical History:   Procedure Laterality Date    CHOLECYSTECTOMY      CHOLECYSTECTOMY LAPAROSCOPIC N/A 10/22/2017    Procedure: CHOLECYSTECTOMY LAPAROSCOPIC;  Surgeon: Anita James MD;  [facility];  Service: General    COLONOSCOPY  06/03/2021    ENDOMETRIAL BIOPSY      resolved: 02/21/2017    HYSTERECTOMY  2016    [place] VAGINAL HYSTERECTOMY,UTERUS 250 GMS/< N/A 4/20/2018    Procedure: HYSTERECTOMY VAGINAL TOTAL (TVH), BILATERAL SALPINGECTOMY;  Surgeon: Flores Fernando MD;  [facility];  Service: Gynecology    TUBAL LIGATION      last assessed: 10/20/2014    UPPER GASTROINTESTINAL ENDOSCOPY  08/03/2020     Social History     Socioeconomic History    Marital status: /Civil Union     Spouse name: Not on file    Number of children: 3    Years of education: Not on file    Highest education level: Not on file   Occupational History    Occupation: Full-time   Tobacco Use    Smoking status: Former Smoker     Packs/day: 0 50     Years: 2 00     Pack years: 1 00     Types: Cigarettes     Start date: 2005     Quit date: 2007     Years since quitting: 15 6    Smokeless tobacco: Never Used   Vaping Use    Vaping Use: Never used   Substance and Sexual Activity    Alcohol use: Yes     Comment: occasional    Drug use: Never    Sexual activity: Yes     Partners: Male     Birth control/protection: Female Sterilization, Surgical   Other Topics Concern    Not on file   Social History Narrative    Daily caffeine consumption    Domestic partner    Parent     Social Determinants of Health     Financial Resource Strain: Low Risk     Difficulty of Paying Living Expenses: Not hard at all   Food Insecurity: No Food Insecurity    Worried About Running Out of Food in the Last Year: Never true    Sharita of Food in the Last Year: Never true   Transportation Needs: No Transportation Needs    Lack of Transportation (Medical): No    Lack of Transportation (Non-Medical):  No   Physical Activity: Not on file   Stress: Not on file   Social Connections: Not on file   Intimate Partner Violence: Not on file   Housing Stability: Low Risk     Unable to Pay for Housing in the Last Year: No    Number of Places Lived in the Last Year: 1    Unstable Housing in the Last Year: No     Family History   Problem Relation Age of Onset    Hypertension Mother    [facility] Arthritis Mother     Diabetes Maternal Grandmother     Breast cancer Family     Cancer Family     Diabetes Family     Heart disease Family     Hyperlipidemia Family         reported cholesterol level was high    Hypertension Other  Hyperlipidemia Father     No Known Problems Maternal Grandfather     No Known Problems Sister     No Known Problems Daughter     Breast cancer Paternal Grandmother 28    No Known Problems Paternal Grandfather     Leukemia Paternal Aunt     No Known Problems Sister     No Known Problems Sister     No Known Problems Sister     No Known Problems Sister     No Known Problems Daughter     No Known Problems Paternal Aunt     No Known Problems Son     No Known Problems Son     No Known Problems Brother     No Known Problems Brother        MD Carroll Eduardo 73 Internal Medicine PGY-3  [address] [facility]  [address] [facility]    PLEASE NOTE:  This encounter was completed utilizing the M[employer] Direct Speech Voice Recognition Software  Grammatical errors, random word insertions, pronoun errors and incomplete sentences are occasional consequences of the system due to software limitations, ambient noise and hardware issues  These may be missed by proof reading prior to affixing electronic signature  Any questions or concerns about the content, text or information contained within the body of this dictation should be directly addressed to the physician for clarification  Please do not hesitate to call me directly if you have any any questions or concerns

## 2022-09-06 NOTE — PATIENT INSTRUCTIONS
Please apply Voltaren cream to neck up to 4 times daily as needed  Please follow-up with physical therapy  Please continue with Tylenol and stretching exercises  May also place heating pads as needed

## 2022-09-15 ENCOUNTER — OFFICE VISIT (OUTPATIENT)
Dept: AUDIOLOGY | Facility: CLINIC | Age: 47
End: 2022-09-15
Payer: MEDICARE

## 2022-09-15 ENCOUNTER — OFFICE VISIT (OUTPATIENT)
Dept: OTOLARYNGOLOGY | Facility: CLINIC | Age: 47
End: 2022-09-15
Payer: MEDICARE

## 2022-09-15 VITALS
WEIGHT: 200 LBS | HEART RATE: 74 BPM | TEMPERATURE: 97 F | HEIGHT: 66 IN | BODY MASS INDEX: 32.14 KG/M2 | OXYGEN SATURATION: 98 %

## 2022-09-15 DIAGNOSIS — R13.10 DYSPHAGIA, UNSPECIFIED TYPE: ICD-10-CM

## 2022-09-15 DIAGNOSIS — H90.3 SENSORINEURAL HEARING LOSS (SNHL) OF BOTH EARS: Primary | ICD-10-CM

## 2022-09-15 DIAGNOSIS — J34.2 DEVIATED NASAL SEPTUM: Primary | ICD-10-CM

## 2022-09-15 DIAGNOSIS — J35.3 HYPERTROPHY OF TONSILS AND ADENOIDS: ICD-10-CM

## 2022-09-15 DIAGNOSIS — J03.90 TONSILLITIS: ICD-10-CM

## 2022-09-15 DIAGNOSIS — R22.1 SENSATION OF LUMP IN THROAT: ICD-10-CM

## 2022-09-15 DIAGNOSIS — R09.81 CHRONIC NASAL CONGESTION: ICD-10-CM

## 2022-09-15 PROCEDURE — 92567 TYMPANOMETRY: CPT | Performed by: AUDIOLOGIST

## 2022-09-15 PROCEDURE — 99204 OFFICE O/P NEW MOD 45 MIN: CPT | Performed by: OTOLARYNGOLOGY

## 2022-09-15 PROCEDURE — 31231 NASAL ENDOSCOPY DX: CPT | Performed by: OTOLARYNGOLOGY

## 2022-09-15 PROCEDURE — 92557 COMPREHENSIVE HEARING TEST: CPT | Performed by: AUDIOLOGIST

## 2022-09-15 RX ORDER — FLUTICASONE PROPIONATE 50 MCG
1 SPRAY, SUSPENSION (ML) NASAL DAILY
Qty: 16 G | Refills: 1 | Status: SHIPPED | OUTPATIENT
Start: 2022-09-15

## 2022-09-15 NOTE — PROGRESS NOTES
[facility]  Duane Monday 56 y o  female MRN: [id number]  Encounter: [id number]  Antonino Garcia MD  Office : [phone]  Also available on Tiger Text    Thank you for referring Duane Monday for an evaluation  My recommendations are included  Please do not hesitate to contact me with any questions you may have  ASSESSMENT AND PLAN:      1  Deviated nasal septum     2  Dysphagia, unspecified type  Ambulatory Referral to Otolaryngology   3  Sensation of lump in throat  Ambulatory Referral to Otolaryngology   4  Tonsillitis  Ambulatory Referral to Otolaryngology   5  Hypertrophy of tonsils and adenoids     6  Chronic nasal congestion       Discussed with the patient the risks benefits and alternatives of tonsillectomy adenoidectomy  Patient is doubtful on proceeding with surgery at this point  Will request a sleep study  Discussed with the patient that if significant apneas present,  there is more reason to proceed with surgery  Regarding the nasal deformity she needs a CT scan of the sinuses for better evaluation of the right nasal cavity as the endoscope could not be advanced  Rule out any sinusitis or polyps  In the interim will use Flonase  Regarding her ears  She needs to use protection as she has noise induced hearing loss  At the current level of hearing of hearing she may not need hearing aids she may not need hearing aids ______________________________________________________________________    Reason for consultation : tonsils    HPI: Duane Mondirk is a 56 y o  female who  Reports having long history of very enlarged tonsils, in addition she also has chronic tonsillar pain, frequent tonsil stones  In addition she complains that she has chronic nasal obstruction apparently "crooked septum"  She has been using the Atrovent nasal spray with minimal improvement    And long-term tinnitus,  She also reports hearing loss was seen by an audiologist who recommended hearing aids,      PRIOR VISIT, ENDOSCOPIC EVALUATION :     REVIEW OF SYSTEMS:    Review of systems:  10 Point ROS was performed and negative except as above or otherwise noted in the medical record      Historical Information   Past Medical History:   Diagnosis Date    Abdominal pain     Allergic drug reaction 9/15/2021    Anemia     Anxiety     Asthma     Back pain     Bilateral fibrocystic breast changes     resolved: 02/21/2017    Breast disorder     Breast pain, left 1/26/2022    Chest pain     Chronic pelvic pain in female     last assessed: 09/07/2016    Cluster headaches     Colon cancer screening 5/12/2021    Constipation     Cough     Depression     Difficulty concentrating     Disease of thyroid gland     Dizziness     Dyspareunia in female     last assessed: 08/18/2016    Easy bruising     Epigastric abdominal pain 7/22/2020    Fainting episodes     Fatigue     Fever due to infection     Fibromyalgia     Frequent urination at night     Gallbladder disease     Herniated intervertebral disc of lumbar spine     Hypotension     Loss of bladder control     Mastalgia in female 5/24/2021    Memory loss     last assessed: 06/15/2015    Mid back pain 3/19/2021    Mixed incontinence     last assessed: 01/11/2016    Myofascial pain     last assessed: 06/01/2016    Numbness and tingling     Painful swelling of joint     Palpitations     Panic attack     Sciatica     last assessed: 06/15/2015    Situational anxiety     last assessed: 02/21/2017    SOB (shortness of breath)     Thyroid disease     Uterus, adenomyosis     last assessed: 09/07/2016    Weakness     Weight disorder      Past Surgical History:   Procedure Laterality Date    CHOLECYSTECTOMY      CHOLECYSTECTOMY LAPAROSCOPIC N/A 10/22/2017    Procedure: CHOLECYSTECTOMY LAPAROSCOPIC;  Surgeon: Charla Cruz MD;  [facility];  Service: General    COLONOSCOPY  06/03/2021    ENDOMETRIAL BIOPSY      resolved: 02/21/2017    HYSTERECTOMY  2016    [place] VAGINAL HYSTERECTOMY,UTERUS 250 GMS/< N/A 4/20/2018    Procedure: HYSTERECTOMY VAGINAL TOTAL (TVH), BILATERAL SALPINGECTOMY;  Surgeon: Ayaka Fuentes MD;  [facility];  Service: Gynecology    TUBAL LIGATION      last assessed: 10/20/2014    UPPER GASTROINTESTINAL ENDOSCOPY  08/03/2020     Social History   Social History     Substance and Sexual Activity   Alcohol Use Yes    Comment: occasional     Social History     Substance and Sexual Activity   Drug Use Never     Social History     Tobacco Use   Smoking Status Former Smoker    Packs/day: 0 50    Years: 2 00    Pack years: 1 00    Types: Cigarettes    Start date: 2005    Quit date: 2007    Years since quitting: 15 7   Smokeless Tobacco Never Used     Family History   Problem Relation Age of Onset    Hypertension Mother     Arthritis Mother     Diabetes Maternal Grandmother     Breast cancer Family     Cancer Family     Diabetes Family     Heart disease Family     Hyperlipidemia Family         reported cholesterol level was high    Hypertension Other     Hyperlipidemia Father     No Known Problems Maternal Grandfather     No Known Problems Sister     No Known Problems Daughter     Breast cancer Paternal Grandmother 28    No Known Problems Paternal Grandfather     Leukemia Paternal Aunt     No Known Problems Sister     No Known Problems Sister     No Known Problems Sister     No Known Problems Sister     No Known Problems Daughter     No Known Problems Paternal Aunt     No Known Problems Son     No Known Problems Son     No Known Problems Brother     No Known Problems Brother        Meds/Allergies       Current Outpatient Medications:     acetaminophen (TYLENOL) 325 mg tablet    albuterol (PROVENTIL HFA,VENTOLIN HFA) 90 mcg/act inhaler    Diclofenac Sodium (VOLTAREN) 1 %    famotidine (PEPCID) 20 mg tablet    ipratropium (ATROVENT) 0 03 % nasal spray   levothyroxine (Synthroid) 100 mcg tablet    loratadine (CLARITIN) 10 mg tablet    Allergies   Allergen Reactions    Morphine Chest Pain    Iv Contrast [Iodinated Diagnostic Agents] Hives         PHYSICAL EXAM:    Pulse 74, temperature (!) 97 °F (36 1 °C), temperature source Temporal, height 5' 6" (1 676 m), weight 90 7 kg (200 lb), last menstrual period 02/28/2018, SpO2 98 %, not currently breastfeeding  Body mass index is 32 28 kg/m²  Constitutional: Oriented to person, place, and time  Well-developed and well-nourished, no apparent distress, non-toxic appearance  Cooperative, able to hear and answer questions without difficulty  Voice: Normal voice quality  Head: Normocephalic, atraumatic  No scars, masses or lesions  Face: Symmetric, no edema, no sinus tenderness  Eyes: Vision grossly intact, extra-ocular movement intact  Right Ear: External ear normal   Auditory canal clear  Tympanic membrane well-appearing, without retraction or scarring  No fluid present  No post-auricular erythema or tenderness  Left Ear: External ear normal   Auditory canal clear  Tympanic membrane well-appearing, without retraction or scarring  No fluid present  No post-auricular erythema or tenderness  Nose:   Severe septal deviation septum contacts with right lateral wall  Unable to visualize middle turbinate with anterior rhinoscopy  Mucosa moist, turbinates well appearing  No crusting, polyps or discharge evident  Oral cavity: Dentition intact  Mucosa moist, lips normal   Tongue mobile, floor of mouth normal   Hard palate unremarkable  No masses or lesions  Oropharynx: Uvula is midline, soft palate normal   Unremarkable oropharyngeal inlet  Tonsils  4+ unremarkable  Posterior pharyngeal wall clear  No masses or lesions  Salivary glands:  Parotid glands and submandibular glands symmetric, no enlargement or tenderness  Neck: Normal laryngeal elevation with swallow  Trachea midline  No masses or lesions   No palpable adenopathy  Thyroid: normal in size, unremarkable without tenderness or palpable nodules  Pulmonary/Chest: Normal effort and rate  No respiratory distress  Musculoskeletal: Normal range of motion  Neurological: Cranial nerves 2-12 intact  Skin: Skin is warm and dry  Psychiatric: Normal mood and affect  Nasal endoscopy:     Verbal informed consent obtained  Topical anesthesia and vasoconstriction was applied via nasal spray bilaterally  Once anesthesia vasoconstriction had taken effect a flexible endoscope was advanced on both nasal cavities to the level of the nasopharynx  nasal endoscopy, very deviated septum, white left nasal cavity, no polyps no pus, middle meatus and superior meatus as well as sphenoid recess clear  Adenoids are noticed to be occluding 50%  the choanal aperture  Unable to advance the endoscope on the right nasal cavity    The endoscope was then removed without complication patient tolerates procedure  audiometry:  11/12/21  [facility], high-frequency sensorineural hearing loss does have a pattern of noise induced damage  Pure tone averages of  23 decibels but 4000 hertz at  50 decibels, 8000 hertz at 60 decibels bilaterally    Imaging Studies: I have personally reviewed images on the PACS system and : no head or sinus imaging available

## 2022-09-15 NOTE — PROGRESS NOTES
AUDIOLOGY AUDIOMETRIC EVALUATION      Name:  Jen Day  :  1975  Age:  56 y o  Date of Evaluation: 9/15/2022    History:  Reason for visit:  Ms John Argueta is seen today at the request of Dr Stephani Ramirez for an evaluation of hearing  Patient complains of decreased hearing  EVALUATION     Audiogram Description:     Right Left     Normal   Normal     Conductive   Conductive    X Sensorineural  X Sensorineural     Mixed   Mixed     Unspecified   Unspecified      Audio shows: Normal to moderate SNHL AD/Normal to Severe SNHL AS     RESULTS:     Impedence Audiometry:      Tympanometry     Type Vol Press Comp   R A 1 8 ml 5 daPa 1 7 ml   L A 1 3 ml 5 daPa 1 7 ml      Pure Tone Audiometry (AC):       Hz 250  1500 2000 3000 4000 6000 8000   R  20 15   20 25 35 50 45 45 40   L 20 10   15 20 35 55 50 50 70      Speech Audiometry:           Right Ear: SRT: 25dB               WRS were good (76%) at 60dB presentation level          Left Ear:  SRT: 20dB                 TCZ AOEV CHMW (76%) at 55dB presentation level     Test-Retest Reliability: Good  PT Stimulus: Puretone  Speech Stimulus: Recorded  Recognition Test: NU-6 (2,3)  Response: Push Button  Transducer: Headphones                             *See Attached Audiogram     PATIENT EDUCATION:   Discussed audio results with Ms Boo  Patient to see Dr Ferrari for follow-up  Questions were answered and patient was encouraged to contact our office if questions or concerns were to arise         Hoang Reyez    Licensed Audiologist

## 2022-09-29 ENCOUNTER — HOSPITAL ENCOUNTER (OUTPATIENT)
Dept: RADIOLOGY | Facility: HOSPITAL | Age: 47
Discharge: HOME | End: 2022-09-29
Payer: MEDICARE

## 2022-09-29 DIAGNOSIS — R10.13 EPIGASTRIC PAIN: ICD-10-CM

## 2022-09-29 DIAGNOSIS — R14.0 ABDOMINAL BLOATING: ICD-10-CM

## 2022-09-29 DIAGNOSIS — R68.81 EARLY SATIETY: ICD-10-CM

## 2022-09-29 PROCEDURE — A9541 TC99M SULFUR COLLOID: HCPCS

## 2022-09-29 PROCEDURE — 78264 GASTRIC EMPTYING IMG STUDY: CPT

## 2022-09-29 PROCEDURE — G1004 CDSM NDSC: HCPCS

## 2022-10-05 ENCOUNTER — TELEPHONE (OUTPATIENT)
Dept: PULMONOLOGY | Facility: CLINIC | Age: 47
End: 2022-10-05

## 2022-10-05 NOTE — TELEPHONE ENCOUNTER
LM for Pt to call back to schedule 6m follow up with Dr Hanna Plascencia in October at the [facility] office

## 2022-10-11 ENCOUNTER — HOSPITAL ENCOUNTER (OUTPATIENT)
Dept: CT IMAGING | Facility: HOSPITAL | Age: 47
Discharge: HOME | End: 2022-10-11
Attending: OTOLARYNGOLOGY
Payer: MEDICARE

## 2022-10-11 DIAGNOSIS — J35.3 HYPERTROPHY OF TONSILS AND ADENOIDS: ICD-10-CM

## 2022-10-11 DIAGNOSIS — J34.2 DEVIATED NASAL SEPTUM: ICD-10-CM

## 2022-10-11 DIAGNOSIS — R09.81 CHRONIC NASAL CONGESTION: ICD-10-CM

## 2022-10-11 PROCEDURE — G1004 CDSM NDSC: HCPCS

## 2022-10-11 PROCEDURE — 70486 CT MAXILLOFACIAL W/O DYE: CPT

## 2022-11-01 ENCOUNTER — CLINICAL SUPPORT (OUTPATIENT)
Dept: INTERNAL MEDICINE CLINIC | Facility: CLINIC | Age: 47
End: 2022-11-01

## 2022-11-01 DIAGNOSIS — Z23 NEED FOR HEPATITIS B VACCINATION: Primary | ICD-10-CM

## 2022-11-01 NOTE — PROGRESS NOTES
Patient on schedule for nurse visit for 2nd Hep B vaccine, administered hep b vaccine in right deltoid  Patient tolerated well

## 2022-11-07 DIAGNOSIS — M54.2 NECK PAIN ON LEFT SIDE: ICD-10-CM

## 2022-11-29 ENCOUNTER — OFFICE VISIT (OUTPATIENT)
Dept: INTERNAL MEDICINE CLINIC | Facility: CLINIC | Age: 47
End: 2022-11-29

## 2022-11-29 VITALS
OXYGEN SATURATION: 97 % | BODY MASS INDEX: 32.44 KG/M2 | SYSTOLIC BLOOD PRESSURE: 96 MMHG | WEIGHT: 201 LBS | DIASTOLIC BLOOD PRESSURE: 64 MMHG | HEART RATE: 102 BPM | TEMPERATURE: 98.2 F

## 2022-11-29 DIAGNOSIS — Z11.52 ENCOUNTER FOR SCREENING FOR COVID-19: Primary | ICD-10-CM

## 2022-11-29 LAB
SARS-COV-2 AG UPPER RESP QL IA: NEGATIVE
VALID CONTROL: NORMAL

## 2022-11-29 RX ORDER — DEXTROMETHORPHAN HYDROBROMIDE AND PROMETHAZINE HYDROCHLORIDE 15; 6.25 MG/5ML; MG/5ML
2.5 SOLUTION ORAL 4 TIMES DAILY PRN
Qty: 180 ML | Refills: 0 | Status: SHIPPED | OUTPATIENT
Start: 2022-11-29 | End: 2022-11-29

## 2022-11-29 RX ORDER — DEXTROMETHORPHAN HYDROBROMIDE AND PROMETHAZINE HYDROCHLORIDE 15; 6.25 MG/5ML; MG/5ML
5 SOLUTION ORAL 4 TIMES DAILY PRN
Qty: 180 ML | Refills: 0 | Status: SHIPPED | OUTPATIENT
Start: 2022-11-29

## 2022-11-29 RX ORDER — DEXTROMETHORPHAN HYDROBROMIDE AND PROMETHAZINE HYDROCHLORIDE 15; 6.25 MG/5ML; MG/5ML
2.5 SOLUTION ORAL 4 TIMES DAILY PRN
Qty: 118 ML | Refills: 0 | Status: CANCELLED | OUTPATIENT
Start: 2022-11-29 | End: 2022-12-11

## 2022-11-29 RX ORDER — LEVOTHYROXINE SODIUM 88 UG/1
TABLET ORAL
COMMUNITY
Start: 2022-11-07

## 2022-11-29 NOTE — PROGRESS NOTES
"Wolfgang Worley's goals for this visit include: consult for low testosterone   He requests these members of his care team be copied on today's visit information: yes    PCP: No Ref-Primary, Physician    Referring Provider:  Referred Self, MD  No address on file    /66 (BP Location: Left arm, Patient Position: Sitting, Cuff Size: Adult Regular)  Pulse 57  Temp 97  F (36.1  C)  Ht 1.702 m (5' 7\")  Wt 82.2 kg (181 lb 4.8 oz)  BMI 28.4 kg/m2    Do you need any medication refills at today's visit? no  " INTERNAL MEDICINE [facility] [address] [place] [address]    NAME: Liang Notice  AGE: 56 y o  SEX: female    DATE OF ENCOUNTER: 11/29/2022    Assessment and Plan     1  Encounter for screening for COVID-19  Patient presents with three days of flu-like symptoms including sore throat, dry cough, subjective fevers, myalgias, and nausea but no vomitting  No SOB  Endorses sick contact with her children and , who tested positive for COVID-19 one day ago  No hx of cardiopulmonary disease or diabetes mellitus  Not vaccinated against COVID-19 or seasonal influenza viruses due to personal choice  Former   5PPD smoker from [id number]  VSS on presentation; afebrile, satting well on RA; Non-toxic appearing on exam, maintaining secretions, throat with erythema but no exudate, lungs with good airway entry without wheezing or rales, exam otherwise unrevealing  POCT Rapid COVID Ag NEGATIVE    Plan:  Given patient's clinical presentation in conjunction with 's recent positive COVID-19 test, will initiate symptomatic treatment for presumed COVID-19 including use of OTC antipyretics, anti-nausea, and antitussive pharmacotherapy  Promethazine-DM (PHENERGAN-DM) 6 25-15 mg/5 mL oral syrup for cough and nausea relief  OTC Tylenol for sore throat and fever relief; 1-2 tablets PO q4-6h prn without exceeding 4g/d  Advised rest, adequate hydration with electrolyte-rich fluids, self-isolation according to current CDC guidelines; work note provided  Return precautions provided, including, but not limited to, worsening shortness of breath, high fevers, chest pain, intractable nausea/vomitting  Patient agrees with plan and verbalizes understanding         Orders Placed This Encounter   Procedures   • POCT Rapid Covid Ag       - Counseling Documentation: patient was counseled regarding: diagnostic results, instructions for management, risk factor reductions, patient and family education, risks and benefits of treatment options and importance of compliance with treatment     BMI Counseling: Body mass index is 32 44 kg/m²  The BMI is above normal  Nutrition recommendations include reducing portion sizes, decreasing overall calorie intake, 3-5 servings of fruits/vegetables daily, reducing fast food intake, consuming healthier snacks, decreasing soda and/or juice intake and moderation in carbohydrate intake  Exercise recommendations include vigorous aerobic physical activity for 75 minutes/week  Chief Complaint     Chief Complaint   Patient presents with   • Cough   • Sore Throat       History of Present Illness     Luciano Avila is a 56year old female with PMH of fibromyalgia, hypothyroidism controlled on levothyroxine who presents with three days of flu-like symptoms including sore throat, dry cough, subjective fevers, myalgias, and nausea but no vomitting  Denies SOB or chest pain  Has tried OTC tylenol for mild throat pain relief  Endorses sick contact with her children and , who tested positive for COVID-19 one day ago  States she's had COVID-19 infection four times over the past two years; states current symptoms are similar to her prior episodes of COVID-19, though milder  Denies history of cardiopulmonary disease or diabetes mellitus  Not vaccinated against COVID-19 or seasonal influenza viruses due to personal choice  Former   5PPD smoker from [id number]  The following portions of the patient's history were reviewed and updated as appropriate: allergies, current medications, past family history, past medical history, past social history, past surgical history and problem list     Review of Systems     Review of Systems   Constitutional: Positive for chills and fever (subjective)  HENT: Positive for rhinorrhea and sore throat  Negative for ear pain  Eyes: Negative for pain and visual disturbance     Respiratory: Positive for cough (dry)  Negative for shortness of breath  Cardiovascular: Negative for chest pain and palpitations  Gastrointestinal: Positive for nausea  Negative for abdominal pain and vomiting  Genitourinary: Negative for dysuria and hematuria  Musculoskeletal: Negative for arthralgias and back pain  Skin: Negative for color change and rash  Neurological: Negative for seizures and syncope  All other systems reviewed and are negative  Active Problem List     Patient Active Problem List   Diagnosis   • Fibromyalgia   • Goiter   • Hypothyroidism due to Hashimoto's thyroiditis   • Stress incontinence, female   • Hypermobility of urethra   • Calcaneal spur of left foot   • Thyroid nodule   • Lumbar radiculopathy   • Generalized anxiety disorder   • Class 1 obesity due to excess calories without serious comorbidity with body mass index (BMI) of 31 0 to 31 9 in adult   • Tension headache   • Cervical radiculopathy   • Myofascial pain syndrome   • Gastroesophageal reflux disease without esophagitis   • History of COVID-19   • SOB (shortness of breath)   • Excessive daytime sleepiness       Objective     BP 96/64 (BP Location: Left arm, Patient Position: Sitting, Cuff Size: Large)   Pulse 102   Temp 98 2 °F (36 8 °C) (Temporal)   Wt 91 2 kg (201 lb)   LMP 02/28/2018 (Exact Date)   SpO2 97%   BMI 32 44 kg/m²     Physical Exam  Constitutional:       General: She is not in acute distress  Appearance: Normal appearance  She is obese  She is not ill-appearing or toxic-appearing  HENT:      Nose: Rhinorrhea present  Mouth/Throat:      Mouth: Mucous membranes are moist       Pharynx: Posterior oropharyngeal erythema present  No oropharyngeal exudate  Eyes:      Conjunctiva/sclera: Conjunctivae normal    Neck:      Vascular: No carotid bruit  Cardiovascular:      Rate and Rhythm: Normal rate and regular rhythm  Pulses: Normal pulses  Heart sounds: Normal heart sounds  No murmur heard  No friction rub  No gallop  Pulmonary:      Effort: Pulmonary effort is normal       Breath sounds: Normal breath sounds  No wheezing or rhonchi  Abdominal:      General: There is no distension  Palpations: Abdomen is soft  Tenderness: There is no abdominal tenderness  Musculoskeletal:      Right lower leg: No edema  Left lower leg: No edema  Skin:     General: Skin is warm  Capillary Refill: Capillary refill takes less than 2 seconds  Findings: No rash  Neurological:      Mental Status: She is alert and oriented to person, place, and time  Mental status is at baseline           Pertinent Laboratory/Diagnostic Studies:  CBC:   Lab Results   Component Value Date/Time    WBC 8 51 12/01/2021 08:09 PM    WBC 6 57 04/06/2015 11:04 AM    RBC 4 60 12/01/2021 08:09 PM    RBC 4 43 04/06/2015 11:04 AM    HGB 13 3 12/01/2021 08:09 PM    HGB 12 5 04/06/2015 11:04 AM    HCT 41 6 12/01/2021 08:09 PM    HCT 37 3 04/06/2015 11:04 AM    MCV 90 12/01/2021 08:09 PM    MCV 84 04/06/2015 11:04 AM    MCH 28 9 12/01/2021 08:09 PM    MCH 28 2 04/06/2015 11:04 AM    MCHC 32 0 12/01/2021 08:09 PM    MCHC 33 5 04/06/2015 11:04 AM    RDW 13 2 12/01/2021 08:09 PM    RDW 14 1 04/06/2015 11:04 AM    MPV 11 1 12/01/2021 08:09 PM    MPV 10 7 04/06/2015 11:04 AM     12/01/2021 08:09 PM     04/06/2015 11:04 AM    NRBC 0 12/01/2021 08:09 PM    NEUTOPHILPCT 64 12/01/2021 08:09 PM    NEUTOPHILPCT 66 04/06/2015 11:04 AM    LYMPHOPCT 25 12/01/2021 08:09 PM    LYMPHOPCT 27 04/06/2015 11:04 AM    MONOPCT 8 12/01/2021 08:09 PM    MONOPCT 6 04/06/2015 11:04 AM    EOSPCT 1 12/01/2021 08:09 PM    EOSPCT 1 04/06/2015 11:04 AM    BASOPCT 1 12/01/2021 08:09 PM    BASOPCT 0 04/28/2014 04:28 PM    NEUTROABS 5 44 12/01/2021 08:09 PM    NEUTROABS 4 34 04/06/2015 11:04 AM    LYMPHSABS 2 16 12/01/2021 08:09 PM    LYMPHSABS 1 77 04/06/2015 11:04 AM    MONOSABS 0 70 12/01/2021 08:09 PM    MONOSABS 0 39 04/06/2015 11:04 AM EOSABS 0 12 12/01/2021 08:09 PM    EOSABS 0 07 04/06/2015 11:04 AM     Chemistry Profile:   Lab Results   Component Value Date/Time     04/06/2015 11:04 AM    K 3 6 12/01/2021 08:09 PM    K 3 7 04/06/2015 11:04 AM     12/01/2021 08:09 PM     04/06/2015 11:04 AM    CO2 29 12/01/2021 08:09 PM    CO2 25 04/06/2015 11:04 AM    ANIONGAP 8 04/06/2015 11:04 AM    BUN 10 12/01/2021 08:09 PM    BUN 12 04/06/2015 11:04 AM    CREATININE 0 70 12/01/2021 08:09 PM    CREATININE 0 74 04/06/2015 11:04 AM    GLUC 84 12/01/2021 08:09 PM    GLUF 85 11/18/2021 11:46 AM    GLUCOSE 106 04/06/2015 11:04 AM    CALCIUM 9 2 12/01/2021 08:09 PM    CALCIUM 8 3 04/06/2015 11:04 AM    CORRECTEDCA 8 9 08/24/2021 11:24 AM    MG 2 4 08/19/2022 09:44 AM    MG 1 9 04/06/2015 11:04 AM    AST 22 11/18/2021 11:46 AM    AST 17 04/06/2015 11:04 AM    ALT 31 11/18/2021 11:46 AM    ALT 19 04/06/2015 11:04 AM    ALKPHOS 78 11/18/2021 11:46 AM    ALKPHOS 63 04/06/2015 11:04 AM    PROT 7 5 04/06/2015 11:04 AM    BILITOT 0 40 04/06/2015 11:04 AM    EGFR 105 12/01/2021 08:09 PM     Endocrine Studies:   Lab Results   Component Value Date/Time    HGBA1C 5 5 08/19/2022 09:44 AM    HGBA1C 5 2 09/25/2015 10:01 AM    ZSL6AQOWNSXY 6 170 (H) 08/19/2022 09:44 AM    OPG0ACXECUVS 5 334 (H) 04/10/2015 01:56 PM    B4CDHUC 1 00 10/22/2019 10:30 AM    FREET4 1 08 08/19/2022 09:44 AM    FREET4 0 9 04/10/2015 01:56 PM    THYMICROANTI >600 (H) 03/30/2017 12:36 PM    THGAB 76 1 (H) 03/30/2017 12:36 PM    TRIG 68 08/19/2022 09:44 AM    TRIG 47 09/25/2015 10:01 AM    CHOL 152 09/25/2015 10:01 AM    CHOLESTEROL 197 08/19/2022 09:44 AM    HDL 65 08/19/2022 09:44 AM    HDL 68 09/25/2015 10:01 AM    LDLCALC 118 (H) 08/19/2022 09:44 AM    LDLCALC 75 09/25/2015 10:01 AM    NONHDLC 147 01/24/2022 08:32 AM    TYMM38HNFUZH 30 6 08/19/2022 09:44 AM    IKKA99ZJIPKG 14 5 (L) 04/10/2015 01:56 PM     Endocrine Studies:   Results from Last 12 Months   Lab Units 08/19/22  5464 HEMOGLOBIN A1C % 5 5   TSH 3RD GENERATON uIU/mL 6 170*   FREE T4 ng/dL 1 08   TRIGLYCERIDES mg/dL 68   CHOLESTEROL mg/dL 197   HDL mg/dL 65   LDL CALC mg/dL 118*   VIT D 25 HYDROXY ng/mL 30 6         Current Medications     Current Outpatient Medications:   •  acetaminophen (TYLENOL) 325 mg tablet, Take 650 mg by mouth every 6 (six) hours as needed for mild pain, Disp: , Rfl:   •  albuterol (PROVENTIL HFA,VENTOLIN HFA) 90 mcg/act inhaler, INHALE 2 PUFFS BY MOUTH EVERY 6 HOURS AS NEEDED FOR WHEEZE, Disp: 6 7 g, Rfl: 3  •  Diclofenac Sodium (VOLTAREN) 1 %, APPLY 2 GRAMS TO AFFECTED AREA 4 TIMES A DAY, Disp: 200 g, Rfl: 1  •  famotidine (PEPCID) 20 mg tablet, TAKE 1 TABLET BY MOUTH TWICE A DAY, Disp: 180 tablet, Rfl: 2  •  fluticasone (FLONASE) 50 mcg/act nasal spray, 1 spray into each nostril daily, Disp: 16 g, Rfl: 1  •  ipratropium (ATROVENT) 0 03 % nasal spray, 2 sprays into each nostril every 12 (twelve) hours, Disp: 30 mL, Rfl: 2  •  levothyroxine (Synthroid) 100 mcg tablet, Take 1 tablet (100 mcg total) by mouth daily in the early morning, Disp: 90 tablet, Rfl: 3  •  loratadine (CLARITIN) 10 mg tablet, TAKE 1 TABLET BY MOUTH EVERY DAY, Disp: 90 tablet, Rfl: 3  •  Promethazine-DM (PHENERGAN-DM) 6 25-15 mg/5 mL oral syrup, Take 5 mL by mouth 4 (four) times a day as needed for cough, Disp: 180 mL, Rfl: 0  •  levothyroxine 88 mcg tablet, , Disp: , Rfl:     Health Maintenance     Health Maintenance   Topic Date Due   • COVID-19 Vaccine (1) Never done   • Influenza Vaccine (1) Never done   • Pneumococcal Vaccine: Pediatrics (0 to 5 Years) and At-Risk Patients (6 to 64 Years) (1 - PCV) 08/19/2023 (Originally 2/10/1981)   • Hepatitis B Vaccine (3 of 3 - 3-dose series) 12/27/2022   • Breast Cancer Screening: Mammogram  03/01/2023   • Depression Screening  08/19/2023   • Annual Physical  08/19/2023   • BMI: Followup Plan  11/29/2023   • BMI: Adult  11/29/2023   • DTaP,Tdap,and Td Vaccines (2 - Td or Tdap) 03/05/2029   • Colorectal Cancer Screening  06/03/2031   • HIV Screening  Completed   • Hepatitis C Screening  Completed   • HIB Vaccine  Aged Out   • IPV Vaccine  Aged Out   • Hepatitis A Vaccine  Aged Out   • Meningococcal ACWY Vaccine  Aged Out   • HPV Vaccine  Aged Out     Immunization History   Administered Date(s) Administered   • Hep B, adult 08/26/2022, 11/01/2022   • Tdap 03/05/2019     ----------------------------------------------------  Karyn Cornell DO, MS, PGY-1  Internal Medicine Residency   [address] [facility], [address] [facility]

## 2022-11-29 NOTE — LETTER
November 29, 2022     Patient: Fanny Huang  YOB: 1975  Date of Visit: 11/29/2022      To Whom it May Concern:    Fanny Huang is under my professional care  Haris Vega was seen in my office on 11/29/2022 for treatment for COVID-19 viral infection  Haris Vega may return to work on 12/5/22  If you have any questions or concerns, please don't hesitate to call           Sincerely,          Althea Hargrove DO        CC: No Recipients

## 2022-12-02 ENCOUNTER — TELEPHONE (OUTPATIENT)
Dept: INTERNAL MEDICINE CLINIC | Facility: CLINIC | Age: 47
End: 2022-12-02

## 2022-12-02 NOTE — TELEPHONE ENCOUNTER
Spoke with patient about missed appointment  She explained that she took a dose of the promethazine after scheduling the appointment  This  caused her to sleep past her appointment time   Advised patient to call us if she is still experiencing symptoms because she was scheduled for a same day appointment

## 2023-01-18 ENCOUNTER — TELEPHONE (OUTPATIENT)
Dept: INTERNAL MEDICINE CLINIC | Facility: CLINIC | Age: 48
End: 2023-01-18

## 2023-01-18 NOTE — TELEPHONE ENCOUNTER
Patient called requesting a letter for work stating that she is "unable to mop" due to having bulging discs in her back and fibromyalgia  Patient stated that the mop she uses at work is really heavy and it really makes her conditions act up causing severe pain  Please write letter if appropriate

## 2023-01-20 NOTE — TELEPHONE ENCOUNTER
VM was left advising pt letter is available through [employer] or she can come into the office to

## 2023-01-30 ENCOUNTER — OFFICE VISIT (OUTPATIENT)
Dept: INTERNAL MEDICINE CLINIC | Facility: CLINIC | Age: 48
End: 2023-01-30

## 2023-01-30 VITALS
TEMPERATURE: 98 F | SYSTOLIC BLOOD PRESSURE: 108 MMHG | WEIGHT: 202 LBS | HEART RATE: 94 BPM | DIASTOLIC BLOOD PRESSURE: 73 MMHG | BODY MASS INDEX: 32.6 KG/M2

## 2023-01-30 DIAGNOSIS — R42 DIZZINESS: Primary | ICD-10-CM

## 2023-01-30 DIAGNOSIS — E03.8 HYPOTHYROIDISM DUE TO HASHIMOTO'S THYROIDITIS: ICD-10-CM

## 2023-01-30 DIAGNOSIS — E06.3 HYPOTHYROIDISM DUE TO HASHIMOTO'S THYROIDITIS: ICD-10-CM

## 2023-01-30 DIAGNOSIS — R00.2 HEART PALPITATIONS: ICD-10-CM

## 2023-01-30 NOTE — ASSESSMENT & PLAN NOTE
Patient presents with daily episodes of intermittent, episodic dizziness x 2 weeks  Associated symptoms: palpitations x 1 episode, headache, nausea, photosensitivity, tinnitus (although this seems to be chronic problem)  Lab Results   Component Value Date    EUM0CLIONZXR 6 170 (H) 08/19/2022   Levothyroxine increased to 100 mcg 8/19/22  On exam, patient is in no acute distress  She is normotensive (/73), heart rate is 94 and regular  Prior Holter monitor (3/12/21) showed rare supraventricular ectopy making up <1 0% of total heartbeats--no indication for medications at that time per interpreting Provider  EKG in office today showed normal sinus rhythm  Recommend checking labs, echo, Holter monitor and carotid ultrasound  Counseled on red flag symptoms (severe headaches, numbness/tingling/weakness of face or extremities, etc) and advised to go to ER if any other these arise

## 2023-01-30 NOTE — PROGRESS NOTES
Name: Modesto Mancilla      : 1975      MRN: [id number]  Encounter Provider: ALTA Morales  Encounter Date: 2023   Encounter department: [address] [facility]     1  Dizziness  Assessment & Plan:  Patient presents with daily episodes of intermittent, episodic dizziness x 2 weeks  Associated symptoms: palpitations x 1 episode, headache, nausea, photosensitivity, tinnitus (although this seems to be chronic problem)  Lab Results   Component Value Date    SBC1YROPUFTG 6 170 (H) 2022   Levothyroxine increased to 100 mcg 22  On exam, patient is in no acute distress  She is normotensive (/73), heart rate is 94 and regular  Prior Holter monitor (3/12/21) showed rare supraventricular ectopy making up <1 0% of total heartbeats--no indication for medications at that time per interpreting Provider  EKG in office today showed normal sinus rhythm  Recommend checking labs, echo, Holter monitor and carotid ultrasound  Counseled on red flag symptoms (severe headaches, numbness/tingling/weakness of face or extremities, etc) and advised to go to ER if any other these arise  Orders:  -     POCT ECG  -     Basic metabolic panel; Future  -     CBC and differential; Future  -     Holter monitor; Future; Expected date: 2023  -     Echo complete w/ contrast if indicated; Future; Expected date: 2023  -     VAS carotid complete study; Future; Expected date: 2023    2  Heart palpitations  -     TSH, 3rd generation with Free T4 reflex; Future  -     Holter monitor; Future; Expected date: 2023  -     Echo complete w/ contrast if indicated; Future; Expected date: 2023    3  Hypothyroidism due to Hashimoto's thyroiditis  -     POCT ECG  -     TSH, 3rd generation with Free T4 reflex;  Future         Subjective      Patient with  has a past medical history of GERD, hypothyroidism due to Hashimoto's, anxiety, fibromyalgia presents for acute visit today for evaluation of dizziness  Dizziness: Onset: 2 weeks ago   Frequency: 2 times per day    Duration: 5-6 seconds   Description: feels like she is spinning   Aggravating factors: none identified   Alleviating factors: none identified, goes away on it's own   Associated symptoms: intermittent headache-describes as sharp pain on top of head and left side x 30 mins, heart palpitations--1 episodes 2 weeks ago, nausea (usually in AM), photosensitivity, ringing in B/L ears (this seems to be chronic issue)  Denies vomiting, vision changes, new onset numbness/tingling/weakness, facial drooping  Previous treatments/work ups: Had similar a couple years ago--found to have low BP and was given fludrocortisone 0 1 mg daily  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 and it is usually low 100s/70s  She reports she drinks 6-7 bottles of water per day  She only drinks decaf beverages and limits chocolate consumption  Review of Systems   Constitutional: Negative for chills and fever  HENT: Positive for tinnitus  Negative for congestion, ear pain and sore throat  Eyes: Positive for photophobia (ocassional )  Negative for pain and visual disturbance  Respiratory: Negative for cough and shortness of breath  Cardiovascular: Positive for palpitations (1 episode about 2 weeks ago)  Negative for chest pain  Gastrointestinal: Negative for abdominal pain and vomiting  Genitourinary: Negative for dysuria and hematuria  Musculoskeletal: Negative for arthralgias and back pain  Skin: Negative for color change and rash  Neurological: Positive for dizziness (daily x 2 weeks ) and headaches  Negative for seizures, syncope, speech difficulty, weakness and numbness  All other systems reviewed and are negative        Current Outpatient Medications on File Prior to Visit   Medication Sig   • acetaminophen (TYLENOL) 325 mg tablet Take 650 mg by mouth every 6 (six) hours as needed for mild pain   • albuterol (PROVENTIL HFA,VENTOLIN HFA) 90 mcg/act inhaler INHALE 2 PUFFS BY MOUTH EVERY 6 HOURS AS NEEDED FOR WHEEZE   • Diclofenac Sodium (VOLTAREN) 1 % APPLY 2 GRAMS TO AFFECTED AREA 4 TIMES A DAY   • famotidine (PEPCID) 20 mg tablet TAKE 1 TABLET BY MOUTH TWICE A DAY   • ipratropium (ATROVENT) 0 03 % nasal spray 2 sprays into each nostril every 12 (twelve) hours   • levothyroxine (Synthroid) 100 mcg tablet Take 1 tablet (100 mcg total) by mouth daily in the early morning   • loratadine (CLARITIN) 10 mg tablet TAKE 1 TABLET BY MOUTH EVERY DAY   • [DISCONTINUED] levothyroxine 88 mcg tablet    • [DISCONTINUED] fluticasone (FLONASE) 50 mcg/act nasal spray 1 spray into each nostril daily (Patient not taking: Reported on 1/30/2023)   • [DISCONTINUED] Promethazine-DM (PHENERGAN-DM) 6 25-15 mg/5 mL oral syrup Take 5 mL by mouth 4 (four) times a day as needed for cough (Patient not taking: Reported on 1/30/2023)       Objective     /73 (BP Location: Left arm, Patient Position: Sitting, Cuff Size: Large)   Pulse 94   Temp 98 °F (36 7 °C) (Temporal)   Wt 91 6 kg (202 lb)   LMP 02/28/2018 (Exact Date)   BMI 32 60 kg/m²     Physical Exam  Vitals and nursing note reviewed  Constitutional:       General: She is not in acute distress  Appearance: Normal appearance  HENT:      Head: Normocephalic and atraumatic  Right Ear: Tympanic membrane and external ear normal       Left Ear: Tympanic membrane and external ear normal       Nose: Nose normal       Mouth/Throat:      Mouth: Mucous membranes are moist    Eyes:      Conjunctiva/sclera: Conjunctivae normal       Pupils: Pupils are equal, round, and reactive to light  Cardiovascular:      Rate and Rhythm: Normal rate and regular rhythm  Pulses: Normal pulses  Heart sounds: Normal heart sounds  Pulmonary:      Effort: Pulmonary effort is normal  No respiratory distress        Breath sounds: Normal breath sounds  Abdominal:      General: Bowel sounds are normal  There is no distension  Palpations: Abdomen is soft  Musculoskeletal:         General: Normal range of motion  Cervical back: Normal range of motion  Skin:     General: Skin is warm and dry  Capillary Refill: Capillary refill takes less than 2 seconds  Neurological:      General: No focal deficit present  Mental Status: She is alert and oriented to person, place, and time  Cranial Nerves: Cranial nerves 2-12 are intact  No dysarthria or facial asymmetry  Sensory: Sensation is intact  Motor: Motor function is intact  No weakness  Coordination: Coordination is intact  Romberg sign negative  Finger-Nose-Finger Test and Heel to [facility] Test normal  Rapid alternating movements normal       Gait: Gait is intact     Psychiatric:         Mood and Affect: Mood normal          Behavior: Behavior normal        ALTA Andrade

## 2023-02-01 ENCOUNTER — APPOINTMENT (OUTPATIENT)
Dept: LAB | Facility: CLINIC | Age: 48
End: 2023-02-01

## 2023-02-01 DIAGNOSIS — E06.3 HYPOTHYROIDISM DUE TO HASHIMOTO'S THYROIDITIS: ICD-10-CM

## 2023-02-01 DIAGNOSIS — E03.8 HYPOTHYROIDISM DUE TO HASHIMOTO'S THYROIDITIS: ICD-10-CM

## 2023-02-01 DIAGNOSIS — R00.2 HEART PALPITATIONS: ICD-10-CM

## 2023-02-01 DIAGNOSIS — R42 DIZZINESS: ICD-10-CM

## 2023-02-01 LAB
ANION GAP SERPL CALCULATED.3IONS-SCNC: 5 MMOL/L (ref 4–13)
BASOPHILS # BLD AUTO: 0.04 THOUSANDS/ÂΜL (ref 0–0.1)
BASOPHILS NFR BLD AUTO: 1 % (ref 0–1)
BUN SERPL-MCNC: 11 MG/DL (ref 5–25)
CALCIUM SERPL-MCNC: 8.6 MG/DL (ref 8.3–10.1)
CHLORIDE SERPL-SCNC: 106 MMOL/L (ref 96–108)
CO2 SERPL-SCNC: 27 MMOL/L (ref 21–32)
CREAT SERPL-MCNC: 0.67 MG/DL (ref 0.6–1.3)
EOSINOPHIL # BLD AUTO: 0.12 THOUSAND/ÂΜL (ref 0–0.61)
EOSINOPHIL NFR BLD AUTO: 2 % (ref 0–6)
ERYTHROCYTE [DISTWIDTH] IN BLOOD BY AUTOMATED COUNT: 13.1 % (ref 11.6–15.1)
GFR SERPL CREATININE-BSD FRML MDRD: 105 ML/MIN/1.73SQ M
GLUCOSE P FAST SERPL-MCNC: 110 MG/DL (ref 65–99)
HCT VFR BLD AUTO: 42.4 % (ref 34.8–46.1)
HGB BLD-MCNC: 13.6 G/DL (ref 11.5–15.4)
IMM GRANULOCYTES # BLD AUTO: 0.04 THOUSAND/UL (ref 0–0.2)
IMM GRANULOCYTES NFR BLD AUTO: 1 % (ref 0–2)
LYMPHOCYTES # BLD AUTO: 1.2 THOUSANDS/ÂΜL (ref 0.6–4.47)
LYMPHOCYTES NFR BLD AUTO: 17 % (ref 14–44)
MCH RBC QN AUTO: 29.2 PG (ref 26.8–34.3)
MCHC RBC AUTO-ENTMCNC: 32.1 G/DL (ref 31.4–37.4)
MCV RBC AUTO: 91 FL (ref 82–98)
MONOCYTES # BLD AUTO: 0.47 THOUSAND/ÂΜL (ref 0.17–1.22)
MONOCYTES NFR BLD AUTO: 7 % (ref 4–12)
NEUTROPHILS # BLD AUTO: 5.12 THOUSANDS/ÂΜL (ref 1.85–7.62)
NEUTS SEG NFR BLD AUTO: 72 % (ref 43–75)
NRBC BLD AUTO-RTO: 0 /100 WBCS
PLATELET # BLD AUTO: 232 THOUSANDS/UL (ref 149–390)
PMV BLD AUTO: 10.8 FL (ref 8.9–12.7)
POTASSIUM SERPL-SCNC: 4.3 MMOL/L (ref 3.5–5.3)
RBC # BLD AUTO: 4.66 MILLION/UL (ref 3.81–5.12)
SODIUM SERPL-SCNC: 138 MMOL/L (ref 135–147)
TSH SERPL DL<=0.05 MIU/L-ACNC: 3.61 UIU/ML (ref 0.45–4.5)
WBC # BLD AUTO: 6.99 THOUSAND/UL (ref 4.31–10.16)

## 2023-02-14 ENCOUNTER — HOSPITAL ENCOUNTER (OUTPATIENT)
Dept: NON INVASIVE DIAGNOSTICS | Facility: CLINIC | Age: 48
Discharge: HOME | End: 2023-02-14

## 2023-02-14 DIAGNOSIS — R42 DIZZINESS: ICD-10-CM

## 2023-02-17 DIAGNOSIS — I65.23 BILATERAL CAROTID ARTERY STENOSIS: Primary | ICD-10-CM

## 2023-02-17 RX ORDER — ATORVASTATIN CALCIUM 20 MG/1
20 TABLET, FILM COATED ORAL DAILY
Qty: 90 TABLET | Refills: 1 | Status: SHIPPED | OUTPATIENT
Start: 2023-02-17

## 2023-02-17 RX ORDER — ASPIRIN 81 MG/1
81 TABLET ORAL DAILY
Qty: 90 TABLET | Refills: 1 | Status: SHIPPED | OUTPATIENT
Start: 2023-02-17

## 2023-02-23 ENCOUNTER — HOSPITAL ENCOUNTER (OUTPATIENT)
Dept: NON INVASIVE DIAGNOSTICS | Facility: CLINIC | Age: 48
Discharge: HOME | End: 2023-02-23

## 2023-02-23 VITALS
SYSTOLIC BLOOD PRESSURE: 108 MMHG | BODY MASS INDEX: 32.47 KG/M2 | HEIGHT: 66 IN | WEIGHT: 202 LBS | HEART RATE: 65 BPM | DIASTOLIC BLOOD PRESSURE: 73 MMHG

## 2023-02-23 DIAGNOSIS — R00.2 HEART PALPITATIONS: ICD-10-CM

## 2023-02-23 DIAGNOSIS — R42 DIZZINESS: ICD-10-CM

## 2023-02-23 LAB
AORTIC ROOT: 3 CM
APICAL FOUR CHAMBER EJECTION FRACTION: 62 %
E WAVE DECELERATION TIME: 239 MS
FRACTIONAL SHORTENING: 38 % (ref 28–44)
INTERVENTRICULAR SEPTUM IN DIASTOLE (PARASTERNAL SHORT AXIS VIEW): 0.8 CM
INTERVENTRICULAR SEPTUM: 0.8 CM (ref 0.6–1.1)
LAAS-AP2: 11 CM2
LAAS-AP4: 17.2 CM2
LEFT ATRIUM AREA SYSTOLE SINGLE PLANE A4C: 17.4 CM2
LEFT ATRIUM SIZE: 3.5 CM
LEFT INTERNAL DIMENSION IN SYSTOLE: 3 CM (ref 2.1–4)
LEFT VENTRICLE DIASTOLIC VOLUME (MOD BIPLANE): 95 ML
LEFT VENTRICLE SYSTOLIC VOLUME (MOD BIPLANE): 36 ML
LEFT VENTRICULAR INTERNAL DIMENSION IN DIASTOLE: 4.8 CM (ref 3.5–6)
LEFT VENTRICULAR POSTERIOR WALL IN END DIASTOLE: 0.9 CM
LEFT VENTRICULAR STROKE VOLUME: 71 ML
LV EF: 62 %
LVSV (TEICH): 71 ML
MV E'TISSUE VEL-SEP: 14 CM/S
MV PEAK A VEL: 0.51 M/S
MV PEAK E VEL: 58 CM/S
MV STENOSIS PRESSURE HALF TIME: 69 MS
MV VALVE AREA P 1/2 METHOD: 3.19 CM2
RIGHT VENTRICLE ID DIMENSION: 3.9 CM
SL CV LEFT ATRIUM LENGTH A2C: 3 CM
SL CV LV EF: 60
SL CV PED ECHO LEFT VENTRICLE DIASTOLIC VOLUME (MOD BIPLANE) 2D: 107 ML
SL CV PED ECHO LEFT VENTRICLE SYSTOLIC VOLUME (MOD BIPLANE) 2D: 36 ML
TRICUSPID ANNULAR PLANE SYSTOLIC EXCURSION: 3.1 CM

## 2023-05-05 ENCOUNTER — OFFICE VISIT (OUTPATIENT)
Dept: INTERNAL MEDICINE CLINIC | Facility: CLINIC | Age: 48
End: 2023-05-05

## 2023-05-05 VITALS
HEIGHT: 66 IN | WEIGHT: 200.4 LBS | BODY MASS INDEX: 32.21 KG/M2 | HEART RATE: 76 BPM | SYSTOLIC BLOOD PRESSURE: 107 MMHG | DIASTOLIC BLOOD PRESSURE: 70 MMHG | OXYGEN SATURATION: 98 % | TEMPERATURE: 98 F

## 2023-05-05 DIAGNOSIS — R00.2 PALPITATIONS: Primary | ICD-10-CM

## 2023-05-05 DIAGNOSIS — M79.7 FIBROMYALGIA: ICD-10-CM

## 2023-05-05 DIAGNOSIS — G44.85 STABBING HEADACHE: ICD-10-CM

## 2023-05-05 RX ORDER — INDOMETHACIN 25 MG/1
25 CAPSULE ORAL AS NEEDED
Qty: 15 CAPSULE | Refills: 3 | Status: SHIPPED | OUTPATIENT
Start: 2023-05-05

## 2023-05-05 NOTE — PROGRESS NOTES
[facility] Visit Note  Heriberto Carreon 53 y o  female   MRN: [id number]    Assessment and Plan      Diagnoses and all orders for this visit:    Palpitations  -     Ambulatory Referral to Cardiology; Future  · Patient with palpitations occurring roughly twice a week, can last for upwards of 4 minutes even when sitting and patient's apple watch indicates HRs to 150s  Recent 48hr holter monitor with no findings  Will refer to cardiology for possible zio patch  Stabbing headache  -     indomethacin (INDOCIN) 25 mg capsule; Take 1 capsule (25 mg total) by mouth if needed for mild pain (take one a day when you feel your first headache ) for up to 15 doses    · Patient with symptoms that describe a probable stabbing headache  Will try indomethacin once PRN on days where she feels symptoms  · Patient to follow up in 2 months to assess treatment  · Can consider neurology/headache specialist referral if symptoms continue  May need to consider MRI or Head CT to rule out structural head pathology  Patient's fibromyalgia may be contributing as well however patient does not tolerate medical therapy for her fibromyalgia  Fibromyalgia   · Patient currently on no medication for this  · In the past was prescribed Cymbalta and followed with rheum, however patient reports that she had suicidal ideation with the medication and has since refused it  · Has stopped following with Rheum due to refusing medical management  Schedule a follow-up appointment in 2 months for follow up Headaches and palpitations  Chief Complaint: My head hurts  Subjective     History of Present Illness:  Heriberto Carreon is a 53 y o  female with a past medical history of GERD, hypothyroidism due to Hashimoto's on levothyroxine, anxiety, fibromyalgia, generalized anxiety who presents to the clinic today for head pain      Head pain, 4-5 times a day for a few seconds and occurs 2-3 days per week  Not triggered by activity or weather or touch  No prodromal symptoms  Does have left eye blurriness and left eye excessive tears with these pains  Her occular symptoms will last minutes and then self resolve  Denies nausea/vomiting/photophobia    She was recently worked up for dizziness and echo which showed LVEF60% and normal heart function, Holter monitor which showed rare premature atrial contractions but otherwise normal,  and carotid ultrasound which showed <50% stenosis bilaterally  Palpiations  Occurs roughly once or twice a week  No clear triggers or associations  Has recently been occurring when sitting, can last for upwards of 4 minutes, associated with sweating  Per her Apple watch, her heart rate can increase to 155 during these episodes  Not associate with exertion  These palpitations do not trigger chest pain or shortness of breath or cough  Did have a Holter monitor done recently which had no findings, but patient states she did not have palpitations during her Holter monitor  =    Review of Systems   Constitutional: Negative for chills and fever  HENT: Negative for ear pain and sore throat  Eyes: Positive for visual disturbance (blurry vision assocated with head pain)  Negative for pain  Respiratory: Negative for cough, chest tightness and shortness of breath  Cardiovascular: Positive for palpitations  Negative for chest pain and leg swelling  Gastrointestinal: Negative for abdominal pain, constipation, diarrhea and vomiting  Genitourinary: Negative for dysuria and hematuria  Musculoskeletal: Negative for arthralgias and back pain  Skin: Negative for color change and rash  Neurological: Positive for headaches  Negative for dizziness, seizures, syncope, speech difficulty, weakness, light-headedness and numbness  All other systems reviewed and are negative          Current Outpatient Medications:     acetaminophen (TYLENOL) 325 mg tablet, Take 650 mg by mouth every 6 (six) hours as needed for mild pain, Disp: , Rfl:     albuterol (PROVENTIL HFA,VENTOLIN HFA) 90 mcg/act inhaler, INHALE 2 PUFFS BY MOUTH EVERY 6 HOURS AS NEEDED FOR WHEEZE, Disp: 6 7 g, Rfl: 3    aspirin (ECOTRIN LOW STRENGTH) 81 mg EC tablet, Take 1 tablet (81 mg total) by mouth daily, Disp: 90 tablet, Rfl: 1    atorvastatin (LIPITOR) 20 mg tablet, Take 1 tablet (20 mg total) by mouth daily, Disp: 90 tablet, Rfl: 1    Diclofenac Sodium (VOLTAREN) 1 %, APPLY 2 GRAMS TO AFFECTED AREA 4 TIMES A DAY, Disp: 200 g, Rfl: 1    famotidine (PEPCID) 20 mg tablet, TAKE 1 TABLET BY MOUTH TWICE A DAY, Disp: 180 tablet, Rfl: 2    indomethacin (INDOCIN) 25 mg capsule, Take 1 capsule (25 mg total) by mouth if needed for mild pain (take one a day when you feel your first headache ) for up to 15 doses, Disp: 15 capsule, Rfl: 3    ipratropium (ATROVENT) 0 03 % nasal spray, 2 sprays into each nostril every 12 (twelve) hours, Disp: 30 mL, Rfl: 2    levothyroxine (Synthroid) 100 mcg tablet, Take 1 tablet (100 mcg total) by mouth daily in the early morning, Disp: 90 tablet, Rfl: 3    loratadine (CLARITIN) 10 mg tablet, TAKE 1 TABLET BY MOUTH EVERY DAY, Disp: 90 tablet, Rfl: 3  Past Medical History:   Diagnosis Date    Abdominal pain     Allergic drug reaction 9/15/2021    Anemia     Anxiety     Asthma     Back pain     Bilateral fibrocystic breast changes     resolved: 02/21/2017    Breast disorder     Breast pain, left 1/26/2022    Chest pain     Chronic pelvic pain in female     last assessed: 09/07/2016    Cluster headaches     Colon cancer screening 5/12/2021    Constipation     Cough     Depression     Difficulty concentrating     Disease of thyroid gland     Dizziness     Dyspareunia in female     last assessed: 08/18/2016    Easy bruising     Epigastric abdominal pain 7/22/2020    Fainting episodes     Fatigue     Fever due to infection     Fibromyalgia     Frequent urination at night     Gallbladder disease     Herniated intervertebral disc of lumbar spine     Hypotension     Loss of bladder control     Mastalgia in female 2021    Memory loss     last assessed: 06/15/2015    Mid back pain 3/19/2021    Mixed incontinence     last assessed: 2016    Myofascial pain     last assessed: 2016    Numbness and tingling     Painful swelling of joint     Palpitations     Panic attack     Sciatica     last assessed: 06/15/2015    Situational anxiety     last assessed: 2017    SOB (shortness of breath)     Thyroid disease     Uterus, adenomyosis     last assessed: 2016    Weakness     Weight disorder      Past Surgical History:   Procedure Laterality Date    CHOLECYSTECTOMY      CHOLECYSTECTOMY LAPAROSCOPIC N/A 10/22/2017    Procedure: CHOLECYSTECTOMY LAPAROSCOPIC;  Surgeon: Segundo Madison MD;  [facility];  Service: General    COLONOSCOPY  2021    ENDOMETRIAL BIOPSY      resolved: 2017    HYSTERECTOMY  2016    OR VAGINAL HYSTERECTOMY UTERUS 250 GM/< N/A 2018    Procedure: HYSTERECTOMY VAGINAL TOTAL (TVH), BILATERAL SALPINGECTOMY;  Surgeon: Annel Kowalski MD;  [facility];  Service: Gynecology    TUBAL LIGATION      last assessed: 10/20/2014    UPPER GASTROINTESTINAL ENDOSCOPY  2020     Social History     Socioeconomic History    Marital status: /Civil Union     Spouse name: Not on file    Number of children: 3    Years of education: Not on file    Highest education level: Not on file   Occupational History    Occupation: Full-time   Tobacco Use    Smoking status: Former     Packs/day: 0 50     Years: 2 00     Pack years: 1 00     Types: Cigarettes     Start date:      Quit date:      Years since quittin 3    Smokeless tobacco: Never   Vaping Use    Vaping Use: Never used   Substance and Sexual Activity    Alcohol use: Yes     Comment: occasional    Drug use: Never    Sexual activity: Yes     Partners: Male     Birth control/protection: Female Sterilization, Surgical   Other Topics Concern    Not on file   Social History Narrative    Daily caffeine consumption    Domestic partner    Parent     Social Determinants of Health     Financial Resource Strain: Low Risk     Difficulty of Paying Living Expenses: Not hard at all   Food Insecurity: No Food Insecurity    Worried About Running Out of Food in the Last Year: Never true    Sharita of Food in the Last Year: Never true   Transportation Needs: No Transportation Needs    Lack of Transportation (Medical): No    Lack of Transportation (Non-Medical):  No   Physical Activity: Not on file   Stress: Not on file   Social Connections: Not on file   Intimate Partner Violence: Not on file   Housing Stability: Low Risk     Unable to Pay for Housing in the Last Year: No    Number of Places Lived in the Last Year: 1    Unstable Housing in the Last Year: No     Family History   Problem Relation Age of Onset    Hypertension Mother    Emeli Mg Arthritis Mother     Diabetes Maternal Grandmother     Breast cancer Family     Cancer Family     Diabetes Family     Heart disease Family     Hyperlipidemia Family         reported cholesterol level was high    Hypertension Other     Hyperlipidemia Father     No Known Problems Maternal Grandfather     No Known Problems Sister     No Known Problems Daughter     Breast cancer Paternal Grandmother 28    No Known Problems Paternal Grandfather     Leukemia Paternal Aunt     No Known Problems Sister     No Known Problems Sister     No Known Problems Sister     No Known Problems Sister     No Known Problems Daughter     No Known Problems Paternal Aunt     No Known Problems Son     No Known Problems Son     No Known Problems Brother     No Known Problems Brother      Allergies   Allergen Reactions    Morphine Chest Pain    Iv Contrast [Iodinated Contrast Media] Hives       Objective     Vitals: "05/05/23 0858   BP: 107/70   BP Location: Left arm   Patient Position: Sitting   Cuff Size: Large   Pulse: 76   Temp: 98 °F (36 7 °C)   TempSrc: Temporal   SpO2: 98%   Weight: 90 9 kg (200 lb 6 4 oz)   Height: 5' 6\" (1 676 m)       Physical exam:     GENERAL: NAD  HEENT:  NC/AT, PERRL, EOMI, no scleral icterus  CARDIAC:  RRR, +S1/S2, no S3/S4 heard, no m/g/r  PULMONARY:  CTA B/L, no wheezing/rales/rhonci, non-labored breathing  ABDOMEN:  Soft, NT/ND,no rebound/guarding/rigidity  Extremities:  No edema, cyanosis, or clubbing  NEUROLOGIC: Grossly intact  SKIN:  No rashes or erythema noted on exposed skin  Psych: Normal affect      Freda Mitchell MD   PGY-2 Internal Medicine  [place]    ==  PLEASE NOTE:  This encounter was completed utilizing the M- Modal/Fluency Direct Speech Voice Recognition Software  Grammatical errors, random word insertions, pronoun errors and incomplete sentences are occasional consequences of the system due to software limitations, ambient noise and hardware issues  These may be missed by proof reading prior to affixing electronic signature  Any questions or concerns about the content, text or information contained within the body of this dictation should be directly addressed to the physician for clarification  Please do not hesitate to call me directly if you have any any questions or concerns    "

## 2023-05-07 DIAGNOSIS — L50.9 HIVES OF UNKNOWN ORIGIN: ICD-10-CM

## 2023-05-08 RX ORDER — LORATADINE 10 MG/1
TABLET ORAL
Qty: 90 TABLET | Refills: 3 | Status: SHIPPED | OUTPATIENT
Start: 2023-05-08

## 2023-08-13 DIAGNOSIS — E06.3 HYPOTHYROIDISM DUE TO HASHIMOTO'S THYROIDITIS: ICD-10-CM

## 2023-08-13 DIAGNOSIS — E03.8 HYPOTHYROIDISM DUE TO HASHIMOTO'S THYROIDITIS: ICD-10-CM

## 2023-08-14 RX ORDER — LEVOTHYROXINE SODIUM 0.1 MG/1
100 TABLET ORAL
Qty: 90 TABLET | Refills: 3 | Status: SHIPPED | OUTPATIENT
Start: 2023-08-14

## 2023-09-10 DIAGNOSIS — I65.23 BILATERAL CAROTID ARTERY STENOSIS: ICD-10-CM

## 2023-09-11 RX ORDER — ASPIRIN 81 MG/1
81 TABLET, COATED ORAL DAILY
Qty: 90 TABLET | Refills: 3 | Status: SHIPPED | OUTPATIENT
Start: 2023-09-11

## 2023-09-11 RX ORDER — ATORVASTATIN CALCIUM 20 MG/1
20 TABLET, FILM COATED ORAL DAILY
Qty: 90 TABLET | Refills: 3 | Status: SHIPPED | OUTPATIENT
Start: 2023-09-11

## 2023-09-17 ENCOUNTER — HOSPITAL ENCOUNTER (EMERGENCY)
Facility: HOSPITAL | Age: 48
Discharge: HOME | End: 2023-09-17
Attending: EMERGENCY MEDICINE
Payer: COMMERCIAL

## 2023-09-17 ENCOUNTER — APPOINTMENT (EMERGENCY)
Dept: RADIOLOGY | Facility: HOSPITAL | Age: 48
End: 2023-09-17
Payer: COMMERCIAL

## 2023-09-17 VITALS
TEMPERATURE: 97.5 F | DIASTOLIC BLOOD PRESSURE: 57 MMHG | HEART RATE: 88 BPM | SYSTOLIC BLOOD PRESSURE: 131 MMHG | RESPIRATION RATE: 16 BRPM | OXYGEN SATURATION: 96 %

## 2023-09-17 DIAGNOSIS — S93.409A ANKLE SPRAIN: Primary | ICD-10-CM

## 2023-09-17 PROCEDURE — 73630 X-RAY EXAM OF FOOT: CPT

## 2023-09-17 PROCEDURE — 99284 EMERGENCY DEPT VISIT MOD MDM: CPT | Performed by: EMERGENCY MEDICINE

## 2023-09-17 PROCEDURE — 99284 EMERGENCY DEPT VISIT MOD MDM: CPT

## 2023-09-17 PROCEDURE — 73610 X-RAY EXAM OF ANKLE: CPT

## 2023-09-17 RX ORDER — IBUPROFEN 400 MG/1
400 TABLET ORAL ONCE
Status: COMPLETED | OUTPATIENT
Start: 2023-09-17 | End: 2023-09-17

## 2023-09-17 RX ADMIN — IBUPROFEN 400 MG: 400 TABLET, FILM COATED ORAL at 15:06

## 2023-09-17 NOTE — ED PROVIDER NOTES
History  Chief Complaint   Patient presents with   • Ankle Injury     Left ankle, pt tripped over box and rolled ankle. No fall, grabbed onto chair. 78-year-old female is presenting with left ankle pain after inversion type injury approximately 20 minutes prior to arrival.  Patient states she was walking out towards her porch to grab a package whenever she excellently stepped on the corner of the package causing her to invert her left foot underneath her left lower leg. She did feel an immediate pop/snap and then grabbed onto a chair which was next to her outside. She states that since the incident she has not been able to bear weight on the left lower extremity. She denies any prior injuries to the left foot or ankle. She has never required orthopedic surgery anywhere in the past.  She denies any history of osteoporosis or concerns for easy fractures. She did take a Tylenol this afternoon but has not taken any Motrin. Prior to Admission Medications   Prescriptions Last Dose Informant Patient Reported? Taking?    Aspirin Low Dose 81 MG EC tablet   No No   Sig: TAKE 1 TABLET BY MOUTH EVERY DAY   Diclofenac Sodium (VOLTAREN) 1 %   No No   Sig: APPLY 2 GRAMS TO AFFECTED AREA 4 TIMES A DAY   acetaminophen (TYLENOL) 325 mg tablet  Self Yes No   Sig: Take 650 mg by mouth every 6 (six) hours as needed for mild pain   albuterol (PROVENTIL HFA,VENTOLIN HFA) 90 mcg/act inhaler   No No   Sig: INHALE 2 PUFFS BY MOUTH EVERY 6 HOURS AS NEEDED FOR WHEEZE   atorvastatin (LIPITOR) 20 mg tablet   No No   Sig: TAKE 1 TABLET BY MOUTH EVERY DAY   famotidine (PEPCID) 20 mg tablet   No No   Sig: TAKE 1 TABLET BY MOUTH TWICE A DAY   indomethacin (INDOCIN) 25 mg capsule   No No   Sig: Take 1 capsule (25 mg total) by mouth if needed for mild pain (take one a day when you feel your first headache.) for up to 15 doses   ipratropium (ATROVENT) 0.03 % nasal spray  Self No No   Si sprays into each nostril every 12 (twelve) hours   levothyroxine 100 mcg tablet   No No   Sig: TAKE 1 TABLET (100 MCG TOTAL) BY MOUTH DAILY IN THE EARLY MORNING   loratadine (CLARITIN) 10 mg tablet   No No   Sig: TAKE 1 TABLET BY MOUTH EVERY DAY      Facility-Administered Medications: None       Past Medical History:   Diagnosis Date   • Abdominal pain    • Allergic drug reaction 9/15/2021   • Anemia    • Anxiety    • Asthma    • Back pain    • Bilateral fibrocystic breast changes     resolved: 02/21/2017   • Breast disorder    • Breast pain, left 1/26/2022   • Chest pain    • Chronic pelvic pain in female     last assessed: 09/07/2016   • Cluster headaches    • Colon cancer screening 5/12/2021   • Constipation    • Cough    • Depression    • Difficulty concentrating    • Disease of thyroid gland    • Dizziness    • Dyspareunia in female     last assessed: 08/18/2016   • Easy bruising    • Epigastric abdominal pain 7/22/2020   • Fainting episodes    • Fatigue    • Fever due to infection    • Fibromyalgia    • Frequent urination at night    • Gallbladder disease    • Herniated intervertebral disc of lumbar spine    • Hypotension    • Loss of bladder control    • Mastalgia in female 5/24/2021   • Memory loss     last assessed: 06/15/2015   • Mid back pain 3/19/2021   • Mixed incontinence     last assessed: 01/11/2016   • Myofascial pain     last assessed: 06/01/2016   • Numbness and tingling    • Painful swelling of joint    • Palpitations    • Panic attack    • Sciatica     last assessed: 06/15/2015   • Situational anxiety     last assessed: 02/21/2017   • SOB (shortness of breath)    • Thyroid disease    • Uterus, adenomyosis     last assessed: 09/07/2016   • Weakness    • Weight disorder        Past Surgical History:   Procedure Laterality Date   • CHOLECYSTECTOMY     • CHOLECYSTECTOMY LAPAROSCOPIC N/A 10/22/2017    Procedure: CHOLECYSTECTOMY LAPAROSCOPIC;  Surgeon: Oralia Mckenzie MD;  [facility];  Service: General   • COLONOSCOPY  06/03/2021   • ENDOMETRIAL BIOPSY      resolved: 2017   • HYSTERECTOMY  2016   • NC VAGINAL HYSTERECTOMY UTERUS 250 GM/< N/A 2018    Procedure: HYSTERECTOMY VAGINAL TOTAL (TVH), BILATERAL SALPINGECTOMY;  Surgeon: Iban Vargas MD;  [facility];  Service: Gynecology   • TUBAL LIGATION      last assessed: 10/20/2014   • UPPER GASTROINTESTINAL ENDOSCOPY  2020       Family History   Problem Relation Age of Onset   • Hypertension Mother    • Arthritis Mother    • Diabetes Maternal Grandmother    • Breast cancer Family    • Cancer Family    • Diabetes Family    • Heart disease Family    • Hyperlipidemia Family         reported cholesterol level was high   • Hypertension Other    • Hyperlipidemia Father    • No Known Problems Maternal Grandfather    • No Known Problems Sister    • No Known Problems Daughter    • Breast cancer Paternal Grandmother 28   • No Known Problems Paternal Grandfather    • Leukemia Paternal Aunt    • No Known Problems Sister    • No Known Problems Sister    • No Known Problems Sister    • No Known Problems Sister    • No Known Problems Daughter    • No Known Problems Paternal Aunt    • No Known Problems Son    • No Known Problems Son    • No Known Problems Brother    • No Known Problems Brother      I have reviewed and agree with the history as documented. E-Cigarette/Vaping   • E-Cigarette Use Never User      E-Cigarette/Vaping Substances   • Nicotine No    • THC No    • CBD No    • Flavoring No    • Other No    • Unknown No      Social History     Tobacco Use   • Smoking status: Former     Packs/day: 0.50     Years: 2.00     Total pack years: 1.00     Types: Cigarettes     Start date:      Quit date:      Years since quittin.7   • Smokeless tobacco: Never   Vaping Use   • Vaping Use: Never used   Substance Use Topics   • Alcohol use: Yes     Comment: occasional   • Drug use: Never        Review of Systems   Constitutional: Negative for chills and fever.    HENT: Negative for ear pain and sore throat. Eyes: Negative for pain and visual disturbance. Respiratory: Negative for cough and shortness of breath. Cardiovascular: Negative for chest pain and palpitations. Gastrointestinal: Negative for abdominal pain and vomiting. Genitourinary: Negative for dysuria and hematuria. Musculoskeletal: Positive for arthralgias, gait problem and joint swelling. Negative for back pain. Skin: Negative for color change and rash. Neurological: Negative for seizures and syncope. All other systems reviewed and are negative. Physical Exam  ED Triage Vitals [09/17/23 1412]   Temperature Pulse Respirations Blood Pressure SpO2   97.5 °F (36.4 °C) 88 16 131/57 96 %      Temp Source Heart Rate Source Patient Position - Orthostatic VS BP Location FiO2 (%)   Temporal Monitor Sitting Left arm --      Pain Score       6             Orthostatic Vital Signs  Vitals:    09/17/23 1412   BP: 131/57   Pulse: 88   Patient Position - Orthostatic VS: Sitting       Physical Exam  Vitals and nursing note reviewed. Constitutional:       General: She is not in acute distress. Appearance: She is well-developed and normal weight. HENT:      Head: Normocephalic and atraumatic. Right Ear: External ear normal.      Left Ear: External ear normal.      Nose: Nose normal. No congestion or rhinorrhea. Mouth/Throat:      Mouth: Mucous membranes are moist.      Pharynx: Oropharynx is clear. No oropharyngeal exudate or posterior oropharyngeal erythema. Eyes:      General: No scleral icterus. Extraocular Movements: Extraocular movements intact. Conjunctiva/sclera: Conjunctivae normal.      Pupils: Pupils are equal, round, and reactive to light. Cardiovascular:      Rate and Rhythm: Normal rate and regular rhythm. Pulses: Normal pulses. Heart sounds: Normal heart sounds. No murmur heard. Pulmonary:      Effort: Pulmonary effort is normal. No respiratory distress. Breath sounds: Normal breath sounds. No wheezing or rhonchi. Abdominal:      General: Abdomen is flat. There is no distension. Palpations: Abdomen is soft. Tenderness: There is no abdominal tenderness. There is no guarding. Musculoskeletal:         General: No swelling. Cervical back: Neck supple. No rigidity. Right lower leg: No edema. Left lower leg: No edema. Right ankle: No tenderness. Left ankle: Tenderness present over the AITF ligament and base of 5th metatarsal.   Lymphadenopathy:      Cervical: No cervical adenopathy. Skin:     General: Skin is warm and dry. Capillary Refill: Capillary refill takes less than 2 seconds. Coloration: Skin is not jaundiced. Findings: No rash. Neurological:      General: No focal deficit present. Mental Status: She is alert and oriented to person, place, and time. Mental status is at baseline. Psychiatric:         Mood and Affect: Mood normal.         Behavior: Behavior normal.         ED Medications  Medications   ibuprofen (MOTRIN) tablet 400 mg (400 mg Oral Given 9/17/23 1506)       Diagnostic Studies  Results Reviewed     None                 XR ankle 3+ views LEFT    (Results Pending)   XR foot 3+ views LEFT    (Results Pending)         Procedures  Splint application    Date/Time: 9/17/2023 3:50 PM    Performed by: Ignacio Miller MD  Authorized by: Ignacio Miller MD  Universal Protocol:  Consent: Verbal consent not obtained. Consent given by: patient  Patient understanding: patient states understanding of the procedure being performed  Patient consent: the patient's understanding of the procedure matches consent given  Site marked: the operative site was marked    Pre-procedure details:     Sensation:  Normal  Procedure details:     Laterality:  Left    Location:  Ankle    Ankle:  L ankleCast type:  Short leg      Splint type: Aircast stirrup.   Post-procedure details:     Pain:  Unchanged    Sensation: Normal    Patient tolerance of procedure: Tolerated well, no immediate complications          ED Course                                       Medical Decision Making  DDx: Fracture versus sprain, will image foot and ankle to rule out fifth metatarsal/dancers/kerr/psuedojones fracture. Will likely need some type of support such as splint or stirrup, will need follow-up with either podiatry or orthopedics depending on imaging. We will treat pain empirically for now with NSAIDs to alleviate swelling. Patient already took Tylenol. Ice over area. Reassessment/disposition: Patient does not have obvious fracture on x-ray. Placed her and stirrup and crutches given. Patient was given referral to orthopedic surgery. Made weightbearing as tolerated. Told to use rest, ice, compression, elevation, and NSAIDs as needed tonight for pain relief as well as relief of her swelling. Patient was observed using her crutches and then was given wheelchair to get out to her car today. Expressed understanding of the follow-up plan. Was given work note for light duty and reduced mobility of her left ankle. Amount and/or Complexity of Data Reviewed  Radiology: ordered. Risk  Prescription drug management. Disposition  Final diagnoses: Ankle sprain     Time reflects when diagnosis was documented in both MDM as applicable and the Disposition within this note     Time User Action Codes Description Comment    9/17/2023  3:40 PM Adriana Sanders Add [C86.397O] Ankle sprain       ED Disposition     ED Disposition   Discharge    Condition   Stable    Date/Time   Sun Sep 17, 2023  3:44 PM    Comment   Kristina Presume discharge to home/self care.                Follow-up Information     Follow up With Specialties Details Why Contact Info Additional [address] [facility] Emergency Department Emergency Medicine Go to  If symptoms worsen, As needed [address] [id number]  [facility] Emergency Department, [address], [place], [facility] Orthopedic Surgery In 1 week  [address] [id number] [phone] [facility], [address] [place] [address], [address] [place] [address]  Use Entrance A           Patient's Medications   Discharge Prescriptions    No medications on file         PDMP Review     None           ED Provider  Attending physically available and evaluated Diazjazmyn Crews. I managed the patient along with the ED Attending.     Electronically Signed by         Lilia Recio MD  09/17/23 9630

## 2023-09-17 NOTE — Clinical Note
Tiffanie Trotter was seen and treated in our emergency department on 9/17/2023. No work until cleared by Family Doctor/Orthopedics    Left ankle immobility    Diagnosis:     Randa  . She may return on this date: 09/18/2023         If you have any questions or concerns, please don't hesitate to call.       Missael Wong MD    ______________________________           _______________          _______________  Hospital Representative                              Date                                Time

## 2023-09-17 NOTE — ED ATTENDING ATTESTATION
9/17/2023  I, Tarun Henley MD, saw and evaluated the patient. I have discussed the patient with the resident/non-physician practitioner and agree with the resident's/non-physician practitioner's findings, Plan of Care, and MDM as documented in the resident's/non-physician practitioner's note, except where noted. All available labs and Radiology studies were reviewed. I was present for key portions of any procedure(s) performed by the resident/non-physician practitioner and I was immediately available to provide assistance. At this point I agree with the current assessment done in the Emergency Department. I have conducted an independent evaluation of this patient a history and physical is as follows:    60-year-old woman presenting with inversion injury of the left ankle. She is awake and alert no acute distress. There is tenderness over the left AITF ligament and base of 5th metatarsal.  2+ DP pulse, distal motor and sensory intact. XR done. Will place air splint, instruct on symptomatic care and follow-up.     ED Course         Critical Care Time  Procedures

## 2023-09-17 NOTE — DISCHARGE INSTRUCTIONS
I do not appreciate a fracture on your x-rays. I am placing you in a stirrup splint and sending you to orthopedic surgery. Please see attached note for your work. In the meantime please continue to use Motrin, Tylenol, ice, rest, compression, and elevation for the next 24 to 48 hours for relief of the swelling and pain. With this type of injury you can bear weight if tolerated. If using crutches, please do not try and climb or descend stairs or hills. Please take time with any type of transition between your crutches and sitting position. Please see orthopedics for follow-up and clearance to return to work.

## 2023-09-20 ENCOUNTER — OFFICE VISIT (OUTPATIENT)
Dept: OBGYN CLINIC | Facility: CLINIC | Age: 48
End: 2023-09-20
Payer: COMMERCIAL

## 2023-09-20 VITALS
BODY MASS INDEX: 32.17 KG/M2 | SYSTOLIC BLOOD PRESSURE: 105 MMHG | HEIGHT: 66 IN | OXYGEN SATURATION: 95 % | WEIGHT: 200.2 LBS | DIASTOLIC BLOOD PRESSURE: 75 MMHG | HEART RATE: 98 BPM

## 2023-09-20 DIAGNOSIS — S93.602A SPRAIN OF LEFT FOOT, INITIAL ENCOUNTER: Primary | ICD-10-CM

## 2023-09-20 DIAGNOSIS — S93.409A ANKLE SPRAIN: ICD-10-CM

## 2023-09-20 PROCEDURE — 99203 OFFICE O/P NEW LOW 30 MIN: CPT | Performed by: PHYSICIAN ASSISTANT

## 2023-09-20 NOTE — PROGRESS NOTES
Assessment/Plan   Diagnoses and all orders for this visit:      Left ankle sprain, ATF    Sprain of left foot, initial encounter    - Concern for fracture base 4th, cuboid  - CT foot  - CAM boot  - Will hold off on PT until CT confirms fracture status  - Ice as needed  - Follow up with Dr. Mario Mosquera   Patient ID: Geoff Almeida is a 53 y.o. female. Vitals:    09/20/23 1014   BP: 105/75   Pulse: 98   SpO2: 95%     49yo female comes in for an evaluation of her left ankle. She was injured on 9/17/23 when she stepped on a package on her porch, turned her foot/ankle, and fell. Since then, she has been having lateral dorso/lateral midfoot pain. This increases with attempted weightbearing and she is still having difficulty walking. Xrays in the ED were negative for fracture. The pain is dull in character, mild in severity, pain does not radiate and is not associated with numbness.         The following portions of the patient's history were reviewed and updated as appropriate: allergies, current medications, past family history, past medical history, past social history, past surgical history and problem list.    Review of Systems  Ortho Exam  Past Medical History:   Diagnosis Date   • Abdominal pain    • Allergic drug reaction 9/15/2021   • Anemia    • Anxiety    • Asthma    • Back pain    • Bilateral fibrocystic breast changes     resolved: 02/21/2017   • Breast disorder    • Breast pain, left 1/26/2022   • Chest pain    • Chronic pelvic pain in female     last assessed: 09/07/2016   • Cluster headaches    • Colon cancer screening 5/12/2021   • Constipation    • Cough    • Depression    • Difficulty concentrating    • Disease of thyroid gland    • Dizziness    • Dyspareunia in female     last assessed: 08/18/2016   • Easy bruising    • Epigastric abdominal pain 7/22/2020   • Fainting episodes    • Fatigue    • Fever due to infection    • Fibromyalgia    • Frequent urination at night    • Gallbladder disease    • Herniated intervertebral disc of lumbar spine    • Hypotension    • Loss of bladder control    • Mastalgia in female 5/24/2021   • Memory loss     last assessed: 06/15/2015   • Mid back pain 3/19/2021   • Mixed incontinence     last assessed: 01/11/2016   • Myofascial pain     last assessed: 06/01/2016   • Numbness and tingling    • Painful swelling of joint    • Palpitations    • Panic attack    • Sciatica     last assessed: 06/15/2015   • Situational anxiety     last assessed: 02/21/2017   • SOB (shortness of breath)    • Thyroid disease    • Uterus, adenomyosis     last assessed: 09/07/2016   • Weakness    • Weight disorder      Past Surgical History:   Procedure Laterality Date   • CHOLECYSTECTOMY     • CHOLECYSTECTOMY LAPAROSCOPIC N/A 10/22/2017    Procedure: CHOLECYSTECTOMY LAPAROSCOPIC;  Surgeon: Hugo Ro MD;  [facility];  Service: General   • COLONOSCOPY  06/03/2021   • ENDOMETRIAL BIOPSY      resolved: 02/21/2017   • HYSTERECTOMY  2016   • [place] VAGINAL HYSTERECTOMY UTERUS 250 GM/< N/A 4/20/2018    Procedure: HYSTERECTOMY VAGINAL TOTAL (TVH), BILATERAL SALPINGECTOMY;  Surgeon: Sav Pantoja MD;  [facility];  Service: Gynecology   • TUBAL LIGATION      last assessed: 10/20/2014   • UPPER GASTROINTESTINAL ENDOSCOPY  08/03/2020     Family History   Problem Relation Age of Onset   • Hypertension Mother    • Arthritis Mother    • Diabetes Maternal Grandmother    • Breast cancer Family    • Cancer Family    • Diabetes Family    • Heart disease Family    • Hyperlipidemia Family         reported cholesterol level was high   • Hypertension Other    • Hyperlipidemia Father    • No Known Problems Maternal Grandfather    • No Known Problems Sister    • No Known Problems Daughter    • Breast cancer Paternal Grandmother 28   • No Known Problems Paternal Grandfather    • Leukemia Paternal Aunt    • No Known Problems Sister    • No Known Problems Sister    • No Known Problems Sister    • No Known Problems Sister    • No Known Problems Daughter    • No Known Problems Paternal Aunt    • No Known Problems Son    • No Known Problems Son    • No Known Problems Brother    • No Known Problems Brother      Social History     Occupational History   • Occupation: Full-time   Tobacco Use   • Smoking status: Former     Packs/day: 0.50     Years: 2.00     Total pack years: 1.00     Types: Cigarettes     Start date:      Quit date:      Years since quittin.7   • Smokeless tobacco: Never   Vaping Use   • Vaping Use: Never used   Substance and Sexual Activity   • Alcohol use: Yes     Comment: occasional   • Drug use: Never   • Sexual activity: Yes     Partners: Male     Birth control/protection: Female Sterilization, Surgical       Review of Systems   Constitutional: Negative. HENT: Negative. Eyes: Negative. Respiratory: Negative. Cardiovascular: Negative. Gastrointestinal: Negative. Endocrine: Negative. Genitourinary: Negative. Musculoskeletal: As below. .   Allergic/Immunologic: Negative. Neurological: Negative. Hematological: Negative. Psychiatric/Behavioral: Negative. Objective   Physical Exam        I have personally reviewed pertinent films in PACS and my interpretation is no acute displaced fracture on xray. · Constitutional: Awake, Alert, Oriented  · Eyes: EOMI  · Psych: Mood and affect appropriate  · Heart: regular rate   · Lungs: No audible wheezing  · Abdomen: No guarding  · Lymph: no lymphedema            • left ankle, foot:  - Appearance  • Foot: There is mild ecchymosis over the dorsolateral midfoot  • Ankle:No swelling, discoloration, deformity, or ecchymosis  - Palpation  • Sharp tenderness over the dorsal cuboid, dorsal 3rd-4th-5th MT base  • Mild tenderness ATF  • Nontender medial ankle, achilles, calcaneus, forefoot, prox tib/fib  - ROM  • Full, pain-free, active ROM of the ankle.   + pain with midfoot motion  - Special Tests  • Negative anterior drawer  - Motor  • normal 5/5 in all planes  - NVI distally

## 2023-09-20 NOTE — LETTER
September 20, 2023     Patient: Joseluis Frazier  YOB: 1975  Date of Visit: 9/20/2023      To Whom it May Concern:    Joseluis Frazier is under my professional care. Norman Gonzalez was seen in my office on 9/20/2023. Norman Gonzalez may return to work with limitations sedentary (sitting) duty with occasional walking/standing. If work is unable to accommodate this then remain out until the follow up visit in 1-2 weeks . If you have any questions or concerns, please don't hesitate to call.          Sincerely,          Dean Castillo PA-C        CC: No Recipients

## 2023-09-25 ENCOUNTER — TELEPHONE (OUTPATIENT)
Age: 48
End: 2023-09-25

## 2023-09-25 ENCOUNTER — TELEPHONE (OUTPATIENT)
Dept: OBGYN CLINIC | Facility: MEDICAL CENTER | Age: 48
End: 2023-09-25

## 2023-09-25 ENCOUNTER — TELEPHONE (OUTPATIENT)
Dept: OBGYN CLINIC | Facility: HOSPITAL | Age: 48
End: 2023-09-25

## 2023-09-25 NOTE — TELEPHONE ENCOUNTER
Caller: Patient    Doctor/Office: Abarca    Call regarding :  Needs appt     Call was transferred to:  Pod

## 2023-09-25 NOTE — TELEPHONE ENCOUNTER
Caller:Randa     Doctor: Johnsie Epley     Reason for call: The patients MRI was not approved so she would like a note to go back to work asap. Pt states her left foot is fine. [facility]   Thank you     Call back#: [phone]

## 2023-09-25 NOTE — TELEPHONE ENCOUNTER
Caller: patient    Doctor: Yoel Cid    Reason for call: patient called in stating they would like a note to return to work tomorrow 9/26. They are not going to see podiatry due to insurance denying CT scan. Patient states they are walking on it and not wearing a boot or wrapping it.     Patient would like note placed into [employer]    Call back#: [phone]

## 2023-09-25 NOTE — TELEPHONE ENCOUNTER
Caller: Patient     Doctor/Office: Prashanth Mode    Call regarding :  SPA     Call was transferred to: [facility]

## 2023-09-26 ENCOUNTER — TELEPHONE (OUTPATIENT)
Dept: OBGYN CLINIC | Facility: CLINIC | Age: 48
End: 2023-09-26

## 2023-09-26 NOTE — TELEPHONE ENCOUNTER
PT stopped in requesting updated return to work letter. I saw letter dated for 9/20, patient called to request an updated letter, and hasn't received it yet. Aurora Ronquillo said that Amanda Gross is out of the office, and will provide a letter tomorrow, and if an in person visit is required; will let her know.

## 2023-09-26 NOTE — TELEPHONE ENCOUNTER
Caller: Patient- [facility]    Doctor: Dulce Maria GRAY     Reason for call: Patient is calling in stating that she has still not received her work note that she is able to return back to work full duty. She was contacted yesterday by Podiatry who advised her since the MRI has been denied by her insurance that Dr Tyrone Davies would not need to see that patient since she is not able to get this done. And that Dulce Maria GRAY would need to release her to go back to work. Please reach out to patient relating this if any issues as she is wanting to get back to work.     Call back#: [phone]

## 2023-09-27 ENCOUNTER — TELEPHONE (OUTPATIENT)
Dept: OBGYN CLINIC | Facility: CLINIC | Age: 48
End: 2023-09-27

## 2023-09-27 NOTE — TELEPHONE ENCOUNTER
Caller: patient   Doctor: Juan Zepeda    Reason for call: calling for updated work note     Call back#: [phone]

## 2023-09-27 NOTE — TELEPHONE ENCOUNTER
LMOM that updated return to work letter is ready for pickup - or online with [facility].

## 2024-02-06 ENCOUNTER — OFFICE VISIT (OUTPATIENT)
Dept: INTERNAL MEDICINE CLINIC | Facility: CLINIC | Age: 49
End: 2024-02-06

## 2024-02-06 VITALS
BODY MASS INDEX: 32.47 KG/M2 | HEIGHT: 66 IN | HEART RATE: 76 BPM | TEMPERATURE: 97.9 F | SYSTOLIC BLOOD PRESSURE: 109 MMHG | DIASTOLIC BLOOD PRESSURE: 73 MMHG | WEIGHT: 202 LBS

## 2024-02-06 DIAGNOSIS — I65.23 BILATERAL CAROTID ARTERY STENOSIS: Primary | ICD-10-CM

## 2024-02-06 DIAGNOSIS — E03.8 HYPOTHYROIDISM DUE TO HASHIMOTO'S THYROIDITIS: ICD-10-CM

## 2024-02-06 DIAGNOSIS — E06.3 HYPOTHYROIDISM DUE TO HASHIMOTO'S THYROIDITIS: ICD-10-CM

## 2024-02-06 DIAGNOSIS — R09.81 NASAL CONGESTION: ICD-10-CM

## 2024-02-06 DIAGNOSIS — J45.20 MILD INTERMITTENT ASTHMA WITHOUT COMPLICATION: ICD-10-CM

## 2024-02-06 DIAGNOSIS — M92.8 CALCANEAL APOPHYSITIS: ICD-10-CM

## 2024-02-06 PROCEDURE — 99213 OFFICE O/P EST LOW 20 MIN: CPT | Performed by: INTERNAL MEDICINE

## 2024-02-06 RX ORDER — ALBUTEROL SULFATE 90 UG/1
1 AEROSOL, METERED RESPIRATORY (INHALATION) EVERY 4 HOURS PRN
Qty: 6.7 G | Refills: 3 | Status: SHIPPED | OUTPATIENT
Start: 2024-02-06

## 2024-02-06 RX ORDER — IPRATROPIUM BROMIDE 21 UG/1
2 SPRAY, METERED NASAL EVERY 12 HOURS
Qty: 30 ML | Refills: 2 | Status: SHIPPED | OUTPATIENT
Start: 2024-02-06 | End: 2024-05-06

## 2024-02-06 NOTE — PROGRESS NOTES
[facility]  INTERNAL MEDICINE ACUTE VISIT     PATIENT INFORMATION     Randa Boo   48 y.o. female   MRN: [id number]    ASSESSMENT/PLAN     Diagnoses and all orders for this visit:    Bilateral carotid artery stenosis  -     Lipid panel; Future    Mild intermittent asthma without complication  -     albuterol (PROVENTIL HFA,VENTOLIN HFA) 90 mcg/act inhaler; Inhale 1 puff every 4 (four) hours as needed for wheezing    Nasal congestion  -     ipratropium (ATROVENT) 0.03 % nasal spray; 2 sprays into each nostril every 12 (twelve) hours    Calcaneal apophysitis  -     XR foot 3+ vw right; Future  -     Ambulatory Referral to Podiatry; Future    Hypothyroidism due to Hashimoto's thyroiditis  -     TSH, 3rd generation with Free T4 reflex; Future  -     CBC and differential; Future  -     Comprehensive metabolic panel; Future  -     Hemoglobin A1C; Future      CHIEF COMPLAINT     Chief Complaint   Patient presents with    Hip Pain     Right- 1 month- bilateral foot pain     HISTORY OF PRESENT ILLNESS     48 year old female w/ hx of lumber and cervical radiculopathy, GERD, hypothyroid, fibromyalgia, left calcaneal spur who presents to  for right foot and hip pain.     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 Her pain is relieved with rest. She tried to get shoe inserts but did not help. She began standing in a way that puts less pressure on her heels and has now developed right hip pain which is worse in the morning. She denies trauma or injury to the area.     Briefly discussed some of her prior medical conditions. Patient is no longer taking medications due to tendency of various medications to cause GI upset. She continues to take levothyroxine. Will order lab work and have her set up for annual physical    REVIEW OF SYSTEMS     Review of Systems   Constitutional:  Negative for  "chills and fever.   HENT:  Negative for ear pain and sore throat.    Eyes:  Negative for pain and visual disturbance.   Respiratory:  Negative for cough and shortness of breath.    Cardiovascular:  Negative for chest pain and palpitations.   Gastrointestinal:  Negative for abdominal pain and vomiting.   Genitourinary:  Negative for dysuria and hematuria.   Musculoskeletal:  Negative for arthralgias and back pain.   Skin:  Negative for color change and rash.   Neurological:  Negative for seizures and syncope.   All other systems reviewed and are negative.    OBJECTIVE     Vitals:    02/06/24 1600   BP: 109/73   BP Location: Left arm   Patient Position: Sitting   Cuff Size: Large   Pulse: 76   Temp: 97.9 °F (36.6 °C)   TempSrc: Temporal   Weight: 91.6 kg (202 lb)   Height: 5' 6\" (1.676 m)     Physical Exam  Vitals and nursing note reviewed.   Constitutional:       General: She is not in acute distress.     Appearance: She is well-developed.   HENT:      Head: Normocephalic and atraumatic.   Eyes:      Conjunctiva/sclera: Conjunctivae normal.   Cardiovascular:      Rate and Rhythm: Normal rate and regular rhythm.      Heart sounds: No murmur heard.  Pulmonary:      Effort: Pulmonary effort is normal. No respiratory distress.      Breath sounds: Normal breath sounds.   Abdominal:      Palpations: Abdomen is soft.      Tenderness: There is no abdominal tenderness.   Musculoskeletal:         General: No swelling.      Cervical back: Neck supple.      Right lower leg: No edema.      Left lower leg: No edema.      Comments: Pain with palpation of the plantar aspect of the heel bilaterally  B/l knee without effusion, erythema, warmth  B/l hip without point tenderness, ROM intact, no pain elicited with internal and external rotation of the hip   Skin:     General: Skin is warm and dry.      Capillary Refill: Capillary refill takes less than 2 seconds.   Neurological:      Mental Status: She is alert and oriented to person, " place, and time.   Psychiatric:         Mood and Affect: Mood normal.       CURRENT MEDICATIONS     Current Outpatient Medications:     acetaminophen (TYLENOL) 325 mg tablet, Take 650 mg by mouth every 6 (six) hours as needed for mild pain, Disp: , Rfl:     albuterol (PROVENTIL HFA,VENTOLIN HFA) 90 mcg/act inhaler, Inhale 1 puff every 4 (four) hours as needed for wheezing, Disp: 6.7 g, Rfl: 3    ipratropium (ATROVENT) 0.03 % nasal spray, 2 sprays into each nostril every 12 (twelve) hours, Disp: 30 mL, Rfl: 2    levothyroxine 100 mcg tablet, TAKE 1 TABLET (100 MCG TOTAL) BY MOUTH DAILY IN THE EARLY MORNING, Disp: 90 tablet, Rfl: 3    Aspirin Low Dose 81 MG EC tablet, TAKE 1 TABLET BY MOUTH EVERY DAY (Patient not taking: Reported on 2/6/2024), Disp: 90 tablet, Rfl: 3    atorvastatin (LIPITOR) 20 mg tablet, TAKE 1 TABLET BY MOUTH EVERY DAY (Patient not taking: Reported on 2/6/2024), Disp: 90 tablet, Rfl: 3    Diclofenac Sodium (VOLTAREN) 1 %, APPLY 2 GRAMS TO AFFECTED AREA 4 TIMES A DAY (Patient not taking: Reported on 2/6/2024), Disp: 200 g, Rfl: 1    famotidine (PEPCID) 20 mg tablet, TAKE 1 TABLET BY MOUTH TWICE A DAY (Patient not taking: Reported on 2/6/2024), Disp: 180 tablet, Rfl: 2    indomethacin (INDOCIN) 25 mg capsule, Take 1 capsule (25 mg total) by mouth if needed for mild pain (take one a day when you feel your first headache.) for up to 15 doses (Patient not taking: Reported on 2/6/2024), Disp: 15 capsule, Rfl: 3    loratadine (CLARITIN) 10 mg tablet, TAKE 1 TABLET BY MOUTH EVERY DAY (Patient not taking: Reported on 2/6/2024), Disp: 90 tablet, Rfl: 3    PAST MEDICAL & SURGICAL HISTORY     Past Medical History:   Diagnosis Date    Abdominal pain     Allergic drug reaction 9/15/2021    Anemia     Anxiety     Asthma     Back pain     Bilateral fibrocystic breast changes     resolved: 02/21/2017    Breast disorder     Breast pain, left 1/26/2022    Chest pain     Chronic pelvic pain in female     last  assessed: 09/07/2016    Cluster headaches     Colon cancer screening 5/12/2021    Constipation     Cough     Depression     Difficulty concentrating     Disease of thyroid gland     Dizziness     Dyspareunia in female     last assessed: 08/18/2016    Easy bruising     Epigastric abdominal pain 7/22/2020    Fainting episodes     Fatigue     Fever due to infection     Fibromyalgia     Frequent urination at night     Gallbladder disease     Herniated intervertebral disc of lumbar spine     Hypotension     Loss of bladder control     Mastalgia in female 5/24/2021    Memory loss     last assessed: 06/15/2015    Mid back pain 3/19/2021    Mixed incontinence     last assessed: 01/11/2016    Myofascial pain     last assessed: 06/01/2016    Numbness and tingling     Painful swelling of joint     Palpitations     Panic attack     Sciatica     last assessed: 06/15/2015    Situational anxiety     last assessed: 02/21/2017    SOB (shortness of breath)     Thyroid disease     Uterus, adenomyosis     last assessed: 09/07/2016    Weakness     Weight disorder      Past Surgical History:   Procedure Laterality Date    CHOLECYSTECTOMY      CHOLECYSTECTOMY LAPAROSCOPIC N/A 10/22/2017    Procedure: CHOLECYSTECTOMY LAPAROSCOPIC;  Surgeon: Josue Martines MD;  [facility];  Service: General    COLONOSCOPY  06/03/2021    ENDOMETRIAL BIOPSY      resolved: 02/21/2017    HYSTERECTOMY  2016    [place] VAGINAL HYSTERECTOMY UTERUS 250 GM/< N/A 4/20/2018    Procedure: HYSTERECTOMY VAGINAL TOTAL (TVH), BILATERAL SALPINGECTOMY;  Surgeon: Jenn Ghotra MD;  [facility];  Service: Gynecology    TUBAL LIGATION      last assessed: 10/20/2014    UPPER GASTROINTESTINAL ENDOSCOPY  08/03/2020     SOCIAL & FAMILY HISTORY     Social History     Socioeconomic History    Marital status: /Civil Union     Spouse name: Not on file    Number of children: 4    Years of education: Not on file    Highest education level: Not on file    Occupational History    Occupation: Full-time   Tobacco Use    Smoking status: Former     Current packs/day: 0.00     Average packs/day: 0.5 packs/day for 2.0 years (1.0 ttl pk-yrs)     Types: Cigarettes     Start date:      Quit date:      Years since quittin.1    Smokeless tobacco: Never   Vaping Use    Vaping status: Never Used   Substance and Sexual Activity    Alcohol use: Yes     Comment: occasional    Drug use: Never    Sexual activity: Yes     Partners: Male     Birth control/protection: Female Sterilization, Surgical   Other Topics Concern    Not on file   Social History Narrative    Daily caffeine consumption    Domestic partner    Parent     Social Determinants of Health     Financial Resource Strain: Medium Risk (2024)    Overall Financial Resource Strain (CARDIA)     Difficulty of Paying Living Expenses: Somewhat hard   Food Insecurity: No Food Insecurity (2024)    Hunger Vital Sign     Worried About Running Out of Food in the Last Year: Never true     Ran Out of Food in the Last Year: Never true   Transportation Needs: No Transportation Needs (2024)    PRAPARE - Transportation     Lack of Transportation (Medical): No     Lack of Transportation (Non-Medical): No   Physical Activity: Sufficiently Active (2021)    Exercise Vital Sign     Days of Exercise per Week: 5 days     Minutes of Exercise per Session: 60 min   Stress: Stress Concern Present (10/14/2020)    [place] [place] [employer] - Occupational Stress Questionnaire     Feeling of Stress : Very much   Social Connections: Moderately Isolated (2021)    Social Connection and Isolation Panel [NHANES]     Frequency of Communication with Friends and Family: More than three times a week     Frequency of Social Gatherings with Friends and Family: Once a week     Attends Voodoo Services: Never     Active Member of Clubs or Organizations: No     Attends Club or Organization Meetings: Never     Marital  Status:    Intimate Partner Violence: Not At Risk (2021)    Humiliation, Afraid, Rape, and Kick questionnaire     Fear of Current or Ex-Partner: No     Emotionally Abused: No     Physically Abused: No     Sexually Abused: No   Housing Stability: Low Risk  (2022)    Housing Stability Vital Sign     Unable to Pay for Housing in the Last Year: No     Number of Places Lived in the Last Year: 1     Unstable Housing in the Last Year: No     Social History     Substance and Sexual Activity   Alcohol Use Yes    Comment: occasional       Social History     Substance and Sexual Activity   Drug Use Never     Social History     Tobacco Use   Smoking Status Former    Current packs/day: 0.00    Average packs/day: 0.5 packs/day for 2.0 years (1.0 ttl pk-yrs)    Types: Cigarettes    Start date:     Quit date:     Years since quittin.1   Smokeless Tobacco Never     Family History   Problem Relation Age of Onset    Hypertension Mother     Arthritis Mother     Diabetes Maternal Grandmother     Breast cancer Family     Cancer Family     Diabetes Family     Heart disease Family     Hyperlipidemia Family         reported cholesterol level was high    Hypertension Other     Hyperlipidemia Father     No Known Problems Maternal Grandfather     No Known Problems Sister     No Known Problems Daughter     Breast cancer Paternal Grandmother 32    No Known Problems Paternal Grandfather     Leukemia Paternal Aunt     No Known Problems Sister     No Known Problems Sister     No Known Problems Sister     No Known Problems Sister     No Known Problems Daughter     No Known Problems Paternal Aunt     No Known Problems Son     No Known Problems Son     No Known Problems Brother     No Known Problems Brother              ==  Adán Connolly MD, PGY1  [facility] Internal Medicine Res[facility]    [address], [address]  [place] [address]  Office: [phone]  Fax: [phone]

## 2024-02-07 ENCOUNTER — APPOINTMENT (OUTPATIENT)
Dept: LAB | Facility: CLINIC | Age: 49
End: 2024-02-07
Payer: COMMERCIAL

## 2024-02-07 DIAGNOSIS — I65.23 BILATERAL CAROTID ARTERY STENOSIS: ICD-10-CM

## 2024-02-07 DIAGNOSIS — E03.8 HYPOTHYROIDISM DUE TO HASHIMOTO'S THYROIDITIS: ICD-10-CM

## 2024-02-07 DIAGNOSIS — E06.3 HYPOTHYROIDISM DUE TO HASHIMOTO'S THYROIDITIS: ICD-10-CM

## 2024-02-07 LAB
ALBUMIN SERPL BCP-MCNC: 3.8 G/DL (ref 3.5–5)
ALP SERPL-CCNC: 72 U/L (ref 34–104)
ALT SERPL W P-5'-P-CCNC: 12 U/L (ref 7–52)
ANION GAP SERPL CALCULATED.3IONS-SCNC: 3 MMOL/L
AST SERPL W P-5'-P-CCNC: 13 U/L (ref 13–39)
BASOPHILS # BLD AUTO: 0.03 THOUSANDS/ÂΜL (ref 0–0.1)
BASOPHILS NFR BLD AUTO: 1 % (ref 0–1)
BILIRUB SERPL-MCNC: 0.51 MG/DL (ref 0.2–1)
BUN SERPL-MCNC: 12 MG/DL (ref 5–25)
CALCIUM SERPL-MCNC: 8.7 MG/DL (ref 8.4–10.2)
CHLORIDE SERPL-SCNC: 105 MMOL/L (ref 96–108)
CHOLEST SERPL-MCNC: 175 MG/DL
CO2 SERPL-SCNC: 30 MMOL/L (ref 21–32)
CREAT SERPL-MCNC: 0.66 MG/DL (ref 0.6–1.3)
EOSINOPHIL # BLD AUTO: 0.12 THOUSAND/ÂΜL (ref 0–0.61)
EOSINOPHIL NFR BLD AUTO: 2 % (ref 0–6)
ERYTHROCYTE [DISTWIDTH] IN BLOOD BY AUTOMATED COUNT: 13.1 % (ref 11.6–15.1)
EST. AVERAGE GLUCOSE BLD GHB EST-MCNC: 114 MG/DL
GFR SERPL CREATININE-BSD FRML MDRD: 105 ML/MIN/1.73SQ M
GLUCOSE P FAST SERPL-MCNC: 99 MG/DL (ref 65–99)
HBA1C MFR BLD: 5.6 %
HCT VFR BLD AUTO: 42.4 % (ref 34.8–46.1)
HDLC SERPL-MCNC: 58 MG/DL
HGB BLD-MCNC: 13.6 G/DL (ref 11.5–15.4)
IMM GRANULOCYTES # BLD AUTO: 0.02 THOUSAND/UL (ref 0–0.2)
IMM GRANULOCYTES NFR BLD AUTO: 0 % (ref 0–2)
LDLC SERPL CALC-MCNC: 99 MG/DL (ref 0–100)
LYMPHOCYTES # BLD AUTO: 1.34 THOUSANDS/ÂΜL (ref 0.6–4.47)
LYMPHOCYTES NFR BLD AUTO: 23 % (ref 14–44)
MCH RBC QN AUTO: 29.3 PG (ref 26.8–34.3)
MCHC RBC AUTO-ENTMCNC: 32.1 G/DL (ref 31.4–37.4)
MCV RBC AUTO: 91 FL (ref 82–98)
MONOCYTES # BLD AUTO: 0.36 THOUSAND/ÂΜL (ref 0.17–1.22)
MONOCYTES NFR BLD AUTO: 6 % (ref 4–12)
NEUTROPHILS # BLD AUTO: 4.06 THOUSANDS/ÂΜL (ref 1.85–7.62)
NEUTS SEG NFR BLD AUTO: 68 % (ref 43–75)
NONHDLC SERPL-MCNC: 117 MG/DL
NRBC BLD AUTO-RTO: 0 /100 WBCS
PLATELET # BLD AUTO: 222 THOUSANDS/UL (ref 149–390)
PMV BLD AUTO: 11 FL (ref 8.9–12.7)
POTASSIUM SERPL-SCNC: 3.8 MMOL/L (ref 3.5–5.3)
PROT SERPL-MCNC: 7.8 G/DL (ref 6.4–8.4)
RBC # BLD AUTO: 4.64 MILLION/UL (ref 3.81–5.12)
SODIUM SERPL-SCNC: 138 MMOL/L (ref 135–147)
T4 FREE SERPL-MCNC: 0.84 NG/DL (ref 0.61–1.12)
TRIGL SERPL-MCNC: 88 MG/DL
TSH SERPL DL<=0.05 MIU/L-ACNC: 6.24 UIU/ML (ref 0.45–4.5)
WBC # BLD AUTO: 5.93 THOUSAND/UL (ref 4.31–10.16)

## 2024-02-07 PROCEDURE — 84443 ASSAY THYROID STIM HORMONE: CPT

## 2024-02-07 PROCEDURE — 85025 COMPLETE CBC W/AUTO DIFF WBC: CPT

## 2024-02-07 PROCEDURE — 83036 HEMOGLOBIN GLYCOSYLATED A1C: CPT

## 2024-02-07 PROCEDURE — 84439 ASSAY OF FREE THYROXINE: CPT

## 2024-02-07 PROCEDURE — 80061 LIPID PANEL: CPT

## 2024-02-07 PROCEDURE — 80053 COMPREHEN METABOLIC PANEL: CPT

## 2024-02-07 PROCEDURE — 36415 COLL VENOUS BLD VENIPUNCTURE: CPT

## 2024-02-08 ENCOUNTER — TELEPHONE (OUTPATIENT)
Dept: INTERNAL MEDICINE CLINIC | Facility: CLINIC | Age: 49
End: 2024-02-08

## 2024-02-08 NOTE — TELEPHONE ENCOUNTER
Patient left a voicemail stating she would like to know her results for blood work as she seen the results on MyChart but does not understand the.    Please review and advise as you ordered the lab work

## 2024-02-12 ENCOUNTER — HOSPITAL ENCOUNTER (OUTPATIENT)
Dept: RADIOLOGY | Facility: HOSPITAL | Age: 49
Discharge: HOME | End: 2024-02-12
Payer: COMMERCIAL

## 2024-02-12 DIAGNOSIS — M92.8 JUVENILE OSTEOCHONDROSIS OF LOWER EXTREMITY, EXCLUDING FOOT: ICD-10-CM

## 2024-02-12 PROCEDURE — 73630 X-RAY EXAM OF FOOT: CPT

## 2024-02-20 ENCOUNTER — OFFICE VISIT (OUTPATIENT)
Dept: PODIATRY | Facility: CLINIC | Age: 49
End: 2024-02-20
Payer: COMMERCIAL

## 2024-02-20 VITALS
BODY MASS INDEX: 32.6 KG/M2 | HEART RATE: 87 BPM | HEIGHT: 66 IN | SYSTOLIC BLOOD PRESSURE: 136 MMHG | RESPIRATION RATE: 18 BRPM | DIASTOLIC BLOOD PRESSURE: 81 MMHG

## 2024-02-20 DIAGNOSIS — M72.2 PLANTAR FASCIITIS: Primary | ICD-10-CM

## 2024-02-20 DIAGNOSIS — M79.671 RIGHT FOOT PAIN: ICD-10-CM

## 2024-02-20 DIAGNOSIS — M92.8 CALCANEAL APOPHYSITIS: ICD-10-CM

## 2024-02-20 DIAGNOSIS — M79.672 LEFT FOOT PAIN: ICD-10-CM

## 2024-02-20 PROCEDURE — 99203 OFFICE O/P NEW LOW 30 MIN: CPT | Performed by: PODIATRIST

## 2024-02-20 NOTE — PROGRESS NOTES
"Assessment/Plan:    Explained to patient that she is dealing with plantars fasciitis of each foot.  The heel spurs are not uncommon and do not need to be addressed.    Discussed treatment options for plantars fasciitis.  Recommended stretching exercises and the continued wearing of supports.  Urged patient to refrain from walking barefoot.  Recommended cortisone injections but patient is wary at this time.  She prefers to begin with stretching exercises.  She states an inability to go to physical therapy.  She will be reassessed in 6 weeks.    No problem-specific Assessment & Plan notes found for this encounter.       Diagnoses and all orders for this visit:    Plantar fasciitis    Calcaneal apophysitis  -     Ambulatory Referral to Podiatry    Right foot pain    Left foot pain          Subjective:      Patient ID: Randa Boo is a 49 y.o. female.    HPI    Patient, a 49-year-old female presents with pain in each heel of 8 months duration.  Pain began without recalled trauma.  Symptoms of post static dyskinesia related.  Patient states that her pain is severe.  She recently purchased show arch supports and noticed a modicum of improvement.  Patient states she is unable to take oral nonsteroidal anti-inflammatories due to GI issues.    I personally reviewed x-rays of the right foot dated 2/12/2024.  They revealed a small inferior heel spur.    I personally viewed x-rays of the left foot dated 9/17/2023.  They were positive for an inferior heel spur.    The following portions of the patient's history were reviewed and updated as appropriate: allergies, current medications, past family history, past medical history, past social history, past surgical history, and problem list.    Review of Systems   Gastrointestinal:         GERD   Musculoskeletal:  Positive for arthralgias.        Fibromyalgia             Objective:      /81   Pulse 87   Resp 18   Ht 5' 6\" (1.676 m)   LMP 02/28/2018 (Exact Date)   " BMI 32.60 kg/m²          Physical Exam  Constitutional:       Appearance: She is obese.   Cardiovascular:      Pulses: Normal pulses.   Musculoskeletal:         General: Tenderness present.      Comments: Pain to palpation medial aspect each heel at fascia insertion into calcaneus.  Each arch feels tight to palpation.   Skin:     General: Skin is warm.   Neurological:      General: No focal deficit present.      Mental Status: She is oriented to person, place, and time.

## 2024-03-05 DIAGNOSIS — R09.81 NASAL CONGESTION: ICD-10-CM

## 2024-03-05 RX ORDER — IPRATROPIUM BROMIDE 21 UG/1
2 SPRAY, METERED NASAL EVERY 12 HOURS
Qty: 30 ML | Refills: 3 | Status: SHIPPED | OUTPATIENT
Start: 2024-03-05

## 2024-03-21 ENCOUNTER — OFFICE VISIT (OUTPATIENT)
Dept: CARDIOLOGY CLINIC | Facility: CLINIC | Age: 49
End: 2024-03-21
Payer: COMMERCIAL

## 2024-03-21 VITALS
BODY MASS INDEX: 32.95 KG/M2 | HEIGHT: 66 IN | OXYGEN SATURATION: 97 % | WEIGHT: 205 LBS | SYSTOLIC BLOOD PRESSURE: 90 MMHG | DIASTOLIC BLOOD PRESSURE: 58 MMHG | HEART RATE: 93 BPM

## 2024-03-21 DIAGNOSIS — R06.02 SOB (SHORTNESS OF BREATH): ICD-10-CM

## 2024-03-21 DIAGNOSIS — E78.5 HYPERLIPIDEMIA, UNSPECIFIED HYPERLIPIDEMIA TYPE: ICD-10-CM

## 2024-03-21 DIAGNOSIS — M92.8 CALCANEAL APOPHYSITIS: ICD-10-CM

## 2024-03-21 DIAGNOSIS — R00.2 PALPITATIONS: Primary | ICD-10-CM

## 2024-03-21 PROCEDURE — 93000 ELECTROCARDIOGRAM COMPLETE: CPT | Performed by: INTERNAL MEDICINE

## 2024-03-21 PROCEDURE — 99214 OFFICE O/P EST MOD 30 MIN: CPT | Performed by: INTERNAL MEDICINE

## 2024-03-21 NOTE — PATIENT INSTRUCTIONS
You were seen today in the Cardiology office for evaluation of palpitations and shortness of breath.     Please continue your current cardiac medications as prescribed.    Thank you for choosing [facility].    Please call our office or use [employer] with any questions.

## 2024-03-21 NOTE — PROGRESS NOTES
[facility]    Name:Randa Boo   DOS: 3/21/2024     Chief Complaint:   Chief Complaint   Patient presents with    New Patient Visit    Palpitations    Chest Pain    Fatigue    Shortness of Breath    Dizziness    Edema     Both hands and legs       HISTORY OF PRESENT ILLNESS:    HPI:  Randa Boo is a 49 y.o. female. She  has a past medical history of Abdominal pain, Allergic drug reaction (9/15/2021), Anemia, Anxiety, Asthma, Back pain, Bilateral fibrocystic breast changes, Breast disorder, Breast pain, left (1/26/2022), Chest pain, Chronic pelvic pain in female, Cluster headaches, Colon cancer screening (5/12/2021), Constipation, Cough, Depression, Difficulty concentrating, Disease of thyroid gland, Dizziness, Dyspareunia in female, Easy bruising, Epigastric abdominal pain (7/22/2020), Fainting episodes, Fatigue, Fever due to infection, Fibromyalgia, Frequent urination at night, Gallbladder disease, Herniated intervertebral disc of lumbar spine, Hypotension, Loss of bladder control, Mastalgia in female (5/24/2021), Memory loss, Mid back pain (3/19/2021), Mixed incontinence, Myofascial pain, Numbness and tingling, Painful swelling of joint, Palpitations, Panic attack, Sciatica, Situational anxiety, SOB (shortness of breath), Thyroid disease, Uterus, adenomyosis, Weakness, and Weight disorder.    49-year-old female presenting for cardiac evaluation of palpitations associated with multiple symptoms. Previously followed with my colleague Dr. Rondon, last seen 9/1/2021. She reports that she has experienced palpitations for years, described as a tachycardia sensation, sometimes a fluttering in her throat. Longest episode lasted 3 minutes straight associated with profuse diaphoresis. Has also woken her during sleep in the past with palpitations.  These episodes occur both at rest and with exertion, generally self-limiting and generally only last a few seconds at a time.  Episodes are  "sometimes but not always associated with shortness of breath, dizziness, chest pain lasting seconds while she is actively experiencing a fast heart rate.  Prior cardiac workup is included a recent 48-hour Holter (personally reviewed, no symptomatic correlation to an arrhythmia, predominantly sinus rhythm) and a resting transthoracic echocardiogram (also personally reviewed, preserved biventricular size and systolic function, no prognostically significant valvular abnormalities seen).  She has also undergone 7-day Zio patch rhythm monitoring in the past, which demonstrated a brief short run of atrial tachycardia and otherwise mildly elevated ectopy burden.    She otherwise has no cardiopulmonary complaints outside of her palpitation episodes, and specifically denies chest pain, shortness of breath, diaphoresis, dizziness, orthopnea.  She states that she \"always has a small amount of lower extremity and upper extremity swelling.  She has had no syncopal episodes.    There is a family history of stroke in her father, otherwise no known family history of heart disease that she is aware of.       ROS    ROS: Pertinent positives and negatives as described in History of Present Illness. Remainder of a 14 point review of systems was negative.     Allergies   Allergen Reactions    Morphine Chest Pain    Iv Contrast [Iodinated Contrast Media] Hives        Current Outpatient Medications on File Prior to Visit   Medication Sig Dispense Refill    acetaminophen (TYLENOL) 325 mg tablet Take 650 mg by mouth every 6 (six) hours as needed for mild pain      albuterol (PROVENTIL HFA,VENTOLIN HFA) 90 mcg/act inhaler Inhale 1 puff every 4 (four) hours as needed for wheezing 6.7 g 3    atorvastatin (LIPITOR) 20 mg tablet TAKE 1 TABLET BY MOUTH EVERY DAY 90 tablet 3    ipratropium (ATROVENT) 0.03 % nasal spray SPRAY 2 SPRAYS INTO EACH NOSTRIL EVERY 12 HOURS. 30 mL 3    levothyroxine 100 mcg tablet TAKE 1 TABLET (100 MCG TOTAL) BY MOUTH " DAILY IN THE EARLY MORNING 90 tablet 3    loratadine (CLARITIN) 10 mg tablet TAKE 1 TABLET BY MOUTH EVERY DAY 90 tablet 3    Aspirin Low Dose 81 MG EC tablet TAKE 1 TABLET BY MOUTH EVERY DAY (Patient not taking: Reported on 3/21/2024) 90 tablet 3    Diclofenac Sodium (VOLTAREN) 1 % APPLY 2 GRAMS TO AFFECTED AREA 4 TIMES A DAY (Patient not taking: Reported on 2/6/2024) 200 g 1    famotidine (PEPCID) 20 mg tablet TAKE 1 TABLET BY MOUTH TWICE A DAY (Patient not taking: Reported on 2/6/2024) 180 tablet 2    indomethacin (INDOCIN) 25 mg capsule Take 1 capsule (25 mg total) by mouth if needed for mild pain (take one a day when you feel your first headache.) for up to 15 doses (Patient not taking: Reported on 2/6/2024) 15 capsule 3     No current facility-administered medications on file prior to visit.       Past Medical History:   Diagnosis Date    Abdominal pain     Allergic drug reaction 9/15/2021    Anemia     Anxiety     Asthma     Back pain     Bilateral fibrocystic breast changes     resolved: 02/21/2017    Breast disorder     Breast pain, left 1/26/2022    Chest pain     Chronic pelvic pain in female     last assessed: 09/07/2016    Cluster headaches     Colon cancer screening 5/12/2021    Constipation     Cough     Depression     Difficulty concentrating     Disease of thyroid gland     Dizziness     Dyspareunia in female     last assessed: 08/18/2016    Easy bruising     Epigastric abdominal pain 7/22/2020    Fainting episodes     Fatigue     Fever due to infection     Fibromyalgia     Frequent urination at night     Gallbladder disease     Herniated intervertebral disc of lumbar spine     Hypotension     Loss of bladder control     Mastalgia in female 5/24/2021    Memory loss     last assessed: 06/15/2015    Mid back pain 3/19/2021    Mixed incontinence     last assessed: 01/11/2016    Myofascial pain     last assessed: 06/01/2016    Numbness and tingling     Painful swelling of joint     Palpitations     Panic  attack     Sciatica     last assessed: 06/15/2015    Situational anxiety     last assessed: 02/21/2017    SOB (shortness of breath)     Thyroid disease     Uterus, adenomyosis     last assessed: 09/07/2016    Weakness     Weight disorder        Past Surgical History:   Procedure Laterality Date    CHOLECYSTECTOMY      CHOLECYSTECTOMY LAPAROSCOPIC N/A 10/22/2017    Procedure: CHOLECYSTECTOMY LAPAROSCOPIC;  Surgeon: Josue Martines MD;  Location: [facility];  Service: General    COLONOSCOPY  06/03/2021    ENDOMETRIAL BIOPSY      resolved: 02/21/2017    HYSTERECTOMY  2016    DC VAGINAL HYSTERECTOMY UTERUS 250 GM/< N/A 4/20/2018    Procedure: HYSTERECTOMY VAGINAL TOTAL (TVH), BILATERAL SALPINGECTOMY;  Surgeon: Jenn Ghotra MD;  Location: [facility];  Service: Gynecology    TUBAL LIGATION      last assessed: 10/20/2014    UPPER GASTROINTESTINAL ENDOSCOPY  08/03/2020       Family History   Problem Relation Age of Onset    Hypertension Mother     Arthritis Mother     Diabetes Maternal Grandmother     Breast cancer Family     Cancer Family     Diabetes Family     Heart disease Family     Hyperlipidemia Family         reported cholesterol level was high    Hypertension Other     Hyperlipidemia Father     No Known Problems Maternal Grandfather     No Known Problems Sister     No Known Problems Daughter     Breast cancer Paternal Grandmother 32    No Known Problems Paternal Grandfather     Leukemia Paternal Aunt     No Known Problems Sister     No Known Problems Sister     No Known Problems Sister     No Known Problems Sister     No Known Problems Daughter     No Known Problems Paternal Aunt     No Known Problems Son     No Known Problems Son     No Known Problems Brother     No Known Problems Brother        Social History     Socioeconomic History    Marital status: /Civil Union     Spouse name: Not on file    Number of children: 4    Years of education: Not on file    Highest education level: Not on file    Occupational History    Occupation: Full-time   Tobacco Use    Smoking status: Former     Current packs/day: 0.00     Average packs/day: 0.5 packs/day for 2.0 years (1.0 ttl pk-yrs)     Types: Cigarettes     Start date:      Quit date:      Years since quittin.2    Smokeless tobacco: Never   Vaping Use    Vaping status: Never Used   Substance and Sexual Activity    Alcohol use: Yes     Comment: occasional    Drug use: Never    Sexual activity: Yes     Partners: Male     Birth control/protection: Female Sterilization, Surgical   Other Topics Concern    Not on file   Social History Narrative    Daily caffeine consumption    Domestic partner    Parent     Social Determinants of Health     Financial Resource Strain: Medium Risk (2024)    Overall Financial Resource Strain (CARDIA)     Difficulty of Paying Living Expenses: Somewhat hard   Food Insecurity: No Food Insecurity (2024)    Hunger Vital Sign     Worried About Running Out of Food in the Last Year: Never true     Ran Out of Food in the Last Year: Never true   Transportation Needs: No Transportation Needs (2024)    PRAPARE - Transportation     Lack of Transportation (Medical): No     Lack of Transportation (Non-Medical): No   Physical Activity: Sufficiently Active (2021)    Exercise Vital Sign     Days of Exercise per Week: 5 days     Minutes of Exercise per Session: 60 min   Stress: Stress Concern Present (10/14/2020)    [employer] [place] [employer] - Occupational Stress Questionnaire     Feeling of Stress : Very much   Social Connections: Moderately Isolated (2021)    Social Connection and Isolation Panel [NHANES]     Frequency of Communication with Friends and Family: More than three times a week     Frequency of Social Gatherings with Friends and Family: Once a week     Attends [employer] Services: Never     Active Member of Clubs or Organizations: No     Attends Club or Organization Meetings: Never     Marital  "Status:    Intimate Partner Violence: Not At Risk (8/23/2021)    Humiliation, Afraid, Rape, and Kick questionnaire     Fear of Current or Ex-Partner: No     Emotionally Abused: No     Physically Abused: No     Sexually Abused: No   Housing Stability: Low Risk  (5/11/2022)    Housing Stability Vital Sign     Unable to Pay for Housing in the Last Year: No     Number of Places Lived in the Last Year: 1     Unstable Housing in the Last Year: No       OBJECTIVE:    BP 90/58 (BP Location: Left arm, Patient Position: Sitting, Cuff Size: Large)   Pulse 93   Ht 5' 6\" (1.676 m)   Wt 93 kg (205 lb)   LMP 02/28/2018 (Exact Date)   SpO2 97%   BMI 33.09 kg/m²      BP Readings from Last 3 Encounters:   03/21/24 90/58   02/20/24 136/81   02/06/24 109/73       Wt Readings from Last 3 Encounters:   03/21/24 93 kg (205 lb)   02/06/24 91.6 kg (202 lb)   09/20/23 90.8 kg (200 lb 3.2 oz)         Physical Exam  Vitals reviewed.   Constitutional:       General: She is not in acute distress.     Appearance: Normal appearance. She is not diaphoretic.   HENT:      Head: Normocephalic and atraumatic.   Eyes:      Conjunctiva/sclera: Conjunctivae normal.   Neck:      Vascular: No carotid bruit or JVD.   Cardiovascular:      Rate and Rhythm: Normal rate and regular rhythm.      Pulses: Normal pulses.      Heart sounds: Normal heart sounds. No murmur heard.     No friction rub. No gallop.   Pulmonary:      Effort: Pulmonary effort is normal.      Breath sounds: Normal breath sounds. No wheezing, rhonchi or rales.   Abdominal:      General: Abdomen is flat. Bowel sounds are normal. There is no distension.      Palpations: Abdomen is soft.   Musculoskeletal:      Right lower leg: No edema.      Left lower leg: No edema.   Skin:     General: Skin is warm and dry.   Neurological:      General: No focal deficit present.      Mental Status: She is alert and oriented to person, place, and time.   Psychiatric:         Mood and Affect: Mood " normal.         Behavior: Behavior normal.                                                       Cardiac testing:   EKG reviewed personally: Sinus rhythm.  Nonspecific ST and T wave abnormality in the anterior lateral leads, unchanged compared to the prior tracing from January 2023.  QTc is mildly prolonged at 469 ms.      LABS:  Lab Results   Component Value Date    GLUCOSE 106 04/06/2015    BUN 12 02/07/2024    CREATININE 0.66 02/07/2024    CALCIUM 8.7 02/07/2024     04/06/2015    K 3.8 02/07/2024    CO2 30 02/07/2024     02/07/2024    ALKPHOS 72 02/07/2024    BILITOT 0.40 04/06/2015    PROT 7.5 04/06/2015    AST 13 02/07/2024    ALT 12 02/07/2024    ANIONGAP 8 04/06/2015        Lab Results   Component Value Date    WBC 5.93 02/07/2024    HGB 13.6 02/07/2024    HCT 42.4 02/07/2024    MCV 91 02/07/2024     02/07/2024       Lab Results   Component Value Date    CHOL 152 09/25/2015    HDL 58 02/07/2024    LDLCALC 99 02/07/2024    TRIG 88 02/07/2024       Lab Results   Component Value Date    HGBA1C 5.6 02/07/2024         ASSESSMENT/PLAN:  Diagnoses and all orders for this visit:    Palpitations  -     POCT ECG  -     AMB extended holter monitor; Future    Calcaneal apophysitis - Not addressed during this visit. Erroneously added to the chart.   -     XR foot 3+ vw right    SOB (shortness of breath)    Hyperlipidemia, unspecified hyperlipidemia type    41-year-old female presenting for evaluation of chronic palpitations.  Prior cardiac workup thus far has been within normal limits, although the patient reports that prior rhythm monitoring has not caught a severe episode.  She prefers a more definitive diagnosis, and therefore we discussed loop recorder implantation versus more extended Zio patch monitoring.  She favors noninvasive rhythm monitoring, and therefore I recommended a 14-day Zio patch XT to further evaluate underlying arrhythmia burden.  She does have a history of intolerance to  "metoprolol, and therefore if medical therapy is needed for an arrhythmia a calcium channel blocker may be a more appropriate regimen in this specific patient.  I have counseled her on the appropriate use of the Zio patch.  I advised urgent evaluation in the emergency department for any sustained episodes.  She will follow-up in office to review the results of testing.    In regards to management of hyperlipidemia, the patient reports that she has only been occasionally compliant with atorvastatin.  I encouraged full compliance with her dose of atorvastatin 20 mg once daily, and I recommended that she continues to take it daily.          Jayden Galindo MD      Portions of the record may have been created with voice recognition software. Occasional wrong word or \"sound alike\" substitutions may have occurred due to the inherent limitations of voice recognition software. Please review the chart carefully and recognize, using context, where substitutions/typographical errors may have occurred.     "

## 2024-03-22 ENCOUNTER — DOCUMENTATION (OUTPATIENT)
Dept: CARDIOLOGY CLINIC | Facility: CLINIC | Age: 49
End: 2024-03-22

## 2024-04-30 ENCOUNTER — CLINICAL SUPPORT (OUTPATIENT)
Dept: CARDIOLOGY CLINIC | Facility: CLINIC | Age: 49
End: 2024-04-30
Payer: COMMERCIAL

## 2024-04-30 DIAGNOSIS — R00.2 PALPITATIONS: ICD-10-CM

## 2024-04-30 PROCEDURE — 93248 EXT ECG>7D<15D REV&INTERPJ: CPT | Performed by: INTERNAL MEDICINE

## 2024-05-03 ENCOUNTER — TELEPHONE (OUTPATIENT)
Age: 49
End: 2024-05-03

## 2024-05-03 DIAGNOSIS — Z00.6 ENCOUNTER FOR EXAMINATION FOR NORMAL COMPARISON OR CONTROL IN CLINICAL RESEARCH PROGRAM: ICD-10-CM

## 2024-05-03 NOTE — TELEPHONE ENCOUNTER
Pt called refill line stating that the results of her holter monitor came in and she saw that Dr Galindo viewed them but she has not heard anything. She would like a call back with the results ASAP if possible. [phone]

## 2024-05-07 NOTE — TELEPHONE ENCOUNTER
I attempted to call Randa Boo to review the results of her test. No answer, so I left a voicemail.    I contacted her via [employer] to discuss my interpretation of her test.     Jayden Galindo MD

## 2024-05-08 ENCOUNTER — APPOINTMENT (OUTPATIENT)
Dept: LAB | Facility: CLINIC | Age: 49
End: 2024-05-08

## 2024-05-08 DIAGNOSIS — Z00.6 ENCOUNTER FOR EXAMINATION FOR NORMAL COMPARISON OR CONTROL IN CLINICAL RESEARCH PROGRAM: ICD-10-CM

## 2024-05-08 PROCEDURE — 36415 COLL VENOUS BLD VENIPUNCTURE: CPT

## 2024-05-09 NOTE — TELEPHONE ENCOUNTER
P/C office back to speak with Dr Galindo, pt is scheduled next week for a follow up appt advised can be discuss at that time, pt states if could discuss prior to that would be great.     C/b [phone]

## 2024-05-13 ENCOUNTER — OFFICE VISIT (OUTPATIENT)
Dept: INTERNAL MEDICINE CLINIC | Facility: CLINIC | Age: 49
End: 2024-05-13

## 2024-05-13 VITALS
TEMPERATURE: 98.6 F | RESPIRATION RATE: 18 BRPM | HEIGHT: 66 IN | OXYGEN SATURATION: 97 % | HEART RATE: 90 BPM | WEIGHT: 208 LBS | BODY MASS INDEX: 33.43 KG/M2 | SYSTOLIC BLOOD PRESSURE: 101 MMHG | DIASTOLIC BLOOD PRESSURE: 69 MMHG

## 2024-05-13 DIAGNOSIS — Z12.31 ENCOUNTER FOR SCREENING MAMMOGRAM FOR BREAST CANCER: ICD-10-CM

## 2024-05-13 DIAGNOSIS — R00.2 PALPITATIONS: ICD-10-CM

## 2024-05-13 DIAGNOSIS — E03.8 HYPOTHYROIDISM DUE TO HASHIMOTO'S THYROIDITIS: ICD-10-CM

## 2024-05-13 DIAGNOSIS — G47.30 SLEEP APNEA, UNSPECIFIED TYPE: ICD-10-CM

## 2024-05-13 DIAGNOSIS — E06.3 HYPOTHYROIDISM DUE TO HASHIMOTO'S THYROIDITIS: ICD-10-CM

## 2024-05-13 DIAGNOSIS — Z00.00 ANNUAL PHYSICAL EXAM: Primary | ICD-10-CM

## 2024-05-13 PROCEDURE — 99386 PREV VISIT NEW AGE 40-64: CPT | Performed by: HOSPITALIST

## 2024-05-13 NOTE — ASSESSMENT & PLAN NOTE
STOP-BANG 3. Endorses excessive tiredness, loud snoring, gasping at times    -Will proceed with sleep study

## 2024-05-13 NOTE — ASSESSMENT & PLAN NOTE
Currently follows with cardiology. Josepho done showing 3 episodes of brief SVT.    -Follow up scheduled with cardiology on Friday

## 2024-05-13 NOTE — ASSESSMENT & PLAN NOTE
Last TSH 2/2024 was 6.236, T4 0.84  -Patient does endorse symptoms of weight gain, temperature intolerances  -Currently on Levothyroxine 100    -Will repeat TSH

## 2024-05-13 NOTE — PROGRESS NOTES
ADULT ANNUAL PHYSICAL  [facility] [place]    NAME: Randa Boo  AGE: 49 y.o. SEX: female  : 1975     DATE: 2024     Assessment and Plan:     Problem List Items Addressed This Visit       Hypothyroidism due to Hashimoto's thyroiditis     Last TSH 2024 was 6.236, T4 0.84  -Patient does endorse symptoms of weight gain, temperature intolerances  -Currently on Levothyroxine 100    -Will repeat TSH         Relevant Orders    TSH + Free T4    Palpitations     Currently follows with cardiology. Zio done showing 3 episodes of brief SVT.    -Follow up scheduled with cardiology on Friday         Sleep apnea     STOP-BANG 3. Endorses excessive tiredness, loud snoring, gasping at times    -Will proceed with sleep study         Relevant Orders    Ambulatory Referral to Sleep Medicine     Other Visit Diagnoses       Annual physical exam    -  Primary    Relevant Orders    Ambulatory Referral to Obstetrics / Gynecology    Encounter for screening mammogram for breast cancer        Relevant Orders    Mammo screening bilateral w cad              Immunizations and preventive care screenings were discussed with patient today. Appropriate education was printed on patient's after visit summary.    Counseling:  Alcohol/drug use: discussed moderation in alcohol intake, the recommendations for healthy alcohol use, and avoidance of illicit drug use.  Dental Health: discussed importance of regular tooth brushing, flossing, and dental visits.  Exercise: the importance of regular exercise/physical activity was discussed. Recommend exercise 3-5 times per week for at least 30 minutes.          Return in 3 months (on 2024).     Chief Complaint:     Chief Complaint   Patient presents with    Physical Exam      History of Present Illness:     Adult Annual Physical   Patient here for a comprehensive physical exam. The patient reports continued palpitations that  she is seeing cardiology for on Friday. Additionally endorses tiredness and not sleeping well. Also states she feels like she is having thyroid symptoms again in which we will order a repeat TSH.     Diet and Physical Activity  Diet/Nutrition: well balanced diet.   Exercise: no formal exercise.      Depression Screening  PHQ-2/9 Depression Screening    Little interest or pleasure in doing things: 0 - not at all  Feeling down, depressed, or hopeless: 0 - not at all  PHQ-2 Score: 0  PHQ-2 Interpretation: Negative depression screen       General Health  Sleep: sleeps poorly.   Hearing: Has seen audiology in the past per patient and was told she needs hearing aids but was unsure of the reason  Vision: no vision problems.   Dental: regular dental visits.       /GYN Health  Follows with gynecology? No-will place referral      Review of Systems:     Review of Systems   Constitutional:  Positive for fatigue.   HENT:  Negative for congestion.    Respiratory:  Positive for shortness of breath.    Cardiovascular:  Positive for palpitations. Negative for leg swelling.   Gastrointestinal:  Negative for constipation, diarrhea, nausea and vomiting.   Musculoskeletal: Negative.    Neurological:  Negative for dizziness and headaches.   Psychiatric/Behavioral: Negative.        Past Medical History:     Past Medical History:   Diagnosis Date    Abdominal pain     Allergic drug reaction 9/15/2021    Anemia     Anxiety     Asthma     Back pain     Bilateral fibrocystic breast changes     resolved: 02/21/2017    Breast disorder     Breast pain, left 1/26/2022    Chest pain     Chronic pelvic pain in female     last assessed: 09/07/2016    Cluster headaches     Colon cancer screening 5/12/2021    Constipation     Cough     Depression     Difficulty concentrating     Disease of thyroid gland     Dizziness     Dyspareunia in female     last assessed: 08/18/2016    Easy bruising     Epigastric abdominal pain 7/22/2020    Fainting episodes      Fatigue     Fever due to infection     Fibromyalgia     Frequent urination at night     Gallbladder disease     Herniated intervertebral disc of lumbar spine     Hypotension     Loss of bladder control     Mastalgia in female 2021    Memory loss     last assessed: 06/15/2015    Mid back pain 3/19/2021    Mixed incontinence     last assessed: 2016    Myofascial pain     last assessed: 2016    Numbness and tingling     Painful swelling of joint     Palpitations     Panic attack     Sciatica     last assessed: 06/15/2015    Situational anxiety     last assessed: 2017    SOB (shortness of breath)     Thyroid disease     Uterus, adenomyosis     last assessed: 2016    Weakness     Weight disorder       Past Surgical History:     Past Surgical History:   Procedure Laterality Date    CHOLECYSTECTOMY      CHOLECYSTECTOMY LAPAROSCOPIC N/A 10/22/2017    Procedure: CHOLECYSTECTOMY LAPAROSCOPIC;  Surgeon: Josue Martines MD;  Location: [facility];  Service: General    COLONOSCOPY  2021    ENDOMETRIAL BIOPSY      resolved: 2017    HYSTERECTOMY  2016    IN VAGINAL HYSTERECTOMY UTERUS 250 GM/< N/A 2018    Procedure: HYSTERECTOMY VAGINAL TOTAL (TVH), BILATERAL SALPINGECTOMY;  Surgeon: Jenn Ghotra MD;  Location: [facility];  Service: Gynecology    TUBAL LIGATION      last assessed: 10/20/2014    UPPER GASTROINTESTINAL ENDOSCOPY  2020      Social History:     Social History     Socioeconomic History    Marital status: /Civil Union     Spouse name: None    Number of children: 4    Years of education: None    Highest education level: None   Occupational History    Occupation: Full-time   Tobacco Use    Smoking status: Former     Current packs/day: 0.00     Average packs/day: 0.5 packs/day for 2.0 years (1.0 ttl pk-yrs)     Types: Cigarettes     Start date:      Quit date:      Years since quittin.3    Smokeless tobacco: Never   Vaping Use    Vaping status:  Never Used   Substance and Sexual Activity    Alcohol use: Yes     Comment: occasional    Drug use: Never    Sexual activity: Yes     Partners: Male     Birth control/protection: Female Sterilization, Surgical   Other Topics Concern    None   Social History Narrative    Daily caffeine consumption    Domestic partner    Parent     Social Determinants of Health     Financial Resource Strain: Patient Declined (5/13/2024)    Overall Financial Resource Strain (CARDIA)     Difficulty of Paying Living Expenses: Patient declined   Food Insecurity: Patient Declined (5/13/2024)    Hunger Vital Sign     Worried About Running Out of Food in the Last Year: Patient declined     Ran Out of Food in the Last Year: Patient declined   Transportation Needs: Patient Declined (5/13/2024)    PRAPARE - Transportation     Lack of Transportation (Medical): Patient declined     Lack of Transportation (Non-Medical): Patient declined   Physical Activity: Sufficiently Active (8/23/2021)    Exercise Vital Sign     Days of Exercise per Week: 5 days     Minutes of Exercise per Session: 60 min   Stress: Stress Concern Present (10/14/2020)    [place] citizen [place] [employer] - Occupational Stress Questionnaire     Feeling of Stress : Very much   Social Connections: Moderately Isolated (8/23/2021)    Social Connection and Isolation Panel [NHANES]     Frequency of Communication with Friends and Family: More than three times a week     Frequency of Social Gatherings with Friends and Family: Once a week     Attends Mosque Services: Never     Active Member of Clubs or Organizations: No     Attends Club or Organization Meetings: Never     Marital Status:    Intimate Partner Violence: Not At Risk (8/23/2021)    Humiliation, Afraid, Rape, and Kick questionnaire     Fear of Current or Ex-Partner: No     Emotionally Abused: No     Physically Abused: No     Sexually Abused: No   Housing Stability: Patient Declined (5/13/2024)    Housing  Stability Vital Sign     Unable to Pay for Housing in the Last Year: Patient declined     Number of Places Lived in the Last Year: Not on file     Unstable Housing in the Last Year: Patient declined      Family History:     Family History   Problem Relation Age of Onset    Hypertension Mother     Arthritis Mother     Diabetes Maternal Grandmother     Breast cancer Family     Cancer Family     Diabetes Family     Heart disease Family     Hyperlipidemia Family         reported cholesterol level was high    Hypertension Other     Hyperlipidemia Father     No Known Problems Maternal Grandfather     No Known Problems Sister     No Known Problems Daughter     Breast cancer Paternal Grandmother 32    No Known Problems Paternal Grandfather     Leukemia Paternal Aunt     No Known Problems Sister     No Known Problems Sister     No Known Problems Sister     No Known Problems Sister     No Known Problems Daughter     No Known Problems Paternal Aunt     No Known Problems Son     No Known Problems Son     No Known Problems Brother     No Known Problems Brother       Current Medications:     Current Outpatient Medications   Medication Sig Dispense Refill    acetaminophen (TYLENOL) 325 mg tablet Take 650 mg by mouth every 6 (six) hours as needed for mild pain      albuterol (PROVENTIL HFA,VENTOLIN HFA) 90 mcg/act inhaler Inhale 1 puff every 4 (four) hours as needed for wheezing 6.7 g 3    Aspirin Low Dose 81 MG EC tablet TAKE 1 TABLET BY MOUTH EVERY DAY (Patient not taking: Reported on 3/21/2024) 90 tablet 3    atorvastatin (LIPITOR) 20 mg tablet TAKE 1 TABLET BY MOUTH EVERY DAY 90 tablet 3    Diclofenac Sodium (VOLTAREN) 1 % APPLY 2 GRAMS TO AFFECTED AREA 4 TIMES A DAY (Patient not taking: Reported on 2/6/2024) 200 g 1    famotidine (PEPCID) 20 mg tablet TAKE 1 TABLET BY MOUTH TWICE A DAY (Patient not taking: Reported on 2/6/2024) 180 tablet 2    indomethacin (INDOCIN) 25 mg capsule Take 1 capsule (25 mg total) by mouth if needed  "for mild pain (take one a day when you feel your first headache.) for up to 15 doses (Patient not taking: Reported on 2/6/2024) 15 capsule 3    ipratropium (ATROVENT) 0.03 % nasal spray SPRAY 2 SPRAYS INTO EACH NOSTRIL EVERY 12 HOURS. 30 mL 3    levothyroxine 100 mcg tablet TAKE 1 TABLET (100 MCG TOTAL) BY MOUTH DAILY IN THE EARLY MORNING 90 tablet 3    loratadine (CLARITIN) 10 mg tablet TAKE 1 TABLET BY MOUTH EVERY DAY 90 tablet 3     No current facility-administered medications for this visit.      Allergies:     Allergies   Allergen Reactions    Morphine Chest Pain    Iv Contrast [Iodinated Contrast Media] Hives      Physical Exam:     /69 (BP Location: Left arm, Patient Position: Sitting, Cuff Size: Large)   Pulse 90   Temp 98.6 °F (37 °C) (Temporal)   Resp 18   Ht 5' 6\" (1.676 m)   Wt 94.3 kg (208 lb)   LMP 02/28/2018 (Exact Date)   SpO2 97%   BMI 33.57 kg/m²     Physical Exam  Vitals reviewed.   Constitutional:       Appearance: Normal appearance.   HENT:      Head: Normocephalic and atraumatic.      Mouth/Throat:      Mouth: Mucous membranes are moist.      Pharynx: Oropharynx is clear.   Cardiovascular:      Rate and Rhythm: Normal rate and regular rhythm.   Pulmonary:      Effort: Pulmonary effort is normal.      Breath sounds: Normal breath sounds.   Abdominal:      General: Abdomen is flat. Bowel sounds are normal. There is no distension.      Tenderness: There is no abdominal tenderness.   Musculoskeletal:      Right lower leg: No edema.      Left lower leg: No edema.   Skin:     General: Skin is warm and dry.   Neurological:      Mental Status: She is alert and oriented to person, place, and time.   Psychiatric:         Mood and Affect: Mood normal.         Behavior: Behavior normal.          William Tony MD  [facility]    "

## 2024-05-14 ENCOUNTER — TELEPHONE (OUTPATIENT)
Dept: INTERNAL MEDICINE CLINIC | Facility: CLINIC | Age: 49
End: 2024-05-14

## 2024-05-14 ENCOUNTER — APPOINTMENT (OUTPATIENT)
Dept: LAB | Facility: CLINIC | Age: 49
End: 2024-05-14
Payer: COMMERCIAL

## 2024-05-14 DIAGNOSIS — E03.8 HYPOTHYROIDISM DUE TO HASHIMOTO'S THYROIDITIS: ICD-10-CM

## 2024-05-14 DIAGNOSIS — E06.3 HYPOTHYROIDISM DUE TO HASHIMOTO'S THYROIDITIS: Primary | ICD-10-CM

## 2024-05-14 DIAGNOSIS — E03.8 HYPOTHYROIDISM DUE TO HASHIMOTO'S THYROIDITIS: Primary | ICD-10-CM

## 2024-05-14 DIAGNOSIS — E06.3 HYPOTHYROIDISM DUE TO HASHIMOTO'S THYROIDITIS: ICD-10-CM

## 2024-05-14 LAB
T4 FREE SERPL-MCNC: 0.81 NG/DL (ref 0.61–1.12)
TSH SERPL DL<=0.05 MIU/L-ACNC: 7.46 UIU/ML (ref 0.45–4.5)

## 2024-05-14 PROCEDURE — 36415 COLL VENOUS BLD VENIPUNCTURE: CPT

## 2024-05-14 PROCEDURE — 84443 ASSAY THYROID STIM HORMONE: CPT

## 2024-05-14 PROCEDURE — 84439 ASSAY OF FREE THYROXINE: CPT

## 2024-05-14 NOTE — TELEPHONE ENCOUNTER
Spoke with patient-TSH 7.455, T40.81. Spoke with patient about taking medication properly which she states she does. Additionally she states she does not take any supplements but does drink a peppermint herbal tea at times. Discussed stopping this indefinitely as this can also affect her thyroid number.    -Will get T4 by dialysis to check actual T4 amount and can consider further adjusting medication off of this.

## 2024-05-17 ENCOUNTER — APPOINTMENT (OUTPATIENT)
Dept: LAB | Facility: CLINIC | Age: 49
End: 2024-05-17
Payer: COMMERCIAL

## 2024-05-17 ENCOUNTER — OFFICE VISIT (OUTPATIENT)
Dept: CARDIOLOGY CLINIC | Facility: CLINIC | Age: 49
End: 2024-05-17
Payer: COMMERCIAL

## 2024-05-17 VITALS
HEART RATE: 88 BPM | WEIGHT: 206.3 LBS | SYSTOLIC BLOOD PRESSURE: 92 MMHG | OXYGEN SATURATION: 98 % | HEIGHT: 66 IN | BODY MASS INDEX: 33.16 KG/M2 | DIASTOLIC BLOOD PRESSURE: 62 MMHG

## 2024-05-17 DIAGNOSIS — I44.1 MOBITZ (TYPE) I (WENCKEBACH'S) ATRIOVENTRICULAR BLOCK: ICD-10-CM

## 2024-05-17 DIAGNOSIS — M79.18 MYOFASCIAL PAIN SYNDROME: ICD-10-CM

## 2024-05-17 DIAGNOSIS — E78.5 HYPERLIPIDEMIA, UNSPECIFIED HYPERLIPIDEMIA TYPE: ICD-10-CM

## 2024-05-17 DIAGNOSIS — R00.2 PALPITATIONS: Primary | ICD-10-CM

## 2024-05-17 DIAGNOSIS — G47.30 SLEEP APNEA, UNSPECIFIED TYPE: Primary | ICD-10-CM

## 2024-05-17 DIAGNOSIS — E06.3 HYPOTHYROIDISM DUE TO HASHIMOTO'S THYROIDITIS: ICD-10-CM

## 2024-05-17 DIAGNOSIS — E03.8 HYPOTHYROIDISM DUE TO HASHIMOTO'S THYROIDITIS: ICD-10-CM

## 2024-05-17 DIAGNOSIS — I47.19 ATRIAL TACHYCARDIA: ICD-10-CM

## 2024-05-17 DIAGNOSIS — R07.82 INTERCOSTAL PAIN: ICD-10-CM

## 2024-05-17 PROCEDURE — 36415 COLL VENOUS BLD VENIPUNCTURE: CPT

## 2024-05-17 PROCEDURE — 99214 OFFICE O/P EST MOD 30 MIN: CPT | Performed by: INTERNAL MEDICINE

## 2024-05-17 PROCEDURE — 84439 ASSAY OF FREE THYROXINE: CPT

## 2024-05-17 NOTE — PATIENT INSTRUCTIONS
You were seen today in the Cardiology office for follow up evaluation.     Please continue your current cardiac medications as prescribed. Notify me if you develop any worsening palpitations.     Thank you for choosing [facility].    Please call our office or use [employer] with any questions.

## 2024-05-17 NOTE — PROGRESS NOTES
[facility]    Name:Randa Boo   DOS: 5/17/2024     Chief Complaint:   Chief Complaint   Patient presents with    Follow-up     After ZIO results    Chest Pain     Pressure like chest pain    Palpitations    Shortness of Breath    Dizziness       HISTORY OF PRESENT ILLNESS:    HPI:  Randa Boo is a 49 y.o. female. She  has a past medical history of Abdominal pain, Allergic drug reaction (9/15/2021), Anemia, Anxiety, Asthma, Back pain, Bilateral fibrocystic breast changes, Breast disorder, Breast pain, left (1/26/2022), Chest pain, Chronic pelvic pain in female, Cluster headaches, Colon cancer screening (5/12/2021), Constipation, Cough, Depression, Difficulty concentrating, Disease of thyroid gland, Dizziness, Dyspareunia in female, Easy bruising, Epigastric abdominal pain (7/22/2020), Fainting episodes, Fatigue, Fever due to infection, Fibromyalgia, Frequent urination at night, Gallbladder disease, Herniated intervertebral disc of lumbar spine, Hypotension, Loss of bladder control, Mastalgia in female (5/24/2021), Memory loss, Mid back pain (3/19/2021), Mixed incontinence, Myofascial pain, Numbness and tingling, Painful swelling of joint, Palpitations, Panic attack, Sciatica, Situational anxiety, SOB (shortness of breath), Thyroid disease, Uterus, adenomyosis, Weakness, and Weight disorder.    She presents for follow up evaluation. She last saw me in the office on 3/21/24. Per my OV note at that time:  She reports that she has experienced palpitations for years, described as a tachycardia sensation, sometimes a fluttering in her throat. Longest episode lasted 3 minutes straight associated with profuse diaphoresis. Has also woken her during sleep in the past with palpitations.  These episodes occur both at rest and with exertion, generally self-limiting and generally only last a few seconds at a time.  Episodes are sometimes but not always associated with shortness of breath,  dizziness, chest pain lasting seconds while she is actively experiencing a fast heart rate.  Prior cardiac workup is included a recent 48-hour Holter (personally reviewed, no symptomatic correlation to an arrhythmia, predominantly sinus rhythm) and a resting transthoracic echocardiogram (also personally reviewed, preserved biventricular size and systolic function, no prognostically significant valvular abnormalities seen).  She has also undergone 7-day Zio patch rhythm monitoring in the past, which demonstrated a brief short run of atrial tachycardia and otherwise mildly elevated ectopy burden.     Based upon her presentation then, I had referred the patient for a 14-day Zio patch XT cardiac rhythm monitor given her preference to avoid invasive cardiac rhythm monitoring by means of a loop recorder.  The study was completed on 4/30/2024, personally reviewed by me.  The study demonstrated predominantly normal sinus rhythm with normal heart rate variation.  A single episode of Mobitz type I second-degree AV block occurred on 1 occasion, asymptomatic.  The patient's symptomatic events correlated on at least 1 occasion with a short burst of narrow complex tachycardia that likely represents atrial tachycardia.    Today, she reports no new cardiopulmonary complaints.  She continues to experience episodic bouts chronically.  There have been no episodes lasting more than a few seconds at a time since I last saw her in the office.  She also continues to report episodic pressure-like discomfort in her chest, noting in my prior office note I documented that she had denied chest pain to me the patient states that she has been chronically experiencing chest pain for many years ranging as far back as 2020.  In the past, she was evaluated by cardiac CTA which demonstrated a coronary calcium score of 0 and absence of occlusive coronary disease at that time.  Subsequent to that time she underwent an exercise stress echo, which  demonstrated no ECG criteria for ischemia but technically difficult wall motion assessment limited sensitivity and specificity of the echocardiographic portion of the study.  The patient states that the chest discomfort she experiences not reliably related to exertion, rather it occurs randomly and does not worsen or improve with exertion when it occurs.  She states that she has previously been diagnosed with fibromyalgia and costochondritis, and feels that the pain she is currently experiencing is very similar to that.    She denies diaphoresis, orthopnea, edema, syncope.  She remains physically active with no activity limiting symptoms.  Presently being evaluated for sleep apnea by her PCP, I recommendation that I completely agree with.     ROS    ROS: Pertinent positives and negatives as described in History of Present Illness. Remainder of a 14 point review of systems was negative.     Allergies   Allergen Reactions    Morphine Chest Pain    Iv Contrast [Iodinated Contrast Media] Hives        Current Outpatient Medications on File Prior to Visit   Medication Sig Dispense Refill    acetaminophen (TYLENOL) 325 mg tablet Take 650 mg by mouth every 6 (six) hours as needed for mild pain      albuterol (PROVENTIL HFA,VENTOLIN HFA) 90 mcg/act inhaler Inhale 1 puff every 4 (four) hours as needed for wheezing 6.7 g 3    atorvastatin (LIPITOR) 20 mg tablet TAKE 1 TABLET BY MOUTH EVERY DAY 90 tablet 3    ipratropium (ATROVENT) 0.03 % nasal spray SPRAY 2 SPRAYS INTO EACH NOSTRIL EVERY 12 HOURS. 30 mL 3    levothyroxine 100 mcg tablet TAKE 1 TABLET (100 MCG TOTAL) BY MOUTH DAILY IN THE EARLY MORNING 90 tablet 3    loratadine (CLARITIN) 10 mg tablet TAKE 1 TABLET BY MOUTH EVERY DAY 90 tablet 3    Aspirin Low Dose 81 MG EC tablet TAKE 1 TABLET BY MOUTH EVERY DAY (Patient not taking: Reported on 3/21/2024) 90 tablet 3    Diclofenac Sodium (VOLTAREN) 1 % APPLY 2 GRAMS TO AFFECTED AREA 4 TIMES A DAY (Patient not taking: Reported  on 2/6/2024) 200 g 1    famotidine (PEPCID) 20 mg tablet TAKE 1 TABLET BY MOUTH TWICE A DAY (Patient not taking: Reported on 2/6/2024) 180 tablet 2    indomethacin (INDOCIN) 25 mg capsule Take 1 capsule (25 mg total) by mouth if needed for mild pain (take one a day when you feel your first headache.) for up to 15 doses (Patient not taking: Reported on 2/6/2024) 15 capsule 3     No current facility-administered medications on file prior to visit.       Past Medical History:   Diagnosis Date    Abdominal pain     Allergic drug reaction 9/15/2021    Anemia     Anxiety     Asthma     Back pain     Bilateral fibrocystic breast changes     resolved: 02/21/2017    Breast disorder     Breast pain, left 1/26/2022    Chest pain     Chronic pelvic pain in female     last assessed: 09/07/2016    Cluster headaches     Colon cancer screening 5/12/2021    Constipation     Cough     Depression     Difficulty concentrating     Disease of thyroid gland     Dizziness     Dyspareunia in female     last assessed: 08/18/2016    Easy bruising     Epigastric abdominal pain 7/22/2020    Fainting episodes     Fatigue     Fever due to infection     Fibromyalgia     Frequent urination at night     Gallbladder disease     Herniated intervertebral disc of lumbar spine     Hypotension     Loss of bladder control     Mastalgia in female 5/24/2021    Memory loss     last assessed: 06/15/2015    Mid back pain 3/19/2021    Mixed incontinence     last assessed: 01/11/2016    Myofascial pain     last assessed: 06/01/2016    Numbness and tingling     Painful swelling of joint     Palpitations     Panic attack     Sciatica     last assessed: 06/15/2015    Situational anxiety     last assessed: 02/21/2017    SOB (shortness of breath)     Thyroid disease     Uterus, adenomyosis     last assessed: 09/07/2016    Weakness     Weight disorder        Past Surgical History:   Procedure Laterality Date    CHOLECYSTECTOMY      CHOLECYSTECTOMY LAPAROSCOPIC N/A  10/22/2017    Procedure: CHOLECYSTECTOMY LAPAROSCOPIC;  Surgeon: Josue Martines MD;  Location: [facility];  Service: General    COLONOSCOPY  2021    ENDOMETRIAL BIOPSY      resolved: 2017    HYSTERECTOMY  2016    TX VAGINAL HYSTERECTOMY UTERUS 250 GM/< N/A 2018    Procedure: HYSTERECTOMY VAGINAL TOTAL (TVH), BILATERAL SALPINGECTOMY;  Surgeon: Jenn Ghotra MD;  Location: [facility];  Service: Gynecology    TUBAL LIGATION      last assessed: 10/20/2014    UPPER GASTROINTESTINAL ENDOSCOPY  2020       Family History   Problem Relation Age of Onset    Hypertension Mother     Arthritis Mother     Diabetes Maternal Grandmother     Breast cancer Family     Cancer Family     Diabetes Family     Heart disease Family     Hyperlipidemia Family         reported cholesterol level was high    Hypertension Other     Hyperlipidemia Father     No Known Problems Maternal Grandfather     No Known Problems Sister     No Known Problems Daughter     Breast cancer Paternal Grandmother 32    No Known Problems Paternal Grandfather     Leukemia Paternal Aunt     No Known Problems Sister     No Known Problems Sister     No Known Problems Sister     No Known Problems Sister     No Known Problems Daughter     No Known Problems Paternal Aunt     No Known Problems Son     No Known Problems Son     No Known Problems Brother     No Known Problems Brother        Social History     Socioeconomic History    Marital status: /Civil Union     Spouse name: Not on file    Number of children: 4    Years of education: Not on file    Highest education level: Not on file   Occupational History    Occupation: Full-time   Tobacco Use    Smoking status: Former     Current packs/day: 0.00     Average packs/day: 0.5 packs/day for 2.0 years (1.0 ttl pk-yrs)     Types: Cigarettes     Start date:      Quit date:      Years since quittin.3    Smokeless tobacco: Never   Vaping Use    Vaping status: Never Used   Substance and  Sexual Activity    Alcohol use: Yes     Comment: occasional    Drug use: Never    Sexual activity: Yes     Partners: Male     Birth control/protection: Female Sterilization, Surgical   Other Topics Concern    Not on file   Social History Narrative    Daily caffeine consumption    Domestic partner    Parent     Social Determinants of Health     Financial Resource Strain: Patient Declined (5/13/2024)    Overall Financial Resource Strain (CARDIA)     Difficulty of Paying Living Expenses: Patient declined   Food Insecurity: Patient Declined (5/13/2024)    Hunger Vital Sign     Worried About Running Out of Food in the Last Year: Patient declined     Ran Out of Food in the Last Year: Patient declined   Transportation Needs: Patient Declined (5/13/2024)    PRAPARE - Transportation     Lack of Transportation (Medical): Patient declined     Lack of Transportation (Non-Medical): Patient declined   Physical Activity: Sufficiently Active (8/23/2021)    Exercise Vital Sign     Days of Exercise per Week: 5 days     Minutes of Exercise per Session: 60 min   Stress: Stress Concern Present (10/14/2020)    Citizen of [place] of Occupational Health - Occupational Stress Questionnaire     Feeling of Stress : Very much   Social Connections: Moderately Isolated (8/23/2021)    Social Connection and Isolation Panel [NHANES]     Frequency of Communication with Friends and Family: More than three times a week     Frequency of Social Gatherings with Friends and Family: Once a week     Attends Latter day Services: Never     Active Member of Clubs or Organizations: No     Attends Club or Organization Meetings: Never     Marital Status:    Intimate Partner Violence: Not At Risk (8/23/2021)    Humiliation, Afraid, Rape, and Kick questionnaire     Fear of Current or Ex-Partner: No     Emotionally Abused: No     Physically Abused: No     Sexually Abused: No   Housing Stability: Unknown (5/13/2024)    Housing Stability Vital Sign     Unable to  Pay for Housing in the Last Year: Patient declined     Number of Times Moved in the Last Year: Not on file     Homeless in the Last Year: Not on file       OBJECTIVE:    LMP 02/28/2018 (Exact Date)      BP Readings from Last 3 Encounters:   05/13/24 101/69   03/21/24 90/58   02/20/24 136/81       Wt Readings from Last 3 Encounters:   05/13/24 94.3 kg (208 lb)   03/21/24 93 kg (205 lb)   02/06/24 91.6 kg (202 lb)         Physical Exam  Vitals reviewed.   Constitutional:       General: She is not in acute distress.     Appearance: Normal appearance. She is not diaphoretic.   HENT:      Head: Normocephalic and atraumatic.   Eyes:      Conjunctiva/sclera: Conjunctivae normal.   Neck:      Vascular: No JVD.   Cardiovascular:      Rate and Rhythm: Normal rate and regular rhythm.      Pulses: Normal pulses.      Heart sounds: Normal heart sounds. No murmur heard.     No friction rub. No gallop.   Pulmonary:      Effort: Pulmonary effort is normal.      Breath sounds: Normal breath sounds. No wheezing, rhonchi or rales.   Neurological:      General: No focal deficit present.      Mental Status: She is alert and oriented to person, place, and time.   Psychiatric:         Mood and Affect: Mood normal.         Behavior: Behavior normal.                                                             LABS:  Lab Results   Component Value Date    GLUCOSE 106 04/06/2015    BUN 12 02/07/2024    CREATININE 0.66 02/07/2024    CALCIUM 8.7 02/07/2024     04/06/2015    K 3.8 02/07/2024    CO2 30 02/07/2024     02/07/2024    ALKPHOS 72 02/07/2024    BILITOT 0.40 04/06/2015    PROT 7.5 04/06/2015    AST 13 02/07/2024    ALT 12 02/07/2024    ANIONGAP 8 04/06/2015        Lab Results   Component Value Date    WBC 5.93 02/07/2024    HGB 13.6 02/07/2024    HCT 42.4 02/07/2024    MCV 91 02/07/2024     02/07/2024       Lab Results   Component Value Date    CHOL 152 09/25/2015    HDL 58 02/07/2024    LDLCALC 99 02/07/2024    TRIG  88 02/07/2024       Lab Results   Component Value Date    HGBA1C 5.6 02/07/2024         ASSESSMENT/PLAN:  Diagnoses and all orders for this visit:    Palpitations    Atrial tachycardia    Hyperlipidemia, unspecified hyperlipidemia type    Intercostal pain    Myofascial pain syndrome    Mobitz (type) I (Wenckebach's) atrioventricular block    49-year-old female presenting for follow-up evaluation.  No new active cardiopulmonary complaints, persistent palpitations episodic and occasional pressure-like chest discomfort reported.  The symptoms are unchanged compared to her chronic baseline.  Reviewed that her Zio patch XT demonstrated symptomatic correlation with very short proximal-isms of atrial tachycardia, atrial triplets.  The study demonstrated no sustained arrhythmias, and no evidence of high degree AV block.  I offered to the patient a trial of medical management with a low-dose calcium channel blocker for SVT suppression.  She prefers to hold off on medication at this time which I think is reasonable given the overall low burden of arrhythmia and lack of sustained episodes.  I have recommended follow-up with her PCP for continued management of myofascial pain, and at this time based upon review of prior testing (CTA exercise stress echo) and her overall low pretest risk for coronary artery disease I do not anticipate that additional cardiac testing is warranted at this time.  I did  the patient on the importance of notifying me if there is any change in chest pain frequency or severity as this would warrant further evaluation at a future time.  We reviewed the single episode of Mobitz type I second-degree AV block on her Zio patch monitor, noting my recommendation for continuing forward with evaluation for sleep apnea as this may be contributing to nocturnal bradycardia.  I advised her to continue atorvastatin 20 mg once daily given mild carotid artery disease noted on her most recent carotid duplex  "assessment.          Jayden Galindo MD      Portions of the record may have been created with voice recognition software. Occasional wrong word or \"sound alike\" substitutions may have occurred due to the inherent limitations of voice recognition software. Please review the chart carefully and recognize, using context, where substitutions/typographical errors may have occurred.     "

## 2024-05-19 LAB
APOB+LDLR+PCSK9 GENE MUT ANL BLD/T: NOT DETECTED
BRCA1+BRCA2 DEL+DUP + FULL MUT ANL BLD/T: NOT DETECTED
MLH1+MSH2+MSH6+PMS2 GN DEL+DUP+FUL M: NOT DETECTED

## 2024-05-28 LAB — T4 FREE SERPL DIALY-MCNC: 1.3 NG/DL

## 2024-05-29 DIAGNOSIS — E03.8 HYPOTHYROIDISM DUE TO HASHIMOTO'S THYROIDITIS: Primary | ICD-10-CM

## 2024-05-29 DIAGNOSIS — E06.3 HYPOTHYROIDISM DUE TO HASHIMOTO'S THYROIDITIS: Primary | ICD-10-CM

## 2024-06-13 ENCOUNTER — TELEPHONE (OUTPATIENT)
Dept: INTERNAL MEDICINE CLINIC | Facility: CLINIC | Age: 49
End: 2024-06-13

## 2024-06-13 NOTE — LETTER
[facility]  [address]  [place] [address]  Phone#  [phone]  Fax#  [phone]    Randa Boo  [address]   [place] [address]     June 13, 2024      Dear:   Randa Boo         Our office has attempted to contact you several times regarding your Appointment.  Could you please contact our office at [phone].    Thank you.     Sincerely,    Practice Administrator

## 2024-06-18 NOTE — PROGRESS NOTES
Chief Complaint   Patient presents with   • Hypothyroidism      Referring Provider  William Tony Md  [address] [place] [address]     History of Present Illness:   Randa Boo is a 49 y.o. female with hypothyroidism seen in consultation at the [facility].  Reports being dx in 2004 when she became amenorrheic. At the time, there was also a change in her hair texture which she is seeing now. Now she also sees dry skin and a difficulties with losing weight. She has seen a 10# gain in the pst 6+ months. . Fatigue has increased and her sleeping has been interrupted. She is scheduled for a sleep test.     Levothyroxine 100mcg daily. She takes this in the morning by itself in the morning. She has only been on generic levothyroxine.   S/p hysterectomy but still has ovaries. She has noted some hot flashes and night sweats.   To see GYN this week but suspects she is in perimenopause.     She denies changes in neck. With occasional raspiness in voice, but no dysphagia.        Patient Active Problem List   Diagnosis   • Fibromyalgia   • Hypothyroidism due to Hashimoto's thyroiditis   • Stress incontinence, female   • Hypermobility of urethra   • Calcaneal spur of left foot   • Palpitations   • Lumbar radiculopathy   • Generalized anxiety disorder   • Class 1 obesity due to excess calories without serious comorbidity with body mass index (BMI) of 31.0 to 31.9 in adult   • Tension headache   • Cervical radiculopathy   • Myofascial pain syndrome   • Gastroesophageal reflux disease without esophagitis   • History of COVID-19   • SOB (shortness of breath)   • Excessive daytime sleepiness   • Dizziness   • Sprain of left foot   • Sleep apnea      Past Medical History:   Diagnosis Date   • Abdominal pain    • Allergic drug reaction 09/15/2021   • Anemia    • Anxiety    • Asthma    • Back pain    • Bilateral fibrocystic breast changes     resolved: 02/21/2017   • Breast disorder    • Breast pain, left  01/26/2022   • Chest pain    • Chronic pelvic pain in female     last assessed: 09/07/2016   • Cluster headaches    • Colon cancer screening 05/12/2021   • Constipation    • Cough    • Depression    • Difficulty concentrating    • Disease of thyroid gland    • Dizziness    • Dyspareunia in female     last assessed: 08/18/2016   • Easy bruising    • Epigastric abdominal pain 07/22/2020   • Fainting episodes    • Fatigue    • Fever due to infection    • Fibromyalgia    • Frequent urination at night    • Gallbladder disease    • Goiter 04/13/2017   • Herniated intervertebral disc of lumbar spine    • Hypotension    • Loss of bladder control    • Mastalgia in female 05/24/2021   • Memory loss     last assessed: 06/15/2015   • Mid back pain 03/19/2021   • Mixed incontinence     last assessed: 01/11/2016   • Myofascial pain     last assessed: 06/01/2016   • Numbness and tingling    • Painful swelling of joint    • Palpitations    • Panic attack    • Sciatica     last assessed: 06/15/2015   • Situational anxiety     last assessed: 02/21/2017   • SOB (shortness of breath)    • Thyroid disease    • Thyroid nodule 03/05/2019    Thyroid ultrasound March 2019.  Left mid thyroid nodule unchanged from previous.  Does not require further evaluation or screening.     • Uterus, adenomyosis     last assessed: 09/07/2016   • Weakness    • Weight disorder       Past Surgical History:   Procedure Laterality Date   • CHOLECYSTECTOMY     • CHOLECYSTECTOMY LAPAROSCOPIC N/A 10/22/2017    Procedure: CHOLECYSTECTOMY LAPAROSCOPIC;  Surgeon: Josue Martines MD;  Location: [facility];  Service: General   • COLONOSCOPY  06/03/2021   • ENDOMETRIAL BIOPSY      resolved: 02/21/2017   • HYSTERECTOMY  2016   • MN VAGINAL HYSTERECTOMY UTERUS 250 GM/< N/A 4/20/2018    Procedure: HYSTERECTOMY VAGINAL TOTAL (TVH), BILATERAL SALPINGECTOMY;  Surgeon: Jenn Ghotra MD;  Location: [facility];  Service: Gynecology   • TUBAL LIGATION      last assessed:  10/20/2014   • UPPER GASTROINTESTINAL ENDOSCOPY  2020      Family History   Problem Relation Age of Onset   • Hypertension Mother    • Arthritis Mother    • Hypothyroidism Mother    • Hyperlipidemia Father    • No Known Problems Sister    • No Known Problems Sister    • No Known Problems Sister    • No Known Problems Sister    • No Known Problems Sister    • No Known Problems Brother    • No Known Problems Brother    • Diabetes Maternal Grandmother    • No Known Problems Maternal Grandfather    • Breast cancer Paternal Grandmother 32   • No Known Problems Paternal Grandfather    • No Known Problems Daughter    • No Known Problems Daughter    • No Known Problems Son    • No Known Problems Son    • Leukemia Paternal Aunt    • No Known Problems Paternal Aunt    • Hypertension Other    • Breast cancer Family    • Cancer Family    • Diabetes Family    • Heart disease Family    • Hyperlipidemia Family         reported cholesterol level was high     Social History     Tobacco Use   • Smoking status: Former     Current packs/day: 0.00     Average packs/day: 0.5 packs/day for 2.0 years (1.0 ttl pk-yrs)     Types: Cigarettes     Start date:      Quit date:      Years since quittin.4     Passive exposure: Past   • Smokeless tobacco: Never   Substance Use Topics   • Alcohol use: Yes     Comment: occasional     Allergies   Allergen Reactions   • Morphine Chest Pain   • Iv Contrast [Iodinated Contrast Media] Hives         Current Outpatient Medications:   •  levothyroxine (Euthyrox) 112 mcg tablet, Take one pill by mouth once daily, Disp: 30 tablet, Rfl: 5  •  acetaminophen (TYLENOL) 325 mg tablet, Take 650 mg by mouth every 6 (six) hours as needed for mild pain, Disp: , Rfl:   •  albuterol (PROVENTIL HFA,VENTOLIN HFA) 90 mcg/act inhaler, Inhale 1 puff every 4 (four) hours as needed for wheezing, Disp: 6.7 g, Rfl: 3  •  Aspirin Low Dose 81 MG EC tablet, TAKE 1 TABLET BY MOUTH EVERY DAY (Patient not taking:  "Reported on 3/21/2024), Disp: 90 tablet, Rfl: 3  •  atorvastatin (LIPITOR) 20 mg tablet, TAKE 1 TABLET BY MOUTH EVERY DAY, Disp: 90 tablet, Rfl: 3  •  ipratropium (ATROVENT) 0.03 % nasal spray, SPRAY 2 SPRAYS INTO EACH NOSTRIL EVERY 12 HOURS., Disp: 30 mL, Rfl: 3  •  loratadine (CLARITIN) 10 mg tablet, TAKE 1 TABLET BY MOUTH EVERY DAY, Disp: 90 tablet, Rfl: 3  Review of Systems   Constitutional:  Positive for fatigue and unexpected weight change.   HENT:  Positive for voice change.    Eyes:  Negative for visual disturbance.   Respiratory:  Negative for cough and shortness of breath.    Cardiovascular:  Positive for palpitations.   Gastrointestinal:  Positive for constipation, diarrhea and nausea.   Endocrine: Positive for heat intolerance. Negative for cold intolerance.        Feet are cold   Musculoskeletal:  Negative for gait problem.   Skin:         See hpi   Neurological:  Positive for tremors.   Psychiatric/Behavioral:  Positive for sleep disturbance.        Physical Exam:  Body mass index is 33.89 kg/m².  BP 98/68 (BP Location: Left arm, Patient Position: Sitting, Cuff Size: Adult)   Ht 5' 6\" (1.676 m)   Wt 95.3 kg (210 lb)   LMP 02/28/2018 (Exact Date)   BMI 33.89 kg/m²    Wt Readings from Last 3 Encounters:   06/19/24 95.3 kg (210 lb)   05/17/24 93.6 kg (206 lb 4.8 oz)   05/13/24 94.3 kg (208 lb)       GEN: NAD  E/n/m nl facies, hearing intact bilat, tongue midline, lips nl  Eyes: no stare or proptosis, nl lids, EOMI  Neck: trachea midline, thyroid NT to palpation, nl in size, no nodules or neck masses noted, no cervical LAD  CV; heart reg rate s1s2 nl, no m/r/g appreciated  Resp: CTAB, good effort  Ab+BS  Neuro: no tremor, 2+ DTRs in BUE  MS: no c/c in digits, moves all 4 ext, nl muscle bulk, gait nl  Skin: warm and dry, no palmar erythema  Psych: nl mood and affect, no gross lapses in memory    DATA:  Labs:   Lab Results   Component Value Date    JVV4AKRUPUIJ 7.455 (H) 05/14/2024         Lab Results "   Component Value Date    FREET4 0.81 05/14/2024         Radiology    Thyroid u/s  Jan 2022  COMPARISON:  3/6/2019     TECHNIQUE:   Ultrasound of the thyroid was performed with a high frequency linear transducer in transverse and sagittal planes including volumetric imaging sweeps as well as traditional still imaging technique.     FINDINGS:  Thyroid parenchyma is diffusely heterogeneous in echotexture.     Right lobe:  5.0 x 1.8 x 1.3 cm.  Left lobe:  4.4 x 1.1 x 1.4 cm.  Isthmus:  0.4 cm.     Previously noted left thyroid nodule could not be identified on the current examination.  No discrete nodule identified.     IMPRESSION:  Diffusely heterogeneous echotexture throughout the thyroid gland and mild thyromegaly again consistent with thyroiditis.  Previously noted subcentimeter left-sided thyroid nodule is not demonstrated on the current study.  No nodule identified.       Impression:  1. Hypothyroidism due to Hashimoto's thyroiditis           Plan:    Randa was seen today for hypothyroidism.    Diagnoses and all orders for this visit:    Hypothyroidism due to Hashimoto's thyroiditis  -     Ambulatory Referral to Endocrinology  -     TSH, 3rd generation; Future  -     levothyroxine (Euthyrox) 112 mcg tablet; Take one pill by mouth once daily         Hypothyroidism, likely due to Hashimoto's: At this time, I recommend increasing levothyroxine to 112mcg daily with repeat TSH in 6 weeks.       Discussed with the patient and all questioned fully answered. She will call me if any problems arise.        Kenna Tejeda MD

## 2024-06-19 ENCOUNTER — OFFICE VISIT (OUTPATIENT)
Dept: ENDOCRINOLOGY | Facility: CLINIC | Age: 49
End: 2024-06-19
Payer: COMMERCIAL

## 2024-06-19 VITALS
HEIGHT: 66 IN | SYSTOLIC BLOOD PRESSURE: 98 MMHG | WEIGHT: 210 LBS | DIASTOLIC BLOOD PRESSURE: 68 MMHG | BODY MASS INDEX: 33.75 KG/M2

## 2024-06-19 DIAGNOSIS — E03.8 HYPOTHYROIDISM DUE TO HASHIMOTO'S THYROIDITIS: ICD-10-CM

## 2024-06-19 DIAGNOSIS — E06.3 HYPOTHYROIDISM DUE TO HASHIMOTO'S THYROIDITIS: ICD-10-CM

## 2024-06-19 PROBLEM — E04.1 THYROID NODULE: Status: RESOLVED | Noted: 2019-03-05 | Resolved: 2024-06-19

## 2024-06-19 PROBLEM — E04.9 GOITER: Status: RESOLVED | Noted: 2017-04-13 | Resolved: 2024-06-19

## 2024-06-19 PROCEDURE — 99204 OFFICE O/P NEW MOD 45 MIN: CPT | Performed by: INTERNAL MEDICINE

## 2024-06-19 RX ORDER — LEVOTHYROXINE SODIUM 112 UG/1
TABLET ORAL
Qty: 30 TABLET | Refills: 5 | Status: SHIPPED | OUTPATIENT
Start: 2024-06-19

## 2024-06-21 ENCOUNTER — OFFICE VISIT (OUTPATIENT)
Dept: OBGYN CLINIC | Facility: CLINIC | Age: 49
End: 2024-06-21
Payer: COMMERCIAL

## 2024-06-21 VITALS
HEIGHT: 66 IN | SYSTOLIC BLOOD PRESSURE: 102 MMHG | BODY MASS INDEX: 33.56 KG/M2 | DIASTOLIC BLOOD PRESSURE: 68 MMHG | WEIGHT: 208.8 LBS

## 2024-06-21 DIAGNOSIS — N39.46 MIXED STRESS AND URGE URINARY INCONTINENCE: ICD-10-CM

## 2024-06-21 DIAGNOSIS — N95.1 MENOPAUSAL SYMPTOMS: Primary | ICD-10-CM

## 2024-06-21 PROCEDURE — 99213 OFFICE O/P EST LOW 20 MIN: CPT | Performed by: PHYSICIAN ASSISTANT

## 2024-06-21 RX ORDER — ESTRADIOL 0.04 MG/D
1 PATCH, EXTENDED RELEASE TRANSDERMAL 2 TIMES WEEKLY
Qty: 8 PATCH | Refills: 4 | Status: SHIPPED | OUTPATIENT
Start: 2024-06-24

## 2024-06-21 NOTE — PROGRESS NOTES
Assessment/Plan:      Diagnoses and all orders for this visit:    Menopausal symptoms  -     Ambulatory Referral to Obstetrics / Gynecology  -     estradiol (Minivelle) 0.0375 MG/24HR; Place 1 patch on the skin 2 (two) times a week    Mixed stress and urge urinary incontinence  -     Ambulatory Referral to Physical Therapy; Future        Discussed symptoms with patient.  Stressed the need for compliance with thyroid treatment.  Referral to pelvic floor PT entered; patient to call for appointment.  Discussed HRT.  Only need estrogen as patient no longer has a uterus.  Rx for estradiol patch sent to pharmacy with refills.  Patch to be changed 2x weekly.  F/u in 3 mos for recheck and yearly gyn exam.  Call with problems in the interim.    Subjective:     Patient ID: Randa Boo is a 49 y.o. female.    Patient is here with complaint of menopausal symptoms.  She is new to our office today.  Underwent hysterectomy and b/l salpingectomy for fibroids in 2018.  Has been experiencing hot flashes, night sweats, sleep disturbance, fatigue, brain fog, bloating, and weight gain for the last several months.  Also complains of both urinary and fecal urgency.  Has had some episodes of GABRIELLE.  Denies abdominal pain, n/v, and change in appetite.  Patient is hypothyroid; just had dose of levothyroxine increased.  No history of blood clots.  Up to date on her colonoscopy.        Review of Systems   Constitutional:  Positive for diaphoresis (Hot flashes/night sweats), fatigue and unexpected weight change. Negative for appetite change.   Gastrointestinal:  Positive for abdominal distention and constipation. Negative for abdominal pain, diarrhea, nausea and vomiting.   Genitourinary:  Positive for urgency. Negative for difficulty urinating, dysuria, frequency, genital sores, hematuria, menstrual problem, pelvic pain, vaginal bleeding, vaginal discharge and vaginal pain.   Psychiatric/Behavioral:  Positive for sleep disturbance.       "    Objective:  Visit Vitals  /68 (BP Location: Left arm, Patient Position: Sitting, Cuff Size: Standard)   Ht 5' 6\" (1.676 m)   Wt 94.7 kg (208 lb 12.8 oz)   LMP 02/28/2018 (Exact Date)   BMI 33.70 kg/m²   OB Status Hysterectomy   Smoking Status Former   BSA 2.04 m²         Physical Exam  Vitals reviewed.   Constitutional:       Appearance: Normal appearance. She is well-developed.   Neurological:      Mental Status: She is alert and oriented to person, place, and time.   Psychiatric:         Mood and Affect: Mood normal.         Behavior: Behavior normal. Behavior is cooperative.         Thought Content: Thought content normal.         Judgment: Judgment normal.           "

## 2024-06-21 NOTE — PATIENT INSTRUCTIONS
Call PT for appointment.    Rx for estradiol patch sent to pharmacy.  Change 2x weekly.    Call with problems.

## 2024-06-25 ENCOUNTER — TELEPHONE (OUTPATIENT)
Age: 49
End: 2024-06-25

## 2024-06-25 NOTE — TELEPHONE ENCOUNTER
Caller: Randa    Doctor and/or Office: Darlyn/Brianne CRAMER#: [phone]    Escalation: Care Patient is sched for August and she would like a shot.  She would like to now how long they normally last ? Also she asked if you have  a cx list because she is going to  [place] in the beginning of Aug and would like to get her shot before that.  Thank you

## 2024-07-01 ENCOUNTER — HOSPITAL ENCOUNTER (OUTPATIENT)
Dept: SLEEP CENTER | Facility: CLINIC | Age: 49
Discharge: HOME | End: 2024-07-01
Payer: COMMERCIAL

## 2024-07-01 DIAGNOSIS — G47.30 SLEEP APNEA, UNSPECIFIED TYPE: ICD-10-CM

## 2024-07-01 PROCEDURE — G0399 HOME SLEEP TEST/TYPE 3 PORTA: HCPCS

## 2024-07-01 NOTE — PROGRESS NOTES
"Home Sleep Study Documentation    HOME STUDY DEVICE: Noxturnal no                                           Leta G3 yes  #20      Pre-Sleep Home Study:    Set-up and instructions performed by: YUE Doty    Technician performed demonstration for Patient: yes    Return demonstration performed by Patient: yes    Written instructions provided to Patient: yes    Patient signed consent form: yes        Post-Sleep Home Study:    Additional comments by Patient: pending    Home Sleep Study Failed:pending    Failure reason: {Home Study Failure:50477::\"pending\"}    Reported or Detected: {home study reported/detected:13844::\"pending\"}    Scored by: pending          "

## 2024-07-09 DIAGNOSIS — G47.19 EXCESSIVE DAYTIME SLEEPINESS: Primary | ICD-10-CM

## 2024-07-11 DIAGNOSIS — N95.1 MENOPAUSAL SYMPTOMS: ICD-10-CM

## 2024-07-11 RX ORDER — ESTRADIOL 0.04 MG/D
PATCH, EXTENDED RELEASE TRANSDERMAL
Qty: 24 PATCH | Refills: 2 | Status: SHIPPED | OUTPATIENT
Start: 2024-07-11

## 2024-07-13 DIAGNOSIS — E03.8 HYPOTHYROIDISM DUE TO HASHIMOTO'S THYROIDITIS: ICD-10-CM

## 2024-07-13 DIAGNOSIS — E06.3 HYPOTHYROIDISM DUE TO HASHIMOTO'S THYROIDITIS: ICD-10-CM

## 2024-07-14 RX ORDER — LEVOTHYROXINE SODIUM 112 UG/1
TABLET ORAL
Qty: 90 TABLET | Refills: 1 | Status: SHIPPED | OUTPATIENT
Start: 2024-07-14

## 2024-07-25 ENCOUNTER — TELEPHONE (OUTPATIENT)
Dept: SLEEP CENTER | Facility: CLINIC | Age: 49
End: 2024-07-25

## 2024-07-25 NOTE — TELEPHONE ENCOUNTER
Home sleep study resulted, confirms the diagnosis of moderate LUCY with a significant hypoxic burden.  Per ordering provider, Dr. Tony, patient to consult sleep medicine.    Call placed to patient, advised of above.    Patient scheduled for consultation with Dr. Torres 10/1/2024 in [place].

## 2024-07-29 ENCOUNTER — OFFICE VISIT (OUTPATIENT)
Dept: PODIATRY | Facility: CLINIC | Age: 49
End: 2024-07-29
Payer: COMMERCIAL

## 2024-07-29 VITALS
SYSTOLIC BLOOD PRESSURE: 112 MMHG | RESPIRATION RATE: 18 BRPM | HEART RATE: 71 BPM | HEIGHT: 66 IN | BODY MASS INDEX: 33.7 KG/M2 | DIASTOLIC BLOOD PRESSURE: 79 MMHG

## 2024-07-29 DIAGNOSIS — M79.672 LEFT FOOT PAIN: ICD-10-CM

## 2024-07-29 DIAGNOSIS — M72.2 PLANTAR FASCIITIS: Primary | ICD-10-CM

## 2024-07-29 PROCEDURE — RECHECK: Performed by: PODIATRIST

## 2024-07-29 PROCEDURE — 20550 NJX 1 TENDON SHEATH/LIGAMENT: CPT | Performed by: PODIATRIST

## 2024-07-29 RX ORDER — LIDOCAINE HYDROCHLORIDE 10 MG/ML
1 INJECTION, SOLUTION INFILTRATION; PERINEURAL
Status: SHIPPED | OUTPATIENT
Start: 2024-07-29

## 2024-07-29 RX ORDER — TRIAMCINOLONE ACETONIDE 40 MG/ML
20 INJECTION, SUSPENSION INTRA-ARTICULAR; INTRAMUSCULAR
Status: SHIPPED | OUTPATIENT
Start: 2024-07-29

## 2024-07-29 RX ORDER — BUPIVACAINE HYDROCHLORIDE 2.5 MG/ML
1 INJECTION, SOLUTION EPIDURAL; INFILTRATION; INTRACAUDAL
Status: SHIPPED | OUTPATIENT
Start: 2024-07-29

## 2024-07-29 RX ADMIN — TRIAMCINOLONE ACETONIDE 20 MG: 40 INJECTION, SUSPENSION INTRA-ARTICULAR; INTRAMUSCULAR at 11:00

## 2024-07-29 RX ADMIN — BUPIVACAINE HYDROCHLORIDE 1 ML: 2.5 INJECTION, SOLUTION EPIDURAL; INFILTRATION; INTRACAUDAL at 11:00

## 2024-07-29 RX ADMIN — LIDOCAINE HYDROCHLORIDE 1 ML: 10 INJECTION, SOLUTION INFILTRATION; PERINEURAL at 11:00

## 2024-07-29 NOTE — PROGRESS NOTES
Patient presents with recurrence of severe pain in her left heel secondary to plantars fasciitis.  Right heel pain is present but mild.  Patient is desirous of cortisone injection.    On exam, pain with palpation medial aspect left heel at fascia insertion at the calcaneus.    Treatment: Injected left heel with 0.5 cc Kenalog 40 along with 1 cc 1% Xylocaine and 1 cc 0.5% Marcaine.    Foot/lower extremity injection    Performed by: Steve Santizo DPM  Authorized by: Steve Santizo DPM    Procedure:     Other Assisting Provider: No      Verbal consent obtained?: Yes      Risks and benefits: Risks, benefits and alternatives were discussed      Consent given by:  Patient    Patient states understanding of procedure being performed: Yes      Patient identity confirmed:  Verbally with patient    Supporting Documentation:     Indications:  Pain    Procedure Details:                Ethyl Chloride was applied      Needle size: 25 G G    Ultrasound Guidance: no      Approach:  Medial    Laterality:  Left    Cyst Aspiration/Injection: No      Location: aponeurosis      Aponeurosis Structures: Plantar fascia origin      Injection Information:       Medications:  1 mL bupivacaine (PF) 0.25 %; 1 mL lidocaine 1 %; 20 mg triamcinolone acetonide 40 mg/mL

## 2024-07-31 ENCOUNTER — LAB (OUTPATIENT)
Dept: LAB | Facility: CLINIC | Age: 49
End: 2024-07-31
Payer: COMMERCIAL

## 2024-07-31 DIAGNOSIS — E03.8 HYPOTHYROIDISM DUE TO HASHIMOTO'S THYROIDITIS: Primary | ICD-10-CM

## 2024-07-31 DIAGNOSIS — E06.3 HYPOTHYROIDISM DUE TO HASHIMOTO'S THYROIDITIS: ICD-10-CM

## 2024-07-31 DIAGNOSIS — E03.8 HYPOTHYROIDISM DUE TO HASHIMOTO'S THYROIDITIS: ICD-10-CM

## 2024-07-31 DIAGNOSIS — E06.3 HYPOTHYROIDISM DUE TO HASHIMOTO'S THYROIDITIS: Primary | ICD-10-CM

## 2024-07-31 LAB — TSH SERPL DL<=0.05 MIU/L-ACNC: 5.05 UIU/ML (ref 0.45–4.5)

## 2024-07-31 PROCEDURE — 84443 ASSAY THYROID STIM HORMONE: CPT

## 2024-07-31 PROCEDURE — 36415 COLL VENOUS BLD VENIPUNCTURE: CPT

## 2024-07-31 RX ORDER — LEVOTHYROXINE SODIUM 0.12 MG/1
125 TABLET ORAL DAILY
Qty: 30 TABLET | Refills: 4 | Status: SHIPPED | OUTPATIENT
Start: 2024-07-31

## 2024-07-31 NOTE — RESULT ENCOUNTER NOTE
Please let her know that I have her TSH results. The number has improve don the 112mcg dose of levothyroxine but is still above the normal range of the test. I think she should increase to 125mcg. I will order that and order another TSH for mid September. Thank you

## 2024-08-01 ENCOUNTER — TELEPHONE (OUTPATIENT)
Dept: ENDOCRINOLOGY | Facility: CLINIC | Age: 49
End: 2024-08-01

## 2024-08-01 NOTE — TELEPHONE ENCOUNTER
----- Message from Kenna Tejeda MD sent at 7/31/2024 12:26 PM EDT -----  Please let her know that I have her TSH results. The number has improve don the 112mcg dose of levothyroxine but is still above the normal range of the test. I think she should increase to 125mcg. I will order that and order another TSH for mid September. Thank you

## 2024-09-03 DIAGNOSIS — E03.8 HYPOTHYROIDISM DUE TO HASHIMOTO'S THYROIDITIS: ICD-10-CM

## 2024-09-03 DIAGNOSIS — E06.3 HYPOTHYROIDISM DUE TO HASHIMOTO'S THYROIDITIS: ICD-10-CM

## 2024-09-04 RX ORDER — LEVOTHYROXINE SODIUM 125 UG/1
125 TABLET ORAL DAILY
Qty: 90 TABLET | Refills: 1 | Status: SHIPPED | OUTPATIENT
Start: 2024-09-04

## 2024-09-13 ENCOUNTER — APPOINTMENT (OUTPATIENT)
Dept: LAB | Facility: CLINIC | Age: 49
End: 2024-09-13
Payer: COMMERCIAL

## 2024-09-13 DIAGNOSIS — E03.8 HYPOTHYROIDISM DUE TO HASHIMOTO'S THYROIDITIS: ICD-10-CM

## 2024-09-13 DIAGNOSIS — E06.3 HYPOTHYROIDISM DUE TO HASHIMOTO'S THYROIDITIS: ICD-10-CM

## 2024-09-13 LAB — TSH SERPL DL<=0.05 MIU/L-ACNC: 1.66 UIU/ML (ref 0.45–4.5)

## 2024-09-13 PROCEDURE — 84443 ASSAY THYROID STIM HORMONE: CPT

## 2024-09-13 PROCEDURE — 36415 COLL VENOUS BLD VENIPUNCTURE: CPT

## 2024-09-23 ENCOUNTER — OFFICE VISIT (OUTPATIENT)
Dept: PODIATRY | Facility: CLINIC | Age: 49
End: 2024-09-23
Payer: COMMERCIAL

## 2024-09-23 VITALS — BODY MASS INDEX: 32.95 KG/M2 | RESPIRATION RATE: 18 BRPM | HEIGHT: 66 IN | WEIGHT: 205 LBS

## 2024-09-23 DIAGNOSIS — L30.9 ECZEMA, UNSPECIFIED TYPE: ICD-10-CM

## 2024-09-23 DIAGNOSIS — B35.3 TINEA PEDIS OF BOTH FEET: ICD-10-CM

## 2024-09-23 DIAGNOSIS — M72.2 PLANTAR FASCIITIS: Primary | ICD-10-CM

## 2024-09-23 PROCEDURE — 99213 OFFICE O/P EST LOW 20 MIN: CPT | Performed by: PODIATRIST

## 2024-09-23 NOTE — PROGRESS NOTES
Patient presents for pedal assessment.  Patient was treated for plantars fasciitis at last visit with cortisone injection.  She had significant improvement with only mild discomfort related.    A second concern involves dry scaly feet.  Both feet are dry and scaly with symptoms of both tinea pedis along with atopic eczema.    Treatment: No treatment needed for the relatively asymptomatic left heel as there is minimal pain with palpation.  She will contact me should she have difficulty in the future.    Patient has known hypothyroidism possibility leading to the scaling of the feet.  Recommended daily application of both over-the-counter Lamisil cream and Goldbond healing lotion.  Reappoint as needed

## 2024-10-01 ENCOUNTER — OFFICE VISIT (OUTPATIENT)
Dept: SLEEP CENTER | Facility: CLINIC | Age: 49
End: 2024-10-01
Payer: COMMERCIAL

## 2024-10-01 VITALS
DIASTOLIC BLOOD PRESSURE: 70 MMHG | BODY MASS INDEX: 33.43 KG/M2 | WEIGHT: 208 LBS | SYSTOLIC BLOOD PRESSURE: 116 MMHG | HEIGHT: 66 IN

## 2024-10-01 DIAGNOSIS — G47.33 OSA (OBSTRUCTIVE SLEEP APNEA): Primary | ICD-10-CM

## 2024-10-01 DIAGNOSIS — E66.811 CLASS 1 OBESITY DUE TO EXCESS CALORIES WITHOUT SERIOUS COMORBIDITY WITH BODY MASS INDEX (BMI) OF 33.0 TO 33.9 IN ADULT: ICD-10-CM

## 2024-10-01 DIAGNOSIS — E66.09 CLASS 1 OBESITY DUE TO EXCESS CALORIES WITHOUT SERIOUS COMORBIDITY WITH BODY MASS INDEX (BMI) OF 33.0 TO 33.9 IN ADULT: ICD-10-CM

## 2024-10-01 DIAGNOSIS — E61.1 IRON DEFICIENCY: ICD-10-CM

## 2024-10-01 DIAGNOSIS — G25.81 RESTLESS LEG SYNDROME: ICD-10-CM

## 2024-10-01 PROCEDURE — 99204 OFFICE O/P NEW MOD 45 MIN: CPT | Performed by: INTERNAL MEDICINE

## 2024-10-01 NOTE — PROGRESS NOTES
Sleep Consultation   Randa Boo 49 y.o. female MRN: [id number]      Reason for consultation: Obstructive sleep apnea    Requesting physician: Eliud Merino DO PCP    Assessment/Plan    1.  Moderate obstructive sleep apnea  HST JIMMY 20.0, oxygen sam 78%, oxygen saturation less than 90% for 20.8 minutes.    Mallampati class 4, enlarged scalloped tongue, 2/3+ tonsils, Body mass index is 33.57 kg/m².,  .      S/s: Gasping, excessive daytime sleepiness  Bur[place] score of 8    I discussed in depth the treatment options involved with obstructive sleep apnea  I also discussed in depth the risk of leaving sleep apnea untreated including hypertension, heart failure, arrhythmia, MI and stroke.  The patient is agreeable to undergo  treatment of obstructive sleep apnea.      Plan  Ordered APAP 5-15 cm H2O  Follow-up in 2 to 3 months for compliance    2.  Obesity  Counseled patient on lifestyle modifications including diet and exercise  I explained that the severity of sleep apnea generally decreases with weight loss    3.  Restless leg syndrome  She reports frequent bothersome symptoms  Last ferritin was 14 in 2022    Plan  Ordered repeat iron panel  I will message patient regarding oral iron therapy    History of Present Illness   HPI:  Randa Boo is a 49 y.o. female with PMHx Hypothyroidism, fibromyalgia, GERD, obesity, anxiety who presents for evaluation of moderate obstructive sleep apnea.  She recently underwent home sleep study which showed an JIMMY of 20.0.  She is gasping during sleep.  She goes to bed at 8 PM and it takes 1 to 2 hours to fall asleep.  She has 3-4 nighttime awakenings due to being thirsty.  She gets up at 6:30 AM.  She sleeps 6 hours on average.  She does not feel refreshed when she wakes up.  She does not take naps but would do so if given the opportunity.  She drinks coffee in the morning.  She has nightmares.  She has chronic pain.  She has an Burlington score of 8.          Review of  Systems      Genitourinary none   Cardiology none   Gastrointestinal none   Neurology none   Constitutional none   Integumentary none   Psychiatry none   Musculoskeletal none   Pulmonary none   ENT none   Endocrine none   Hematological none         Historical Information   Past Medical History:   Diagnosis Date    Abdominal pain     Allergic drug reaction 09/15/2021    Anemia     Anxiety     Asthma     Back pain     Bilateral fibrocystic breast changes     resolved: 02/21/2017    Breast disorder     Breast pain, left 01/26/2022    Chest pain     Chronic pelvic pain in female     last assessed: 09/07/2016    Cluster headaches     Colon cancer screening 05/12/2021    Constipation     Cough     Depression     Difficulty concentrating     Disease of thyroid gland     Dizziness     Dyspareunia in female     last assessed: 08/18/2016    Easy bruising     Endometriosis     Epigastric abdominal pain 07/22/2020    Fainting episodes     Fatigue     Fever due to infection     Fibromyalgia     Frequent urination at night     Gallbladder disease     Goiter 04/13/2017    Herniated intervertebral disc of lumbar spine     Hypotension     Hypothyroidism 2006    Loss of bladder control     Mastalgia in female 05/24/2021    Memory loss     last assessed: 06/15/2015    Mid back pain 03/19/2021    Mixed incontinence     last assessed: 01/11/2016    Myofascial pain     last assessed: 06/01/2016    Numbness and tingling     Painful swelling of joint     Palpitations     Panic attack     Sciatica     last assessed: 06/15/2015    Situational anxiety     last assessed: 02/21/2017    SOB (shortness of breath)     Thyroid disease     Thyroid nodule 03/05/2019    Thyroid ultrasound March 2019.  Left mid thyroid nodule unchanged from previous.  Does not require further evaluation or screening.      Uterus, adenomyosis     last assessed: 09/07/2016    Weakness     Weight disorder      Past Surgical History:   Procedure Laterality Date     CHOLECYSTECTOMY      CHOLECYSTECTOMY LAPAROSCOPIC N/A 10/22/2017    Procedure: CHOLECYSTECTOMY LAPAROSCOPIC;  Surgeon: Josue Martines MD;  [facility];  Service: General    COLONOSCOPY  06/03/2021    ENDOMETRIAL BIOPSY      resolved: 02/21/2017    HYSTERECTOMY  2016    [place] VAGINAL HYSTERECTOMY UTERUS 250 GM/< N/A 4/20/2018    Procedure: HYSTERECTOMY VAGINAL TOTAL (TVH), BILATERAL SALPINGECTOMY;  Surgeon: Jenn Ghotra MD;  [facility];  Service: Gynecology    TUBAL LIGATION      last assessed: 10/20/2014    UPPER GASTROINTESTINAL ENDOSCOPY  08/03/2020     Family History   Problem Relation Age of Onset    Hypertension Mother     Arthritis Mother     Hypothyroidism Mother     Asthma Mother     Thyroid disease Mother     Hyperlipidemia Father     Stroke Father     No Known Problems Sister     No Known Problems Sister     No Known Problems Sister     No Known Problems Sister     Rashes / Skin problems Sister     No Known Problems Brother     No Known Problems Brother     Diabetes Maternal Grandmother     No Known Problems Maternal Grandfather     Breast cancer Paternal Grandmother 32    No Known Problems Paternal Grandfather     No Known Problems Daughter     No Known Problems Daughter     No Known Problems Son     No Known Problems Son     Leukemia Paternal Aunt     No Known Problems Paternal Aunt     Hypertension Other     Breast cancer Family     Cancer Family     Diabetes Family     Heart disease Family     Hyperlipidemia Family         reported cholesterol level was high     Social History     Socioeconomic History    Marital status: /Civil Union     Spouse name: Not on file    Number of children: 4    Years of education: Not on file    Highest education level: Not on file   Occupational History    Occupation: Full-time   Tobacco Use    Smoking status: Former     Current packs/day: 0.00     Average packs/day: 0.5 packs/day for 2.0 years (1.0 ttl pk-yrs)     Types: Cigarettes     Start  date: 2005     Quit date:      Years since quittin.7     Passive exposure: Past    Smokeless tobacco: Never   Vaping Use    Vaping status: Never Used   Substance and Sexual Activity    Alcohol use: Yes     Comment: occasional    Drug use: Never    Sexual activity: Yes     Partners: Male     Birth control/protection: Surgical, Female Sterilization   Other Topics Concern    Not on file   Social History Narrative    Daily caffeine consumption    Domestic partner    Parent     Social Determinants of Health     Financial Resource Strain: Patient Declined (2024)    Overall Financial Resource Strain (CARDIA)     Difficulty of Paying Living Expenses: Patient declined   Food Insecurity: Patient Declined (2024)    Hunger Vital Sign     Worried About Running Out of Food in the Last Year: Patient declined     Ran Out of Food in the Last Year: Patient declined   Transportation Needs: Patient Declined (2024)    PRAPARE - Transportation     Lack of Transportation (Medical): Patient declined     Lack of Transportation (Non-Medical): Patient declined   Physical Activity: Sufficiently Active (2021)    Exercise Vital Sign     Days of Exercise per Week: 5 days     Minutes of Exercise per Session: 60 min   Stress: Stress Concern Present (10/14/2020)    [employer] - Occupational Stress Questionnaire     Feeling of Stress : Very much   Social Connections: Moderately Isolated (2021)    Social Connection and Isolation Panel [NHANES]     Frequency of Communication with Friends and Family: More than three times a week     Frequency of Social Gatherings with Friends and Family: Once a week     Attends [employer] Services: Never     Active Member of Clubs or Organizations: No     Attends Club or Organization Meetings: Never     Marital Status:    Intimate Partner Violence: Not At Risk (2021)    Humiliation, Afraid, Rape, and Kick questionnaire     Fear of Current or  "Ex-Partner: No     Emotionally Abused: No     Physically Abused: No     Sexually Abused: No   Housing Stability: Patient Declined (5/13/2024)    Housing Stability Vital Sign     Unable to Pay for Housing in the Last Year: Patient declined     Number of Times Moved in the Last Year: Not on file     Homeless in the Last Year: Patient declined       Occupational History: Works dayshift    Meds/Allergies   Allergies   Allergen Reactions    Morphine Chest Pain    Iv Contrast [Iodinated Contrast Media] Hives       Home medications:  Prior to Admission medications    Medication Sig Start Date End Date Taking? Authorizing Provider   acetaminophen (TYLENOL) 325 mg tablet Take 650 mg by mouth every 6 (six) hours as needed for mild pain    Historical Provider, MD   albuterol (PROVENTIL HFA,VENTOLIN HFA) 90 mcg/act inhaler Inhale 1 puff every 4 (four) hours as needed for wheezing 2/6/24   Adán Connolly MD   Aspirin Low Dose 81 MG EC tablet TAKE 1 TABLET BY MOUTH EVERY DAY  Patient not taking: Reported on 3/21/2024 9/11/23   Eliud Merino DO   atorvastatin (LIPITOR) 20 mg tablet TAKE 1 TABLET BY MOUTH EVERY DAY 9/11/23   Eliud Merino DO   estradiol (VIVELLE-DOT) 0.0375 MG/24HR PLACE 1 PATCH ON THE SKIN 2 TIMES A WEEK. 7/11/24   Mariam Bearden PA-C   ipratropium (ATROVENT) 0.03 % nasal spray SPRAY 2 SPRAYS INTO EACH NOSTRIL EVERY 12 HOURS. 3/5/24   Maile Gutierrez DO   levothyroxine 125 mcg tablet TAKE 1 TABLET BY MOUTH EVERY DAY 9/4/24   Kenna Tejeda MD   loratadine (CLARITIN) 10 mg tablet TAKE 1 TABLET BY MOUTH EVERY DAY 5/8/23   Eliud Merino DO       Vitals:   Height 5' 6\" (1.676 m), weight 94.3 kg (208 lb), last menstrual period 02/28/2018, not currently breastfeeding.,  Body mass index is 33.57 kg/m².       Physical Exam  General: Awake alert and oriented x 3, conversant without conversational dyspnea, NAD, normal affect  HEENT:  PERRL, Sclera noninjected, nonicteric OU, Nares patent,  no craniofacial " "abnormalities, Mucous membranes, moist, no oral lesions, normal dentition, Mallampati class 4, enlarged scalloped tongue, 2/3+ tonsils  NECK:  Trachea midline, no accessory muscle use, no stridor, no cervical or supraclavicular adenopathy, JVP not elevated  CARDIAC: Reg, single s1/S2, no m/r/g  PULM: CTA bilaterally no wheezing, rhonchi or rales  EXT: No cyanosis, no clubbing, no edema, normal capillary refill  NEURO: no focal neurologic deficits, AAOx3, moving all extremities appropriately    Labs: I have personally reviewed pertinent lab results.  Lab Results   Component Value Date    WBC 5.93 02/07/2024    HGB 13.6 02/07/2024    HCT 42.4 02/07/2024    MCV 91 02/07/2024     02/07/2024      Lab Results   Component Value Date    GLUCOSE 106 04/06/2015    CALCIUM 8.7 02/07/2024     04/06/2015    K 3.8 02/07/2024    CO2 30 02/07/2024     02/07/2024    BUN 12 02/07/2024    CREATININE 0.66 02/07/2024     Lab Results   Component Value Date    IRON 68 08/19/2022    TIBC 351 08/19/2022    FERRITIN 14 08/19/2022     Lab Results   Component Value Date    GWYMSZLV81 400 08/19/2022     No results found for: \"FOLATE\"      Arterial Blood Gas result:  ANU Torres MD  [facility]   "

## 2024-10-04 ENCOUNTER — APPOINTMENT (OUTPATIENT)
Dept: LAB | Facility: CLINIC | Age: 49
End: 2024-10-04
Payer: COMMERCIAL

## 2024-10-04 DIAGNOSIS — E61.1 IRON DEFICIENCY: ICD-10-CM

## 2024-10-04 LAB
FERRITIN SERPL-MCNC: 12 NG/ML (ref 11–307)
IRON SATN MFR SERPL: 18 % (ref 15–50)
IRON SERPL-MCNC: 61 UG/DL (ref 50–212)
TIBC SERPL-MCNC: 345 UG/DL (ref 250–450)
UIBC SERPL-MCNC: 284 UG/DL (ref 155–355)

## 2024-10-04 PROCEDURE — 83550 IRON BINDING TEST: CPT

## 2024-10-04 PROCEDURE — 36415 COLL VENOUS BLD VENIPUNCTURE: CPT

## 2024-10-04 PROCEDURE — 83540 ASSAY OF IRON: CPT

## 2024-10-04 PROCEDURE — 82728 ASSAY OF FERRITIN: CPT

## 2024-12-17 ENCOUNTER — TELEPHONE (OUTPATIENT)
Dept: SLEEP CENTER | Facility: CLINIC | Age: 49
End: 2024-12-17

## 2024-12-17 NOTE — PROGRESS NOTES
[facility] - Outpatient Follow-up  Kaila Patel 40 y o  female MRN: [id number]  Encounter: [id number]      ASSESSMENT AND PLAN:      Problem List Items Addressed This Visit        Other    Epigastric abdominal pain - Primary     Etiology for pain remains unclear  May be due to reflux  Recent EGD with only findings mild gastritis  Biopsies were negative  Patient has stopped taking NSAIDs  Recently restarted on omeprazole 20 mg daily per PCP  Patient reports taking antacids additionally for symptomatic relief  Does report some improvement in symptoms with PPI  · Increase omeprazole to 40 mg 2 times daily; advised patient to take 30 minutes before breakfast and 30 minutes before dinner  · If symptoms persist, can consider manometry with 24-hour pH monitoring for further evaluation         Relevant Medications    omeprazole (PriLOSEC) 40 MG capsule    Colon cancer screening     No previous colonoscopy or colon cancer screening  Patient reports family history of polyps in her sister who is younger than her  No other family history of GI malignancy or colon cancer  No alarm symptoms  Not on any anti-platelet agents or anticoagulants  · Discussed options with patient and she is agreeable to screening colonoscopy; will schedule for colonoscopy   · Bowel prep instructions provided         Relevant Medications    polyethylene glycol (MiraLax) 17 GM/SCOOP powder    bisacodyl (DULCOLAX) 5 mg EC tablet    Other Relevant Orders    Colonoscopy        ______________________________________________________________________    HPI:  29-year-old female seen in follow-up  Reports that she continues to have some epigastric pain  She recently underwent EGD which showed only mild gastritis  Biopsies were negative for H pylori or celiac disease  She was recently restarted on low-dose omeprazole per her family doctor    This does provide some relief but she is also taking additional antacids on top  Not associated with any other alarm symptoms  She is mainly here today for need for screening colonoscopy  She has not had previous colon cancer screening  She does report a family history of colon polyps in her sister who is younger than her  No other family history of GI malignancy including colon cancer or IBD  Denies any diarrhea, constipation, hematochezia, melena, unintentional weight loss, change in bowel habits  She is not on anti-platelet agents or anticoagulants      REVIEW OF SYSTEMS:    CONSTITUTIONAL: Denies any fever, chills, rigors, and weight loss  HEENT: No earache or tinnitus, denies hearing loss or visual disturbances  CARDIOVASCULAR: No chest pain or palpitations   RESPIRATORY: Denies any cough, hemoptysis, shortness of breath or dyspnea on exertion  GASTROINTESTINAL: As noted in the History of Present Illness   GENITOURINARY: No problems with urination, denies any hematuria or dysuria  NEUROLOGIC: No dizziness or vertigo, denies headaches   MUSCULOSKELETAL: Denies any muscle or joint pain   SKIN: Denies skin rashes or itching   ENDOCRINE: Denies excessive thirst, denies intolerance to heat or cold  PSYCHOSOCIAL: Denies depression or anxiety, denies any recent memory loss     Historical Information   Past Medical History:   Diagnosis Date    Abdominal pain     Anemia     Anxiety     Back pain     Bilateral fibrocystic breast changes     resolved: 02/21/2017    Breast disorder     Chest pain     Chronic pelvic pain in female     last assessed: 09/07/2016    Cluster headaches     Constipation     Cough     Depression     Difficulty concentrating     Disease of thyroid gland     Dizziness     Dyspareunia in female     last assessed: 08/18/2016    Easy bruising     Fainting episodes     Fatigue     Fever due to infection     Fibromyalgia     Frequent urination at night     Gallbladder disease     Herniated intervertebral disc of lumbar spine     Hypotension     Loss of bladder control     Memory loss     last assessed: 06/15/2015    Mixed incontinence     last assessed: 01/11/2016    Myofascial pain     last assessed: 06/01/2016    Numbness and tingling     Painful swelling of joint     Palpitations     Panic attack     Sciatica     last assessed: 06/15/2015    Situational anxiety     last assessed: 02/21/2017    SOB (shortness of breath)     Thyroid disease     Uterus, adenomyosis     last assessed: 09/07/2016    Weakness     Weight disorder      Past Surgical History:   Procedure Laterality Date    CHOLECYSTECTOMY      CHOLECYSTECTOMY LAPAROSCOPIC N/A 10/22/2017    Procedure: CHOLECYSTECTOMY LAPAROSCOPIC;  Surgeon: Luis Alberto Sutton MD;  [facility];  Service: General    ENDOMETRIAL BIOPSY      resolved: 02/21/2017    HYSTERECTOMY  2016    [place] VAGINAL HYSTERECTOMY,UTERUS 250 GMS/< N/A 4/20/2018    Procedure: HYSTERECTOMY VAGINAL TOTAL (TVH), BILATERAL SALPINGECTOMY;  Surgeon: Eufemia Sun MD;  [facility];  Service: Gynecology    TUBAL LIGATION      last assessed: 10/20/2014     Social History   Social History     Substance and Sexual Activity   Alcohol Use Yes    Frequency: Monthly or less    Drinks per session: 1 or 2    Binge frequency: Never     Social History     Substance and Sexual Activity   Drug Use Never     Social History     Tobacco Use   Smoking Status Never Smoker   Smokeless Tobacco Never Used     Family History   Problem Relation Age of Onset    Hypertension Mother     Arthritis Mother     Diabetes Maternal Grandmother     Breast cancer Family     Cancer Family     Diabetes Family     Heart disease Family     Hyperlipidemia Family         reported cholesterol level was high    Hypertension Other     Hyperlipidemia Father     No Known Problems Sister     No Known Problems Daughter     Breast cancer Paternal Grandmother 28    Leukemia Paternal Aunt     No Known Problems Sister     No Known Problems Sister     No Known Problems Sister     No Known Problems Sister     No Known Problems Daughter     No Known Problems Paternal Aunt        Meds/Allergies   (Not in a hospital admission)    No current facility-administered medications for this visit  Allergies   Allergen Reactions    Morphine Chest Pain    Iv Contrast [Iodinated Diagnostic Agents] Hives         PHYSICAL EXAM:      Objective   Blood pressure 98/64, pulse 80, temperature (!) 96 °F (35 6 °C), temperature source Tympanic, height 5' 5" (1 651 m), weight 87 5 kg (193 lb), last menstrual period 02/28/2018, not currently breastfeeding  Body mass index is 32 12 kg/m²  General Appearance:   Alert, cooperative, no distress   HEENT:   Normocephalic, atraumatic, anicteric     Neck:   Supple, symmetrical, trachea midline   Lungs:   Clear to auscultation bilaterally; no rales, rhonchi or wheezing; respirations unlabored    Heart:   Regular rate and rhythm; no murmur, rub, or gallop   Abdomen:   Soft, non-tender, non-distended; normal bowel sounds; no masses, no organomegaly    Genitalia:   Deferred    Rectal:   Deferred    Extremities:   No cyanosis, clubbing or edema    Pulses:   2+ and symmetric all extremities    Skin:   No jaundice, rashes, or lesions    Lymph Nodes:   No palpable cervical lymphadenopathy      LAB RESULTS:     No visits with results within 1 Day(s) from this visit     Latest known visit with results is:   Admission on 02/13/2021, Discharged on 02/13/2021   Component Date Value    WBC 02/13/2021 7 89     RBC 02/13/2021 4 70     Hemoglobin 02/13/2021 13 3     Hematocrit 02/13/2021 41 5     MCV 02/13/2021 88     MCH 02/13/2021 28 3     MCHC 02/13/2021 32 0     RDW 02/13/2021 13 0     MPV 02/13/2021 11 4     Platelets 78/46/7480 245     nRBC 02/13/2021 0     Neutrophils Relative 02/13/2021 62     Immat GRANS % 02/13/2021 1     Lymphocytes Relative 02/13/2021 27     Monocytes Relative 02/13/2021 7     Eosinophils Relative 02/13/2021 2     Basophils Relative 02/13/2021 1     Neutrophils Absolute 02/13/2021 4 98     Immature Grans Absolute 02/13/2021 0 04     Lymphocytes Absolute 02/13/2021 2 15     Monocytes Absolute 02/13/2021 0 55     Eosinophils Absolute 02/13/2021 0 13     Basophils Absolute 02/13/2021 0 04     Sodium 02/13/2021 140     Potassium 02/13/2021 3 7     Chloride 02/13/2021 107     CO2 02/13/2021 27     ANION GAP 02/13/2021 6     BUN 02/13/2021 12     Creatinine 02/13/2021 0 69     Glucose 02/13/2021 116     Calcium 02/13/2021 9 0     AST 02/13/2021 13     ALT 02/13/2021 21     Alkaline Phosphatase 02/13/2021 77     Total Protein 02/13/2021 8 1     Albumin 02/13/2021 3 5     Total Bilirubin 02/13/2021 0 23     eGFR 02/13/2021 105     Troponin I 02/13/2021 <0 02     TSH 3RD GENERATON 02/13/2021 2 470     Ventricular Rate 02/13/2021 80     Atrial Rate 02/13/2021 80     OK Interval 02/13/2021 148     QRSD Interval 02/13/2021 82     QT Interval 02/13/2021 350     QTC Interval 02/13/2021 403     P Axis 02/13/2021 42     QRS Axis 02/13/2021 72     T Wave Axis 02/13/2021 9        RADIOLOGY RESULTS: I have personally reviewed pertinent imaging studies  АННА Alvarez  Gastroenterology Fellow  [facility]  Available on [email or web address] [email or web address]  org English

## 2025-01-24 LAB

## 2025-01-29 ENCOUNTER — APPOINTMENT (EMERGENCY)
Dept: RADIOLOGY | Facility: HOSPITAL | Age: 50
End: 2025-01-29
Payer: COMMERCIAL

## 2025-01-29 ENCOUNTER — HOSPITAL ENCOUNTER (EMERGENCY)
Facility: HOSPITAL | Age: 50
Discharge: HOME | End: 2025-01-29
Attending: EMERGENCY MEDICINE | Admitting: EMERGENCY MEDICINE
Payer: COMMERCIAL

## 2025-01-29 VITALS
OXYGEN SATURATION: 94 % | HEART RATE: 93 BPM | RESPIRATION RATE: 17 BRPM | DIASTOLIC BLOOD PRESSURE: 83 MMHG | SYSTOLIC BLOOD PRESSURE: 126 MMHG | TEMPERATURE: 97.8 F

## 2025-01-29 DIAGNOSIS — R07.9 CHEST PAIN: Primary | ICD-10-CM

## 2025-01-29 LAB
ALBUMIN SERPL BCG-MCNC: 3.9 G/DL (ref 3.5–5)
ALP SERPL-CCNC: 75 U/L (ref 34–104)
ALT SERPL W P-5'-P-CCNC: 14 U/L (ref 7–52)
ANION GAP SERPL CALCULATED.3IONS-SCNC: 7 MMOL/L (ref 4–13)
AST SERPL W P-5'-P-CCNC: 16 U/L (ref 13–39)
ATRIAL RATE: 89 BPM
BASOPHILS # BLD AUTO: 0.04 THOUSANDS/ΜL (ref 0–0.1)
BASOPHILS NFR BLD AUTO: 1 % (ref 0–1)
BILIRUB SERPL-MCNC: 0.38 MG/DL (ref 0.2–1)
BUN SERPL-MCNC: 11 MG/DL (ref 5–25)
CALCIUM SERPL-MCNC: 8.5 MG/DL (ref 8.4–10.2)
CARDIAC TROPONIN I PNL SERPL HS: <2 NG/L (ref ?–50)
CHLORIDE SERPL-SCNC: 102 MMOL/L (ref 96–108)
CO2 SERPL-SCNC: 26 MMOL/L (ref 21–32)
CREAT SERPL-MCNC: 0.57 MG/DL (ref 0.6–1.3)
EOSINOPHIL # BLD AUTO: 0.11 THOUSAND/ΜL (ref 0–0.61)
EOSINOPHIL NFR BLD AUTO: 2 % (ref 0–6)
ERYTHROCYTE [DISTWIDTH] IN BLOOD BY AUTOMATED COUNT: 13.3 % (ref 11.6–15.1)
GFR SERPL CREATININE-BSD FRML MDRD: 109 ML/MIN/1.73SQ M
GLUCOSE SERPL-MCNC: 113 MG/DL (ref 65–140)
HCT VFR BLD AUTO: 41.3 % (ref 34.8–46.1)
HGB BLD-MCNC: 13.4 G/DL (ref 11.5–15.4)
IMM GRANULOCYTES # BLD AUTO: 0.03 THOUSAND/UL (ref 0–0.2)
IMM GRANULOCYTES NFR BLD AUTO: 0 % (ref 0–2)
LYMPHOCYTES # BLD AUTO: 1.6 THOUSANDS/ΜL (ref 0.6–4.47)
LYMPHOCYTES NFR BLD AUTO: 23 % (ref 14–44)
MCH RBC QN AUTO: 28.8 PG (ref 26.8–34.3)
MCHC RBC AUTO-ENTMCNC: 32.4 G/DL (ref 31.4–37.4)
MCV RBC AUTO: 89 FL (ref 82–98)
MONOCYTES # BLD AUTO: 0.43 THOUSAND/ΜL (ref 0.17–1.22)
MONOCYTES NFR BLD AUTO: 6 % (ref 4–12)
NEUTROPHILS # BLD AUTO: 4.85 THOUSANDS/ΜL (ref 1.85–7.62)
NEUTS SEG NFR BLD AUTO: 68 % (ref 43–75)
NRBC BLD AUTO-RTO: 0 /100 WBCS
P AXIS: 17 DEGREES
PLATELET # BLD AUTO: 229 THOUSANDS/UL (ref 149–390)
PMV BLD AUTO: 10.7 FL (ref 8.9–12.7)
POTASSIUM SERPL-SCNC: 3.6 MMOL/L (ref 3.5–5.3)
PR INTERVAL: 156 MS
PROT SERPL-MCNC: 7.7 G/DL (ref 6.4–8.4)
QRS AXIS: 29 DEGREES
QRSD INTERVAL: 72 MS
QT INTERVAL: 380 MS
QTC INTERVAL: 463 MS
RBC # BLD AUTO: 4.65 MILLION/UL (ref 3.81–5.12)
SODIUM SERPL-SCNC: 135 MMOL/L (ref 135–147)
T WAVE AXIS: 15 DEGREES
VENTRICULAR RATE: 89 BPM
WBC # BLD AUTO: 7.06 THOUSAND/UL (ref 4.31–10.16)

## 2025-01-29 PROCEDURE — 84484 ASSAY OF TROPONIN QUANT: CPT

## 2025-01-29 PROCEDURE — 71046 X-RAY EXAM CHEST 2 VIEWS: CPT

## 2025-01-29 PROCEDURE — 93005 ELECTROCARDIOGRAM TRACING: CPT

## 2025-01-29 PROCEDURE — 99285 EMERGENCY DEPT VISIT HI MDM: CPT

## 2025-01-29 PROCEDURE — 36415 COLL VENOUS BLD VENIPUNCTURE: CPT

## 2025-01-29 PROCEDURE — 80053 COMPREHEN METABOLIC PANEL: CPT

## 2025-01-29 PROCEDURE — 96372 THER/PROPH/DIAG INJ SC/IM: CPT

## 2025-01-29 PROCEDURE — 99284 EMERGENCY DEPT VISIT MOD MDM: CPT | Performed by: EMERGENCY MEDICINE

## 2025-01-29 PROCEDURE — 85025 COMPLETE CBC W/AUTO DIFF WBC: CPT

## 2025-01-29 PROCEDURE — 93010 ELECTROCARDIOGRAM REPORT: CPT | Performed by: INTERNAL MEDICINE

## 2025-01-29 RX ORDER — KETOROLAC TROMETHAMINE 30 MG/ML
15 INJECTION, SOLUTION INTRAMUSCULAR; INTRAVENOUS ONCE
Status: COMPLETED | OUTPATIENT
Start: 2025-01-29 | End: 2025-01-29

## 2025-01-29 RX ORDER — ACETAMINOPHEN 325 MG/1
975 TABLET ORAL ONCE
Status: COMPLETED | OUTPATIENT
Start: 2025-01-29 | End: 2025-01-29

## 2025-01-29 RX ADMIN — ACETAMINOPHEN 975 MG: 325 TABLET, FILM COATED ORAL at 15:24

## 2025-01-29 RX ADMIN — KETOROLAC TROMETHAMINE 15 MG: 30 INJECTION, SOLUTION INTRAMUSCULAR; INTRAVENOUS at 15:24

## 2025-01-29 NOTE — ED PROVIDER NOTES
Time reflects when diagnosis was documented in both MDM as applicable and the Disposition within this note       Time User Action Codes Description Comment    1/29/2025  3:47 PM Margarita Da Silva Add [R07.9] Chest pain           ED Disposition       ED Disposition   Discharge    Condition   Stable    Date/Time   Wed Jan 29, 2025  3:51 PM    Comment   Randa Boo discharge to home/self care.                   Assessment & Plan       Medical Decision Making  Risk  OTC drugs.  Prescription drug management.      ASSESSMENT: Patient is a 49 y.o. female who presents with CP.   DDX includes but not limited to: ACS, MSK, pneumothorax, pneumonia, pleural effusion, pericarditis, myocarditis, anxiety, gastritis.   PLAN: CBC, CMP, troponin, EKG, chest x-ray. Treated with toradol, tylenol.    Stable for discharge. Strict return to ED precautions provided. Advised to follow up with PCP and cardiology as soon as able for re-evaluation and further management. Patient verbalized understanding and agrees with the plan of care.        Medications   ketorolac (TORADOL) injection 15 mg (15 mg Intramuscular Given 1/29/25 1524)   acetaminophen (TYLENOL) tablet 975 mg (975 mg Oral Given 1/29/25 1524)       ED Risk Strat Scores   HEART Risk Score      Flowsheet Row Most Recent Value   Heart Score Risk Calculator    History 0 Filed at: 01/29/2025 1548   ECG 1 Filed at: 01/29/2025 1548   Age 1 Filed at: 01/29/2025 1548   Risk Factors 1 Filed at: 01/29/2025 1548   Troponin 0 Filed at: 01/29/2025 1548   HEART Score 3 Filed at: 01/29/2025 1548          HEART Risk Score      Flowsheet Row Most Recent Value   Heart Score Risk Calculator    History 0 Filed at: 01/29/2025 1548   ECG 1 Filed at: 01/29/2025 1548   Age 1 Filed at: 01/29/2025 1548   Risk Factors 1 Filed at: 01/29/2025 1548   Troponin 0 Filed at: 01/29/2025 1548   HEART Score 3 Filed at: 01/29/2025 1548                                                History of Present Illness        Chief Complaint   Patient presents with    Chest Pain     Tightness in chest and pain in L neck/collar bone area, started aprox 1 hr ago and getting worse, pt also reports feeling dizzy, pt also reports headaches for the past week       Past Medical History:   Diagnosis Date    Abdominal pain     Allergic drug reaction 09/15/2021    Anemia     Anxiety     Asthma     Back pain     Bilateral fibrocystic breast changes     resolved: 02/21/2017    Breast disorder     Breast pain, left 01/26/2022    Chest pain     Chronic pelvic pain in female     last assessed: 09/07/2016    Cluster headaches     Colon cancer screening 05/12/2021    Constipation     Cough     Depression     Difficulty concentrating     Disease of thyroid gland     Dizziness     Dyspareunia in female     last assessed: 08/18/2016    Easy bruising     Endometriosis     Epigastric abdominal pain 07/22/2020    Fainting episodes     Fatigue     Fever due to infection     Fibromyalgia     Frequent urination at night     Gallbladder disease     Goiter 04/13/2017    Herniated intervertebral disc of lumbar spine     Hypotension     Hypothyroidism 2006    Loss of bladder control     Mastalgia in female 05/24/2021    Memory loss     last assessed: 06/15/2015    Mid back pain 03/19/2021    Mixed incontinence     last assessed: 01/11/2016    Myofascial pain     last assessed: 06/01/2016    Numbness and tingling     Painful swelling of joint     Palpitations     Panic attack     Sciatica     last assessed: 06/15/2015    Situational anxiety     last assessed: 02/21/2017    SOB (shortness of breath)     Thyroid disease     Thyroid nodule 03/05/2019    Thyroid ultrasound March 2019.  Left mid thyroid nodule unchanged from previous.  Does not require further evaluation or screening.      Uterus, adenomyosis     last assessed: 09/07/2016    Weakness     Weight disorder       Past Surgical History:   Procedure Laterality Date    CHOLECYSTECTOMY      CHOLECYSTECTOMY  LAPAROSCOPIC N/A 10/22/2017    Procedure: CHOLECYSTECTOMY LAPAROSCOPIC;  Surgeon: Josue Martines MD;  [facility];  Service: General    COLONOSCOPY  2021    ENDOMETRIAL BIOPSY      resolved: 2017    HYSTERECTOMY  2016    MA VAGINAL HYSTERECTOMY UTERUS 250 GM/< N/A 2018    Procedure: HYSTERECTOMY VAGINAL TOTAL (TVH), BILATERAL SALPINGECTOMY;  Surgeon: Jenn Ghotra MD;  Location: [facility];  Service: Gynecology    TUBAL LIGATION      last assessed: 10/20/2014    UPPER GASTROINTESTINAL ENDOSCOPY  2020      Family History   Problem Relation Age of Onset    Hypertension Mother     Arthritis Mother     Hypothyroidism Mother     Asthma Mother     Thyroid disease Mother     Hyperlipidemia Father     Stroke Father     No Known Problems Sister     No Known Problems Sister     No Known Problems Sister     No Known Problems Sister     Rashes / Skin problems Sister     No Known Problems Brother     No Known Problems Brother     Diabetes Maternal Grandmother     No Known Problems Maternal Grandfather     Breast cancer Paternal Grandmother 32    No Known Problems Paternal Grandfather     No Known Problems Daughter     No Known Problems Daughter     No Known Problems Son     No Known Problems Son     Leukemia Paternal Aunt     No Known Problems Paternal Aunt     Hypertension Other     Breast cancer Family     Cancer Family     Diabetes Family     Heart disease Family     Hyperlipidemia Family         reported cholesterol level was high      Social History     Tobacco Use    Smoking status: Former     Current packs/day: 0.00     Average packs/day: 0.5 packs/day for 2.0 years (1.0 ttl pk-yrs)     Types: Cigarettes     Start date: 2005     Quit date:      Years since quittin.0     Passive exposure: Past    Smokeless tobacco: Never   Vaping Use    Vaping status: Never Used   Substance Use Topics    Alcohol use: Yes     Comment: occasional    Drug use: Never      E-Cigarette/Vaping     E-Cigarette Use Never User       E-Cigarette/Vaping Substances    Nicotine No     THC No     CBD No     Flavoring No     Other No     Unknown No       I have reviewed and agree with the history as documented.     HPI  Patient is a 49 y.o. female with PMHx fibromyalgia, hypothyroidism who presents to the ED for evaluation of chest pain.  Patient endorses developing central chest pressure/heaviness that woke her up from sleep at 3 this morning.  Patient states pain was transient in which she was able to go back to sleep.  Patient reports developing chest pain again at 1100 today while at rest, localizes to right chest, feels improved since onset. Patient also reports intermittent headache x 1 week that is improving with Motrin and Tylenol.  Denies any recent illness.  Denies any fever, chills, cough, congestion, sore throat, shortness of breath, abdominal pain, nausea, vomiting, diarrhea, dysuria, hematuria, focal numbness/weakness.  Denies any cardiac history aside from palpitations that she has been evaluated for by cardiology.  Denies any history of PE or DVT.  Denies any prolonged periods of immobilization or history of malignancy.    Review of Systems   Cardiovascular:  Positive for chest pain.   Neurological:  Positive for headaches.           Objective       ED Triage Vitals [01/29/25 1226]   Temperature Pulse Blood Pressure Respirations SpO2 Patient Position - Orthostatic VS   97.8 °F (36.6 °C) 93 126/83 17 94 % Sitting      Temp Source Heart Rate Source BP Location FiO2 (%) Pain Score    Temporal Monitor Left arm -- 4      Vitals      Date and Time Temp Pulse SpO2 Resp BP Pain Score FACES Pain Rating User   01/29/25 1226 97.8 °F (36.6 °C) 93 94 % 17 126/83 4 -- AS            Physical Exam  Vitals and nursing note reviewed.   Constitutional:       General: She is not in acute distress.     Appearance: Normal appearance. She is well-developed. She is not ill-appearing, toxic-appearing or diaphoretic.   HENT:       Head: Normocephalic and atraumatic.   Eyes:      Conjunctiva/sclera: Conjunctivae normal.   Cardiovascular:      Rate and Rhythm: Normal rate and regular rhythm.      Heart sounds: No murmur heard.  Pulmonary:      Effort: Pulmonary effort is normal. No respiratory distress.      Breath sounds: Normal breath sounds.   Chest:      Chest wall: Tenderness present.   Abdominal:      Palpations: Abdomen is soft.      Tenderness: There is no abdominal tenderness.   Musculoskeletal:         General: No swelling.      Cervical back: Neck supple.   Skin:     General: Skin is warm and dry.      Capillary Refill: Capillary refill takes less than 2 seconds.   Neurological:      General: No focal deficit present.      Mental Status: She is alert and oriented to person, place, and time.   Psychiatric:         Mood and Affect: Mood normal.         Results Reviewed       Procedure Component Value Units Date/Time    Comprehensive metabolic panel [[id number]]  (Abnormal) Collected: 01/29/25 1231    Lab Status: Final result Specimen: Blood from Arm, Left Updated: 01/29/25 1303     Sodium 135 mmol/L      Potassium 3.6 mmol/L      Chloride 102 mmol/L      CO2 26 mmol/L      ANION GAP 7 mmol/L      BUN 11 mg/dL      Creatinine 0.57 mg/dL      Glucose 113 mg/dL      Calcium 8.5 mg/dL      AST 16 U/L      ALT 14 U/L      Alkaline Phosphatase 75 U/L      Total Protein 7.7 g/dL      Albumin 3.9 g/dL      Total Bilirubin 0.38 mg/dL      eGFR 109 ml/min/1.73sq m     Narrative:      National Kidney Disease Foundation guidelines for Chronic Kidney Disease (CKD):     Stage 1 with normal or high GFR (GFR > 90 mL/min/1.73 square meters)    Stage 2 Mild CKD (GFR = 60-89 mL/min/1.73 square meters)    Stage 3A Moderate CKD (GFR = 45-59 mL/min/1.73 square meters)    Stage 3B Moderate CKD (GFR = 30-44 mL/min/1.73 square meters)    Stage 4 Severe CKD (GFR = 15-29 mL/min/1.73 square meters)    Stage 5 End Stage CKD (GFR <15 mL/min/1.73 square  meters)  Note: GFR calculation is accurate only with a steady state creatinine    HS Troponin 0hr (reflex protocol) [[id number]]  (Normal) Collected: 01/29/25 1231    Lab Status: Final result Specimen: Blood from Arm, Left Updated: 01/29/25 1301     hs TnI 0hr <2 ng/L     CBC and differential [[id number]] Collected: 01/29/25 1231    Lab Status: Final result Specimen: Blood from Arm, Left Updated: 01/29/25 1242     WBC 7.06 Thousand/uL      RBC 4.65 Million/uL      Hemoglobin 13.4 g/dL      Hematocrit 41.3 %      MCV 89 fL      MCH 28.8 pg      MCHC 32.4 g/dL      RDW 13.3 %      MPV 10.7 fL      Platelets 229 Thousands/uL      nRBC 0 /100 WBCs      Segmented % 68 %      Immature Grans % 0 %      Lymphocytes % 23 %      Monocytes % 6 %      Eosinophils Relative 2 %      Basophils Relative 1 %      Absolute Neutrophils 4.85 Thousands/µL      Absolute Immature Grans 0.03 Thousand/uL      Absolute Lymphocytes 1.60 Thousands/µL      Absolute Monocytes 0.43 Thousand/µL      Eosinophils Absolute 0.11 Thousand/µL      Basophils Absolute 0.04 Thousands/µL             XR chest 2 views   Final Interpretation by Víctor Nichols MD (01/29 1528)      No acute cardiopulmonary disease.            Workstation performed: [id number]             Procedures    ED Medication and Procedure Management   Prior to Admission Medications   Prescriptions Last Dose Informant Patient Reported? Taking?   Aspirin Low Dose 81 MG EC tablet  Self No No   Sig: TAKE 1 TABLET BY MOUTH EVERY DAY   Patient not taking: Reported on 3/21/2024   acetaminophen (TYLENOL) 325 mg tablet  Self Yes No   Sig: Take 650 mg by mouth every 6 (six) hours as needed for mild pain   albuterol (PROVENTIL HFA,VENTOLIN HFA) 90 mcg/act inhaler  Self No No   Sig: Inhale 1 puff every 4 (four) hours as needed for wheezing   atorvastatin (LIPITOR) 20 mg tablet  Self No No   Sig: TAKE 1 TABLET BY MOUTH EVERY DAY   estradiol (VIVELLE-DOT) 0.0375 MG/24HR  Self No No   Sig: PLACE 1  PATCH ON THE SKIN 2 TIMES A WEEK.   Patient not taking: No sig reported   ipratropium (ATROVENT) 0.03 % nasal spray  Self No No   Sig: SPRAY 2 SPRAYS INTO EACH NOSTRIL EVERY 12 HOURS.   levothyroxine 125 mcg tablet   No No   Sig: TAKE 1 TABLET BY MOUTH EVERY DAY   loratadine (CLARITIN) 10 mg tablet  Self No No   Sig: TAKE 1 TABLET BY MOUTH EVERY DAY      Facility-Administered Medications Last Administration Doses Remaining   bupivacaine (PF) (MARCAINE) 0.25 % injection 1 mL 7/29/2024 11:00 AM    lidocaine (XYLOCAINE) 1 % injection 1 mL 7/29/2024 11:00 AM    triamcinolone acetonide (KENALOG-40) 40 mg/mL injection 20 mg 7/29/2024 11:00 AM         Discharge Medication List as of 1/29/2025  3:52 PM        CONTINUE these medications which have NOT CHANGED    Details   acetaminophen (TYLENOL) 325 mg tablet Take 650 mg by mouth every 6 (six) hours as needed for mild pain, Historical Med      albuterol (PROVENTIL HFA,VENTOLIN HFA) 90 mcg/act inhaler Inhale 1 puff every 4 (four) hours as needed for wheezing, Starting Tue 2/6/2024, Normal      Aspirin Low Dose 81 MG EC tablet TAKE 1 TABLET BY MOUTH EVERY DAY, Starting Mon 9/11/2023, Normal      atorvastatin (LIPITOR) 20 mg tablet TAKE 1 TABLET BY MOUTH EVERY DAY, Starting Mon 9/11/2023, Normal      estradiol (VIVELLE-DOT) 0.0375 MG/24HR PLACE 1 PATCH ON THE SKIN 2 TIMES A WEEK., Normal      ipratropium (ATROVENT) 0.03 % nasal spray SPRAY 2 SPRAYS INTO EACH NOSTRIL EVERY 12 HOURS., Starting Tue 3/5/2024, Normal      levothyroxine 125 mcg tablet TAKE 1 TABLET BY MOUTH EVERY DAY, Starting Wed 9/4/2024, Normal      loratadine (CLARITIN) 10 mg tablet TAKE 1 TABLET BY MOUTH EVERY DAY, Normal           No discharge procedures on file.  ED SEPSIS DOCUMENTATION   Time reflects when diagnosis was documented in both MDM as applicable and the Disposition within this note       Time User Action Codes Description Comment    1/29/2025  3:47 PM Margarita Da Silva Add [R07.9] Chest pain                   Margarita Da Silva MD  01/29/25 8097

## 2025-01-29 NOTE — ED ATTENDING ATTESTATION
1/29/2025  I, Samantha Barrientos MD, saw and evaluated the patient. I have discussed the patient with the resident/non-physician practitioner and agree with the resident's/non-physician practitioner's findings, Plan of Care, and MDM as documented in the resident's/non-physician practitioner's note, except where noted. All available labs and Radiology studies were reviewed.  I was present for key portions of any procedure(s) performed by the resident/non-physician practitioner and I was immediately available to provide assistance.       At this point I agree with the current assessment done in the Emergency Department.  I have conducted an independent evaluation of this patient a history and physical is as follows:    ED Course         Critical Care Time  Procedures    50 yo female with hx of hypothyroid, fibromyalgia, here for chest pain that woke her up in the night then resolved. Pt then had recurrence of pain 11am while at rest. No sob, no dizziness, no palpitations.  Pt also with headache resolved with motrin, tylenol. No numbness, tingling, weakness.  No fever, no chills, no urinary complaints.  Vss, afebrile, lungs cta, rrr, abdomen soft nontender, no pedal edema.  No chest wall tenderness. No neuro deficits.  Cardiac workup.  Cxr. Noncardiac chest pain.  Pain meds.  
You can access the FollowMyHealth Patient Portal offered by [facility] by registering at the following website: [email or web address]. By joining [employer]’s FollowMyHealth portal, you will also be able to view your health information using other applications (apps) compatible with our system.

## 2025-01-29 NOTE — DISCHARGE INSTRUCTIONS
You were seen in the Emergency Department today for chest pain.    Please follow up with your primary care doctor and your cardiologist as soon as able for re-evaluation and further workup.   Please return to the Emergency Department if you experience worsening of your current symptoms or any other concerning symptoms.

## 2025-01-29 NOTE — Clinical Note
Randa Boo was seen and treated in our emergency department on 1/29/2025.    No restrictions            Diagnosis: chest pain    Randa  is off the rest of the shift today, may return to work on return date.    She may return on this date: 01/31/2025         If you have any questions or concerns, please don't hesitate to call.      Margarita Da Silva MD    ______________________________           _______________          _______________  Hospital Representative                              Date                                Time

## 2025-01-30 ENCOUNTER — OFFICE VISIT (OUTPATIENT)
Dept: INTERNAL MEDICINE CLINIC | Facility: CLINIC | Age: 50
End: 2025-01-30

## 2025-01-30 VITALS
WEIGHT: 207 LBS | HEART RATE: 88 BPM | TEMPERATURE: 97.8 F | BODY MASS INDEX: 33.41 KG/M2 | SYSTOLIC BLOOD PRESSURE: 100 MMHG | DIASTOLIC BLOOD PRESSURE: 67 MMHG

## 2025-01-30 DIAGNOSIS — M54.12 CERVICAL RADICULOPATHY: ICD-10-CM

## 2025-01-30 DIAGNOSIS — G47.33 OSA (OBSTRUCTIVE SLEEP APNEA): ICD-10-CM

## 2025-01-30 DIAGNOSIS — M79.7 FIBROMYALGIA: Primary | ICD-10-CM

## 2025-01-30 DIAGNOSIS — M79.18 MYOFASCIAL PAIN SYNDROME: ICD-10-CM

## 2025-01-30 PROCEDURE — 99213 OFFICE O/P EST LOW 20 MIN: CPT

## 2025-01-30 NOTE — PROGRESS NOTES
Name: Randa Boo      : 1975      MRN: [id number]  Encounter Provider: Jordan Fontenot MD  Encounter Date: 2025   Encounter department: [facility]    Assessment & Plan  Fibromyalgia    Orders:    Ambulatory Referral to Rheumatology; Future    Lumbar radiculopathy    Orders:    Ambulatory Referral to Rheumatology; Future    Ambulatory referral to Spine & Pain Management; Future    Cervical radiculopathy    Orders:    Ambulatory Referral to Rheumatology; Future    Ambulatory referral to Spine & Pain Management; Future    Myofascial pain syndrome    Orders:    Ambulatory Referral to Rheumatology; Future    Ambulatory referral to Spine & Pain Management; Future         History of Present Illness   {?Quick Links Encounters * My Last Note * Last Note in Specialty * Snapshot * Since Last Visit * History :66282}  HPI  Review of Systems  Past Medical History:   Diagnosis Date    Abdominal pain     Allergic drug reaction 09/15/2021    Anemia     Anxiety     Asthma     Back pain     Bilateral fibrocystic breast changes     resolved: 2017    Breast disorder     Breast pain, left 2022    Chest pain     Chronic pelvic pain in female     last assessed: 2016    Cluster headaches     Colon cancer screening 2021    Constipation     Cough     Depression     Difficulty concentrating     Disease of thyroid gland     Dizziness     Dyspareunia in female     last assessed: 2016    Easy bruising     Endometriosis     Epigastric abdominal pain 2020    Fainting episodes     Fatigue     Fever due to infection     Fibromyalgia     Frequent urination at night     Gallbladder disease     Goiter 2017    Herniated intervertebral disc of lumbar spine     Hypotension     Hypothyroidism 2006    Loss of bladder control     Mastalgia in female 2021    Memory loss     last assessed: 06/15/2015    Mid back pain 2021    Mixed incontinence     last  assessed: 01/11/2016    Myofascial pain     last assessed: 06/01/2016    Numbness and tingling     Painful swelling of joint     Palpitations     Panic attack     Sciatica     last assessed: 06/15/2015    Situational anxiety     last assessed: 02/21/2017    SOB (shortness of breath)     Thyroid disease     Thyroid nodule 03/05/2019    Thyroid ultrasound March 2019.  Left mid thyroid nodule unchanged from previous.  Does not require further evaluation or screening.      Uterus, adenomyosis     last assessed: 09/07/2016    Weakness     Weight disorder      Past Surgical History:   Procedure Laterality Date    CHOLECYSTECTOMY      CHOLECYSTECTOMY LAPAROSCOPIC N/A 10/22/2017    Procedure: CHOLECYSTECTOMY LAPAROSCOPIC;  Surgeon: Josue Martines MD;  [facility];  Service: General    COLONOSCOPY  06/03/2021    ENDOMETRIAL BIOPSY      resolved: 02/21/2017    HYSTERECTOMY  2016    AR VAGINAL HYSTERECTOMY UTERUS 250 GM/< N/A 4/20/2018    Procedure: HYSTERECTOMY VAGINAL TOTAL (TVH), BILATERAL SALPINGECTOMY;  Surgeon: Jenn Ghotra MD;  Location: [facility];  Service: Gynecology    TUBAL LIGATION      last assessed: 10/20/2014    UPPER GASTROINTESTINAL ENDOSCOPY  08/03/2020     Family History   Problem Relation Age of Onset    Hypertension Mother     Arthritis Mother     Hypothyroidism Mother     Asthma Mother     Thyroid disease Mother     Hyperlipidemia Father     Stroke Father     No Known Problems Sister     No Known Problems Sister     No Known Problems Sister     No Known Problems Sister     Rashes / Skin problems Sister     No Known Problems Brother     No Known Problems Brother     Diabetes Maternal Grandmother     No Known Problems Maternal Grandfather     Breast cancer Paternal Grandmother 32    No Known Problems Paternal Grandfather     No Known Problems Daughter     No Known Problems Daughter     No Known Problems Son     No Known Problems Son     Leukemia Paternal Aunt     No Known Problems Paternal Aunt      Hypertension Other     Breast cancer Family     Cancer Family     Diabetes Family     Heart disease Family     Hyperlipidemia Family         reported cholesterol level was high     Social History     Tobacco Use    Smoking status: Former     Current packs/day: 0.00     Average packs/day: 0.5 packs/day for 2.0 years (1.0 ttl pk-yrs)     Types: Cigarettes     Start date: 2005     Quit date:      Years since quittin.0     Passive exposure: Past    Smokeless tobacco: Never   Vaping Use    Vaping status: Never Used   Substance and Sexual Activity    Alcohol use: Yes     Comment: occasional    Drug use: Never    Sexual activity: Yes     Partners: Male     Birth control/protection: Surgical, Female Sterilization     Current Outpatient Medications on File Prior to Visit   Medication Sig    acetaminophen (TYLENOL) 325 mg tablet Take 650 mg by mouth every 6 (six) hours as needed for mild pain    albuterol (PROVENTIL HFA,VENTOLIN HFA) 90 mcg/act inhaler Inhale 1 puff every 4 (four) hours as needed for wheezing    Aspirin Low Dose 81 MG EC tablet TAKE 1 TABLET BY MOUTH EVERY DAY (Patient not taking: Reported on 3/21/2024)    atorvastatin (LIPITOR) 20 mg tablet TAKE 1 TABLET BY MOUTH EVERY DAY    estradiol (VIVELLE-DOT) 0.0375 MG/24HR PLACE 1 PATCH ON THE SKIN 2 TIMES A WEEK. (Patient not taking: No sig reported)    ipratropium (ATROVENT) 0.03 % nasal spray SPRAY 2 SPRAYS INTO EACH NOSTRIL EVERY 12 HOURS.    levothyroxine 125 mcg tablet TAKE 1 TABLET BY MOUTH EVERY DAY    loratadine (CLARITIN) 10 mg tablet TAKE 1 TABLET BY MOUTH EVERY DAY     Allergies   Allergen Reactions    Morphine Chest Pain    Iv Contrast [Iodinated Contrast Media] Hives     Immunization History   Administered Date(s) Administered    Hep B, adult 2022, 2022    Tdap 2019     Objective {?Quick Links Trend Vitals * Enter New Vitals * Results Review * Timeline (Adult) * Labs * Imaging * Cardiology * Procedures * Lung Cancer  Screening * Surgical eConsent :39769}  /67 (BP Location: Left arm, Patient Position: Sitting, Cuff Size: Large)   Pulse 88   Temp 97.8 °F (36.6 °C) (Temporal)   Wt 93.9 kg (207 lb)   LMP 02/28/2018 (Exact Date)   BMI 33.41 kg/m²     Physical Exam  {Administrative / Billing Section (Optional):43924}

## 2025-01-30 NOTE — ASSESSMENT & PLAN NOTE
Orders:    Ambulatory Referral to Rheumatology; Future    Ambulatory referral to Spine & Pain Management; Future

## 2025-01-30 NOTE — ASSESSMENT & PLAN NOTE
Patient with long standing history of fibromyalgia/myofascial pain syndrome and cervical radiculopathy  Prior seen by pain management for steroid injections and by rheumatology and failed gabapentin, duloxetine, and pregabalin due to depression, suicidal ideation with these medications    Patient was in the ED yesterday with complaint of band like squeezing chest pain. EKG, CXR, troponin negative for any cardiac or pulmonary etiology  Slight improvement with toradol     Patient mentions the chest pain is now gone but the chronic pain in the upper back and torso still remains since atleast 10 years  Not very keen to start medications due to prior side effects  Has tried physical therapy and water therapy before without any relief    Plan  Advised to try yoga, meditation, and acupuncture  Referred to rheumatology and pain medicine as per patient wishes  Informed that untreated LUCY could be exacerbating these symptoms and hopefully these improve with CPAP that she is going to get soon  Also advised that weight loss could improve symptoms and again LUCY treatment could help with weight loss  Follow up in 3 months

## 2025-01-30 NOTE — PROGRESS NOTES
Name: Randa Boo      : 1975      MRN: [id number]  Encounter Provider: Jordan Fontenot MD  Encounter Date: 2025   Encounter department: [facility]    Assessment & Plan  Fibromyalgia    Cervical radiculopathy    Myofascial pain syndrome  Patient with long standing history of fibromyalgia/myofascial pain syndrome and cervical radiculopathy  Prior seen by pain management for steroid injections and by rheumatology and failed gabapentin, duloxetine, and pregabalin due to depression, suicidal ideation with these medications    Patient was in the ED yesterday with complaint of band like squeezing chest pain. EKG, CXR, troponin negative for any cardiac or pulmonary etiology  Slight improvement with toradol     Patient mentions the chest pain is now gone but the chronic pain in the upper back and torso still remains since atleast 10 years  Not very keen to start medications due to prior side effects  Has tried physical therapy and water therapy before without any relief    Plan  Advised to try yoga, meditation, and acupuncture  Referred to rheumatology and pain medicine as per patient wishes  Informed that untreated LUCY could be exacerbating these symptoms and hopefully these improve with CPAP that she is going to get soon  Also advised that weight loss could improve symptoms and again LUCY treatment could help with weight loss  Follow up in 3 months  LUCY (obstructive sleep apnea)  Seeing sleep medicine  Awaiting CPAP machine      History of Present Illness     Randa Boo is a 49/F with PMH of hypothyroidism, palpitations, RLS, LUCY, carotid artery atherosclerosis who presents for ED follow up for chest pain as outlined above.      Review of Systems   Constitutional:  Negative for chills and fever.   HENT:  Negative for ear pain and sore throat.    Eyes:  Negative for pain and visual disturbance.   Respiratory:  Negative for cough and shortness of breath.     Cardiovascular:  Negative for chest pain and palpitations.   Gastrointestinal:  Negative for abdominal pain and vomiting.   Genitourinary:  Negative for dysuria and hematuria.   Musculoskeletal:  Positive for back pain, myalgias and neck pain. Negative for arthralgias.   Skin:  Negative for color change and rash.   Neurological:  Negative for dizziness, seizures, syncope, facial asymmetry, weakness, numbness and headaches.   All other systems reviewed and are negative.    Past Medical History:   Diagnosis Date    Abdominal pain     Allergic drug reaction 09/15/2021    Anemia     Anxiety     Asthma     Back pain     Bilateral fibrocystic breast changes     resolved: 02/21/2017    Breast disorder     Breast pain, left 01/26/2022    Chest pain     Chronic pelvic pain in female     last assessed: 09/07/2016    Cluster headaches     Colon cancer screening 05/12/2021    Constipation     Cough     Depression     Difficulty concentrating     Disease of thyroid gland     Dizziness     Dyspareunia in female     last assessed: 08/18/2016    Easy bruising     Endometriosis     Epigastric abdominal pain 07/22/2020    Fainting episodes     Fatigue     Fever due to infection     Fibromyalgia     Frequent urination at night     Gallbladder disease     Goiter 04/13/2017    Herniated intervertebral disc of lumbar spine     Hypotension     Hypothyroidism 2006    Loss of bladder control     Mastalgia in female 05/24/2021    Memory loss     last assessed: 06/15/2015    Mid back pain 03/19/2021    Mixed incontinence     last assessed: 01/11/2016    Myofascial pain     last assessed: 06/01/2016    Numbness and tingling     Painful swelling of joint     Palpitations     Panic attack     Sciatica     last assessed: 06/15/2015    Situational anxiety     last assessed: 02/21/2017    SOB (shortness of breath)     Thyroid disease     Thyroid nodule 03/05/2019    Thyroid ultrasound March 2019.  Left mid thyroid nodule unchanged from previous.   Does not require further evaluation or screening.      Uterus, adenomyosis     last assessed: 09/07/2016    Weakness     Weight disorder      Past Surgical History:   Procedure Laterality Date    CHOLECYSTECTOMY      CHOLECYSTECTOMY LAPAROSCOPIC N/A 10/22/2017    Procedure: CHOLECYSTECTOMY LAPAROSCOPIC;  Surgeon: Josue Martines MD;  Location:  MAIN OR;  Service: General    COLONOSCOPY  06/03/2021    ENDOMETRIAL BIOPSY      resolved: 02/21/2017    HYSTERECTOMY  2016    AL VAGINAL HYSTERECTOMY UTERUS 250 GM/< N/A 4/20/2018    Procedure: HYSTERECTOMY VAGINAL TOTAL (TVH), BILATERAL SALPINGECTOMY;  Surgeon: Jenn Ghotra MD;  Location:  MAIN OR;  Service: Gynecology    TUBAL LIGATION      last assessed: 10/20/2014    UPPER GASTROINTESTINAL ENDOSCOPY  08/03/2020     Family History   Problem Relation Age of Onset    Hypertension Mother     Arthritis Mother     Hypothyroidism Mother     Asthma Mother     Thyroid disease Mother     Hyperlipidemia Father     Stroke Father     No Known Problems Sister     No Known Problems Sister     No Known Problems Sister     No Known Problems Sister     Rashes / Skin problems Sister     No Known Problems Brother     No Known Problems Brother     Diabetes Maternal Grandmother     No Known Problems Maternal Grandfather     Breast cancer Paternal Grandmother 32    No Known Problems Paternal Grandfather     No Known Problems Daughter     No Known Problems Daughter     No Known Problems Son     No Known Problems Son     Leukemia Paternal Aunt     No Known Problems Paternal Aunt     Hypertension Other     Breast cancer Family     Cancer Family     Diabetes Family     Heart disease Family     Hyperlipidemia Family         reported cholesterol level was high     Social History     Tobacco Use    Smoking status: Former     Current packs/day: 0.00     Average packs/day: 0.5 packs/day for 2.0 years (1.0 ttl pk-yrs)     Types: Cigarettes     Start date: 1/1/2005     Quit date: 2007     Years  since quittin.0     Passive exposure: Past    Smokeless tobacco: Never   Vaping Use    Vaping status: Never Used   Substance and Sexual Activity    Alcohol use: Yes     Comment: occasional    Drug use: Never    Sexual activity: Yes     Partners: Male     Birth control/protection: Surgical, Female Sterilization     Current Outpatient Medications on File Prior to Visit   Medication Sig    acetaminophen (TYLENOL) 325 mg tablet Take 650 mg by mouth every 6 (six) hours as needed for mild pain    albuterol (PROVENTIL HFA,VENTOLIN HFA) 90 mcg/act inhaler Inhale 1 puff every 4 (four) hours as needed for wheezing    Aspirin Low Dose 81 MG EC tablet TAKE 1 TABLET BY MOUTH EVERY DAY (Patient not taking: Reported on 3/21/2024)    atorvastatin (LIPITOR) 20 mg tablet TAKE 1 TABLET BY MOUTH EVERY DAY    estradiol (VIVELLE-DOT) 0.0375 MG/24HR PLACE 1 PATCH ON THE SKIN 2 TIMES A WEEK. (Patient not taking: No sig reported)    ipratropium (ATROVENT) 0.03 % nasal spray SPRAY 2 SPRAYS INTO EACH NOSTRIL EVERY 12 HOURS.    levothyroxine 125 mcg tablet TAKE 1 TABLET BY MOUTH EVERY DAY    loratadine (CLARITIN) 10 mg tablet TAKE 1 TABLET BY MOUTH EVERY DAY     Allergies   Allergen Reactions    Morphine Chest Pain    Iv Contrast [Iodinated Contrast Media] Hives     Immunization History   Administered Date(s) Administered    Hep B, adult 2022, 2022    Tdap 2019     Objective   /67 (BP Location: Left arm, Patient Position: Sitting, Cuff Size: Large)   Pulse 88   Temp 97.8 °F (36.6 °C) (Temporal)   Wt 93.9 kg (207 lb)   LMP 2018 (Exact Date)   BMI 33.41 kg/m²     Physical Exam  Vitals and nursing note reviewed.   Constitutional:       General: She is not in acute distress.     Appearance: She is well-developed.   HENT:      Head: Normocephalic and atraumatic.   Eyes:      Conjunctiva/sclera: Conjunctivae normal.   Cardiovascular:      Rate and Rhythm: Normal rate and regular rhythm.      Heart sounds: No  murmur heard.  Pulmonary:      Effort: Pulmonary effort is normal. No respiratory distress.      Breath sounds: Normal breath sounds.   Abdominal:      Palpations: Abdomen is soft.      Tenderness: There is no abdominal tenderness.   Musculoskeletal:         General: Tenderness (cervical spine, upper back, torso) present. No swelling.      Cervical back: Neck supple.   Skin:     General: Skin is warm and dry.      Capillary Refill: Capillary refill takes less than 2 seconds.   Neurological:      Mental Status: She is alert.   Psychiatric:         Mood and Affect: Mood normal.       Jordan Fontenot MD  PGY-3, Internal Medicine  [facility]

## 2025-01-31 ENCOUNTER — TELEPHONE (OUTPATIENT)
Dept: SLEEP CENTER | Facility: CLINIC | Age: 50
End: 2025-01-31

## 2025-01-31 LAB
DME PARACHUTE DELIVERY DATE ACTUAL: NORMAL
DME PARACHUTE DELIVERY DATE EXPECTED: NORMAL
DME PARACHUTE DELIVERY DATE REQUESTED: NORMAL
DME PARACHUTE ITEM DESCRIPTION: NORMAL
DME PARACHUTE ORDER STATUS: NORMAL
DME PARACHUTE SUPPLIER NAME: NORMAL
DME PARACHUTE SUPPLIER PHONE: NORMAL

## 2025-01-31 NOTE — TELEPHONE ENCOUNTER
Pt was set up on 1/31/25 in [facility] with a resmed S11 set at 5-15cm and gave mask tanmay dreamwear nasal mask Small.

## 2025-02-13 ENCOUNTER — TELEPHONE (OUTPATIENT)
Age: 50
End: 2025-02-13

## 2025-02-13 ENCOUNTER — OFFICE VISIT (OUTPATIENT)
Dept: ENDOCRINOLOGY | Facility: CLINIC | Age: 50
End: 2025-02-13
Payer: COMMERCIAL

## 2025-02-13 ENCOUNTER — RESULTS FOLLOW-UP (OUTPATIENT)
Dept: ENDOCRINOLOGY | Facility: CLINIC | Age: 50
End: 2025-02-13

## 2025-02-13 ENCOUNTER — APPOINTMENT (OUTPATIENT)
Dept: LAB | Facility: CLINIC | Age: 50
End: 2025-02-13
Payer: COMMERCIAL

## 2025-02-13 VITALS
SYSTOLIC BLOOD PRESSURE: 102 MMHG | WEIGHT: 207 LBS | HEART RATE: 80 BPM | BODY MASS INDEX: 33.27 KG/M2 | DIASTOLIC BLOOD PRESSURE: 76 MMHG | HEIGHT: 66 IN

## 2025-02-13 DIAGNOSIS — E06.3 HYPOTHYROIDISM DUE TO HASHIMOTO'S THYROIDITIS: ICD-10-CM

## 2025-02-13 DIAGNOSIS — E06.3 HYPOTHYROIDISM DUE TO HASHIMOTO'S THYROIDITIS: Primary | ICD-10-CM

## 2025-02-13 DIAGNOSIS — E04.1 THYROID NODULE: ICD-10-CM

## 2025-02-13 LAB — TSH SERPL DL<=0.05 MIU/L-ACNC: 3.52 UIU/ML (ref 0.45–4.5)

## 2025-02-13 PROCEDURE — 84443 ASSAY THYROID STIM HORMONE: CPT

## 2025-02-13 PROCEDURE — 99214 OFFICE O/P EST MOD 30 MIN: CPT

## 2025-02-13 PROCEDURE — 36415 COLL VENOUS BLD VENIPUNCTURE: CPT

## 2025-02-13 NOTE — TELEPHONE ENCOUNTER
Patient called stating she was just seen in the office and forgot to ask for a doctors note for work. She is asking if this can be added to her MyChart so she can print it or email to her job?  Please advise

## 2025-02-13 NOTE — PROGRESS NOTES
"Established Patient Progress Note    CC: Follow up for hypothyroidism     Impression & Plan:    Assessment & Plan  Thyroid nodule    Orders:    US thyroid; Future    Hypothyroidism due to Hashimoto's thyroiditis  Due for updated lab work. Orders given  Continue levothyroxine  Orders:    TSH, 3rd generation with Free T4 reflex; Future    TSH, 3rd generation with Free T4 reflex; Future      Orders Placed This Encounter   Procedures    US thyroid     Standing Status:   Future     Expected Date:   2/13/2025     Expiration Date:   2/13/2029     Scheduling Instructions:      No prep required.        Please bring your insurance cards and a form of photo ID.  Bring your order/script for the test if from a non - [facility] provider.        Arrive 15 minutes prior to your appointment time to register.            To schedule this appointment, please contact Central Scheduling at [phone].           Does the patient have a history of thyroid cancer? If yes, order US HEAD NECK LYMPH NODE MAPPING [[id number]]:   No- patient does not have a history of thyroid cancer    TSH, 3rd generation with Free T4 reflex     Standing Status:   Future     Expiration Date:   2/13/2026    TSH, 3rd generation with Free T4 reflex     Standing Status:   Future     Expected Date:   8/13/2025     Expiration Date:   2/13/2026       History of Present Illness:   Randa Boo is a 50 y.o. female presents to the office today for routine follow up of hypothyroidism. Currently taking levothyroxine 125 mcg daily.    She reports she felt a \"pop\" in her thyroid 1 month ago. Reports raspy voice but has recently been sick. On physical exam, a left lower nodule is palpated. There is tenderness on palpation. Denies any other trouble swallowing or breathing.  History of hysterectomy with ovaries intact. Follows with OBGYN.    History of thyroid nodule found in 2019. Repeat US 2022 showed no nodule.     Patient Active Problem List   Diagnosis    " Fibromyalgia    Hypothyroidism due to Hashimoto's thyroiditis    Stress incontinence, female    Hypermobility of urethra    Calcaneal spur of left foot    Palpitations    Lumbar radiculopathy    Generalized anxiety disorder    Class 1 obesity due to excess calories without serious comorbidity with body mass index (BMI) of 31.0 to 31.9 in adult    Tension headache    Cervical radiculopathy    Myofascial pain syndrome    Gastroesophageal reflux disease without esophagitis    History of COVID-19    SOB (shortness of breath)    Excessive daytime sleepiness    Dizziness    Sprain of left foot    Sleep apnea    LUCY (obstructive sleep apnea)      Past Medical History:   Diagnosis Date    Abdominal pain     Allergic drug reaction 09/15/2021    Anemia     Anxiety     Asthma     Back pain     Bilateral fibrocystic breast changes     resolved: 02/21/2017    Breast disorder     Breast pain, left 01/26/2022    Chest pain     Chronic pelvic pain in female     last assessed: 09/07/2016    Cluster headaches     Colon cancer screening 05/12/2021    Constipation     Cough     Depression     Difficulty concentrating     Disease of thyroid gland     Dizziness     Dyspareunia in female     last assessed: 08/18/2016    Easy bruising     Endometriosis     Epigastric abdominal pain 07/22/2020    Fainting episodes     Fatigue     Fever due to infection     Fibromyalgia     Frequent urination at night     Gallbladder disease     Goiter 04/13/2017    Herniated intervertebral disc of lumbar spine     Hypotension     Hypothyroidism 2006    Loss of bladder control     Mastalgia in female 05/24/2021    Memory loss     last assessed: 06/15/2015    Mid back pain 03/19/2021    Mixed incontinence     last assessed: 01/11/2016    Myofascial pain     last assessed: 06/01/2016    Numbness and tingling     Painful swelling of joint     Palpitations     Panic attack     Sciatica     last assessed: 06/15/2015    Situational anxiety     last assessed:  02/21/2017    SOB (shortness of breath)     Thyroid disease     Thyroid nodule 03/05/2019    Thyroid ultrasound March 2019.  Left mid thyroid nodule unchanged from previous.  Does not require further evaluation or screening.      Uterus, adenomyosis     last assessed: 09/07/2016    Weakness     Weight disorder       Past Surgical History:   Procedure Laterality Date    CHOLECYSTECTOMY      CHOLECYSTECTOMY LAPAROSCOPIC N/A 10/22/2017    Procedure: CHOLECYSTECTOMY LAPAROSCOPIC;  Surgeon: Josue Martines MD;  Location: [facility];  Service: General    COLONOSCOPY  06/03/2021    ENDOMETRIAL BIOPSY      resolved: 02/21/2017    HYSTERECTOMY  2016    [place] VAGINAL HYSTERECTOMY UTERUS 250 GM/< N/A 4/20/2018    Procedure: HYSTERECTOMY VAGINAL TOTAL (TVH), BILATERAL SALPINGECTOMY;  Surgeon: Jenn Ghotra MD;  Location: [facility];  Service: Gynecology    TUBAL LIGATION      last assessed: 10/20/2014    UPPER GASTROINTESTINAL ENDOSCOPY  08/03/2020      Family History   Problem Relation Age of Onset    Hypertension Mother     Arthritis Mother     Hypothyroidism Mother     Asthma Mother     Thyroid disease Mother     Hyperlipidemia Father     Stroke Father     No Known Problems Sister     No Known Problems Sister     No Known Problems Sister     No Known Problems Sister     Rashes / Skin problems Sister     No Known Problems Brother     No Known Problems Brother     Diabetes Maternal Grandmother     No Known Problems Maternal Grandfather     Breast cancer Paternal Grandmother 32    No Known Problems Paternal Grandfather     No Known Problems Daughter     No Known Problems Daughter     No Known Problems Son     No Known Problems Son     Leukemia Paternal Aunt     No Known Problems Paternal Aunt     Hypertension Other     Breast cancer Family     Cancer Family     Diabetes Family     Heart disease Family     Hyperlipidemia Family         reported cholesterol level was high     Social History     Tobacco Use    Smoking status: Former      Current packs/day: 0.00     Average packs/day: 0.5 packs/day for 2.0 years (1.0 ttl pk-yrs)     Types: Cigarettes     Start date: 2005     Quit date:      Years since quittin.1     Passive exposure: Past    Smokeless tobacco: Never   Substance Use Topics    Alcohol use: Yes     Comment: occasional     Allergies   Allergen Reactions    Morphine Chest Pain    Iv Contrast [Iodinated Contrast Media] Hives         Current Outpatient Medications:     acetaminophen (TYLENOL) 325 mg tablet, Take 650 mg by mouth every 6 (six) hours as needed for mild pain, Disp: , Rfl:     albuterol (PROVENTIL HFA,VENTOLIN HFA) 90 mcg/act inhaler, Inhale 1 puff every 4 (four) hours as needed for wheezing, Disp: 6.7 g, Rfl: 3    ipratropium (ATROVENT) 0.03 % nasal spray, SPRAY 2 SPRAYS INTO EACH NOSTRIL EVERY 12 HOURS., Disp: 30 mL, Rfl: 3    levothyroxine 125 mcg tablet, TAKE 1 TABLET BY MOUTH EVERY DAY, Disp: 90 tablet, Rfl: 1    loratadine (CLARITIN) 10 mg tablet, TAKE 1 TABLET BY MOUTH EVERY DAY, Disp: 90 tablet, Rfl: 3    Aspirin Low Dose 81 MG EC tablet, TAKE 1 TABLET BY MOUTH EVERY DAY (Patient not taking: Reported on 3/21/2024), Disp: 90 tablet, Rfl: 3    atorvastatin (LIPITOR) 20 mg tablet, TAKE 1 TABLET BY MOUTH EVERY DAY (Patient not taking: Reported on 2025), Disp: 90 tablet, Rfl: 3    estradiol (VIVELLE-DOT) 0.0375 MG/24HR, PLACE 1 PATCH ON THE SKIN 2 TIMES A WEEK. (Patient not taking: No sig reported), Disp: 24 patch, Rfl: 2    Current Facility-Administered Medications:     bupivacaine (PF) (MARCAINE) 0.25 % injection 1 mL, 1 mL, Infiltration, , , 1 mL at 24 1100    lidocaine (XYLOCAINE) 1 % injection 1 mL, 1 mL, Infiltration, , , 1 mL at 24 1100    triamcinolone acetonide (KENALOG-40) 40 mg/mL injection 20 mg, 20 mg, Infiltration, , , 20 mg at 24 1100    Review of Systems   Constitutional:  Negative for chills and fever.   HENT:  Negative for ear pain and sore throat.    Eyes:  Negative  "for pain and visual disturbance.   Respiratory:  Negative for cough and shortness of breath.    Cardiovascular:  Negative for chest pain and palpitations.   Gastrointestinal:  Negative for abdominal pain and vomiting.   Genitourinary:  Negative for dysuria and hematuria.   Musculoskeletal:  Negative for arthralgias and back pain.   Skin:  Negative for color change and rash.   Neurological:  Negative for seizures and syncope.   All other systems reviewed and are negative.      Physical Exam:  Body mass index is 33.41 kg/m².  /76   Pulse 80   Ht 5' 6\" (1.676 m)   Wt 93.9 kg (207 lb)   LMP 02/28/2018 (Exact Date)   BMI 33.41 kg/m²    Wt Readings from Last 3 Encounters:   02/13/25 93.9 kg (207 lb)   01/30/25 93.9 kg (207 lb)   12/17/24 94.8 kg (209 lb)       Physical Exam  Vitals reviewed.   Constitutional:       Appearance: Normal appearance.   HENT:      Head: Normocephalic and atraumatic.   Cardiovascular:      Rate and Rhythm: Normal rate.   Pulmonary:      Effort: Pulmonary effort is normal.   Musculoskeletal:      Cervical back: Neck supple. Tenderness present.   Lymphadenopathy:      Cervical: No cervical adenopathy.   Neurological:      Mental Status: She is alert and oriented to person, place, and time.   Psychiatric:         Mood and Affect: Mood normal.         Behavior: Behavior normal.         Labs:   Lab Results   Component Value Date    HGBA1C 5.6 02/07/2024    HGBA1C 5.5 08/19/2022    HGBA1C 5.0 04/17/2018     Lab Results   Component Value Date    CREATININE 0.57 (L) 01/29/2025    CREATININE 0.66 02/07/2024    CREATININE 0.67 02/01/2023    BUN 11 01/29/2025     04/06/2015    K 3.6 01/29/2025     01/29/2025    CO2 26 01/29/2025     GFR, Calculated   Date Value Ref Range Status   11/25/2019 103 >60 mL/min/1.73m2 Final     Comment:     mL/min per 1.73 square meters                                            Normal Function or Mild Renal    Disease (if clinically at risk):  " >or=60  Moderately Decreased:                30-59  Severely Decreased:                  15-29  Renal Failure:                         <15                                            -American GFR: multiply reported GFR by 1.16    Please note that the eGFR is based on the CKD-EPI calculation, and is not intended to be used for drug dosing.     eGFR   Date Value Ref Range Status   01/29/2025 109 ml/min/1.73sq m Final     Lab Results   Component Value Date    CHOL 152 09/25/2015    HDL 58 02/07/2024    TRIG 88 02/07/2024     Lab Results   Component Value Date    ALT 14 01/29/2025    AST 16 01/29/2025    ALKPHOS 75 01/29/2025    BILITOT 0.40 04/06/2015     Lab Results   Component Value Date    SXW5XPWFKAPP 1.658 09/13/2024    MBG0BEYFBJSK 5.054 (H) 07/31/2024    CMW0OCZTMHIS 7.455 (H) 05/14/2024     Lab Results   Component Value Date    FREET4 0.81 05/14/2024         There are no Patient Instructions on file for this visit.      Discussed with the patient and all questioned fully answered. She will call me if any problems arise.

## 2025-02-13 NOTE — ASSESSMENT & PLAN NOTE
Due for updated lab work. Orders given  Continue levothyroxine  Orders:    TSH, 3rd generation with Free T4 reflex; Future    TSH, 3rd generation with Free T4 reflex; Future

## 2025-02-13 NOTE — PROGRESS NOTES
Name: Randa Boo      : 1975      MRN: [id number]  Encounter Provider: Ashley Boothe DO  Encounter Date: 2025   Encounter department: [facility] [address]  :  Assessment & Plan  Fibromyalgia  Patient is a 50-year-old female presenting for further evaluation of diffuse myalgias, arthralgias and numbness tingling.  Symptoms have been going on for many years and has been diagnosed with fibromyalgia in the past.  However, patient reports worsening symptoms recently.  Reports generalized pain which is worse with use and better with rest.  No synovitis or joint tenderness on exam today.  Patient has no signs or symptoms to suggest a systemic autoimmune process.  Patient has undergone serologic testing multiple times in the past with negative NIKOLAI, RNP, Beauchamp, SSA, SSB, RF, anti-CCP.  This time, there is low suspicion for a autoimmune process.  I discussed with patient that her symptoms seem consistent with fibromyalgia however untreated obstructive sleep apnea can also be a mimic.  Discussed the importance of using CPAP, can consider alternative mask if patient is unable to tolerate current one.  Discussed that fibromyalgia is chronically managed through primary care.  -Recommended to start using CPAP machine to treat sleep apnea  -Will check vitamin D level to rule out vitamin D deficiency contributing to symptoms  -For fibromyalgia, pharmacotherapy options can include Lyrica, gabapentin, duloxetine, amitriptyline.  If patient would like to trial these medicines again, can discuss further with PCP if feasible and not contraindicated.  -Can consider physical therapy  Orders:    Ambulatory Referral to Rheumatology    Vitamin D 25 hydroxy; Future    Cervical radiculopathy    Orders:    Ambulatory Referral to Rheumatology    Myofascial pain syndrome    Orders:    Ambulatory Referral to Rheumatology    RTC as needed     Pertinent Medical History   Per chart review, patient initially saw  Dr. Wren and was diagnosed with fibromyalgia.  Patient has tried Lyrica, gabapentin.  Patient states that she could not tolerate those medicines as it gave her severe depression.   In 2021, patient was seen by Dr. Arndt with fatigue and pain all over.  Workup showed negative NIKOLAI, SSA, SSB, dsDNA, RNP, Beauchamp, RF, CCP.  Joint pain sounded non-inflammatory in symptoms seem most consistent with fibromyalgia.     History of Present Illness   Randa Boo is a 50 y.o. female send history of LUCY, GERD, hypothyroidism, lumbar radiculopathy, cervical radiculopathy, anxiety who presents for fibromyalgia.    HPI   Patient reports that recently she has noticed her symptoms have been getting significantly worse and wants to make sure there is nothing else going on.  Patient reports that the pain is worse all over.  Feels pain in her muscles and bones.  Reports that she frequently gets muscle spasms.  The muscle spasms are worse with stretching and activity.  Patient reports pain in between her shoulder blades.  Reports the pain is constant and worse with physical activity.  Patient states that she is able to push through her daily activities and do the chores around her house, however they are exhausting.  Patient reports that if she does excessive physical activity it takes time to recover.  Endorses poor sleep.  Patient states that she was diagnosed with sleep apnea, however has not consistently been using her CPAP machine as she cannot tolerate the mask.  Patient reports numbness and tingling in her hands and below the knees.  Sometimes feels burning sensation around her knees.  Has been experiencing intermittent headaches.  Reports that she is very sensitive to touch.  Light touch and hogs can be very painful.  Patient is unable to tolerate massages.  Patient states that she has tried duloxetine gabapentin and Lyrica in the past, however was unable to tolerate them.  Currently takes Tylenol as needed which  provides minimal relief.  Patient states that she is unable to take NSAIDs due to previous history of gastritis.  Tries to alternate between ice and heat which provides minimal relief.  Patient has tried aqua therapy in the past which she was not able to tolerate.  Patient states that she feels worse after getting out of the pool.    Family hx: sister with SLE     Review of Systems  Complete ROS conducted as per HPI. In addition, denies:  Fever  Photosensitive rash  Sicca symptoms  Recurrent oral ulcers  Muscle weakness  Uveitis  Dactylitis  Chest pain  SOB  Pleurisy  Gross hematuria  Foamy urine  Raynaud's  Joint issues other than noted above    Current Outpatient Medications on File Prior to Visit   Medication Sig Dispense Refill    acetaminophen (TYLENOL) 325 mg tablet Take 650 mg by mouth every 6 (six) hours as needed for mild pain      albuterol (PROVENTIL HFA,VENTOLIN HFA) 90 mcg/act inhaler Inhale 1 puff every 4 (four) hours as needed for wheezing 6.7 g 3    ipratropium (ATROVENT) 0.03 % nasal spray SPRAY 2 SPRAYS INTO EACH NOSTRIL EVERY 12 HOURS. 30 mL 3    levothyroxine 125 mcg tablet TAKE 1 TABLET BY MOUTH EVERY DAY 90 tablet 1    loratadine (CLARITIN) 10 mg tablet TAKE 1 TABLET BY MOUTH EVERY DAY 90 tablet 3    Aspirin Low Dose 81 MG EC tablet TAKE 1 TABLET BY MOUTH EVERY DAY (Patient not taking: Reported on 3/21/2024) 90 tablet 3    atorvastatin (LIPITOR) 20 mg tablet TAKE 1 TABLET BY MOUTH EVERY DAY (Patient not taking: Reported on 2/13/2025) 90 tablet 3    estradiol (VIVELLE-DOT) 0.0375 MG/24HR PLACE 1 PATCH ON THE SKIN 2 TIMES A WEEK. (Patient not taking: No sig reported) 24 patch 2     Current Facility-Administered Medications on File Prior to Visit   Medication Dose Route Frequency Provider Last Rate Last Admin    bupivacaine (PF) (MARCAINE) 0.25 % injection 1 mL  1 mL Infiltration     1 mL at 07/29/24 1100    lidocaine (XYLOCAINE) 1 % injection 1 mL  1 mL Infiltration     1 mL at 07/29/24 1100     "triamcinolone acetonide (KENALOG-40) 40 mg/mL injection 20 mg  20 mg Infiltration     20 mg at 24 1100      Social History     Tobacco Use    Smoking status: Former     Current packs/day: 0.00     Average packs/day: 0.5 packs/day for 2.0 years (1.0 ttl pk-yrs)     Types: Cigarettes     Start date: 2005     Quit date:      Years since quittin.1     Passive exposure: Past    Smokeless tobacco: Never   Vaping Use    Vaping status: Never Used   Substance and Sexual Activity    Alcohol use: Yes     Comment: occasional    Drug use: Never    Sexual activity: Yes     Partners: Male     Birth control/protection: Surgical, Female Sterilization         Objective   /76 (BP Location: Right arm, Patient Position: Sitting, Cuff Size: Adult)   Pulse 99   Temp 98 °F (36.7 °C) (Tympanic)   Ht 5' 6\" (1.676 m)   Wt 93.4 kg (206 lb)   LMP 2018 (Exact Date)   SpO2 97%   BMI 33.25 kg/m²     Physical Exam  General appearance: normal appearing, no acute distress  Skin: normal, no rashes  HEENT: normal, moist oropharynx, no nasal or oral ulcers  Lymph nodes: no palpable adenopathy  Lungs: normal respiratory effort, comfortable on room air, lungs clear to auscultation b/l   Heart: normal heart sounds, normal rate, normal rhythm,  Abdomen: soft, normal bowel sounds, no tenderness  Neurologic: no obvious neurological deficits   Extremities: no edema, warm and well perfused     Musculoskeletal Exam:   - Observation: no obvious joint abnormalities    - Palpation: no joint tenderness, diffuse tenderness to palpation of large muscle groups of the arms, legs and back  - Synovitis: absent  - Joint effusions: absent  - ROM: intact throughout  - Muscle Strength: 5/5 throughout     Recent labs:  Lab Results   Component Value Date/Time    SODIUM 135 2025 12:31 PM    SODIUM 137 2019 05:16 PM    K 3.6 2025 12:31 PM    K 3.6 2019 05:16 PM    BUN 11 2025 12:31 PM    BUN 13 2019 05:16 " PM    CREATININE 0.57 (L) 01/29/2025 12:31 PM    CREATININE 0.72 11/25/2019 05:16 PM    GLUC 113 01/29/2025 12:31 PM    GLUC 96 11/25/2019 05:16 PM    CALCIUM 8.5 01/29/2025 12:31 PM    CALCIUM 8.9 11/25/2019 05:16 PM    AST 16 01/29/2025 12:31 PM    AST 13 11/25/2019 05:16 PM    ALT 14 01/29/2025 12:31 PM    ALT 18 11/25/2019 05:16 PM    ALB 3.9 01/29/2025 12:31 PM    ALB 3.5 11/25/2019 05:16 PM    TP 7.7 01/29/2025 12:31 PM    TP 8.1 11/25/2019 05:16 PM    EGFR 109 01/29/2025 12:31 PM    EGFR 103 11/25/2019 05:16 PM     Lab Results   Component Value Date/Time    HGB 13.4 01/29/2025 12:31 PM    HGB 12.5 04/06/2015 11:04 AM    WBC 7.06 01/29/2025 12:31 PM    WBC 6.57 04/06/2015 11:04 AM     01/29/2025 12:31 PM     04/06/2015 11:04 AM    INR 1.0 11/25/2019 05:16 PM    PTT 31 04/06/2015 11:04 AM     Lab Results   Component Value Date/Time    [id number] 3.525 02/13/2025 10:25 AM    [id number] 5.334 (H) 04/10/2015 01:56 PM       I have personally reviewed notes, labs, and imaging available in the chart.     Ashley Boothe DO, CCD, [facility]

## 2025-02-14 ENCOUNTER — OFFICE VISIT (OUTPATIENT)
Age: 50
End: 2025-02-14

## 2025-02-14 VITALS
DIASTOLIC BLOOD PRESSURE: 76 MMHG | TEMPERATURE: 98 F | OXYGEN SATURATION: 97 % | SYSTOLIC BLOOD PRESSURE: 126 MMHG | WEIGHT: 206 LBS | BODY MASS INDEX: 33.11 KG/M2 | HEIGHT: 66 IN | HEART RATE: 99 BPM

## 2025-02-14 DIAGNOSIS — M79.7 FIBROMYALGIA: ICD-10-CM

## 2025-02-14 DIAGNOSIS — M79.18 MYOFASCIAL PAIN SYNDROME: ICD-10-CM

## 2025-02-14 DIAGNOSIS — M54.12 CERVICAL RADICULOPATHY: ICD-10-CM

## 2025-02-14 NOTE — ASSESSMENT & PLAN NOTE
Patient is a 50-year-old female presenting for further evaluation of diffuse myalgias, arthralgias and numbness tingling.  Symptoms have been going on for many years and has been diagnosed with fibromyalgia in the past.  However, patient reports worsening symptoms recently.  Reports generalized pain which is worse with use and better with rest.  No synovitis or joint tenderness on exam today.  Patient has no signs or symptoms to suggest a systemic autoimmune process.  Patient has undergone serologic testing multiple times in the past with negative NIKOLAI, RNP, Beauchamp, SSA, SSB, RF, anti-CCP.  This time, there is low suspicion for a autoimmune process.  I discussed with patient that her symptoms seem consistent with fibromyalgia however untreated obstructive sleep apnea can also be a mimic.  Discussed the importance of using CPAP, can consider alternative mask if patient is unable to tolerate current one.  Discussed that fibromyalgia is chronically managed through primary care.  -Recommended to start using CPAP machine to treat sleep apnea  -Will check vitamin D level to rule out vitamin D deficiency contributing to symptoms  -For fibromyalgia, pharmacotherapy options can include Lyrica, gabapentin, duloxetine, amitriptyline.  If patient would like to trial these medicines again, can discuss further with PCP if feasible and not contraindicated.  -Can consider physical therapy  Orders:    Ambulatory Referral to Rheumatology    Vitamin D 25 hydroxy; Future

## 2025-02-17 ENCOUNTER — HOSPITAL ENCOUNTER (OUTPATIENT)
Dept: RADIOLOGY | Facility: HOSPITAL | Age: 50
Discharge: HOME | End: 2025-02-17
Payer: COMMERCIAL

## 2025-02-17 DIAGNOSIS — E04.1 THYROID NODULE: ICD-10-CM

## 2025-02-17 PROCEDURE — 76536 US EXAM OF HEAD AND NECK: CPT

## 2025-03-21 ENCOUNTER — APPOINTMENT (EMERGENCY)
Dept: RADIOLOGY | Facility: HOSPITAL | Age: 50
End: 2025-03-21
Payer: COMMERCIAL

## 2025-03-21 ENCOUNTER — HOSPITAL ENCOUNTER (EMERGENCY)
Facility: HOSPITAL | Age: 50
Discharge: HOME | End: 2025-03-21
Attending: EMERGENCY MEDICINE
Payer: COMMERCIAL

## 2025-03-21 VITALS
SYSTOLIC BLOOD PRESSURE: 111 MMHG | RESPIRATION RATE: 20 BRPM | HEART RATE: 91 BPM | OXYGEN SATURATION: 96 % | DIASTOLIC BLOOD PRESSURE: 67 MMHG | TEMPERATURE: 97.6 F

## 2025-03-21 DIAGNOSIS — M25.511 ACUTE PAIN OF RIGHT SHOULDER: ICD-10-CM
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
## 2025-03-21 PROCEDURE — 99284 EMERGENCY DEPT VISIT MOD MDM: CPT

## 2025-03-21 PROCEDURE — 99284 EMERGENCY DEPT VISIT MOD MDM: CPT | Performed by: EMERGENCY MEDICINE

## 2025-03-21 PROCEDURE — 71046 X-RAY EXAM CHEST 2 VIEWS: CPT

## 2025-03-21 RX ORDER — IBUPROFEN 400 MG/1
400 TABLET, FILM COATED ORAL ONCE
Status: COMPLETED | OUTPATIENT
Start: 2025-03-21 | End: 2025-03-21

## 2025-03-21 RX ORDER — ACETAMINOPHEN 325 MG/1
650 TABLET ORAL ONCE
Status: COMPLETED | OUTPATIENT
Start: 2025-03-21 | End: 2025-03-21

## 2025-03-21 RX ADMIN — IBUPROFEN 400 MG: 400 TABLET, FILM COATED ORAL at 09:34

## 2025-03-21 RX ADMIN — ACETAMINOPHEN 650 MG: 325 TABLET, FILM COATED ORAL at 09:34
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
## 2025-03-23 DIAGNOSIS — E06.3 HYPOTHYROIDISM DUE TO HASHIMOTO'S THYROIDITIS: ICD-10-CM

## 2025-03-24 RX ORDER — LEVOTHYROXINE SODIUM 125 UG/1
125 TABLET ORAL DAILY
Qty: 90 TABLET | Refills: 1 | Status: SHIPPED | OUTPATIENT
Start: 2025-03-24

## 2025-03-25 ENCOUNTER — OFFICE VISIT (OUTPATIENT)
Dept: INTERNAL MEDICINE CLINIC | Facility: CLINIC | Age: 50
End: 2025-03-25

## 2025-03-25 ENCOUNTER — TELEPHONE (OUTPATIENT)
Dept: INTERNAL MEDICINE CLINIC | Facility: CLINIC | Age: 50
End: 2025-03-25

## 2025-03-25 VITALS
HEART RATE: 82 BPM | SYSTOLIC BLOOD PRESSURE: 101 MMHG | DIASTOLIC BLOOD PRESSURE: 69 MMHG | TEMPERATURE: 98.1 F | BODY MASS INDEX: 33.75 KG/M2 | HEIGHT: 66 IN | WEIGHT: 210 LBS

## 2025-03-25 DIAGNOSIS — G47.33 OSA (OBSTRUCTIVE SLEEP APNEA): ICD-10-CM

## 2025-03-25 DIAGNOSIS — E78.2 MIXED HYPERLIPIDEMIA: ICD-10-CM

## 2025-03-25 DIAGNOSIS — E66.9 OBESITY (BMI 30-39.9): ICD-10-CM

## 2025-03-25 DIAGNOSIS — M79.7 FIBROMYALGIA: Primary | ICD-10-CM

## 2025-03-25 DIAGNOSIS — E06.3 HYPOTHYROIDISM DUE TO HASHIMOTO'S THYROIDITIS: ICD-10-CM

## 2025-03-25 DIAGNOSIS — T14.8XXA BRUISING: ICD-10-CM
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
## 2025-03-25 PROCEDURE — 99213 OFFICE O/P EST LOW 20 MIN: CPT | Performed by: STUDENT IN AN ORGANIZED HEALTH CARE EDUCATION/TRAINING PROGRAM

## 2025-03-25 RX ORDER — METHOCARBAMOL 500 MG/1
500 TABLET, FILM COATED ORAL 4 TIMES DAILY PRN
Qty: 56 TABLET | Refills: 0 | Status: SHIPPED | OUTPATIENT
Start: 2025-03-25 | End: 2025-04-08
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
## 2025-03-26 NOTE — TELEPHONE ENCOUNTER
Patient called back clarifying she would like the excuse to excuse her up until Monday when she will be returning to work.

## 2025-03-27 ENCOUNTER — RESULTS FOLLOW-UP (OUTPATIENT)
Age: 50
End: 2025-03-27

## 2025-03-27 ENCOUNTER — APPOINTMENT (OUTPATIENT)
Dept: LAB | Facility: CLINIC | Age: 50
End: 2025-03-27
Payer: COMMERCIAL

## 2025-03-27 DIAGNOSIS — E78.2 MIXED HYPERLIPIDEMIA: ICD-10-CM

## 2025-03-27 DIAGNOSIS — M79.7 FIBROMYALGIA: ICD-10-CM

## 2025-03-27 LAB
25(OH)D3 SERPL-MCNC: 17.5 NG/ML (ref 30–100)
CHOLEST SERPL-MCNC: 171 MG/DL (ref ?–200)
HDLC SERPL-MCNC: 55 MG/DL
LDLC SERPL CALC-MCNC: 95 MG/DL (ref 0–100)
NONHDLC SERPL-MCNC: 116 MG/DL
TRIGL SERPL-MCNC: 103 MG/DL (ref ?–150)

## 2025-03-27 PROCEDURE — 36415 COLL VENOUS BLD VENIPUNCTURE: CPT

## 2025-03-27 PROCEDURE — 80061 LIPID PANEL: CPT

## 2025-03-27 PROCEDURE — 82306 VITAMIN D 25 HYDROXY: CPT

## 2025-03-28 ENCOUNTER — TELEPHONE (OUTPATIENT)
Dept: INTERNAL MEDICINE CLINIC | Facility: CLINIC | Age: 50
End: 2025-03-28

## 2025-03-28 DIAGNOSIS — E55.9 VITAMIN D DEFICIENCY: Primary | ICD-10-CM
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
## 2025-03-28 DIAGNOSIS — T14.8XXA BRUISING: ICD-10-CM
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
## 2025-05-02 DIAGNOSIS — E55.9 VITAMIN D DEFICIENCY: ICD-10-CM

## 2025-05-05 RX ORDER — CHOLECALCIFEROL (VITAMIN D3) 25 MCG
1000 TABLET ORAL DAILY
Qty: 90 TABLET | Refills: 1 | Status: SHIPPED | OUTPATIENT
Start: 2025-05-05

## 2025-05-14 ENCOUNTER — HOSPITAL ENCOUNTER (EMERGENCY)
Facility: HOSPITAL | Age: 50
Discharge: HOME | End: 2025-05-14
Attending: EMERGENCY MEDICINE
Payer: COMMERCIAL

## 2025-05-14 ENCOUNTER — APPOINTMENT (EMERGENCY)
Dept: RADIOLOGY | Facility: HOSPITAL | Age: 50
End: 2025-05-14
Payer: COMMERCIAL

## 2025-05-14 VITALS
TEMPERATURE: 97.8 F | DIASTOLIC BLOOD PRESSURE: 60 MMHG | SYSTOLIC BLOOD PRESSURE: 128 MMHG | OXYGEN SATURATION: 95 % | RESPIRATION RATE: 18 BRPM | HEART RATE: 83 BPM

## 2025-05-14 DIAGNOSIS — R07.9 CHEST PAIN: Primary | ICD-10-CM

## 2025-05-14 LAB
2HR DELTA HS TROPONIN: <-1 NG/L
ALBUMIN SERPL BCG-MCNC: 3.8 G/DL (ref 3.5–5)
ALP SERPL-CCNC: 66 U/L (ref 34–104)
ALT SERPL W P-5'-P-CCNC: 16 U/L (ref 7–52)
ANION GAP SERPL CALCULATED.3IONS-SCNC: 7 MMOL/L (ref 4–13)
AST SERPL W P-5'-P-CCNC: 14 U/L (ref 13–39)
ATRIAL RATE: 79 BPM
BASOPHILS # BLD AUTO: 0.04 THOUSANDS/ÂΜL (ref 0–0.1)
BASOPHILS NFR BLD AUTO: 1 % (ref 0–1)
BILIRUB SERPL-MCNC: 0.39 MG/DL (ref 0.2–1)
BUN SERPL-MCNC: 12 MG/DL (ref 5–25)
CALCIUM SERPL-MCNC: 8.5 MG/DL (ref 8.4–10.2)
CARDIAC TROPONIN I PNL SERPL HS: 3 NG/L (ref ?–50)
CARDIAC TROPONIN I PNL SERPL HS: <2 NG/L (ref ?–50)
CHLORIDE SERPL-SCNC: 104 MMOL/L (ref 96–108)
CO2 SERPL-SCNC: 25 MMOL/L (ref 21–32)
CREAT SERPL-MCNC: 0.59 MG/DL (ref 0.6–1.3)
EOSINOPHIL # BLD AUTO: 0.15 THOUSAND/ÂΜL (ref 0–0.61)
EOSINOPHIL NFR BLD AUTO: 2 % (ref 0–6)
ERYTHROCYTE [DISTWIDTH] IN BLOOD BY AUTOMATED COUNT: 13.2 % (ref 11.6–15.1)
GFR SERPL CREATININE-BSD FRML MDRD: 107 ML/MIN/1.73SQ M
GLUCOSE SERPL-MCNC: 112 MG/DL (ref 65–140)
HCT VFR BLD AUTO: 42.4 % (ref 34.8–46.1)
HGB BLD-MCNC: 14 G/DL (ref 11.5–15.4)
IMM GRANULOCYTES # BLD AUTO: 0.06 THOUSAND/UL (ref 0–0.2)
IMM GRANULOCYTES NFR BLD AUTO: 1 % (ref 0–2)
LIPASE SERPL-CCNC: 29 U/L (ref 11–82)
LYMPHOCYTES # BLD AUTO: 1.74 THOUSANDS/ÂΜL (ref 0.6–4.47)
LYMPHOCYTES NFR BLD AUTO: 23 % (ref 14–44)
MCH RBC QN AUTO: 29.4 PG (ref 26.8–34.3)
MCHC RBC AUTO-ENTMCNC: 33 G/DL (ref 31.4–37.4)
MCV RBC AUTO: 89 FL (ref 82–98)
MONOCYTES # BLD AUTO: 0.59 THOUSAND/ÂΜL (ref 0.17–1.22)
MONOCYTES NFR BLD AUTO: 8 % (ref 4–12)
NEUTROPHILS # BLD AUTO: 5.03 THOUSANDS/ÂΜL (ref 1.85–7.62)
NEUTS SEG NFR BLD AUTO: 65 % (ref 43–75)
NRBC BLD AUTO-RTO: 0 /100 WBCS
P AXIS: 42 DEGREES
PLATELET # BLD AUTO: 214 THOUSANDS/UL (ref 149–390)
PMV BLD AUTO: 10.7 FL (ref 8.9–12.7)
POTASSIUM SERPL-SCNC: 3.7 MMOL/L (ref 3.5–5.3)
PR INTERVAL: 150 MS
PROT SERPL-MCNC: 7.7 G/DL (ref 6.4–8.4)
QRS AXIS: 49 DEGREES
QRSD INTERVAL: 76 MS
QT INTERVAL: 376 MS
QTC INTERVAL: 431 MS
RBC # BLD AUTO: 4.76 MILLION/UL (ref 3.81–5.12)
SODIUM SERPL-SCNC: 136 MMOL/L (ref 135–147)
T WAVE AXIS: -15 DEGREES
VENTRICULAR RATE: 79 BPM
WBC # BLD AUTO: 7.61 THOUSAND/UL (ref 4.31–10.16)

## 2025-05-14 PROCEDURE — 83690 ASSAY OF LIPASE: CPT

## 2025-05-14 PROCEDURE — 99285 EMERGENCY DEPT VISIT HI MDM: CPT | Performed by: EMERGENCY MEDICINE

## 2025-05-14 PROCEDURE — 85025 COMPLETE CBC W/AUTO DIFF WBC: CPT

## 2025-05-14 PROCEDURE — 71046 X-RAY EXAM CHEST 2 VIEWS: CPT

## 2025-05-14 PROCEDURE — 93010 ELECTROCARDIOGRAM REPORT: CPT | Performed by: INTERNAL MEDICINE

## 2025-05-14 PROCEDURE — 36415 COLL VENOUS BLD VENIPUNCTURE: CPT

## 2025-05-14 PROCEDURE — 80053 COMPREHEN METABOLIC PANEL: CPT

## 2025-05-14 PROCEDURE — 93005 ELECTROCARDIOGRAM TRACING: CPT

## 2025-05-14 PROCEDURE — 84484 ASSAY OF TROPONIN QUANT: CPT

## 2025-05-14 PROCEDURE — 99285 EMERGENCY DEPT VISIT HI MDM: CPT

## 2025-05-14 NOTE — ED PROVIDER NOTES
Time reflects when diagnosis was documented in both MDM as applicable and the Disposition within this note       Time User Action Codes Description Comment    5/14/2025  7:22 AM Jey Pool Add [R07.9] Chest pain           ED Disposition       ED Disposition   Discharge    Condition   Stable    Date/Time   Wed May 14, 2025  7:22 AM    Comment   Randa Ema discharge to home/self care.                   Assessment & Plan       Medical Decision Making  Patient is a 50-year-old female presenting with chest pain.    Differential includes but not limited to anxiety, MSK, doubt ACS, doubt asthma, doubt pneumonia, doubt pneumothorax, doubt acute intra-abdominal pathology.  Cardiac workup, abdominal labs.  Patient not interested in symptomatic treatment at this time.    Patient signed out to Dr. Pool pending delta troponin.    Amount and/or Complexity of Data Reviewed  Labs: ordered.  Radiology: ordered and independent interpretation performed.        ED Course as of 05/15/25 1626   Wed May 14, 2025   0549 ECG interpreted by me: Normal sinus rhythm, normal axis, no signs of acute ischemia, similar to previous       Medications - No data to display    ED Risk Strat Scores                    No data recorded    PERC Rule for PE      Flowsheet Row Most Recent Value   PERC Rule for PE    Age >=50 0 Filed at: 05/14/2025 0606   HR >=100 0 Filed at: 05/14/2025 0606   O2 Sat on room air < 95% 0 Filed at: 05/14/2025 0606   History of PE or DVT 0 Filed at: 05/14/2025 0606   Recent trauma or surgery 0 Filed at: 05/14/2025 0606   Hemoptysis 0 Filed at: 05/14/2025 0606   Exogenous estrogen 0 Filed at: 05/14/2025 0606   Unilateral leg swelling 0 Filed at: 05/14/2025 0606   PERC Rule for PE Results 0 Filed at: 05/14/2025 0606                Wells' Criteria for PE      Flowsheet Row Most Recent Value   Wells' Criteria for PE    Clinical signs and symptoms of DVT 0 Filed at: 05/14/2025 0606   PE is primary diagnosis or equally  likely 0 Filed at: 05/14/2025 0606   HR >100 0 Filed at: 05/14/2025 0606   Immobilization at least 3 days or Surgery in the previous 4 weeks 0 Filed at: 05/14/2025 0606   Previous, objectively diagnosed PE or DVT 0 Filed at: 05/14/2025 0606   Hemoptysis 0 Filed at: 05/14/2025 0606   Malignancy with treatment within 6 months or palliative 0 Filed at: 05/14/2025 0606   Wells' Criteria Total 0 Filed at: 05/14/2025 0606                        History of Present Illness       Chief Complaint   Patient presents with    Chest Pain     Pt reports chest pain and SOB that started last night. Mild nausea/dizziness        Past Medical History:   Diagnosis Date    Abdominal pain     Allergic drug reaction 09/15/2021    Anemia     Anxiety     Asthma     Back pain     Bilateral fibrocystic breast changes     resolved: 02/21/2017    Breast disorder     Breast pain, left 01/26/2022    Chest pain     Chronic pelvic pain in female     last assessed: 09/07/2016    Cluster headaches     Colon cancer screening 05/12/2021    Constipation     Cough     Depression     Difficulty concentrating     Disease of thyroid gland     Dizziness     Dyspareunia in female     last assessed: 08/18/2016    Easy bruising     Endometriosis     Epigastric abdominal pain 07/22/2020    Fainting episodes     Fatigue     Fever due to infection     Fibromyalgia     Frequent urination at night     Gallbladder disease     Goiter 04/13/2017    Herniated intervertebral disc of lumbar spine     Hypotension     Hypothyroidism 2006    Loss of bladder control     Mastalgia in female 05/24/2021    Memory loss     last assessed: 06/15/2015    Mid back pain 03/19/2021    Mixed incontinence     last assessed: 01/11/2016    Myofascial pain     last assessed: 06/01/2016    Numbness and tingling     Painful swelling of joint     Palpitations     Panic attack     Sciatica     last assessed: 06/15/2015    Situational anxiety     last assessed: 02/21/2017    SOB (shortness of  breath)     Thyroid disease     Thyroid nodule 03/05/2019    Thyroid ultrasound March 2019.  Left mid thyroid nodule unchanged from previous.  Does not require further evaluation or screening.      Uterus, adenomyosis     last assessed: 09/07/2016    Weakness     Weight disorder       Past Surgical History:   Procedure Laterality Date    CHOLECYSTECTOMY      CHOLECYSTECTOMY LAPAROSCOPIC N/A 10/22/2017    Procedure: CHOLECYSTECTOMY LAPAROSCOPIC;  Surgeon: Josue Martines MD;  Location: [facility];  Service: General    COLONOSCOPY  06/03/2021    ENDOMETRIAL BIOPSY      resolved: 02/21/2017    HYSTERECTOMY  2016    CT VAGINAL HYSTERECTOMY UTERUS 250 GM/< N/A 4/20/2018    Procedure: HYSTERECTOMY VAGINAL TOTAL (TVH), BILATERAL SALPINGECTOMY;  Surgeon: Jenn Ghotra MD;  Location: [facility];  Service: Gynecology    TUBAL LIGATION      last assessed: 10/20/2014    UPPER GASTROINTESTINAL ENDOSCOPY  08/03/2020      Family History   Problem Relation Age of Onset    Hypertension Mother     Arthritis Mother     Hypothyroidism Mother     Asthma Mother     Thyroid disease Mother     Hyperlipidemia Father     Stroke Father     No Known Problems Sister     No Known Problems Sister     No Known Problems Sister     No Known Problems Sister     Rashes / Skin problems Sister     No Known Problems Brother     No Known Problems Brother     Diabetes Maternal Grandmother     No Known Problems Maternal Grandfather     Breast cancer Paternal Grandmother 32    No Known Problems Paternal Grandfather     No Known Problems Daughter     No Known Problems Daughter     No Known Problems Son     No Known Problems Son     Leukemia Paternal Aunt     No Known Problems Paternal Aunt     Hypertension Other     Breast cancer Family     Cancer Family     Diabetes Family     Heart disease Family     Hyperlipidemia Family         reported cholesterol level was high      Social History[1]   E-Cigarette/Vaping    E-Cigarette Use Never User        E-Cigarette/Vaping Substances    Nicotine No     THC No     CBD No     Flavoring No     Other No     Unknown No       I have reviewed and agree with the history as documented.     Patient is a 50-year-old female with past medical history of anemia, anxiety, asthma, palpitations, panic attack, fibromyalgia presenting for chest pain.  Patient states that she woke up this morning and had some chest discomfort associated with not feeling like she can take a deep breath.  She states that she is breathing but feels like she is not breathing.  She has some abdominal pain and nausea that she states has been going on for the last few days.  The pain is not changing and has not gotten any worse.  She is continuing to have normal bowel movements and denies any urinary symptoms.  She denies vomiting.  She denies fever or chills, diaphoresis, no numbness, tingling, or weakness.        Review of Systems        Objective       ED Triage Vitals [05/14/25 0538]   Temperature Pulse Blood Pressure Respirations SpO2 Patient Position - Orthostatic VS   97.8 °F (36.6 °C) 80 128/60 18 96 % Sitting      Temp Source Heart Rate Source BP Location FiO2 (%) Pain Score    Temporal Monitor Left arm -- --      Vitals      Date and Time Temp Pulse SpO2 Resp BP Pain Score FACES Pain Rating User   05/14/25 0845 -- 83 95 % 18 -- -- -- MH   05/14/25 0538 97.8 °F (36.6 °C) 80 96 % 18 128/60 -- --             Physical Exam  Vitals and nursing note reviewed.   Constitutional:       Appearance: She is well-developed.   HENT:      Head: Normocephalic and atraumatic.     Cardiovascular:      Rate and Rhythm: Normal rate and regular rhythm.      Heart sounds: Normal heart sounds.   Pulmonary:      Effort: Pulmonary effort is normal. No accessory muscle usage or respiratory distress.      Breath sounds: Normal breath sounds. No decreased breath sounds or wheezing.   Abdominal:      Palpations: Abdomen is soft.     Musculoskeletal:         General: Normal  range of motion.      Cervical back: Normal range of motion and neck supple.     Skin:     General: Skin is warm and dry.     Neurological:      General: No focal deficit present.      Mental Status: She is alert and oriented to person, place, and time.         Results Reviewed       Procedure Component Value Units Date/Time    HS Troponin I 2hr [[id number]]  (Normal) Collected: 05/14/25 0752    Lab Status: Final result Specimen: Blood from Arm, Left Updated: 05/14/25 0822     hs TnI 2hr <2 ng/L      Delta 2hr hsTnI <-1 ng/L     Comprehensive metabolic panel [[id number]]  (Abnormal) Collected: 05/14/25 0601    Lab Status: Final result Specimen: Blood from Arm, Left Updated: 05/14/25 0642     Sodium 136 mmol/L      Potassium 3.7 mmol/L      Chloride 104 mmol/L      CO2 25 mmol/L      ANION GAP 7 mmol/L      BUN 12 mg/dL      Creatinine 0.59 mg/dL      Glucose 112 mg/dL      Calcium 8.5 mg/dL      AST 14 U/L      ALT 16 U/L      Alkaline Phosphatase 66 U/L      Total Protein 7.7 g/dL      Albumin 3.8 g/dL      Total Bilirubin 0.39 mg/dL      eGFR 107 ml/min/1.73sq m     Narrative:      National Kidney Disease Foundation guidelines for Chronic Kidney Disease (CKD):     Stage 1 with normal or high GFR (GFR > 90 mL/min/1.73 square meters)    Stage 2 Mild CKD (GFR = 60-89 mL/min/1.73 square meters)    Stage 3A Moderate CKD (GFR = 45-59 mL/min/1.73 square meters)    Stage 3B Moderate CKD (GFR = 30-44 mL/min/1.73 square meters)    Stage 4 Severe CKD (GFR = 15-29 mL/min/1.73 square meters)    Stage 5 End Stage CKD (GFR <15 mL/min/1.73 square meters)  Note: GFR calculation is accurate only with a steady state creatinine    Lipase [[id number]]  (Normal) Collected: 05/14/25 0601    Lab Status: Final result Specimen: Blood from Arm, Left Updated: 05/14/25 0642     Lipase 29 u/L     HS Troponin 0hr (reflex protocol) [[id number]]  (Normal) Collected: 05/14/25 0601    Lab Status: Final result Specimen: Blood from Arm, Left Updated:  05/14/25 0632     hs TnI 0hr 3 ng/L     CBC and differential [[id number]] Collected: 05/14/25 0601    Lab Status: Final result Specimen: Blood from Arm, Left Updated: 05/14/25 0621     WBC 7.61 Thousand/uL      RBC 4.76 Million/uL      Hemoglobin 14.0 g/dL      Hematocrit 42.4 %      MCV 89 fL      MCH 29.4 pg      MCHC 33.0 g/dL      RDW 13.2 %      MPV 10.7 fL      Platelets 214 Thousands/uL      nRBC 0 /100 WBCs      Segmented % 65 %      Immature Grans % 1 %      Lymphocytes % 23 %      Monocytes % 8 %      Eosinophils Relative 2 %      Basophils Relative 1 %      Absolute Neutrophils 5.03 Thousands/µL      Absolute Immature Grans 0.06 Thousand/uL      Absolute Lymphocytes 1.74 Thousands/µL      Absolute Monocytes 0.59 Thousand/µL      Eosinophils Absolute 0.15 Thousand/µL      Basophils Absolute 0.04 Thousands/µL             X-ray chest 2 views   ED Interpretation by Laci Murrieta MD (05/14 0658)   No acute cardiopulmonary disease as interpreted by myself.      Final Interpretation by Payton Diaz MD (05/14 0724)      No acute cardiopulmonary disease.            Workstation performed: [id number]             Procedures    ED Medication and Procedure Management   Prior to Admission Medications   Prescriptions Last Dose Informant Patient Reported? Taking?   True Vitamin D3 25 MCG (1000 UT) tablet   No No   Sig: TAKE 1 TABLET BY MOUTH EVERY DAY   acetaminophen (TYLENOL) 325 mg tablet  Self Yes No   Sig: Take 650 mg by mouth every 6 (six) hours as needed for mild pain   atorvastatin (LIPITOR) 20 mg tablet  Self No No   Sig: TAKE 1 TABLET BY MOUTH EVERY DAY   Patient not taking: Reported on 2/13/2025   estradiol (VIVELLE-DOT) 0.0375 MG/24HR  Self No No   Sig: PLACE 1 PATCH ON THE SKIN 2 TIMES A WEEK.   Patient not taking: No sig reported   levothyroxine 125 mcg tablet   No No   Sig: TAKE 1 TABLET BY MOUTH EVERY DAY   loratadine (CLARITIN) 10 mg tablet  Self No No   Sig: TAKE 1 TABLET BY MOUTH EVERY DAY    methocarbamol (ROBAXIN) 500 mg tablet   No No   Sig: Take 1 tablet (500 mg total) by mouth 4 (four) times a day as needed for muscle spasms for up to 14 days      Facility-Administered Medications Last Administration Doses Remaining   bupivacaine (PF) (MARCAINE) 0.25 % injection 1 mL 7/29/2024 11:00 AM    lidocaine (XYLOCAINE) 1 % injection 1 mL 7/29/2024 11:00 AM    triamcinolone acetonide (KENALOG-40) 40 mg/mL injection 20 mg 7/29/2024 11:00 AM         Discharge Medication List as of 5/14/2025  9:01 AM        CONTINUE these medications which have NOT CHANGED    Details   acetaminophen (TYLENOL) 325 mg tablet Take 650 mg by mouth every 6 (six) hours as needed for mild pain, Historical Med      atorvastatin (LIPITOR) 20 mg tablet TAKE 1 TABLET BY MOUTH EVERY DAY, Starting Mon 9/11/2023, Normal      estradiol (VIVELLE-DOT) 0.0375 MG/24HR PLACE 1 PATCH ON THE SKIN 2 TIMES A WEEK., Normal      levothyroxine 125 mcg tablet TAKE 1 TABLET BY MOUTH EVERY DAY, Starting Mon 3/24/2025, Normal      loratadine (CLARITIN) 10 mg tablet TAKE 1 TABLET BY MOUTH EVERY DAY, Normal      methocarbamol (ROBAXIN) 500 mg tablet Take 1 tablet (500 mg total) by mouth 4 (four) times a day as needed for muscle spasms for up to 14 days, Starting Tue 3/25/2025, Until Tue 4/8/2025 at 2359, Normal      True Vitamin D3 25 MCG (1000 UT) tablet TAKE 1 TABLET BY MOUTH EVERY DAY, Starting Mon 5/5/2025, Normal           No discharge procedures on file.  ED SEPSIS DOCUMENTATION   Time reflects when diagnosis was documented in both MDM as applicable and the Disposition within this note       Time User Action Codes Description Comment    5/14/2025  7:22 AM Jey Pool Add [R07.9] Chest pain                      [1]   Social History  Tobacco Use    Smoking status: Former     Current packs/day: 0.00     Average packs/day: 0.5 packs/day for 2.0 years (1.0 ttl pk-yrs)     Types: Cigarettes     Start date: 1/1/2005     Quit date: 2007     Years since  quittin.3     Passive exposure: Past    Smokeless tobacco: Never   Vaping Use    Vaping status: Never Used   Substance Use Topics    Alcohol use: Yes     Comment: occasional    Drug use: Never        Clint Elizondo MD  05/15/25 4559

## 2025-05-14 NOTE — ED ATTENDING ATTESTATION
5/14/2025  I, Laci Murrieta MD, saw and evaluated the patient. I have discussed the patient with the resident/non-physician practitioner and agree with the resident's/non-physician practitioner's findings, Plan of Care, and MDM as documented in the resident's/non-physician practitioner's note, except where noted. All available labs and Radiology studies were reviewed.  I was present for key portions of any procedure(s) performed by the resident/non-physician practitioner and I was immediately available to provide assistance.       At this point I agree with the current assessment done in the Emergency Department.  I have conducted an independent evaluation of this patient a history and physical is as follows:      Final Diagnosis:  1. Chest pain      Chief Complaint   Patient presents with    Chest Pain     Pt reports chest pain and SOB that started last night. Mild nausea/dizziness            A:  - 50-year-old female who presents with chest pain and shortness of breath.      P:  - Given patient's concerns, will do a cardiac workup.   - Will do an EKG for arrythmia, strain; troponin for same as per protocol for evaluation of ACS.   - CBC for anemia; CMP for kidney function and electrolytes.   - Will check CXR for pneumonia, PTX, fluid overload    HEART score:  History 0=Slightly or non-suspicious   ECG 0=Normal   Age 1= > 45 - <65 years   Risk Factors 0= No risk factors known   Troponin 0= Less than or equal to 12 ng/L   Total 1     -  Would consider intraabdominal pathology as a source of the chest pain, as well.  Add lipase to check for pancreatitis.      - Disposition per workup.     Past Medical History:   Diagnosis Date    Abdominal pain     Allergic drug reaction 09/15/2021    Anemia     Anxiety     Asthma     Back pain     Bilateral fibrocystic breast changes     resolved: 02/21/2017    Breast disorder     Breast pain, left 01/26/2022    Chest pain     Chronic pelvic pain in female     last assessed:  "09/07/2016    Cluster headaches     Colon cancer screening 05/12/2021    Constipation     Cough     Depression     Difficulty concentrating     Disease of thyroid gland     Dizziness     Dyspareunia in female     last assessed: 08/18/2016    Easy bruising     Endometriosis     Epigastric abdominal pain 07/22/2020    Fainting episodes     Fatigue     Fever due to infection     Fibromyalgia     Frequent urination at night     Gallbladder disease     Goiter 04/13/2017    Herniated intervertebral disc of lumbar spine     Hypotension     Hypothyroidism 2006    Loss of bladder control     Mastalgia in female 05/24/2021    Memory loss     last assessed: 06/15/2015    Mid back pain 03/19/2021    Mixed incontinence     last assessed: 01/11/2016    Myofascial pain     last assessed: 06/01/2016    Numbness and tingling     Painful swelling of joint     Palpitations     Panic attack     Sciatica     last assessed: 06/15/2015    Situational anxiety     last assessed: 02/21/2017    SOB (shortness of breath)     Thyroid disease     Thyroid nodule 03/05/2019    Thyroid ultrasound March 2019.  Left mid thyroid nodule unchanged from previous.  Does not require further evaluation or screening.      Uterus, adenomyosis     last assessed: 09/07/2016    Weakness     Weight disorder           H:    50-year-old female who presents with chest pain and shortness of breath.  Patient woke up from sleep around 4:30 AM this morning with left-sided chest pain and shortness of breath.  States that she was \"monitoring\" her heart rate and started to become anxious.  Has experienced similar symptoms in the past.      PMH:  Past Medical History:   Diagnosis Date    Abdominal pain     Allergic drug reaction 09/15/2021    Anemia     Anxiety     Asthma     Back pain     Bilateral fibrocystic breast changes     resolved: 02/21/2017    Breast disorder     Breast pain, left 01/26/2022    Chest pain     Chronic pelvic pain in female     last assessed: " 09/07/2016    Cluster headaches     Colon cancer screening 05/12/2021    Constipation     Cough     Depression     Difficulty concentrating     Disease of thyroid gland     Dizziness     Dyspareunia in female     last assessed: 08/18/2016    Easy bruising     Endometriosis     Epigastric abdominal pain 07/22/2020    Fainting episodes     Fatigue     Fever due to infection     Fibromyalgia     Frequent urination at night     Gallbladder disease     Goiter 04/13/2017    Herniated intervertebral disc of lumbar spine     Hypotension     Hypothyroidism 2006    Loss of bladder control     Mastalgia in female 05/24/2021    Memory loss     last assessed: 06/15/2015    Mid back pain 03/19/2021    Mixed incontinence     last assessed: 01/11/2016    Myofascial pain     last assessed: 06/01/2016    Numbness and tingling     Painful swelling of joint     Palpitations     Panic attack     Sciatica     last assessed: 06/15/2015    Situational anxiety     last assessed: 02/21/2017    SOB (shortness of breath)     Thyroid disease     Thyroid nodule 03/05/2019    Thyroid ultrasound March 2019.  Left mid thyroid nodule unchanged from previous.  Does not require further evaluation or screening.      Uterus, adenomyosis     last assessed: 09/07/2016    Weakness     Weight disorder        PSH:  Past Surgical History:   Procedure Laterality Date    CHOLECYSTECTOMY      CHOLECYSTECTOMY LAPAROSCOPIC N/A 10/22/2017    Procedure: CHOLECYSTECTOMY LAPAROSCOPIC;  Surgeon: Josue Martines MD;  Location: [facility];  Service: General    COLONOSCOPY  06/03/2021    ENDOMETRIAL BIOPSY      resolved: 02/21/2017    HYSTERECTOMY  2016    AK VAGINAL HYSTERECTOMY UTERUS 250 GM/< N/A 4/20/2018    Procedure: HYSTERECTOMY VAGINAL TOTAL (TVH), BILATERAL SALPINGECTOMY;  Surgeon: Jenn Ghotra MD;  Location: [facility];  Service: Gynecology    TUBAL LIGATION      last assessed: 10/20/2014    UPPER GASTROINTESTINAL ENDOSCOPY  08/03/2020         PE:   Vitals:     05/14/25 0538 05/14/25 0845   BP: 128/60    BP Location: Left arm    Pulse: 80 83   Resp: 18 18   Temp: 97.8 °F (36.6 °C)    TempSrc: Temporal    SpO2: 96% 95%         Constitutional: Vital signs are normal. She appears well-developed. She is cooperative. No distress.   HENT:   Mouth/Throat: Uvula is midline, oropharynx is clear and moist and mucous membranes are normal.   Eyes: Pupils are equal, round, and reactive to light. Conjunctivae and EOM are normal.   Neck: Trachea normal. No thyroid mass and no thyromegaly present.   Cardiovascular: Normal rate, regular rhythm, normal heart sounds.   No murmur heard.  Pulmonary/Chest: Effort normal and breath sounds normal.   Abdominal: Soft. Normal appearance and bowel sounds are normal. There is no tenderness. There is no rebound, no guarding.   Neurological: She is alert.   Skin: Skin is warm, dry and intact.   Psychiatric: Anxious.        - 13 point ROS was performed and all are normal unless stated in the history above.   - Nursing note reviewed. Vitals reviewed.   - Orders placed by myself and/or advanced practitioner / resident.    - Previous chart was reviewed  - No language barrier.   - History obtained from patient.   - There are no limitations to the history obtained. Reasons ROS could not be obtained:  N/A         Medications - No data to display  X-ray chest 2 views   ED Interpretation   No acute cardiopulmonary disease as interpreted by myself.      Final Result      No acute cardiopulmonary disease.            Workstation performed: [id number]           Orders Placed This Encounter   Procedures    X-ray chest 2 views    CBC and differential    HS Troponin 0hr (reflex protocol)    Comprehensive metabolic panel    Lipase    HS Troponin I 2hr    Continuous cardiac monitoring    Continuous pulse oximetry    ECG 12 lead     Labs Reviewed   COMPREHENSIVE METABOLIC PANEL - Abnormal       Result Value Ref Range Status    Sodium 136  135 - 147 mmol/L Final     "Potassium 3.7  3.5 - 5.3 mmol/L Final    Chloride 104  96 - 108 mmol/L Final    CO2 25  21 - 32 mmol/L Final    ANION GAP 7  4 - 13 mmol/L Final    BUN 12  5 - 25 mg/dL Final    Creatinine 0.59 (*) 0.60 - 1.30 mg/dL Final    Comment: Standardized to IDMS reference method    Glucose 112  65 - 140 mg/dL Final    Comment: If the patient is fasting, the ADA then defines impaired fasting glucose as > 100 mg/dL and diabetes as > or equal to 123 mg/dL.    Calcium 8.5  8.4 - 10.2 mg/dL Final    AST 14  13 - 39 U/L Final    ALT 16  7 - 52 U/L Final    Comment: Specimen collection should occur prior to Sulfasalazine administration due to the potential for falsely depressed results.     Alkaline Phosphatase 66  34 - 104 U/L Final    Total Protein 7.7  6.4 - 8.4 g/dL Final    Albumin 3.8  3.5 - 5.0 g/dL Final    Total Bilirubin 0.39  0.20 - 1.00 mg/dL Final    Comment: Use of this assay is not recommended for patients undergoing treatment with eltrombopag due to the potential for falsely elevated results.  N-acetyl-p-benzoquinone imine (metabolite of Acetaminophen) will generate erroneously low results in samples for patients that have taken an overdose of Acetaminophen.    eGFR 107  ml/min/1.73sq m Final    Narrative:     National Kidney Disease Foundation guidelines for Chronic Kidney Disease (CKD):     Stage 1 with normal or high GFR (GFR > 90 mL/min/1.73 square meters)    Stage 2 Mild CKD (GFR = 60-89 mL/min/1.73 square meters)    Stage 3A Moderate CKD (GFR = 45-59 mL/min/1.73 square meters)    Stage 3B Moderate CKD (GFR = 30-44 mL/min/1.73 square meters)    Stage 4 Severe CKD (GFR = 15-29 mL/min/1.73 square meters)    Stage 5 End Stage CKD (GFR <15 mL/min/1.73 square meters)  Note: GFR calculation is accurate only with a steady state creatinine   HS TROPONIN I 0HR - Normal    hs TnI 0hr 3  \"Refer to ACS Flowchart\"- see link ng/L Final    Comment:                                              Initial (time 0) result  If >=50 " "ng/L, Myocardial injury suggested ;  Type of myocardial injury and treatment strategy  to be determined.  If 5-49 ng/L, a delta result at 2 hours will be needed to further evaluate.  If <4 ng/L, and chest pain has been >3 hours since onset, patient may qualify for discharge based on the HEART score in the ED.  If <5 ng/L and <3hours since onset of chest pain, a delta result at 2 hours will be needed to further evaluate.    HS Troponin 99th Percentile URL of a Health Population=12 ng/L with a 95% Confidence Interval of 8-18 ng/L.    Second Troponin (time 2 hours)  If calculated delta >= 20 ng/L,  Myocardial injury suggested ; Type of myocardial injury and treatment strategy to be determined.  If 5-49 ng/L and the calculated delta is 5-19 ng/L, consult medical service for evaluation.  Continue evaluation for ischemia on ecg and other possible etiology and repeat hs troponin at 4 hours.  If delta is <5 ng/L at 2 hours, consider discharge based on risk stratification via the HEART score (if in ED), or ARUN risk score in IP/Observation.    HS Troponin 99th Percentile URL of a Health Population=12 ng/L with a 95% Confidence Interval of 8-18 ng/L.   LIPASE - Normal    Lipase 29  11 - 82 u/L Final   HS TROPONIN I 2HR - Normal    hs TnI 2hr <2  \"Refer to ACS Flowchart\"- see link ng/L Final    Comment:                                              Initial (time 0) result  If >=50 ng/L, Myocardial injury suggested ;  Type of myocardial injury and treatment strategy  to be determined.  If 5-49 ng/L, a delta result at 2 hours will be needed to further evaluate.  If <4 ng/L, and chest pain has been >3 hours since onset, patient may qualify for discharge based on the HEART score in the ED.  If <5 ng/L and <3hours since onset of chest pain, a delta result at 2 hours will be needed to further evaluate.    HS Troponin 99th Percentile URL of a Health Population=12 ng/L with a 95% Confidence Interval of 8-18 ng/L.    Second Troponin " (time 2 hours)  If calculated delta >= 20 ng/L,  Myocardial injury suggested ; Type of myocardial injury and treatment strategy to be determined.  If 5-49 ng/L and the calculated delta is 5-19 ng/L, consult medical service for evaluation.  Continue evaluation for ischemia on ecg and other possible etiology and repeat hs troponin at 4 hours.  If delta is <5 ng/L at 2 hours, consider discharge based on risk stratification via the HEART score (if in ED), or ARUN risk score in IP/Observation.    HS Troponin 99th Percentile URL of a Health Population=12 ng/L with a 95% Confidence Interval of 8-18 ng/L.    Delta 2hr hsTnI <-1  <20 ng/L Final   CBC AND DIFFERENTIAL    WBC 7.61  4.31 - 10.16 Thousand/uL Final    RBC 4.76  3.81 - 5.12 Million/uL Final    Hemoglobin 14.0  11.5 - 15.4 g/dL Final    Hematocrit 42.4  34.8 - 46.1 % Final    MCV 89  82 - 98 fL Final    MCH 29.4  26.8 - 34.3 pg Final    MCHC 33.0  31.4 - 37.4 g/dL Final    RDW 13.2  11.6 - 15.1 % Final    MPV 10.7  8.9 - 12.7 fL Final    Platelets 214  149 - 390 Thousands/uL Final    nRBC 0  /100 WBCs Final    Segmented % 65  43 - 75 % Final    Immature Grans % 1  0 - 2 % Final    Lymphocytes % 23  14 - 44 % Final    Monocytes % 8  4 - 12 % Final    Eosinophils Relative 2  0 - 6 % Final    Basophils Relative 1  0 - 1 % Final    Absolute Neutrophils 5.03  1.85 - 7.62 Thousands/µL Final    Absolute Immature Grans 0.06  0.00 - 0.20 Thousand/uL Final    Absolute Lymphocytes 1.74  0.60 - 4.47 Thousands/µL Final    Absolute Monocytes 0.59  0.17 - 1.22 Thousand/µL Final    Eosinophils Absolute 0.15  0.00 - 0.61 Thousand/µL Final    Basophils Absolute 0.04  0.00 - 0.10 Thousands/µL Final     Time reflects when diagnosis was documented in both MDM as applicable and the Disposition within this note       Time User Action Codes Description Comment    5/14/2025  7:22 AM Jey Pool Add [R07.9] Chest pain           ED Disposition       ED Disposition   Discharge     Condition   Stable    Date/Time   Wed May 14, 2025  7:22 AM    Comment   Randa Boo discharge to home/self care.                   Follow-up Information       Follow up With Specialties Details Why Contact Info    William Tony MD Internal Medicine   [address] [place] [id number]  [phone]            Discharge Medication List as of 5/14/2025  9:01 AM        CONTINUE these medications which have NOT CHANGED    Details   acetaminophen (TYLENOL) 325 mg tablet Take 650 mg by mouth every 6 (six) hours as needed for mild pain, Historical Med      atorvastatin (LIPITOR) 20 mg tablet TAKE 1 TABLET BY MOUTH EVERY DAY, Starting Mon 9/11/2023, Normal      estradiol (VIVELLE-DOT) 0.0375 MG/24HR PLACE 1 PATCH ON THE SKIN 2 TIMES A WEEK., Normal      levothyroxine 125 mcg tablet TAKE 1 TABLET BY MOUTH EVERY DAY, Starting Mon 3/24/2025, Normal      loratadine (CLARITIN) 10 mg tablet TAKE 1 TABLET BY MOUTH EVERY DAY, Normal      methocarbamol (ROBAXIN) 500 mg tablet Take 1 tablet (500 mg total) by mouth 4 (four) times a day as needed for muscle spasms for up to 14 days, Starting Tue 3/25/2025, Until Tue 4/8/2025 at 2359, Normal      True Vitamin D3 25 MCG (1000 UT) tablet TAKE 1 TABLET BY MOUTH EVERY DAY, Starting Mon 5/5/2025, Normal           No discharge procedures on file.  Prior to Admission Medications   Prescriptions Last Dose Informant Patient Reported? Taking?   True Vitamin D3 25 MCG (1000 UT) tablet   No No   Sig: TAKE 1 TABLET BY MOUTH EVERY DAY   acetaminophen (TYLENOL) 325 mg tablet  Self Yes No   Sig: Take 650 mg by mouth every 6 (six) hours as needed for mild pain   atorvastatin (LIPITOR) 20 mg tablet  Self No No   Sig: TAKE 1 TABLET BY MOUTH EVERY DAY   Patient not taking: Reported on 2/13/2025   estradiol (VIVELLE-DOT) 0.0375 MG/24HR  Self No No   Sig: PLACE 1 PATCH ON THE SKIN 2 TIMES A WEEK.   Patient not taking: No sig reported   levothyroxine 125 mcg tablet   No No   Sig: TAKE 1 TABLET BY  "MOUTH EVERY DAY   loratadine (CLARITIN) 10 mg tablet  Self No No   Sig: TAKE 1 TABLET BY MOUTH EVERY DAY   methocarbamol (ROBAXIN) 500 mg tablet   No No   Sig: Take 1 tablet (500 mg total) by mouth 4 (four) times a day as needed for muscle spasms for up to 14 days      Facility-Administered Medications Last Administration Doses Remaining   bupivacaine (PF) (MARCAINE) 0.25 % injection 1 mL 7/29/2024 11:00 AM    lidocaine (XYLOCAINE) 1 % injection 1 mL 7/29/2024 11:00 AM    triamcinolone acetonide (KENALOG-40) 40 mg/mL injection 20 mg 7/29/2024 11:00 AM           Portions of the record may have been created with voice recognition software. Occasional wrong word or \"sound a like\" substitutions may have occurred due to the inherent limitations of voice recognition software. Read the chart carefully and recognize, using context, where substitutions have occurred.       ED Course         Critical Care Time  Procedures      "

## 2025-05-14 NOTE — Clinical Note
Randa Boo was seen and treated in our emergency department on 5/14/2025.            as tolerated    Diagnosis: ER visit    Randa  .    She may return on this date: 05/15/2025         If you have any questions or concerns, please don't hesitate to call.      Jey Pool, DO    ______________________________           _______________          _______________  Hospital Representative                              Date                                Time

## 2025-05-14 NOTE — DISCHARGE INSTRUCTIONS
Randa Boo was seen and evaluated today in the emergency department over your concern of chest pain.  The workup that we performed showed normal heart score, overall unremarkable ECG, no signs of ischemia.  Please return to the emergency department if you experience chest pain, loss of conscioussness or any other signs and symptoms that may be concerning to you.  Please follow-up with your primary care doctor within 1 week.  All questions were answered prior to discharge.  Thank you for choosing [facility] for your care.

## 2025-05-19 ENCOUNTER — RESULTS FOLLOW-UP (OUTPATIENT)
Age: 50
End: 2025-05-19

## 2025-05-29 ENCOUNTER — TELEPHONE (OUTPATIENT)
Age: 50
End: 2025-05-29

## 2025-05-29 ENCOUNTER — OFFICE VISIT (OUTPATIENT)
Age: 50
End: 2025-05-29
Payer: COMMERCIAL

## 2025-05-29 VITALS
HEIGHT: 66 IN | SYSTOLIC BLOOD PRESSURE: 124 MMHG | RESPIRATION RATE: 16 BRPM | HEART RATE: 86 BPM | WEIGHT: 209.6 LBS | BODY MASS INDEX: 33.68 KG/M2 | TEMPERATURE: 96.7 F | DIASTOLIC BLOOD PRESSURE: 70 MMHG

## 2025-05-29 DIAGNOSIS — G47.33 OSA (OBSTRUCTIVE SLEEP APNEA): ICD-10-CM

## 2025-05-29 DIAGNOSIS — E66.811 OBESITY, CLASS I, BMI 30-34.9: Primary | ICD-10-CM

## 2025-05-29 PROCEDURE — 99204 OFFICE O/P NEW MOD 45 MIN: CPT | Performed by: PHYSICIAN ASSISTANT

## 2025-05-29 RX ORDER — CYCLOBENZAPRINE HCL 5 MG
TABLET ORAL AS NEEDED
COMMUNITY
Start: 2025-05-27

## 2025-05-29 RX ORDER — TIRZEPATIDE 2.5 MG/.5ML
2.5 INJECTION, SOLUTION SUBCUTANEOUS WEEKLY
Qty: 2 ML | Refills: 0 | Status: SHIPPED | OUTPATIENT
Start: 2025-05-29 | End: 2025-05-29 | Stop reason: ALTCHOICE

## 2025-05-29 NOTE — TELEPHONE ENCOUNTER
PA for Wegovy SUBMITTED to [employer] [facility]    via    [x]CMM-KEY: [id number]  []Surescripts-Case ID #    []Availity-Auth ID #  NDC #    []Faxed to plan   []Other website    []Phone call Case ID #      []PA sent as URGENT    All office notes, labs and other pertaining documents and studies sent. Clinical questions answered. Awaiting determination from insurance company.     Turnaround time for your insurance to make a decision on your Prior Authorization can take 7-21 business days.

## 2025-05-29 NOTE — TELEPHONE ENCOUNTER
PA for Wegovy  APPROVED     Date(s) approved NO PA REQUIRED- Called [employer] and speak to  Kristine who stated Wegovy falls under a higher tier cost to patient $1387.00. [employer] plan exclusion.    Case #     Patient advised by          []MyChart Message  [x]Phone call   []LMOM  []L/M to call office as no active Communication consent on file  []Unable to leave detailed message as VM not approved on Communication consent       Pharmacy advised by    []Fax  [x]Phone call  []Secure Chat    Specialty Pharmacy    []     Approval letter scanned into [employer] No -On covermymeds   Patient was informed of the amount of 1387.00 for wegovy. Patient stated that she will talk it over with her .
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
## 2025-05-29 NOTE — PATIENT INSTRUCTIONS
I have sent Zebound to your pharmacy. The prior authorization process will been done through our prior authorization team and can take up to 3-4 weeks to process through the insurance.     - Start Zepbound 2.5 mg subcutaneously weekly. After you have taken the second pen, please give me an update, as we will likely increase the dose the next month if you are tolerating it well.    - Side effects of Zepbound include nausea, vomiting, diarrhea, or constipation. Keep an eye on your heart rate while on Zepbound. If you resting heart rate is greater than 100 beats per minutes, please notify me. If you develop severe abdominal pain, stop Zepbound and go to the emergency room, as that could be a sign of pancreatitis.     - Please notify me if you have surgery, upper endoscopy, or colonoscopy scheduled, as we typically hold Zepbound for one week prior to the procedure.     - Zepbound can reduce the effectiveness of oral hormonal birth control (birth control pills). Recommend a barrier backup method such as condoms to prevent pregnancy.       GLP-1 Managing Side Effects Tips:  - focus on small frequent meals  - moderate sugar and fat intake  - change the injection site (abdomen --> thigh--> back of arm)  - eat protein, crackers, mints and trang-based drinks  - nighttime injections  - drink plenty of water  - consider fiber supplements like miralax    Tips for Nausea:  - Don't drink and eat simultaneously: separate fluids 30-60 minutes before and after meals when experiencing nausea or if you notice you become full quickly  - Choose mild smelling foods- strong smells can exacerbate nausea  - Trang and peppermint- consider drinking trang or peppermint tea, which can help alleviate symptoms  - Eat- don't skip meals, if you have nausea, it is understandable that you may not feel like eating. However, skipping meals is generally not recommended as this can lead to lower blood sugar and fatigue. Furthermore, it is essential to  consume adequate protein during weight loss. You can opt for a protein shake, a meal replacement supplement, bone broth or soup.     Tips for Constipation:   - Drink plenty of water  - Eat food with a high fiber content: increase your fiber intake by consuming these types of foods:   Eat more whole grain products like barley, oats, farro, brown or wild rice and quinoa.    Look for choices with 100% whole wheat, rye, oats or bran as the first ingredient listed    Check nutrition fact labels and choose products with 4gm of dietary fiber or more per serving   Add more beans to your diet   Eat more fresh fruits and vegetables with skins on them    Exercise- increase physical activity as it helps stimulate gastric mobility, which moves stool through the digestive tract

## 2025-06-19 ENCOUNTER — HOSPITAL ENCOUNTER (OUTPATIENT)
Dept: MAMMOGRAPHY | Facility: CLINIC | Age: 50
End: 2025-06-19
Attending: STUDENT IN AN ORGANIZED HEALTH CARE EDUCATION/TRAINING PROGRAM
Payer: COMMERCIAL

## 2025-06-19 VITALS — WEIGHT: 209 LBS | BODY MASS INDEX: 33.59 KG/M2 | HEIGHT: 66 IN
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
## 2025-06-19 DIAGNOSIS — T14.8XXA BRUISING: ICD-10-CM

## 2025-06-19 PROCEDURE — G0279 TOMOSYNTHESIS, MAMMO: HCPCS

## 2025-06-19 PROCEDURE — 77066 DX MAMMO INCL CAD BI: CPT

## 2025-06-19 PROCEDURE — 76642 ULTRASOUND BREAST LIMITED: CPT

## 2025-07-15 ENCOUNTER — TELEPHONE (OUTPATIENT)
Dept: INTERNAL MEDICINE CLINIC | Facility: CLINIC | Age: 50
End: 2025-07-15

## 2025-07-23 ENCOUNTER — OFFICE VISIT (OUTPATIENT)
Dept: CARDIOLOGY CLINIC | Facility: CLINIC | Age: 50
End: 2025-07-23
Payer: COMMERCIAL

## 2025-07-23 VITALS
SYSTOLIC BLOOD PRESSURE: 100 MMHG | DIASTOLIC BLOOD PRESSURE: 78 MMHG | HEART RATE: 75 BPM | WEIGHT: 212 LBS | OXYGEN SATURATION: 96 % | BODY MASS INDEX: 34.07 KG/M2 | HEIGHT: 66 IN

## 2025-07-23 DIAGNOSIS — I65.23 CAROTID STENOSIS, ASYMPTOMATIC, BILATERAL: ICD-10-CM

## 2025-07-23 DIAGNOSIS — E78.5 HYPERLIPIDEMIA, UNSPECIFIED HYPERLIPIDEMIA TYPE: ICD-10-CM

## 2025-07-23 DIAGNOSIS — G47.33 OSA (OBSTRUCTIVE SLEEP APNEA): ICD-10-CM

## 2025-07-23 DIAGNOSIS — I44.1 MOBITZ (TYPE) I (WENCKEBACH'S) ATRIOVENTRICULAR BLOCK: ICD-10-CM

## 2025-07-23 DIAGNOSIS — I47.19 ATRIAL TACHYCARDIA (HCC): ICD-10-CM

## 2025-07-23 DIAGNOSIS — R00.2 PALPITATIONS: Primary | ICD-10-CM

## 2025-07-23 LAB
ATRIAL RATE: 75 BPM
P AXIS: 36 DEGREES
PR INTERVAL: 158 MS
QRS AXIS: 44 DEGREES
QRSD INTERVAL: 76 MS
QT INTERVAL: 398 MS
QTC INTERVAL: 444 MS
T WAVE AXIS: 21 DEGREES
VENTRICULAR RATE: 75 BPM

## 2025-07-23 PROCEDURE — 99214 OFFICE O/P EST MOD 30 MIN: CPT | Performed by: INTERNAL MEDICINE

## 2025-07-23 PROCEDURE — 93000 ELECTROCARDIOGRAM COMPLETE: CPT | Performed by: INTERNAL MEDICINE

## 2025-07-23 RX ORDER — GABAPENTIN 100 MG/1
CAPSULE ORAL
COMMUNITY
Start: 2025-07-10

## 2025-07-23 NOTE — PATIENT INSTRUCTIONS
You were seen today in the Cardiology office for follow up evaluation.     Below is a summary of the recommendations based upon today's visit:    1. Dietary modifications - Follow a Mediterranean diet - one that is low in saturated fats (avoid red meat, dairy, cheeses), emphasize chicken, fish, turkey, tofu, vegetables, fruit (moderate quantities); also avoid simple carbs/starch/sugars, use whole wheat/grain/bran/oatmeal, snack on small quantities of nuts and fruits.     2. Exercise is very important. Ideally, AT LEAST four to five times weekly for 30 to 60 minutes per session - both cardiovascular and resistance work is OK. Listen to your body and rest when needed.    3. Continue all present medications. Resume Atorvastatin.     4. Testing prior to your next visit includes: Carotid ultrasound    5. Remember the ABCDEs to cardiovascular health for patients:  A: Awareness of cardiovascular symptoms  B: Blood pressure control  C: Cholesterol control  C: Cigarette avoidance  D: Diabetes control  D: Dietary choices  E: Exercise    6. Return in 12 months     Any testing ordered in office today will be available for patient review via [employer], and reviewed with me at the follow-up office visit as scheduled.    Thank you for choosing [facility].    Please call our office or use [employer] with any questions.

## 2025-07-23 NOTE — PROGRESS NOTES
[facility]    Name:Randa Boo   DOS: 7/23/2025     Chief Complaint:   Chief Complaint   Patient presents with    Follow-up     Pt here for 1 yr follow up. Pt states vision gets blurry occasionally    Chest Pain    Palpitations    Dizziness    Shortness of Breath    Edema     Legs and fingers.       HISTORY OF PRESENT ILLNESS:    HPI:  Randa Boo is a 50 y.o. female. She  has a past medical history of Abdominal pain, Allergic drug reaction (09/15/2021), Anemia, Anxiety, Asthma, Back pain, Bilateral fibrocystic breast changes, Breast disorder, Breast pain, left (01/26/2022), Chest pain, Chronic pelvic pain in female, Cluster headaches, Colon cancer screening (05/12/2021), Constipation, Cough, Depression, Difficulty concentrating, Disease of thyroid gland, Dizziness, Dyspareunia in female, Easy bruising, Endometriosis, Epigastric abdominal pain (07/22/2020), Fainting episodes, Fatigue, Fever due to infection, Fibromyalgia, Frequent urination at night, Gallbladder disease, Goiter (04/13/2017), Herniated intervertebral disc of lumbar spine, Hypotension, Hypothyroidism (2006), Loss of bladder control, Mastalgia in female (05/24/2021), Memory loss, Mid back pain (03/19/2021), Mixed incontinence, Myofascial pain, Numbness and tingling, Painful swelling of joint, Palpitations, Panic attack, Sciatica, Situational anxiety, SOB (shortness of breath), Thyroid disease, Thyroid nodule (03/05/2019), Uterus, adenomyosis, Weakness, and Weight disorder.    She presents for follow up. Last saw me in the office on 5/17/24. She has a pertinent past medical history significant for palpitations correlating to paroxysmal atrial tachycardia (symptomatic on Ziopatch XT 4/2024).  There is also history of nocturnal AV block, Mobitz type I.  She was subsequently referred for a home sleep study by her PCP, which demonstrated evidence of moderate obstructive sleep apnea although it appears that the patient was  lost to follow-up after this diagnosis.  There is also a reported history of chronic chest pain, atypical. In the past, she was evaluated by cardiac CTA which demonstrated a coronary calcium score of 0 and absence of occlusive coronary disease at that time. Subsequent to that time she underwent an exercise stress echo, which demonstrated no ECG criteria for ischemia but technically difficult wall motion assessment limited sensitivity and specificity of the echocardiographic portion of the study.  This has been diagnosed as costochondritis and a component of fibromyalgia.    At the last office visit, a trial of medical management was recommended for arrhythmia suppression although the patient declined this.    Today, she reports no new cardiac complaints.  Continues to experience palpitations of similar frequency, severity, character.  There has been no overall change in symptomatology.  Continues to experience intermittent precordial chest wall pain as previously described.  No symptoms with exertion.  Denies shortness of breath, diaphoresis, edema.  Recently stopped taking her statin because she forgot to take it so often.  Is requesting repeat imaging of her carotid arteries.     ROS    ROS: Pertinent positives and negatives as described in History of Present Illness. Remainder of a 14 point review of systems was negative.     Allergies[1]     Medications Ordered Prior to Encounter[2]    Past Medical History[3]    Past Surgical History[4]    Family History[5]    Social History     Socioeconomic History    Marital status: /Civil Union     Spouse name: Not on file    Number of children: 4    Years of education: Not on file    Highest education level: Not on file   Occupational History    Occupation: Full-time   Tobacco Use    Smoking status: Former     Current packs/day: 0.00     Average packs/day: 0.5 packs/day for 2.0 years (1.0 ttl pk-yrs)     Types: Cigarettes     Start date: 1/1/2005     Quit date: 2007      Years since quittin.5     Passive exposure: Past    Smokeless tobacco: Never   Vaping Use    Vaping status: Never Used   Substance and Sexual Activity    Alcohol use: Yes     Comment: occasional    Drug use: Never    Sexual activity: Yes     Partners: Male     Birth control/protection: Surgical, Female Sterilization   Other Topics Concern    Not on file   Social History Narrative    Daily caffeine consumption    Domestic partner    Parent     Social Drivers of Health     Financial Resource Strain: Low Risk  (2025)    Overall Financial Resource Strain (CARDIA)     Difficulty of Paying Living Expenses: Not hard at all   Food Insecurity: No Food Insecurity (2025)    Nursing - Inadequate Food Risk Classification     Worried About Running Out of Food in the Last Year: Never true     Ran Out of Food in the Last Year: Never true     Ran Out of Food in the Last Year: Not on file   Transportation Needs: No Transportation Needs (2025)    PRAPARE - Transportation     Lack of Transportation (Medical): No     Lack of Transportation (Non-Medical): No   Physical Activity: Sufficiently Active (2021)    Exercise Vital Sign     Days of Exercise per Week: 5 days     Minutes of Exercise per Session: 60 min   Stress: Stress Concern Present (10/14/2020)    [employer] [place] [employer] - Occupational Stress Questionnaire     Feeling of Stress : Very much   Social Connections: Moderately Isolated (2021)    Social Connection and Isolation Panel     Frequency of Communication with Friends and Family: More than three times a week     Frequency of Social Gatherings with Friends and Family: Once a week     Attends Taoist Services: Never     Active Member of Clubs or Organizations: No     Attends Club or Organization Meetings: Never     Marital Status:    Intimate Partner Violence: Not At Risk (2021)    Humiliation, Afraid, Rape, and Kick questionnaire     Fear of Current or  Ex-Partner: No     Emotionally Abused: No     Physically Abused: No     Sexually Abused: No   Housing Stability: Low Risk  (1/30/2025)    Housing Stability Vital Sign     Unable to Pay for Housing in the Last Year: No     Number of Times Moved in the Last Year: 1     Homeless in the Last Year: No       OBJECTIVE:    LMP 02/28/2018 (Exact Date)      BP Readings from Last 3 Encounters:   05/29/25 124/70   05/14/25 128/60   03/25/25 101/69       Wt Readings from Last 3 Encounters:   06/19/25 94.8 kg (209 lb)   05/29/25 95.1 kg (209 lb 9.6 oz)   03/25/25 95.3 kg (210 lb)         Physical Exam  Vitals reviewed.   Constitutional:       General: She is not in acute distress.     Appearance: Normal appearance. She is not diaphoretic.   HENT:      Head: Normocephalic and atraumatic.     Eyes:      Conjunctiva/sclera: Conjunctivae normal.     Neck:      Vascular: No carotid bruit or JVD.     Cardiovascular:      Rate and Rhythm: Normal rate and regular rhythm.      Pulses: Normal pulses.      Heart sounds: Normal heart sounds. No murmur heard.     No friction rub. No gallop.   Pulmonary:      Effort: Pulmonary effort is normal.      Breath sounds: Normal breath sounds. No wheezing, rhonchi or rales.     Musculoskeletal:      Right lower leg: No edema.      Left lower leg: No edema.     Neurological:      General: No focal deficit present.      Mental Status: She is alert and oriented to person, place, and time.     Psychiatric:         Mood and Affect: Mood normal.         Behavior: Behavior normal.                                                       Cardiac testing:   EKG reviewed personally: NSR, non specific ST and T change        LABS:  Lab Results   Component Value Date    GLUCOSE 106 04/06/2015    BUN 12 05/14/2025    CREATININE 0.59 (L) 05/14/2025    CALCIUM 8.5 05/14/2025     04/06/2015    K 3.7 05/14/2025    CO2 25 05/14/2025     05/14/2025    ALKPHOS 66 05/14/2025    BILITOT 0.40 04/06/2015    PROT 7.5  "04/06/2015    AST 14 05/14/2025    ALT 16 05/14/2025    ANIONGAP 8 04/06/2015        Lab Results   Component Value Date    WBC 7.61 05/14/2025    HGB 14.0 05/14/2025    HCT 42.4 05/14/2025    MCV 89 05/14/2025     05/14/2025       Lab Results   Component Value Date    CHOL 152 09/25/2015    HDL 55 03/27/2025    LDLCALC 95 03/27/2025    TRIG 103 03/27/2025       Lab Results   Component Value Date    HGBA1C 5.6 02/07/2024           ASSESSMENT/PLAN:  Diagnoses and all orders for this visit:    Palpitations  -     POCT ECG    Atrial tachycardia (HCC)    Mobitz (type) I (Wenckebach's) atrioventricular block    LUCY (obstructive sleep apnea)    Carotid stenosis, asymptomatic, bilateral  -     VAS carotid complete study; Future    Hyperlipidemia, unspecified hyperlipidemia type    Other orders  -     gabapentin (NEURONTIN) 100 mg capsule    Plan:  Resume atorvastatin 20 mg daily  Repeat carotid imaging ordered, last carotid ultrasound was greater than 2 years ago.  This is for surveillance imaging.  I recommended follow-up with PCP and sleep medicine to discuss alternatives to CPAP therapy.  Patient states that she did receive a CPAP, tried 2 different masks, was unable to find comfort with this therapy and therefore returned the machine.  We once again discussed medical management for palpitations.  She prefers to hold off on more medication at this time.  I advised that managing sleep apnea will likely result in an improvement in symptom burden.          Jayden Galindo MD [facility]      Portions of the record may have been created with voice recognition software. Occasional wrong word or \"sound alike\" substitutions may have occurred due to the inherent limitations of voice recognition software. Please review the chart carefully and recognize, using context, where substitutions/typographical errors may have occurred.          [1]   Allergies  Allergen Reactions    Morphine Chest Pain    Iv Contrast [Iodinated Contrast " Media] Hives   [2]   Current Outpatient Medications on File Prior to Visit   Medication Sig Dispense Refill    acetaminophen (TYLENOL) 325 mg tablet Take 650 mg by mouth every 6 (six) hours as needed for mild pain      gabapentin (NEURONTIN) 100 mg capsule       levothyroxine 125 mcg tablet TAKE 1 TABLET BY MOUTH EVERY DAY 90 tablet 1    loratadine (CLARITIN) 10 mg tablet TAKE 1 TABLET BY MOUTH EVERY DAY 90 tablet 3    True Vitamin D3 25 MCG (1000 UT) tablet TAKE 1 TABLET BY MOUTH EVERY DAY 90 tablet 1    cyclobenzaprine (FLEXERIL) 5 mg tablet as needed (Patient not taking: Reported on 7/23/2025)      methocarbamol (ROBAXIN) 500 mg tablet Take 1 tablet (500 mg total) by mouth 4 (four) times a day as needed for muscle spasms for up to 14 days 56 tablet 0     Current Facility-Administered Medications on File Prior to Visit   Medication Dose Route Frequency Provider Last Rate Last Admin    bupivacaine (PF) (MARCAINE) 0.25 % injection 1 mL  1 mL Infiltration     1 mL at 07/29/24 1100    lidocaine (XYLOCAINE) 1 % injection 1 mL  1 mL Infiltration     1 mL at 07/29/24 1100    triamcinolone acetonide (KENALOG-40) 40 mg/mL injection 20 mg  20 mg Infiltration     20 mg at 07/29/24 1100   [3]   Past Medical History:  Diagnosis Date    Abdominal pain     Allergic drug reaction 09/15/2021    Anemia     Anxiety     Asthma     Back pain     Bilateral fibrocystic breast changes     resolved: 02/21/2017    Breast disorder     Breast pain, left 01/26/2022    Chest pain     Chronic pelvic pain in female     last assessed: 09/07/2016    Cluster headaches     Colon cancer screening 05/12/2021    Constipation     Cough     Depression     Difficulty concentrating     Disease of thyroid gland     Dizziness     Dyspareunia in female     last assessed: 08/18/2016    Easy bruising     Endometriosis     Epigastric abdominal pain 07/22/2020    Fainting episodes     Fatigue     Fever due to infection     Fibromyalgia     Frequent urination at  night     Gallbladder disease     Goiter 04/13/2017    Herniated intervertebral disc of lumbar spine     Hypotension     Hypothyroidism 2006    Loss of bladder control     Mastalgia in female 05/24/2021    Memory loss     last assessed: 06/15/2015    Mid back pain 03/19/2021    Mixed incontinence     last assessed: 01/11/2016    Myofascial pain     last assessed: 06/01/2016    Numbness and tingling     Painful swelling of joint     Palpitations     Panic attack     Sciatica     last assessed: 06/15/2015    Situational anxiety     last assessed: 02/21/2017    SOB (shortness of breath)     Thyroid disease     Thyroid nodule 03/05/2019    Thyroid ultrasound March 2019.  Left mid thyroid nodule unchanged from previous.  Does not require further evaluation or screening.      Uterus, adenomyosis     last assessed: 09/07/2016    Weakness     Weight disorder    [4]   Past Surgical History:  Procedure Laterality Date    CHOLECYSTECTOMY      CHOLECYSTECTOMY LAPAROSCOPIC N/A 10/22/2017    Procedure: CHOLECYSTECTOMY LAPAROSCOPIC;  Surgeon: Josue Martines MD;  Location: [facility];  Service: General    COLONOSCOPY  06/03/2021    ENDOMETRIAL BIOPSY      resolved: 02/21/2017    HYSTERECTOMY  2016    MN VAGINAL HYSTERECTOMY UTERUS 250 GM/< N/A 4/20/2018    Procedure: HYSTERECTOMY VAGINAL TOTAL (TVH), BILATERAL SALPINGECTOMY;  Surgeon: Jenn Ghotra MD;  Location: [facility];  Service: Gynecology    TUBAL LIGATION      last assessed: 10/20/2014    UPPER GASTROINTESTINAL ENDOSCOPY  08/03/2020   [5]   Family History  Problem Relation Name Age of Onset    Hypertension Mother jesus ortez     Arthritis Mother jesus ortez     Hypothyroidism Mother jesus ortez     Asthma Mother jesus ortez     Thyroid disease Mother jesus ortez     Hyperlipidemia Father Andrae duque     Stroke Father Andrae duque     No Known Problems Sister louise     No Known Problems Sister carolyn     No Known Problems Sister kevon     No Known Problems  Sister nora     Rashes / Skin problems Sister rose     No Known Problems Daughter lian     No Known Problems Daughter alfredito     Diabetes Maternal Grandmother Rose ellsworth     No Known Problems Maternal Grandfather      Breast cancer Paternal Grandmother ginna 32    No Known Problems Paternal Grandfather      No Known Problems Brother      No Known Problems Brother      No Known Problems Son      No Known Problems Son      Leukemia Paternal Aunt      No Known Problems Paternal Aunt      Cancer Family dad     Diabetes Family dad     Heart disease Family dad     Hyperlipidemia Family dad         reported cholesterol level was high    Hypertension Other Unknown

## 2025-08-01 ENCOUNTER — HOSPITAL ENCOUNTER (OUTPATIENT)
Dept: NON INVASIVE DIAGNOSTICS | Facility: HOSPITAL | Age: 50
Discharge: HOME | End: 2025-08-01
Attending: INTERNAL MEDICINE
Payer: COMMERCIAL

## 2025-08-01 DIAGNOSIS — I65.23 CAROTID STENOSIS, ASYMPTOMATIC, BILATERAL: ICD-10-CM

## 2025-08-01 PROCEDURE — 93880 EXTRACRANIAL BILAT STUDY: CPT | Performed by: SURGERY

## 2025-08-01 PROCEDURE — 93880 EXTRACRANIAL BILAT STUDY: CPT

## 2025-08-07 DIAGNOSIS — E06.3 HYPOTHYROIDISM DUE TO HASHIMOTO'S THYROIDITIS: ICD-10-CM

## 2025-08-08 ENCOUNTER — TELEPHONE (OUTPATIENT)
Dept: ENDOCRINOLOGY | Facility: CLINIC | Age: 50
End: 2025-08-08

## 2025-08-08 DIAGNOSIS — E06.3 HYPOTHYROIDISM DUE TO HASHIMOTO'S THYROIDITIS: ICD-10-CM

## 2025-08-08 RX ORDER — LEVOTHYROXINE SODIUM 125 UG/1
125 TABLET ORAL DAILY
Qty: 90 TABLET | Refills: 1 | Status: SHIPPED | OUTPATIENT
Start: 2025-08-08

## 2025-08-08 RX ORDER — LEVOTHYROXINE SODIUM 125 UG/1
125 TABLET ORAL DAILY
Qty: 90 TABLET | Refills: 2 | Status: SHIPPED | OUTPATIENT
Start: 2025-08-08

## (undated) DEVICE — SUT MONOCRYL 4-0 PS-2 18 IN Y496G

## (undated) DEVICE — 3M™ STERI-STRIP™ REINFORCED ADHESIVE SKIN CLOSURES, R1547, 1/2 IN X 4 IN (12 MM X 100 MM), 6 STRIPS/ENVELOPE: Brand: 3M™ STERI-STRIP™

## (undated) DEVICE — SUT VICRYL 1 CT-1 CR/8 27 IN JJ40G

## (undated) DEVICE — SYRINGE 10ML LL

## (undated) DEVICE — ENDOPOUCH RETRIEVER SPECIMEN RETRIEVAL BAGS: Brand: ENDOPOUCH RETRIEVER

## (undated) DEVICE — SPECIMEN CONTAINER STERILE PEEL PACK

## (undated) DEVICE — ENDOPATH XCEL BLADELESS TROCARS WITH STABILITY SLEEVES: Brand: ENDOPATH XCEL

## (undated) DEVICE — LIGAMAX 5 MM ENDOSCOPIC MULTIPLE CLIP APPLIER: Brand: LIGAMAX

## (undated) DEVICE — REM POLYHESIVE ADULT PATIENT RETURN ELECTRODE: Brand: VALLEYLAB

## (undated) DEVICE — PACK PBDS LAP CHOLE RF

## (undated) DEVICE — 3000CC GUARDIAN II: Brand: GUARDIAN

## (undated) DEVICE — 3M™ STERI-STRIP™ COMPOUND BENZOIN TINCTURE 40 BAGS/CARTON 4 CARTONS/CASE C1544: Brand: 3M™ STERI-STRIP™

## (undated) DEVICE — ENDOPATH XCEL UNIVERSAL TROCAR STABLILITY SLEEVES: Brand: ENDOPATH XCEL

## (undated) DEVICE — GLOVE INDICATOR PI UNDERGLOVE SZ 6.5 BLUE

## (undated) DEVICE — INSUFLATION TUBING INSUFLOW (LEXION)

## (undated) DEVICE — BUTTON SWITCH PENCIL HOLSTER: Brand: VALLEYLAB

## (undated) DEVICE — PLASTIC ADHESIVE BANDAGE: Brand: CURITY

## (undated) DEVICE — GLOVE SRG BIOGEL 8

## (undated) DEVICE — CHLORAPREP HI-LITE 26ML ORANGE

## (undated) DEVICE — GLOVE SRG BIOGEL ECLIPSE 6.5

## (undated) DEVICE — SCD SEQUENTIAL COMPRESSION COMFORT SLEEVE MEDIUM KNEE LENGTH: Brand: KENDALL SCD

## (undated) DEVICE — INTENDED FOR TISSUE SEPARATION, AND OTHER PROCEDURES THAT REQUIRE A SHARP SURGICAL BLADE TO PUNCTURE OR CUT.: Brand: BARD-PARKER SAFETY BLADES SIZE 11, STERILE

## (undated) DEVICE — CAUTERY TIP POLISHER: Brand: DEVON

## (undated) DEVICE — SUT VICRYL 0 UR-6 27 IN J603H

## (undated) DEVICE — NEEDLE 25G X 1 1/2

## (undated) DEVICE — AEM CORD

## (undated) DEVICE — PREMIUM DRY TRAY LF: Brand: MEDLINE INDUSTRIES, INC.

## (undated) DEVICE — SUT VICRYL 0 CT-1 CR/8 27 IN JJ41G

## (undated) DEVICE — COTTON TIP APPLICTOR 2 PK

## (undated) DEVICE — SKIN MARKER DUAL TIP WITH RULER CAP, FLEXIBLE RULER AND LABELS: Brand: DEVON

## (undated) DEVICE — SUT VICRYL 0 CT-1 27 IN J260H

## (undated) DEVICE — PACK PBDS MAJOR GYN VAGINAL SLB

## (undated) DEVICE — GLOVE INDICATOR PI UNDERGLOVE SZ 8 BLUE